# Patient Record
Sex: MALE | Race: WHITE | Employment: OTHER | URBAN - METROPOLITAN AREA
[De-identification: names, ages, dates, MRNs, and addresses within clinical notes are randomized per-mention and may not be internally consistent; named-entity substitution may affect disease eponyms.]

---

## 2017-01-10 ENCOUNTER — ALLIED HEALTH/NURSE VISIT (OUTPATIENT)
Dept: NURSING | Facility: CLINIC | Age: 82
End: 2017-01-10
Payer: COMMERCIAL

## 2017-01-10 DIAGNOSIS — E53.8 VITAMIN B12 DEFICIENCY (NON ANEMIC): Primary | ICD-10-CM

## 2017-01-10 PROCEDURE — 99207 ZZC NO CHARGE NURSE ONLY: CPT

## 2017-01-10 PROCEDURE — 96372 THER/PROPH/DIAG INJ SC/IM: CPT

## 2017-01-10 NOTE — PROGRESS NOTES
The following medication was given:     MEDICATION: Vitamin B12  1000mcg  ROUTE: IM  SITE: Deltoid - Right  DOSE: 1mL  LOT #: [id number]  :  [employer] [place]  EXPIRATION DATE:  5/1/2018  NDC#: 0738-4873-97

## 2017-02-07 ENCOUNTER — ALLIED HEALTH/NURSE VISIT (OUTPATIENT)
Dept: NURSING | Facility: CLINIC | Age: 82
End: 2017-02-07
Payer: COMMERCIAL

## 2017-02-07 DIAGNOSIS — E53.8 VITAMIN B12 DEFICIENCY (NON ANEMIC): Primary | ICD-10-CM

## 2017-02-07 PROCEDURE — 99207 ZZC NO CHARGE NURSE ONLY: CPT | Mod: 25

## 2017-02-27 ENCOUNTER — OFFICE VISIT (OUTPATIENT)
Dept: FAMILY MEDICINE | Facility: CLINIC | Age: 82
End: 2017-02-27
Payer: COMMERCIAL

## 2017-02-27 VITALS
BODY MASS INDEX: 25.31 KG/M2 | HEART RATE: 85 BPM | HEIGHT: 67 IN | SYSTOLIC BLOOD PRESSURE: 122 MMHG | TEMPERATURE: 98.5 F | DIASTOLIC BLOOD PRESSURE: 85 MMHG | WEIGHT: 161.3 LBS | OXYGEN SATURATION: 98 %

## 2017-02-27 DIAGNOSIS — M54.16 RIGHT LUMBAR RADICULOPATHY: Primary | ICD-10-CM

## 2017-02-27 DIAGNOSIS — K52.9 CHRONIC DIARRHEA: ICD-10-CM

## 2017-02-27 DIAGNOSIS — K50.819 CROHN'S DISEASE OF SMALL AND LARGE INTESTINES WITH COMPLICATION (H): ICD-10-CM

## 2017-02-27 PROCEDURE — 99213 OFFICE O/P EST LOW 20 MIN: CPT | Performed by: INTERNAL MEDICINE

## 2017-02-27 RX ORDER — MORPHINE 10 MG/ML
TINCTURE ORAL
Qty: 118 ML | Refills: 0 | Status: SHIPPED | OUTPATIENT
Start: 2017-02-27 | End: 2017-11-07

## 2017-02-27 RX ORDER — DIPHENOXYLATE HCL/ATROPINE 2.5-.025MG
1 TABLET ORAL 4 TIMES DAILY PRN
COMMUNITY
End: 2017-05-25

## 2017-02-27 RX ORDER — LOPERAMIDE HYDROCHLORIDE 2 MG/1
TABLET ORAL
Qty: 30 TABLET | Refills: 11 | Status: SHIPPED | OUTPATIENT
Start: 2017-02-27 | End: 2017-06-09

## 2017-02-27 NOTE — NURSING NOTE
"Chief Complaint   Patient presents with     Musculoskeletal Problem     Hip       Initial /85 (BP Location: Right arm, Patient Position: Chair, Cuff Size: Adult Regular)  Pulse 85  Temp 98.5  F (36.9  C) (Oral)  Ht 5' 7\" (1.702 m)  Wt 161 lb 4.8 oz (73.2 kg)  SpO2 98%  BMI 25.26 kg/m2 Estimated body mass index is 25.26 kg/(m^2) as calculated from the following:    Height as of this encounter: 5' 7\" (1.702 m).    Weight as of this encounter: 161 lb 4.8 oz (73.2 kg).  Medication Reconciliation: complete       AURELIA Rodriguez MA February 27, 2017 3:22 PM      "

## 2017-02-27 NOTE — MR AVS SNAPSHOT
After Visit Summary   2/27/2017    Dawson Jones    MRN: [id number]           Patient Information     Date Of Birth          5/5/1930        Visit Information        Provider Department      2/27/2017 3:00 PM Judson Mcadams MD [facility]        Today's Diagnoses     Right lumbar radiculopathy    -  1    Chronic diarrhea        Crohn's disease of small and large intestines with complication (H)           Follow-ups after your visit        Your next 10 appointments already scheduled     Mar 08, 2017  9:30 AM CST   Office Visit with Judson Mcadams MD   [facility] ([facility])    [address] [facility] [address] [phone]           Bring a current list of meds and any records pertaining to this visit.  For Physicals, please bring immunization records and any forms needing to be filled out.  Please arrive 10 minutes early to complete paperwork.            Mar 27, 2017 10:30 AM CDT   Office Visit with Judson Mcadams MD   [facility] ([facility])    [address] [facility] [id number] [phone]           Bring a current list of meds and any records pertaining to this visit.  For Physicals, please bring immunization records and any forms needing to be filled out.  Please arrive 10 minutes early to complete paperwork.            Apr 04, 2017  9:30 AM CDT   Nurse Only with CS NURSE   [facility] ([facility])    [address] [facility] [id number] [phone]              Who to contact     If you have questions or need follow up information about today's clinic visit or your schedule please contact [facility] directly at [phone].  Normal or non-critical lab and imaging results will be communicated to you by MyChart, letter or phone within 4 business days after the clinic has received the results. If you do not hear from us within 7 days, please contact the clinic through  ""[employer]hart or phone. If you have a critical or abnormal lab result, we will notify you by phone as soon as possible.  Submit refill requests through [employer] or call your pharmacy and they will forward the refill request to us. Please allow 3 business days for your refill to be completed.          Additional Information About Your Visit        "[employer] V&V Information     [employer] lets you send messages to your doctor, view your test results, renew your prescriptions, schedule appointments and more. To sign up, go to [email or web address] . Click on \"Log in\" on the left side of the screen, which will take you to the Welcome page. Then click on \"Sign up Now\" on the right side of the page.     You will be asked to enter the access code listed below, as well as some personal information. Please follow the directions to create your username and password.     Your access code is: [email or web address]  Expires: 2017 10:19 PM     Your access code will  in 90 days. If you need help or a new code, please call your [place] clinic or [phone].        Care EveryWhere ID     This is your Care EveryWhere ID. This could be used by other organizations to access your [place] medical records  [id number]        Your Vitals Were     Pulse Temperature Height Pulse Oximetry BMI (Body Mass Index)       85 98.5  F (36.9  C) (Oral) 5' 7\" (1.702 m) 98% 25.26 kg/m2        Blood Pressure from Last 3 Encounters:   17 122/85   09/15/16 94/70   08/10/16 98/60    Weight from Last 3 Encounters:   17 161 lb 4.8 oz (73.2 kg)   09/15/16 160 lb (72.6 kg)   08/10/16 161 lb 6.4 oz (73.2 kg)              We Performed the Following     MISC DURABLE MEDICAL EQUIP     Patient care order          Today's Medication Changes          These changes are accurate as of: 17 10:19 PM.  If you have any questions, ask your nurse or doctor.               Start taking these medicines.        Dose/Directions    loperamide 2 MG tablet   Commonly known " as:  IMODIUM A-D   Used for:  Chronic diarrhea   Started by:  Judson Mcadams MD        Take 2 tabs (4 mg) after first loose stool, and then take one tab (2 mg) after each diarrheal stool.  Max of 8 tabs (16 mg) per day.   Quantity:  30 tablet   Refills:  11            Where to get your medicines      Some of these will need a paper prescription and others can be bought over the counter.  Ask your nurse if you have questions.     Bring a paper prescription for each of these medications     loperamide 2 MG tablet    opium tincture 10 MG/ML (1%) liquid                Primary Care Provider Office Phone # Fax #    Judson Mcadams [phone]       [facility] [address]  [facility] [address]        Thank you!     Thank you for choosing [facility]  for your care. Our goal is always to provide you with excellent care. Hearing back from our patients is one way we can continue to improve our services. Please take a few minutes to complete the written survey that you may receive in the mail after your visit with us. Thank you!             Your Updated Medication List - Protect others around you: Learn how to safely use, store and throw away your medicines at www.disposemymeds.org.          This list is accurate as of: 2/27/17 10:19 PM.  Always use your most recent med list.                   Brand Name Dispense Instructions for use    cyanocobalamin 1000 MCG/ML injection    VITAMIN B12    1 mL    Inject 1 mL (1,000 mcg) into the muscle every 30 days       diphenoxylate-atropine 2.5-0.025 MG per tablet    LOMOTIL     Take 1 tablet by mouth 4 times daily as needed for diarrhea       ELIQUIS 5 MG tablet   Generic drug:  apixaban ANTICOAGULANT      TK 1 T PO BID       loperamide 2 MG tablet    IMODIUM A-D    30 tablet    Take 2 tabs (4 mg) after first loose stool, and then take one tab (2 mg) after each diarrheal stool.  Max of 8 tabs (16 mg) per day.       metoprolol 50 MG tablet     LOPRESSOR    180 tablet    Take 1 tablet (50 mg) by mouth 2 times daily       opium tincture 10 MG/ML (1%) liquid     118 mL    8 drops every 4 hours as needed for diarrhea

## 2017-02-27 NOTE — PROGRESS NOTES
SUBJECTIVE:                                                    Dawson Jones is a 86 year old male who presents to clinic today for the following health issues:      Musculoskeletal problem/pain      Duration: 1 year    Description  Location: Right Hip    Intensity:  3/10    Accompanying signs and symptoms: radiation of pain to Right Leg ; no new right leg weakness    History  Previous similar problem: YES- Cortizone Injection 08/2016; 7/26  Previous evaluation:  x-ray    Precipitating or alleviating factors:  Trauma or overuse: no   Aggravating factors include: standing and walking    Therapies tried and outcome: nothing           Problem list and histories reviewed & adjusted, as indicated.  Additional history: as documented    Patient Active Problem List   Diagnosis     Atrial fibrillation (H)     Crohn's disease of small and large intestines with complication (H)     Spinal stenosis of lumbosacral region     Vitamin B12 deficiency (non anemic)     Past Surgical History   Procedure Laterality Date     Appendectomy         Social History   Substance Use Topics     Smoking status: Former Smoker     Smokeless tobacco: Never Used      Comment: 40 years ago     Alcohol use 4.2 oz/week     7 Standard drinks or equivalent per week     Family History   Problem Relation Age of Onset     HEART DISEASE No family hx of          Current Outpatient Prescriptions   Medication Sig Dispense Refill     diphenoxylate-atropine (LOMOTIL) 2.5-0.025 MG per tablet Take 1 tablet by mouth 4 times daily as needed for diarrhea       loperamide (IMODIUM A-D) 2 MG tablet Take 2 tabs (4 mg) after first loose stool, and then take one tab (2 mg) after each diarrheal stool.  Max of 8 tabs (16 mg) per day. 30 tablet 11     opium tincture 10 MG/ML (1%) liquid 8 drops every 4 hours as needed for diarrhea 118 mL 0     metoprolol (LOPRESSOR) 50 MG tablet Take 1 tablet (50 mg) by mouth 2 times daily 180 tablet 3     cyanocobalamin (VITAMIN B12)  "1000 MCG/ML injection Inject 1 mL (1,000 mcg) into the muscle every 30 days 1 mL 11     ELIQUIS 5 MG tablet TK 1 T PO BID  3     [DISCONTINUED] opium tincture 10 MG/ML (1%) liquid 8 drops every 4 hours as needed for diarrhea 118 mL 0     Allergies   Allergen Reactions     No Known Allergies        ROS:  C: NEGATIVE for fever, chills, change in weight  R: NEGATIVE for significant cough or SOB  CV: NEGATIVE for chest pain, palpitations or peripheral edema    OBJECTIVE:                                                    /85 (BP Location: Right arm, Patient Position: Chair, Cuff Size: Adult Regular)  Pulse 85  Temp 98.5  F (36.9  C) (Oral)  Ht 5' 7\" (1.702 m)  Wt 161 lb 4.8 oz (73.2 kg)  SpO2 98%  BMI 25.26 kg/m2  Body mass index is 25.26 kg/(m^2).  GEN:  Well appearing man, comfortable   BACK:  No tenderness to palpation over the spinous processes of the thoracic and lumbar spine, deep tendon reflexes are 2+ at the bilateral patella and Achilles tendons, there is 5 out of 5 muscle strength in all lower extremity muscle groups, there is normal sensation to light touch in all lower extremity dermatomes, straight leg raise does not elicit radicular symptoms bilaterally, there was no tenderness to palpation over the paraspinous muscles of the lumbar spine. Gait is normal.     Diagnostic Test Results:  Results for orders placed or performed in visit on 06/16/16   Lipid Profile   Result Value Ref Range    Cholesterol 177 100 - 199 mg/dL    Triglycerides 75 0 - 149 mg/dL    HDL Cholesterol 62 >39 mg/dL    LDL Cholesterol Calculated 100 (H) 0 - 99 mg/dL    Narrative    [facility] Internal Medicine lab scan 6/16/16.   Hemoglobin A1c   Result Value Ref Range    Hemoglobin A1C 5.2 4.4 - 6.2 %    Narrative    [facility] Internal Medicine lab scan 6/16/16.   Lipid Profile   Result Value Ref Range    Cholesterol 177 100 - 199 mg/dL    Triglycerides 67 0 - 149 mg/dL    HDL Cholesterol 59 >39 mg/dL    LDL Cholesterol Calculated " 105 (H) 0 - 99 mg/dL    Narrative    [facility] Internal Medicine lab scan 6/16/16.   Hemoglobin A1c   Result Value Ref Range    Hemoglobin A1C 5.4 4.4 - 6.2 %    Narrative    [facility] Internal Medicine lab scan 6/16/16.        ASSESSMENT/PLAN:                                                            1. Right lumbar radiculopathy  Refer back to SI for trans foraminal steroid injection (he will need to hold NOAC prior to injection, discussed with wife); return her in 3-4 weeks to assess therapy; if that does not help enough then consider cymbalta or neurontin; he has already seen spine surgery and not considered surgical candidate   - Patient care order  - MISC DURABLE MEDICAL EQUIP    2. Chronic diarrhea    - loperamide (IMODIUM A-D) 2 MG tablet; Take 2 tabs (4 mg) after first loose stool, and then take one tab (2 mg) after each diarrheal stool.  Max of 8 tabs (16 mg) per day.  Dispense: 30 tablet; Refill: 11    3. Crohn's disease of small and large intestines with complication (H)    - opium tincture 10 MG/ML (1%) liquid; 8 drops every 4 hours as needed for diarrhea  Dispense: 118 mL; Refill: 0    FUTURE APPOINTMENTS:       - Follow-up visit in 3-4 weeks to assess symptoms after injection     Judson Mcadams MD  [facility]

## 2017-03-06 ENCOUNTER — TRANSFERRED RECORDS (OUTPATIENT)
Dept: HEALTH INFORMATION MANAGEMENT | Facility: CLINIC | Age: 82
End: 2017-03-06

## 2017-03-08 ENCOUNTER — OFFICE VISIT (OUTPATIENT)
Dept: NURSING | Facility: CLINIC | Age: 82
End: 2017-03-08
Payer: COMMERCIAL

## 2017-03-08 DIAGNOSIS — E53.8 VITAMIN B12 DEFICIENCY (NON ANEMIC): Primary | ICD-10-CM

## 2017-03-08 PROCEDURE — 96372 THER/PROPH/DIAG INJ SC/IM: CPT

## 2017-03-08 PROCEDURE — 99207 ZZC NO CHARGE NURSE ONLY: CPT | Mod: 25

## 2017-03-08 NOTE — MR AVS SNAPSHOT
After Visit Summary   3/8/2017    Dawson Jones    MRN: [id number]           Patient Information     Date Of Birth          5/5/1930        Visit Information        Provider Department      3/8/2017 9:30 AM CS NURSE [facility]        Today's Diagnoses     Vitamin B12 deficiency (non anemic)    -  1       Follow-ups after your visit        Your next 10 appointments already scheduled     Mar 27, 2017 10:30 AM CDT   Office Visit with Judson Mcadams MD   [facility] ([facility])    [address] [facility] [address] [phone]           Bring a current list of meds and any records pertaining to this visit.  For Physicals, please bring immunization records and any forms needing to be filled out.  Please arrive 10 minutes early to complete paperwork.            Apr 04, 2017  9:30 AM CDT   Nurse Only with CS NURSE   [facility] ([facility])    [address] [facility] [address] [phone]            May 02, 2017  9:30 AM CDT   Nurse Only with CS NURSE   [facility] ([facility])    [address] [facility] [address] [phone]              Who to contact     If you have questions or need follow up information about today's clinic visit or your schedule please contact [facility] directly at [phone].  Normal or non-critical lab and imaging results will be communicated to you by MyChart, letter or phone within 4 business days after the clinic has received the results. If you do not hear from us within 7 days, please contact the clinic through [employer] or phone. If you have a critical or abnormal lab result, we will notify you by phone as soon as possible.  Submit refill requests through [employer] or call your pharmacy and they will forward the refill request to us. Please allow 3 business days for your refill to be completed.          Additional Information About Your Visit        [employer] [facility]  "Information     [employer] lets you send messages to your doctor, view your test results, renew your prescriptions, schedule appointments and more. To sign up, go to [email or web address] . Click on \"Log in\" on the left side of the screen, which will take you to the Welcome page. Then click on \"Sign up Now\" on the right side of the page.     You will be asked to enter the access code listed below, as well as some personal information. Please follow the directions to create your username and password.     Your access code is: [email or web address]  Expires: 2017 10:19 PM     Your access code will  in 90 days. If you need help or a new code, please call your [place] clinic or [phone].        Care EveryWhere ID     This is your Care EveryWhere ID. This could be used by other organizations to access your [place] medical records  [id number]         Blood Pressure from Last 3 Encounters:   17 122/85   09/15/16 94/70   08/10/16 98/60    Weight from Last 3 Encounters:   17 161 lb 4.8 oz (73.2 kg)   09/15/16 160 lb (72.6 kg)   08/10/16 161 lb 6.4 oz (73.2 kg)              We Performed the Following     INJECTION INTRAMUSCULAR OR SUB-Q     VITAMIN B12 INJ /1000MCG        Primary Care Provider Office Phone # Fax #    Judson Mcadams [phone]       [facility] [address]  [facility] [address]        Thank you!     Thank you for choosing [facility]  for your care. Our goal is always to provide you with excellent care. Hearing back from our patients is one way we can continue to improve our services. Please take a few minutes to complete the written survey that you may receive in the mail after your visit with us. Thank you!             Your Updated Medication List - Protect others around you: Learn how to safely use, store and throw away your medicines at www.disposemymeds.org.          This list is accurate as of: 3/8/17 11:59 PM.  Always use your most recent med " list.                   Brand Name Dispense Instructions for use    cyanocobalamin 1000 MCG/ML injection    VITAMIN B12    1 mL    Inject 1 mL (1,000 mcg) into the muscle every 30 days       diphenoxylate-atropine 2.5-0.025 MG per tablet    LOMOTIL     Take 1 tablet by mouth 4 times daily as needed for diarrhea       ELIQUIS 5 MG tablet   Generic drug:  apixaban ANTICOAGULANT      TK 1 T PO BID       loperamide 2 MG tablet    IMODIUM A-D    30 tablet    Take 2 tabs (4 mg) after first loose stool, and then take one tab (2 mg) after each diarrheal stool.  Max of 8 tabs (16 mg) per day.       metoprolol 50 MG tablet    LOPRESSOR    180 tablet    Take 1 tablet (50 mg) by mouth 2 times daily       opium tincture 10 MG/ML (1%) liquid     118 mL    8 drops every 4 hours as needed for diarrhea

## 2017-03-27 ENCOUNTER — OFFICE VISIT (OUTPATIENT)
Dept: FAMILY MEDICINE | Facility: CLINIC | Age: 82
End: 2017-03-27
Payer: COMMERCIAL

## 2017-03-27 VITALS
DIASTOLIC BLOOD PRESSURE: 87 MMHG | TEMPERATURE: 97.9 F | HEART RATE: 74 BPM | BODY MASS INDEX: 25.11 KG/M2 | OXYGEN SATURATION: 98 % | SYSTOLIC BLOOD PRESSURE: 133 MMHG | WEIGHT: 160 LBS | HEIGHT: 67 IN

## 2017-03-27 DIAGNOSIS — R63.4 ABNORMAL LOSS OF WEIGHT: ICD-10-CM

## 2017-03-27 DIAGNOSIS — Z13.220 LIPID SCREENING: ICD-10-CM

## 2017-03-27 DIAGNOSIS — E53.8 VITAMIN B12 DEFICIENCY (NON ANEMIC): Primary | ICD-10-CM

## 2017-03-27 DIAGNOSIS — M48.07 SPINAL STENOSIS OF LUMBOSACRAL REGION: ICD-10-CM

## 2017-03-27 DIAGNOSIS — M70.61 TROCHANTERIC BURSITIS OF RIGHT HIP: ICD-10-CM

## 2017-03-27 LAB
ERYTHROCYTE [DISTWIDTH] IN BLOOD BY AUTOMATED COUNT: 12.7 % (ref 10–15)
HCT VFR BLD AUTO: 41.1 % (ref 40–53)
HGB BLD-MCNC: 13.7 G/DL (ref 13.3–17.7)
MCH RBC QN AUTO: 34.4 PG (ref 26.5–33)
MCHC RBC AUTO-ENTMCNC: 33.3 G/DL (ref 31.5–36.5)
MCV RBC AUTO: 103 FL (ref 78–100)
PLATELET # BLD AUTO: 143 10E9/L (ref 150–450)
RBC # BLD AUTO: 3.98 10E12/L (ref 4.4–5.9)
VIT B12 SERPL-MCNC: 551 PG/ML (ref 193–986)
WBC # BLD AUTO: 8 10E9/L (ref 4–11)

## 2017-03-27 PROCEDURE — 36415 COLL VENOUS BLD VENIPUNCTURE: CPT | Performed by: INTERNAL MEDICINE

## 2017-03-27 PROCEDURE — 80053 COMPREHEN METABOLIC PANEL: CPT | Performed by: INTERNAL MEDICINE

## 2017-03-27 PROCEDURE — 85027 COMPLETE CBC AUTOMATED: CPT | Performed by: INTERNAL MEDICINE

## 2017-03-27 PROCEDURE — 80061 LIPID PANEL: CPT | Performed by: INTERNAL MEDICINE

## 2017-03-27 PROCEDURE — 84443 ASSAY THYROID STIM HORMONE: CPT | Performed by: INTERNAL MEDICINE

## 2017-03-27 PROCEDURE — 99213 OFFICE O/P EST LOW 20 MIN: CPT | Mod: 25 | Performed by: INTERNAL MEDICINE

## 2017-03-27 PROCEDURE — 82607 VITAMIN B-12: CPT | Performed by: INTERNAL MEDICINE

## 2017-03-27 RX ORDER — DULOXETIN HYDROCHLORIDE 30 MG/1
CAPSULE, DELAYED RELEASE ORAL
Qty: 60 CAPSULE | Refills: 3 | Status: SHIPPED | OUTPATIENT
Start: 2017-03-27 | End: 2017-04-26

## 2017-03-27 NOTE — MR AVS SNAPSHOT
After Visit Summary   3/27/2017    Dawson Jones    MRN: [id number]           Patient Information     Date Of Birth          5/5/1930        Visit Information        Provider Department      3/27/2017 10:30 AM Judson Mcadams MD [facility]        Today's Diagnoses     Vitamin B12 deficiency (non anemic)    -  1    Trochanteric bursitis of right hip        Abnormal loss of weight        Spinal stenosis of lumbosacral region        Lipid screening           Follow-ups after your visit        Follow-up notes from your care team     Return in about 4 weeks (around 4/24/2017) for Routine Visit.      Your next 10 appointments already scheduled     Apr 04, 2017  9:30 AM CDT   Nurse Only with CS NURSE   [facility] ([facility])    [address]  [place] [address] [phone]            Apr 26, 2017 10:30 AM CDT   Office Visit with Judson Mcadams MD   [facility] ([facility]    [address] [facility] [address] [phone]           Bring a current list of meds and any records pertaining to this visit.  For Physicals, please bring immunization records and any forms needing to be filled out.  Please arrive 10 minutes early to complete paperwork.            May 02, 2017  9:30 AM CDT   Nurse Only with CS NURSE   [facility]    [address]  [facility] [address] [phone]              Who to contact     If you have questions or need follow up information about today's clinic visit or your schedule please contact [facility] directly at [phone].  Normal or non-critical lab and imaging results will be communicated to you by MyChart, letter or phone within 4 business days after the clinic has received the results. If you do not hear from us within 7 days, please contact the clinic through MyChart or phone. If you have a critical or abnormal lab result, we will notify you by  "phone as soon as possible.  Submit refill requests through [employer] or call your pharmacy and they will forward the refill request to us. Please allow 3 business days for your refill to be completed.          Additional Information About Your Visit        [employer] Information     [employer] lets you send messages to your doctor, view your test results, renew your prescriptions, schedule appointments and more. To sign up, go to [email or web address] . Click on \"Log in\" on the left side of the screen, which will take you to the Welcome page. Then click on \"Sign up Now\" on the right side of the page.     You will be asked to enter the access code listed below, as well as some personal information. Please follow the directions to create your username and password.     Your access code is: [email or web address]  Expires: 2017 11:19 PM     Your access code will  in 90 days. If you need help or a new code, please call your [place] clinic or [phone].        Care EveryWhere ID     This is your Care EveryWhere ID. This could be used by other organizations to access your [place] medical records  [id number]        Your Vitals Were     Pulse Temperature Height Pulse Oximetry BMI (Body Mass Index)       74 97.9  F (36.6  C) 5' 7\" (1.702 m) 98% 25.06 kg/m2        Blood Pressure from Last 3 Encounters:   17 133/87   17 122/85   09/15/16 94/70    Weight from Last 3 Encounters:   17 160 lb (72.6 kg)   17 161 lb 4.8 oz (73.2 kg)   09/15/16 160 lb (72.6 kg)              We Performed the Following     CBC with platelets     Comprehensive metabolic panel     Lipid Profile with reflex to direct LDL     TSH     Vitamin B12          Today's Medication Changes          These changes are accurate as of: 3/27/17 11:30 AM.  If you have any questions, ask your nurse or doctor.               Start taking these medicines.        Dose/Directions    DULoxetine 30 MG EC capsule   Commonly known as:  CYMBALTA   Used " for:  Spinal stenosis of lumbosacral region   Started by:  Judson Mcadams MD        One capsule once daily for one week, then increase to once capsule twice daily   Quantity:  60 capsule   Refills:  3            Where to get your medicines      These medications were sent to [id number] - [place], [place] - [address] AT [facility] [address], [facility] [address]    Hours:  24-hours Phone:  [phone]     DULoxetine 30 MG EC capsule                Primary Care Provider Office Phone # Fax #    Judson Mcadams [phone]       [facility] [address]  [facility] [address]        Thank you!     Thank you for choosing [facility]  for your care. Our goal is always to provide you with excellent care. Hearing back from our patients is one way we can continue to improve our services. Please take a few minutes to complete the written survey that you may receive in the mail after your visit with us. Thank you!             Your Updated Medication List - Protect others around you: Learn how to safely use, store and throw away your medicines at www.disposemymeds.org.          This list is accurate as of: 3/27/17 11:30 AM.  Always use your most recent med list.                   Brand Name Dispense Instructions for use    cyanocobalamin 1000 MCG/ML injection    VITAMIN B12    1 mL    Inject 1 mL (1,000 mcg) into the muscle every 30 days       diphenoxylate-atropine 2.5-0.025 MG per tablet    LOMOTIL     Take 1 tablet by mouth 4 times daily as needed for diarrhea       DULoxetine 30 MG EC capsule    CYMBALTA    60 capsule    One capsule once daily for one week, then increase to once capsule twice daily       ELIQUIS 5 MG tablet   Generic drug:  apixaban ANTICOAGULANT      TK 1 T PO BID       loperamide 2 MG tablet    IMODIUM A-D    30 tablet    Take 2 tabs (4 mg) after first loose stool, and then take one tab (2 mg) after each diarrheal  stool.  Max of 8 tabs (16 mg) per day.       metoprolol 50 MG tablet    LOPRESSOR    180 tablet    Take 1 tablet (50 mg) by mouth 2 times daily       opium tincture 10 MG/ML (1%) liquid     118 mL    8 drops every 4 hours as needed for diarrhea

## 2017-03-27 NOTE — LETTER
[facility]  [address] [facility] [facility]  [address]  [place]  [address]  Tel: [phone]    March 28, 2017    Dawson Jones  [address] [facility] [address]        Dear Beltran Harrellen,    The following letter pertains to your most recent diagnostic tests:    Good news! Your labs do not indicate any dangerous causes for weight loss.          Follow up:  I look forward to seeing you in 3-4 weeks to see how the duloxetine is helping with your pain.        Sincerely,    Judson Mcadams MD/Mary      Enclosure: Lab Results  Results for orders placed or performed in visit on 03/27/17   Comprehensive metabolic panel   Result Value Ref Range    Sodium 139 133 - 144 mmol/L    Potassium 4.7 3.4 - 5.3 mmol/L    Chloride 104 94 - 109 mmol/L    Carbon Dioxide 28 20 - 32 mmol/L    Anion Gap 7 3 - 14 mmol/L    Glucose 129 (H) 70 - 99 mg/dL    Urea Nitrogen 17 7 - 30 mg/dL    Creatinine 0.97 0.66 - 1.25 mg/dL    GFR Estimate 73 >60 mL/min/1.7m2    GFR Estimate If Black 89 >60 mL/min/1.7m2    Calcium 8.7 8.5 - 10.1 mg/dL    Bilirubin Total 1.5 (H) 0.2 - 1.3 mg/dL    Albumin 3.8 3.4 - 5.0 g/dL    Protein Total 6.7 (L) 6.8 - 8.8 g/dL    Alkaline Phosphatase 83 40 - 150 U/L    ALT 25 0 - 70 U/L    AST 23 0 - 45 U/L   TSH   Result Value Ref Range    TSH 1.06 0.40 - 4.00 mU/L   CBC with platelets   Result Value Ref Range    WBC 8.0 4.0 - 11.0 10e9/L    RBC Count 3.98 (L) 4.4 - 5.9 10e12/L    Hemoglobin 13.7 13.3 - 17.7 g/dL    Hematocrit 41.1 40.0 - 53.0 %     (H) 78 - 100 fl    MCH 34.4 (H) 26.5 - 33.0 pg    MCHC 33.3 31.5 - 36.5 g/dL    RDW 12.7 10.0 - 15.0 %    Platelet Count 143 (L) 150 - 450 10e9/L   Lipid Profile with reflex to direct LDL   Result Value Ref Range    Cholesterol 171 <200 mg/dL    Triglycerides 74 <150 mg/dL    HDL Cholesterol 72 >39 mg/dL    LDL Cholesterol Calculated 84 <100 mg/dL    Non HDL Cholesterol 99 <130 mg/dL   Vitamin B12   Result Value Ref Range    Vitamin B12 551 193 - 986  pg/mL

## 2017-03-27 NOTE — PROGRESS NOTES
SUBJECTIVE:                                                    Dawson Jones is a 86 year old male who presents to clinic today for the following health issues:      Chief Complaint   Patient presents with     Musculoskeletal Problem     Right hip pain for over a year     Pleasant 86-year-old man with memory problems who presents in follow-up after receiving an epidural steroid injection for chronic left lumbar radiculitis probably related to underlying spinal stenosis of the lumbar sacral area. He is not considered a surgical scan at it based on his advanced age and comorbid problems including atrial fibrillation for which she is anticoagulated for stroke prophylaxis. He is here with his wife who is involved with his care. His pain is not improved with physical therapy or maximum dose acetaminophen. He is not really a candidate for NSAID therapy due to his ongoing use of anticoagulants. Today, he states that most of his pain is localized to his right lateral hip. The pain seems to worsen when he lays on his right side or when he initiates walking. The pain overlying his right lateral hip is different than the pain that he has had for several years. The right lateral hip pain has been present for several weeks although the history is difficult due to his memory loss. He does not think that the lumbar epidural steroid injection helped very much at all. He continues to be inquiring about other strategies for managing his pain. His wife is concerned about a 6 pound weight loss over the last 3 months. The patient denies early satiety, abdominal pain, nausea, vomiting, change in stools. However, again, history due to the patient's memory loss.    Problem list and histories reviewed & adjusted, as indicated.  Additional history: as documented    Patient Active Problem List   Diagnosis     Atrial fibrillation (H)     Crohn's disease of small and large intestines with complication (H)     Spinal stenosis of lumbosacral  "region     Vitamin B12 deficiency (non anemic)     Past Surgical History:   Procedure Laterality Date     APPENDECTOMY         Social History   Substance Use Topics     Smoking status: Former Smoker     Smokeless tobacco: Never Used      Comment: 40 years ago     Alcohol use 4.2 oz/week     7 Standard drinks or equivalent per week     Family History   Problem Relation Age of Onset     HEART DISEASE No family hx of          Current Outpatient Prescriptions   Medication Sig Dispense Refill     DULoxetine (CYMBALTA) 30 MG EC capsule One capsule once daily for one week, then increase to once capsule twice daily 60 capsule 3     diphenoxylate-atropine (LOMOTIL) 2.5-0.025 MG per tablet Take 1 tablet by mouth 4 times daily as needed for diarrhea       loperamide (IMODIUM A-D) 2 MG tablet Take 2 tabs (4 mg) after first loose stool, and then take one tab (2 mg) after each diarrheal stool.  Max of 8 tabs (16 mg) per day. 30 tablet 11     opium tincture 10 MG/ML (1%) liquid 8 drops every 4 hours as needed for diarrhea 118 mL 0     metoprolol (LOPRESSOR) 50 MG tablet Take 1 tablet (50 mg) by mouth 2 times daily 180 tablet 3     cyanocobalamin (VITAMIN B12) 1000 MCG/ML injection Inject 1 mL (1,000 mcg) into the muscle every 30 days 1 mL 11     ELIQUIS 5 MG tablet TK 1 T PO BID  3     Allergies   Allergen Reactions     No Known Allergies        Reviewed and updated as needed this visit by clinical staff  Tobacco  Allergies  Meds  Soc Hx      Reviewed and updated as needed this visit by Provider         ROS:  Neck her review systems is unable to be obtained due to the patient's known memory loss    OBJECTIVE:                                                    /87  Pulse 74  Temp 97.9  F (36.6  C)  Ht 5' 7\" (1.702 m)  Wt 160 lb (72.6 kg)  SpO2 98%  BMI 25.06 kg/m2  Body mass index is 25.06 kg/(m^2).  Gen.: This is a well-appearing elderly man in no acute distress.  His weight on our scale is stable. He appears " well-nourished and is well-groomed.  BACK:  No tenderness to palpation over the spinous processes of the thoracic and lumbar spine, deep tendon reflexes are 2+ at the bilateral patella and Achilles tendons, there is 5 out of 5 muscle strength in all lower extremity muscle groups, there is normal sensation to light touch in all lower extremity dermatomes, straight leg raise does not elicit radicular symptoms bilaterally, there was no tenderness to palpation over the paraspinous muscles of the lumbar spine.  HIPS:  There is full pain free passive range of motion of the right hip joint, there is severe tenderness to palpation over the right greater trochanteric bursa.    Diagnostic Test Results:  Labs pending      ASSESSMENT/PLAN:                                                            1. Trochanteric bursitis of right hip    Options for treatment of right greater trochanteric bursitis were discussed with the patient and his wife. Conservative options would include ice, continued analgesics and rest. Another option would be a cortisone injection which might provide more rapid symptom relief. After discussion of risks and benefits of cortisone injection, the patient and his wife requested to proceed with a cortisone injection. The bursitis is probably related to gait disturbance from the chronic lumbar radiculopathy.      After discussion of risks and benefits, informed consent was obtained.  Area prepped and draped in sterile fashion.  Local anesthesia was obtained with 1% lidocaine.   1cc of K40 was injected into the bursal space.  The procedure was tolerate well and after care was discussed.       2. Vitamin B12 deficiency (non anemic)    - Vitamin B12    3. Abnormal loss of weight  Check labs for reversible causes of weight loss, sometimes weight loss is observed with memory problems, this was discussed with the patient and his wife  - Comprehensive metabolic panel  - TSH  - CBC with platelets    4. Spinal  stenosis of lumbosacral region  For chronic pain related to this problem, discussed risks and benefits of using Cymbalta, after discussion, the patient and his wife would like to proceed with a trial of Cymbalta as below with follow-up in 3-4 weeks to assess therapy  - DULoxetine (CYMBALTA) 30 MG EC capsule; One capsule once daily for one week, then increase to once capsule twice daily  Dispense: 60 capsule; Refill: 3    5. Lipid screening    - Lipid Profile with reflex to direct LDL    FUTURE APPOINTMENTS:       - Follow-up visit in 3-4 weeks, sooner if needed    Judson Mcadams MD  [facility]

## 2017-03-28 LAB
ALBUMIN SERPL-MCNC: 3.8 G/DL (ref 3.4–5)
ALP SERPL-CCNC: 83 U/L (ref 40–150)
ALT SERPL W P-5'-P-CCNC: 25 U/L (ref 0–70)
ANION GAP SERPL CALCULATED.3IONS-SCNC: 7 MMOL/L (ref 3–14)
AST SERPL W P-5'-P-CCNC: 23 U/L (ref 0–45)
BILIRUB SERPL-MCNC: 1.5 MG/DL (ref 0.2–1.3)
BUN SERPL-MCNC: 17 MG/DL (ref 7–30)
CALCIUM SERPL-MCNC: 8.7 MG/DL (ref 8.5–10.1)
CHLORIDE SERPL-SCNC: 104 MMOL/L (ref 94–109)
CHOLEST SERPL-MCNC: 171 MG/DL
CO2 SERPL-SCNC: 28 MMOL/L (ref 20–32)
CREAT SERPL-MCNC: 0.97 MG/DL (ref 0.66–1.25)
GFR SERPL CREATININE-BSD FRML MDRD: 73 ML/MIN/1.7M2
GLUCOSE SERPL-MCNC: 129 MG/DL (ref 70–99)
HDLC SERPL-MCNC: 72 MG/DL
LDLC SERPL CALC-MCNC: 84 MG/DL
NONHDLC SERPL-MCNC: 99 MG/DL
POTASSIUM SERPL-SCNC: 4.7 MMOL/L (ref 3.4–5.3)
PROT SERPL-MCNC: 6.7 G/DL (ref 6.8–8.8)
SODIUM SERPL-SCNC: 139 MMOL/L (ref 133–144)
TRIGL SERPL-MCNC: 74 MG/DL
TSH SERPL DL<=0.05 MIU/L-ACNC: 1.06 MU/L (ref 0.4–4)

## 2017-03-28 NOTE — PROGRESS NOTES
The following letter pertains to your most recent diagnostic tests:    Good news! Your labs do not indicate any dangerous causes for weight loss.          Follow up:  I look forward to seeing you in 3-4 weeks to see how the duloxetine is helping with your pain.        Sincerely,    Dr. Mcadams

## 2017-03-31 ENCOUNTER — TELEPHONE (OUTPATIENT)
Dept: NURSING | Facility: CLINIC | Age: 82
End: 2017-03-31

## 2017-03-31 ENCOUNTER — HOSPITAL ENCOUNTER (EMERGENCY)
Facility: CLINIC | Age: 82
Discharge: LEFT BEFORE DISCHARGE | End: 2017-03-31
Admitting: PHYSICIAN ASSISTANT
Payer: COMMERCIAL

## 2017-03-31 ENCOUNTER — TELEPHONE (OUTPATIENT)
Dept: FAMILY MEDICINE | Facility: CLINIC | Age: 82
End: 2017-03-31

## 2017-03-31 ENCOUNTER — HOSPITAL ENCOUNTER (EMERGENCY)
Facility: CLINIC | Age: 82
Discharge: HOME | End: 2017-03-31
Attending: EMERGENCY MEDICINE | Admitting: EMERGENCY MEDICINE
Payer: COMMERCIAL

## 2017-03-31 VITALS
DIASTOLIC BLOOD PRESSURE: 84 MMHG | BODY MASS INDEX: 24.25 KG/M2 | OXYGEN SATURATION: 97 % | WEIGHT: 160 LBS | TEMPERATURE: 96.6 F | HEIGHT: 68 IN | RESPIRATION RATE: 16 BRPM | SYSTOLIC BLOOD PRESSURE: 122 MMHG

## 2017-03-31 VITALS
TEMPERATURE: 98.2 F | HEIGHT: 69 IN | OXYGEN SATURATION: 98 % | HEART RATE: 102 BPM | WEIGHT: 160 LBS | RESPIRATION RATE: 16 BRPM | DIASTOLIC BLOOD PRESSURE: 85 MMHG | BODY MASS INDEX: 23.7 KG/M2 | SYSTOLIC BLOOD PRESSURE: 133 MMHG

## 2017-03-31 DIAGNOSIS — R33.9 URINARY RETENTION WITH INCOMPLETE BLADDER EMPTYING: ICD-10-CM

## 2017-03-31 LAB
ALBUMIN UR-MCNC: 10 MG/DL
APPEARANCE UR: CLEAR
BILIRUB UR QL STRIP: NEGATIVE
COLOR UR AUTO: YELLOW
GLUCOSE UR STRIP-MCNC: NEGATIVE MG/DL
HGB UR QL STRIP: NEGATIVE
KETONES UR STRIP-MCNC: 5 MG/DL
LEUKOCYTE ESTERASE UR QL STRIP: NEGATIVE
MUCOUS THREADS #/AREA URNS LPF: PRESENT /LPF
NITRATE UR QL: NEGATIVE
PH UR STRIP: 5.5 PH (ref 5–7)
RBC #/AREA URNS AUTO: 0 /HPF (ref 0–2)
SP GR UR STRIP: 1.02 (ref 1–1.03)
SPERM #/AREA URNS HPF: PRESENT /HPF
URN SPEC COLLECT METH UR: ABNORMAL
UROBILINOGEN UR STRIP-MCNC: NORMAL MG/DL (ref 0–2)
WBC #/AREA URNS AUTO: 1 /HPF (ref 0–2)

## 2017-03-31 PROCEDURE — 51702 INSERT TEMP BLADDER CATH: CPT

## 2017-03-31 PROCEDURE — 51798 US URINE CAPACITY MEASURE: CPT

## 2017-03-31 PROCEDURE — 99284 EMERGENCY DEPT VISIT MOD MDM: CPT | Mod: 25

## 2017-03-31 PROCEDURE — 40000268 ZZH STATISTIC NO CHARGES

## 2017-03-31 PROCEDURE — 81001 URINALYSIS AUTO W/SCOPE: CPT | Performed by: EMERGENCY MEDICINE

## 2017-03-31 ASSESSMENT — ENCOUNTER SYMPTOMS
DYSURIA: 0
DIFFICULTY URINATING: 1
FEVER: 0
ABDOMINAL PAIN: 0
CONSTIPATION: 0
BACK PAIN: 1
HEMATURIA: 0

## 2017-03-31 NOTE — DISCHARGE INSTRUCTIONS
Urinary Retention (Male)  Urinary retention is the medical term for difficulty or inability to pass urine, even though your bladder is full.  Causes  The most common cause of urinary retention in men is the bladder outlet being blocked. This can be due to an enlarged prostate gland or a bladder infection. Certain medicines can also cause this problem. This condition is more likely to occur as men get older.    This condition is treated by insertion of a catheter into the bladder to drain the urine. This provides immediate relief. The catheter may need to remain in place for a few days to prevent a recurrence. The catheter has a balloon on the tip which was inflated after insertion. This prevents the catheter from falling out.  Symptoms  Common symptoms of urinary retention include:    Pain (not experienced by everyone)    Frequent urination    Feeling that the bladder is still full after urinating    Incontinence (not being able to control the release of urine)    Swollen abdomen  Treatment  This condition is treated by inserting a tube (catheter) into the bladder to drain the urine. This provides immediate relief. The catheter may need to stay in place for a few days. The catheter has a balloon on the tip, which is inflated after insertion. This prevents the catheter from falling out.  Home care    If you were given antibiotics, take them until they are used up, or your healthcare provider tells you to stop. It is important to finish the antibiotics even though you feel better. This is to make sure your infection has cleared.    If a catheter was left in place, it is important to keep bacteria from getting into the collection bag. Do not disconnect the catheter from the collection bag.    Use a leg band to secure the drainage tube, so it does not pull on the catheter. Drain the collection bag when it becomes full using the drain spout at the bottom of the bag.    Do not pull on or try to remove your catheter.  This will injure your urethra. The catheter must be removed by a healthcare provider.  Follow-up care  Follow up with your healthcare provider, or as advised.  If a catheter was left in place, it can usually be removed within 3 to 7 days. Some conditions require the catheter to stay in longer. Your healthcare provider will tell you when to return to have the catheter removed.  When to seek medical advice  Call your healthcare provider right away if any of these occur:    Fever of 100.4 F (38 C) or higher, or as directed by your healthcare provider    Bladder or lower-abdominal pain or fullness    Abdominal swelling, nausea, vomiting, or back pain    Blood or urine leakage around the catheter    Bloody urine coming from the catheter (if a new symptom)    Weakness, dizziness, or fainting    Confusion or change in usual level of alertness    If a catheter was left in place, return if:    Catheter falls out    Catheter stops draining for 6 hours    7546-3458 [employer]. [address] [place] [address]. All rights reserved. This information is not intended as a substitute for professional medical care. Always follow your healthcare professional's instructions.          Mcdowell Catheter Care    A Mcdowell catheter is a rubber tube that is placed through the urethra (opening where urine comes out) and into the bladder. This helps drain urine from the bladder. There is a small balloon on the end of the tube that is inflated after insertion. This keeps the catheter from sliding out of the bladder.  A Mcdowell catheter is used to treat urinary retention (unable to pass urine). It is also used when there is incontinence (loss of bladder control).  Home care    Finish taking any prescribed antibiotic even if you are feeling better before then.    It is important to keep bacteria from getting into the collection bag. Do not disconnect the catheter from the collection bag.    Use a leg band to secure the drainage  tube, so it does not pull on the catheter. Drain the collection bag when it becomes full using the drain spout at the bottom of the bag.    Do not try to pull or remove your catheter. This will injure your urethra. It must be removed by a doctor or nurse.  Follow-up care  Follow up with your doctor as advised for repeat urine testing and catheter removal or replacement.  When to seek medical care  Get prompt medical attention if any of the following occur:    Fever of 100.4 F (38 C) or higher, or as directed by your health care provider    Bladder pain or fullness    Abdominal swelling, nausea or vomiting or back pain    Blood or urine leakage around the catheter    Bloody urine coming from the catheter (if a new symptom)    Catheter falls out    Catheter stops draining for 6 hours    Weakness, dizziness or fainting    3870-8104 [employer]. [address] [place] [address], [place] [address]. All rights reserved. This information is not intended as a substitute for professional medical care. Always follow your healthcare professional's instructions.

## 2017-03-31 NOTE — TELEPHONE ENCOUNTER
I called and spoke with Pauly (wife)  She states that Oscar was started on Duloxetine earlier this week.   Before starting medication, Oscar had a small issue with the urge to go to bathroom and would urinate a small amount  Now Pauly reports that since starting medication, Oscar has been having the urge to go to the bathroom every hour but is only urinating a very small volume.   There is slight pain when he is able to urinate.     Patient is not followed by Urology.     PCP: Would you recommend that patient go to Urgent Care today? Would you be able to see patient some time today?     Please advise.    Thank you!    Oliva Valentin RN

## 2017-03-31 NOTE — TELEPHONE ENCOUNTER
"Placed call.  Information given to spouse, Pauly,  from PCP.  States understood and agreed with advise.  Will stop Cymbalta and review at next OV.    Reports patient is \"very uncomfortable...running to the bathroom every 15 minutes.\"  Voiding small amts.    Denies burning or pain with urination.   Denies fever, vomiting, flank pain, hematuria.      Plan to go to [facility] now.      Ivette Mcduffie RN      "

## 2017-03-31 NOTE — ED PROVIDER NOTES
History     Chief Complaint:  Urinary retention    The history is provided by the patient and the spouse.      Dawson Jones is an 86 year old male with a history of spinal stenosis, crohn's disease and atrial fibrillation on eliquis who presents to the ED for evaluation urinary retention. The patient states that he hasn't been able to urinate significantly for the past 1.5 days. He suggests that he may have not actually emptied his bladder for a long time now. He feels like he has a full bladder, but denies any significant pain. He denies fevers, dysuria, hematuria or constipation. He denies any previous issues with his prostate. The patient was recently started on cymbalta for his chronic back pain secondary to spinal stenosis. He denies any recent diet changes.     Allergies:  No known drug allergies.     Medications:    Cymbalta  Lomotil  Imodium  Opium tincture  Lopressor  Vitamin B12  Eliquis     Past Medical History:    Atrial fibrillation  Cardiomyopathy  Crohn's disease  Hyperlipidemia  Spinal stenosis    Past Surgical History:    Appendectomy    Family History:    History reviewed. No pertinent family history.    Social History:  Marital Status:   Presents to the ED with his wife  Tobacco Use: Former smoker  Alcohol Use: Yes  PCP: Judson Mcadams     Review of Systems   Constitutional: Negative for fever.   Gastrointestinal: Negative for abdominal pain and constipation.   Genitourinary: Positive for difficulty urinating. Negative for dysuria and hematuria.   Musculoskeletal: Positive for back pain.   All other systems reviewed and are negative.      Physical Exam   First Vitals:  BP: 122/84 mmHg  Heart Rate: 95   Resp: 16  SpO2: 97% RA  Temp: 96.6  F (oral)       Physical Exam    Constitutional:  Patient is oriented to person, place, and time. They appear well-developed and well-nourished. No distress.  HENT:   Mouth/Throat:   Oropharynx is clear and moist.   Eyes:    Conjunctivae normal and  EOM are normal. Pupils are equal, round, and reactive to light.   Neck:    Normal range of motion.   Cardiovascular: Normal rate, regular rhythm and normal heart sounds.  Exam reveals no gallop and no friction rub.  No murmur heard.  Pulmonary/Chest:  Effort normal and breath sounds normal. Patient has no wheezes. Patient has no rales.   Abdominal:   Soft. Bowel sounds are normal. Patient exhibits no mass. There is no tenderness. There is no rebound and no guarding. No suprapubic tenderness.  Musculoskeletal:  Normal range of motion. Patient exhibits no edema.   Neurological:   Patient is alert and oriented to person, place, and time. Patient has normal strength. No cranial nerve deficit or sensory deficit. GCS 15  Skin:   Skin is warm and dry. No rash noted. No erythema.   Psychiatric:   Patient has a normal mood and affect. Patient's behavior is normal. Judgment and thought content normal.   :    Urinary catheter in, draining yellow, clear urine. No gross hematuria.       Emergency Department Course     Laboratory:  UA: Clear yellow urine, 5 ketones, 10 albumin, mucous present, sperm present, otherwise WNL    Emergency Department Course:  Nursing notes and vitals reviewed.  I performed an exam of the patient as documented above. GCS 15.    Urine sample was obtained and sent for laboratory analysis, findings above.    (1445) Bladder scan post-void residual showed 896 cc's. Mcdowell catheter placed. Approximately 950 cc's of urine output.    (1540) I updated the patient on the UA results.    Findings and plan explained to the patient. Patient discharged home with instructions regarding supportive care, medications, and reasons to return. The importance of close follow-up was reviewed.     I personally reviewed the laboratory results with the patient and answered all related questions prior to discharge.       Impression & Plan      Medical Decision Making:  Dawson Jones is an 86 year old male presenting with  urinary retention. A post-void residual was obtained. He did urinate a small amount, but still had greater than 900 cc's on bladder scan. A Mcdowell catheter was placed without any complications. The only new medication that he was started on was duloxetine and this was 3 days ago, so this may indeed by the culprit, he has no prior history of BPH, there was no resistance to indicate a significant prostatic obstruction on insertion of catheter. His PCP was consulted by himself today and advised him to stop it.  Urine shows no evidence of infection and at this time, he will keep that catheter in with the leg bag and follow-up with urology.       Diagnosis:    ICD-10-CM    1. Urinary retention with incomplete bladder emptying R33.9        Disposition:  Discharged to home    IJose Manuel, am serving as a scribe on 3/31/2017 at 2:17 PM to personally document services performed by Dr. Pee MD based on my observations and the provider's statements to me.     3/31/2017    EMERGENCY DEPARTMENT       Ivania Glasgow MD  03/31/17 6700

## 2017-03-31 NOTE — TELEPHONE ENCOUNTER
Patient wife is calling because she states patient is taking Doloxetine and is urinating every 45 minutes and is also having a hard time urinating. Wife states would like to discuss.  Please call

## 2017-03-31 NOTE — ED AVS SNAPSHOT
Emergency Department    [address] [place] [id number]    Phone:  [phone]    Fax:  [phone]                                       Dawson Jones   MRN: [id number]    Department:   Emergency Department   Date of Visit:  3/31/2017           After Visit Summary Signature Page     I have received my discharge instructions, and my questions have been answered. I have discussed any challenges I see with this plan with the nurse or doctor.    ..........................................................................................................................................  Patient/Patient Representative Signature      ..........................................................................................................................................  Patient Representative Print Name and Relationship to Patient    ..................................................               ................................................  Date                                            Time    ..........................................................................................................................................  Reviewed by Signature/Title    ...................................................              ..............................................  Date                                                            Time

## 2017-03-31 NOTE — ED AVS SNAPSHOT
Emergency Department    [address] [place] [address]    Phone:  [phone]    Fax:  [phone]                                       Dawson Jones   MRN: [id number]    Department:   Emergency Department   Date of Visit:  3/31/2017           Patient Information     Date Of Birth          5/5/1930        Your diagnoses for this visit were:     Urinary retention with incomplete bladder emptying        You were seen by Ivania Glasgow MD.      Follow-up Information     Follow up with Meliton Valverde MD In 3 days.    Specialty:  Urology    Contact information:    [facility]  [address] [place] [address] [phone]          Discharge Instructions         Urinary Retention (Male)  Urinary retention is the medical term for difficulty or inability to pass urine, even though your bladder is full.  Causes  The most common cause of urinary retention in men is the bladder outlet being blocked. This can be due to an enlarged prostate gland or a bladder infection. Certain medicines can also cause this problem. This condition is more likely to occur as men get older.    This condition is treated by insertion of a catheter into the bladder to drain the urine. This provides immediate relief. The catheter may need to remain in place for a few days to prevent a recurrence. The catheter has a balloon on the tip which was inflated after insertion. This prevents the catheter from falling out.  Symptoms  Common symptoms of urinary retention include:    Pain (not experienced by everyone)    Frequent urination    Feeling that the bladder is still full after urinating    Incontinence (not being able to control the release of urine)    Swollen abdomen  Treatment  This condition is treated by inserting a tube (catheter) into the bladder to drain the urine. This provides immediate relief. The catheter may need to stay in place for a few days. The catheter has a balloon on  the tip, which is inflated after insertion. This prevents the catheter from falling out.  Home care    If you were given antibiotics, take them until they are used up, or your healthcare provider tells you to stop. It is important to finish the antibiotics even though you feel better. This is to make sure your infection has cleared.    If a catheter was left in place, it is important to keep bacteria from getting into the collection bag. Do not disconnect the catheter from the collection bag.    Use a leg band to secure the drainage tube, so it does not pull on the catheter. Drain the collection bag when it becomes full using the drain spout at the bottom of the bag.    Do not pull on or try to remove your catheter. This will injure your urethra. The catheter must be removed by a healthcare provider.  Follow-up care  Follow up with your healthcare provider, or as advised.  If a catheter was left in place, it can usually be removed within 3 to 7 days. Some conditions require the catheter to stay in longer. Your healthcare provider will tell you when to return to have the catheter removed.  When to seek medical advice  Call your healthcare provider right away if any of these occur:    Fever of 100.4 F (38 C) or higher, or as directed by your healthcare provider    Bladder or lower-abdominal pain or fullness    Abdominal swelling, nausea, vomiting, or back pain    Blood or urine leakage around the catheter    Bloody urine coming from the catheter (if a new symptom)    Weakness, dizziness, or fainting    Confusion or change in usual level of alertness    If a catheter was left in place, return if:    Catheter falls out    Catheter stops draining for 6 hours    1548-0186 [employer]. [address] [place] [address], [place] [address]. All rights reserved. This information is not intended as a substitute for professional medical care. Always follow your healthcare professional's instructions.          Mcdowell Catheter  Care    A Mcdowell catheter is a rubber tube that is placed through the urethra (opening where urine comes out) and into the bladder. This helps drain urine from the bladder. There is a small balloon on the end of the tube that is inflated after insertion. This keeps the catheter from sliding out of the bladder.  A Mcdowell catheter is used to treat urinary retention (unable to pass urine). It is also used when there is incontinence (loss of bladder control).  Home care    Finish taking any prescribed antibiotic even if you are feeling better before then.    It is important to keep bacteria from getting into the collection bag. Do not disconnect the catheter from the collection bag.    Use a leg band to secure the drainage tube, so it does not pull on the catheter. Drain the collection bag when it becomes full using the drain spout at the bottom of the bag.    Do not try to pull or remove your catheter. This will injure your urethra. It must be removed by a doctor or nurse.  Follow-up care  Follow up with your doctor as advised for repeat urine testing and catheter removal or replacement.  When to seek medical care  Get prompt medical attention if any of the following occur:    Fever of 100.4 F (38 C) or higher, or as directed by your health care provider    Bladder pain or fullness    Abdominal swelling, nausea or vomiting or back pain    Blood or urine leakage around the catheter    Bloody urine coming from the catheter (if a new symptom)    Catheter falls out    Catheter stops draining for 6 hours    Weakness, dizziness or fainting    9952-0528 [employer]. [address] [place] [address]. All rights reserved. This information is not intended as a substitute for professional medical care. Always follow your healthcare professional's instructions.          Discharge References/Attachments     LEG BAG, DISCHARGE INSTRUCTIONS (ENGLISH)      Future Appointments        Provider Department Dept Phone  [place]    4/4/2017 9:30 AM [facility] Nurse [facility] [phone]     4/26/2017 10:30 AM Judson Mcadams MD [facility] [phone]     5/2/2017 9:30 AM [facility] Nurse [facility] [phone]       24 Hour Appointment Hotline       To make an appointment at any [facility], call [phone] ([phone]). If you don't have a family doctor or clinic, we will help you find one. [facility] are conveniently located to serve the needs of you and your family.             Review of your medicines      Our records show that you are taking the medicines listed below. If these are incorrect, please call your family doctor or clinic.        Dose / Directions Last dose taken    cyanocobalamin 1000 MCG/ML injection   Commonly known as:  VITAMIN B12   Dose:  1 mL   Quantity:  1 mL        Inject 1 mL (1,000 mcg) into the muscle every 30 days   Refills:  11        diphenoxylate-atropine 2.5-0.025 MG per tablet   Commonly known as:  LOMOTIL   Dose:  1 tablet        Take 1 tablet by mouth 4 times daily as needed for diarrhea   Refills:  0        DULoxetine 30 MG EC capsule   Commonly known as:  CYMBALTA   Quantity:  60 capsule        One capsule once daily for one week, then increase to once capsule twice daily   Refills:  3        ELIQUIS 5 MG tablet   Generic drug:  apixaban ANTICOAGULANT        TK 1 T PO BID   Refills:  3        loperamide 2 MG tablet   Commonly known as:  IMODIUM A-D   Quantity:  30 tablet        Take 2 tabs (4 mg) after first loose stool, and then take one tab (2 mg) after each diarrheal stool.  Max of 8 tabs (16 mg) per day.   Refills:  11        metoprolol 50 MG tablet   Commonly known as:  LOPRESSOR   Dose:  50 mg   Quantity:  180 tablet        Take 1 tablet (50 mg) by mouth 2 times daily   Refills:  3        opium tincture 10 MG/ML (1%) liquid   Quantity:  118 mL        8 drops every 4 hours as needed for diarrhea    Refills:  0                Procedures and tests performed during your visit     UA with Microscopic      Orders Needing Specimen Collection     None      Pending Results     No orders found from 3/29/2017 to 4/1/2017.            Pending Culture Results     No orders found from 3/29/2017 to 4/1/2017.             Test Results from your hospital stay     3/31/2017  2:58 PM - Interface, Flexilab Results      Component Results     Component Value Ref Range & Units Status    Color Urine Yellow  Final    Appearance Urine Clear  Final    Glucose Urine Negative NEG mg/dL Final    Bilirubin Urine Negative NEG Final    Ketones Urine 5 (A) NEG mg/dL Final    Specific Gravity Urine 1.016 1.003 - 1.035 Final    Blood Urine Negative NEG Final    pH Urine 5.5 5.0 - 7.0 pH Final    Protein Albumin Urine 10 (A) NEG mg/dL Final    Urobilinogen mg/dL Normal 0.0 - 2.0 mg/dL Final    Nitrite Urine Negative NEG Final    Leukocyte Esterase Urine Negative NEG Final    Source Midstream Urine  Final    WBC Urine 1 0 - 2 /HPF Final    RBC Urine 0 0 - 2 /HPF Final    Mucous Urine Present (A) NEG /LPF Final    sperm Present (A) NEG /HPF Final                Clinical Quality Measure: Blood Pressure Screening     Your blood pressure was checked while you were in the emergency department today. The last reading we obtained was  BP: 122/84 . Please read the guidelines below about what these numbers mean and what you should do about them.  If your systolic blood pressure (the top number) is less than 120 and your diastolic blood pressure (the bottom number) is less than 80, then your blood pressure is normal. There is nothing more that you need to do about it.  If your systolic blood pressure (the top number) is 120-139 or your diastolic blood pressure (the bottom number) is 80-89, your blood pressure may be higher than it should be. You should have your blood pressure rechecked within a year by a primary care provider.  If your systolic blood  "pressure (the top number) is 140 or greater or your diastolic blood pressure (the bottom number) is 90 or greater, you may have high blood pressure. High blood pressure is treatable, but if left untreated over time it can put you at risk for heart attack, stroke, or kidney failure. You should have your blood pressure rechecked by a primary care provider within the next 4 weeks.  If your provider in the emergency department today gave you specific instructions to follow-up with your doctor or provider even sooner than that, you should follow that instruction and not wait for up to 4 weeks for your follow-up visit.        Thank you for choosing [facility]       Thank you for choosing [facility] for your care. Our goal is always to provide you with excellent care. Hearing back from our patients is one way we can continue to improve our services. Please take a few minutes to complete the written survey that you may receive in the mail after you visit with us. Thank you!        [employer] Information     [employer] lets you send messages to your doctor, view your test results, renew your prescriptions, schedule appointments and more. To sign up, go to [email or web address] . Click on \"Log in\" on the left side of the screen, which will take you to the Welcome page. Then click on \"Sign up Now\" on the right side of the page.     You will be asked to enter the access code listed below, as well as some personal information. Please follow the directions to create your username and password.     Your access code is: [email or web address]  Expires: 2017 11:19 PM     Your access code will  in 90 days. If you need help or a new code, please call your [facility] clinic or [phone].        Care EveryWhere ID     This is your Care EveryWhere ID. This could be used by other organizations to access your [facility] medical records  [id number]        After Visit Summary       This is your record. Keep this with you and show to your community " pharmacist(s) and doctor(s) at your next visit.

## 2017-03-31 NOTE — TELEPHONE ENCOUNTER
I would advise stopping the cymbalta and reporting to urgent care over weekend if symptoms do not resolve to exclude UTI  ER for flank pain, nausea, vomiting or fevers  We can discuss alternatives to cymbalta when he returns

## 2017-03-31 NOTE — TELEPHONE ENCOUNTER
Call Type: Triage Call    Presenting Problem: Wife  laurence fowler carlton they just arrived  home from [place] ED where he had a king catheter inserted for urinary  retention and now has bright red blood coming from penis around  catheter.  Urine is collecting in bag and is only light pink in  colore; discharge instructions fro ED state to  seek medical  advise  for blood leaking around catheter; due to late hour of day, advised  to go back to ED.  Triage Note:  Guideline Title: Urinary Symptoms - Male ; Urinary Symptoms - Male  Recommended Disposition: See Provider within 2 Weeks  Original Inclination: Would have called ED  Override Disposition: See ED Immediately  Intended Action: Go to Hospital / ED  Physician Contacted: No  All other situations ?  YES  Blood in urine ? NO  Gradual onset of difficulty controlling bladder ? NO  Flank pain ? NO  New or worsening signs and symptoms that may indicate shock ? NO  Evaluated by provider AND symptoms worsening after following recommended treatment  plan for 72 hours ? NO  Increased frequency of urination AND increased thirst or appetite, unexplained  weight loss, or unexplained tiredness ? NO  Difficulty starting or stopping stream, slow stream, or dribbling at end of  urination ? NO  Sudden onset of pain in testicle(s), groin, or lower abdomen AND testicle(s)  swollen or tender to touch ? NO  Unbearable abdominal/pelvic pain ? NO  Current or recent urinary tract instrumentation AND urinary tract symptoms OR no  urine flow ? NO  Urinary tract symptoms AND fever 101.5 F (38.6 C) or higher or vomiting ? NO  Urinary tract symptoms AND any flank or low back, penis or scrotal pain ? NO  Evaluated by provider AND no improvement in symptoms after following treatment  plan for the time specified by provider ? NO  Frequent urination without other symptoms ? NO  Increased urinary volume without other symptoms ? NO  Has one or more urinary tract symptoms AND has not been previously  evaluated ? NO  Urine color change (other than straw colored) not related to consuming food or  food dyes AND not previously evaluated ? NO  Evaluated by provider and has question/concern about their condition, treatment  plan, treatment options, follow-up appointments, or other follow-up care. ? NO  Nighttime urination (3 or more times) continues after limiting fluids two to three  hours before bedtime. ? NO  No urination for 12 or more hours ? NO  Pain with or following ejaculation not previously evaluated ? NO  Pain in rectum with urination not previously evaluated ? NO  Any temperature elevation in an immunocompromised individual OR frail elderly with  signs of dehydration ? NO  Any other unexpected urinary symptoms following urinary tract or abdominal surgery  within timeframe specified by provider ? NO  Any new or unusual discharge from penis not previously evaluated. ? NO  Physician Instructions:  Care Advice: Do not change prescribed medications, dosing regimen, or other  treatments until consulting with your provider.  SYMPTOM / CONDITION MANAGEMENT  Empty your bladder completely every 2 to 3 hours when possible, and before  going to bed.

## 2017-04-04 ENCOUNTER — ALLIED HEALTH/NURSE VISIT (OUTPATIENT)
Dept: NURSING | Facility: CLINIC | Age: 82
End: 2017-04-04
Payer: COMMERCIAL

## 2017-04-04 DIAGNOSIS — E53.8 VITAMIN B12 DEFICIENCY (NON ANEMIC): Primary | ICD-10-CM

## 2017-04-04 PROCEDURE — 99207 ZZC NO CHARGE NURSE ONLY: CPT

## 2017-04-04 PROCEDURE — 96372 THER/PROPH/DIAG INJ SC/IM: CPT

## 2017-04-26 ENCOUNTER — OFFICE VISIT (OUTPATIENT)
Dept: FAMILY MEDICINE | Facility: CLINIC | Age: 82
End: 2017-04-26
Payer: COMMERCIAL

## 2017-04-26 VITALS
SYSTOLIC BLOOD PRESSURE: 110 MMHG | WEIGHT: 157 LBS | HEIGHT: 69 IN | DIASTOLIC BLOOD PRESSURE: 73 MMHG | TEMPERATURE: 97.7 F | HEART RATE: 76 BPM | BODY MASS INDEX: 23.25 KG/M2 | OXYGEN SATURATION: 99 %

## 2017-04-26 DIAGNOSIS — N40.1 BENIGN NON-NODULAR PROSTATIC HYPERPLASIA WITH LOWER URINARY TRACT SYMPTOMS: ICD-10-CM

## 2017-04-26 DIAGNOSIS — M48.07 SPINAL STENOSIS OF LUMBOSACRAL REGION: Primary | ICD-10-CM

## 2017-04-26 PROCEDURE — 99213 OFFICE O/P EST LOW 20 MIN: CPT | Performed by: INTERNAL MEDICINE

## 2017-04-26 RX ORDER — TAMSULOSIN HYDROCHLORIDE 0.4 MG/1
0.4 CAPSULE ORAL DAILY
Qty: 90 CAPSULE | Refills: 3 | COMMUNITY
Start: 2017-04-26 | End: 2018-10-31

## 2017-04-26 NOTE — PROGRESS NOTES
"  SUBJECTIVE:                                                    Dawson Jones is a 86 year old male who presents to clinic today for the following health issues:      ED/UC Followup:    Facility:   ER  Date of visit: 03/31/2017  Reason for visit: Urinary retention  Current Status: ***       {additional problems for provider to add:300575}    Problem list and histories reviewed & adjusted, as indicated.  Additional history: {NONE - AS DOCUMENTED:648172::\"as documented\"}    {HIST REVIEW/ LINKS 2:278697}    Reviewed and updated as needed this visit by clinical staff       Reviewed and updated as needed this visit by Provider         {PROVIDER CHARTING PREFERENCE:143726}    "

## 2017-04-26 NOTE — PROGRESS NOTES
"  SUBJECTIVE:                                                    Dawson Jones is a 86 year old male who presents to clinic today for the following health issues:  {Provider please address medication reconciliation discrepancies--rooming staff please delete if no med/rec issues}    Genitourinary - Male     Onset: 03/30/17    Description:   Dysuria (painful urination): { :835246}  Hematuria (blood in urine): { :494001}  Frequency: { :761378}  Are you urinating at night : { :426577}  Hesitancy (delay in urine): { :916076}  Retention (unable to empty): { :342677}  Decrease in urinary flow: { :918257}  Incontinence: { :200388}    Progression of Symptoms:  {.:107232}    Accompanying Signs & Symptoms:  Fever: { :896037}  Back/Flank pain: { :180120}  Urethral discharge: { :590644}  Testicle lumps/masses/pain: { :899187}  Nausea and/or vomiting: { :545209}  Abdominal pain: { :839971}   History:   History of frequent UTI's: { :121444}  History of kidney stones: { :326959}  History of hernias: { :605184}  Personal or Family history of Prostate problems: {.:318085::\"no\"}  Sexually active: { :526133}    Precipitating factors:   ***    Alleviating factors:  ***         Therapies Tried and outcome: {.:692636::\"***\"}; Outcome - ***      {additional problems for provider to add:153871}    Problem list and histories reviewed & adjusted, as indicated.  Additional history: {NONE - AS DOCUMENTED:635234::\"as documented\"}    {HIST REVIEW/ LINKS 2:814166}    Reviewed and updated as needed this visit by clinical staff       Reviewed and updated as needed this visit by Provider         {PROVIDER CHARTING PREFERENCE:481668}    "

## 2017-04-26 NOTE — PROGRESS NOTES
SUBJECTIVE:                                                    Dawson Jones is a 86 year old male who presents to clinic today for the following health issues:      Medication Followup of DULoxetine (CYMBALTA) 30 MG EC capsule    Taking Medication as prescribed: NO    Side Effects:  Yes, urinary retention shortly after starting the medication resolved with 2 weeks of Mcdowell catheter placement. Mcdowell catheter successfully removed at the urology clinic after starting tamsulosin. Currently patient complains of no urinary difficulties or side effects from tamsulosin specifically he denies new dizziness or lightheadedness.    Medication Helping Symptoms:  yes         Problem list and histories reviewed & adjusted, as indicated.  Additional history: as documented    Patient Active Problem List   Diagnosis     Atrial fibrillation (H)     Crohn's disease of small and large intestines with complication (H)     Spinal stenosis of lumbosacral region     Vitamin B12 deficiency (non anemic)     Past Surgical History:   Procedure Laterality Date     APPENDECTOMY         Social History   Substance Use Topics     Smoking status: Former Smoker     Smokeless tobacco: Never Used      Comment: 40 years ago     Alcohol use 4.2 oz/week     7 Standard drinks or equivalent per week     Family History   Problem Relation Age of Onset     HEART DISEASE No family hx of          Current Outpatient Prescriptions   Medication Sig Dispense Refill     tamsulosin (FLOMAX) 0.4 MG capsule Take 1 capsule (0.4 mg) by mouth daily 90 capsule 3     diphenoxylate-atropine (LOMOTIL) 2.5-0.025 MG per tablet Take 1 tablet by mouth 4 times daily as needed for diarrhea       loperamide (IMODIUM A-D) 2 MG tablet Take 2 tabs (4 mg) after first loose stool, and then take one tab (2 mg) after each diarrheal stool.  Max of 8 tabs (16 mg) per day. 30 tablet 11     opium tincture 10 MG/ML (1%) liquid 8 drops every 4 hours as needed for diarrhea 118 mL 0      "metoprolol (LOPRESSOR) 50 MG tablet Take 1 tablet (50 mg) by mouth 2 times daily 180 tablet 3     cyanocobalamin (VITAMIN B12) 1000 MCG/ML injection Inject 1 mL (1,000 mcg) into the muscle every 30 days 1 mL 11     ELIQUIS 5 MG tablet TK 1 T PO BID  3     Allergies   Allergen Reactions     No Known Allergies        Reviewed and updated as needed this visit by clinical staff  Tobacco  Allergies  Meds       Reviewed and updated as needed this visit by Provider         ROS:  Constitutional, HEENT, cardiovascular, pulmonary, gi and gu systems are negative, except as otherwise noted.    OBJECTIVE:                                                    /73 (BP Location: Left arm, Cuff Size: Adult Regular)  Pulse 76  Temp 97.7  F (36.5  C) (Oral)  Ht 5' 9\" (1.753 m)  Wt 157 lb (71.2 kg)  SpO2 99%  BMI 23.18 kg/m2  Body mass index is 23.18 kg/(m^2).  General: This is a comfortable appearing elderly man who is slightly hard of hearing    Diagnostic Test Results:  none      ASSESSMENT/PLAN:                                                              ICD-10-CM    1. Spinal stenosis of lumbosacral region M48.07    2. Benign non-nodular prostatic hyperplasia with lower urinary tract symptoms N40.1 tamsulosin (FLOMAX) 0.4 MG capsule     I spent about 15 minutes with this patient and his wife in face-to-face contact discussing further options for managing his chronic pain related to his advanced lumbar spinal stenosis. At this point, given the timing of his urinary retention following starting Cymbalta, I do not think he is a candidate for that medication. We spent a considerable amount of time discussing the possibility of using a walker to alleviate symptoms at their most troublesome time which he describes is when he has to stand for long periods of time without having anything to lean on. He remains reluctant to use a walker or cane. I did spend a considerable amount of time discussing the fact that these " interventions could help his symptoms significantly without the risks of side effects similar to what he had when he took Cymbalta. He will again consider this. Regarding medications, his wife relates that he is only taking 1000 mg of acetaminophen per day and he could increase that dose to 1000 mg up to 3 times a day as needed. He will try this. Finally, we discussed some of the potential side effects of tamsulosin such as dizziness or lightheadedness that could occur most likely when there is a change in position. His wife will be on alert for any such symptoms.      FUTURE APPOINTMENTS:       - Follow-up visit in follow up will be on an as-needed basis for no later than March 2018    Judson Mcadams MD  [facility]

## 2017-04-26 NOTE — MR AVS SNAPSHOT
"              After Visit Summary   4/26/2017    Dawson Jones    MRN: [id number]           Patient Information     Date Of Birth          5/5/1930        Visit Information        Provider Department      4/26/2017 10:30 AM Judson Mcadams MD [facility]        Today's Diagnoses     Spinal stenosis of lumbosacral region    -  1    Benign non-nodular prostatic hyperplasia with lower urinary tract symptoms           Follow-ups after your visit        Your next 10 appointments already scheduled     May 02, 2017  9:30 AM CDT   Nurse Only with CS NURSE   [facility] ([facility])    [address]  [place] [address] [phone]            May 30, 2017  9:30 AM CDT   Nurse Only with CS NURSE   [facility] ([facility])    [address]  [place] [address] [phone]              Who to contact     If you have questions or need follow up information about today's clinic visit or your schedule please contact [facility] directly at [phone].  Normal or non-critical lab and imaging results will be communicated to you by Food52hart, letter or phone within 4 business days after the clinic has received the results. If you do not hear from us within 7 days, please contact the clinic through [employer] or phone. If you have a critical or abnormal lab result, we will notify you by phone as soon as possible.  Submit refill requests through Boxbe or call your pharmacy and they will forward the refill request to us. Please allow 3 business days for your refill to be completed.          Additional Information About Your Visit        MyChart Information     Boxbe lets you send messages to your doctor, view your test results, renew your prescriptions, schedule appointments and more. To sign up, go to [email or web address] . Click on \"Log in\" on the left side of the screen, which will take you to the Welcome page. Then click on \"Sign up Now\" on the " "right side of the page.     You will be asked to enter the access code listed below, as well as some personal information. Please follow the directions to create your username and password.     Your access code is: [email or web address]  Expires: 2017 11:19 PM     Your access code will  in 90 days. If you need help or a new code, please call your [place] clinic or [phone].        Care EveryWhere ID     This is your Care EveryWhere ID. This could be used by other organizations to access your [place] medical records  [id number]        Your Vitals Were     Pulse Temperature Height Pulse Oximetry BMI (Body Mass Index)       76 97.7  F (36.5  C) (Oral) 5' 9\" (1.753 m) 99% 23.18 kg/m2        Blood Pressure from Last 3 Encounters:   17 110/73   17 133/85   17 122/84    Weight from Last 3 Encounters:   17 157 lb (71.2 kg)   17 160 lb (72.6 kg)   17 160 lb (72.6 kg)              Today, you had the following     No orders found for display         Today's Medication Changes          These changes are accurate as of: 17 11:13 AM.  If you have any questions, ask your nurse or doctor.               Stop taking these medicines if you haven't already. Please contact your care team if you have questions.     DULoxetine 30 MG EC capsule   Commonly known as:  CYMBALTA   Stopped by:  Judson Mcadams MD                    Primary Care Provider Office Phone # Fax #    Judson Mcadams [phone]       [facility] [address] [facility] [facility] [address]        Thank you!     Thank you for choosing [facility]  for your care. Our goal is always to provide you with excellent care. Hearing back from our patients is one way we can continue to improve our services. Please take a few minutes to complete the written survey that you may receive in the mail after your visit with us. Thank you!             Your Updated Medication List - Protect others around you: " Learn how to safely use, store and throw away your medicines at www.disposemymeds.org.          This list is accurate as of: 4/26/17 11:13 AM.  Always use your most recent med list.                   Brand Name Dispense Instructions for use    cyanocobalamin 1000 MCG/ML injection    VITAMIN B12    1 mL    Inject 1 mL (1,000 mcg) into the muscle every 30 days       diphenoxylate-atropine 2.5-0.025 MG per tablet    LOMOTIL     Take 1 tablet by mouth 4 times daily as needed for diarrhea       ELIQUIS 5 MG tablet   Generic drug:  apixaban ANTICOAGULANT      TK 1 T PO BID       FLOMAX 0.4 MG capsule   Generic drug:  tamsulosin     90 capsule    Take 1 capsule (0.4 mg) by mouth daily       loperamide 2 MG tablet    IMODIUM A-D    30 tablet    Take 2 tabs (4 mg) after first loose stool, and then take one tab (2 mg) after each diarrheal stool.  Max of 8 tabs (16 mg) per day.       metoprolol 50 MG tablet    LOPRESSOR    180 tablet    Take 1 tablet (50 mg) by mouth 2 times daily       opium tincture 10 mg/mL (1%) liquid     118 mL    8 drops every 4 hours as needed for diarrhea

## 2017-04-26 NOTE — NURSING NOTE
"Chief Complaint   Patient presents with     Recheck Medication     Cymbalta       Initial /73 (BP Location: Left arm, Cuff Size: Adult Regular)  Pulse 76  Temp 97.7  F (36.5  C) (Oral)  Ht 5' 9\" (1.753 m)  Wt 157 lb (71.2 kg)  SpO2 99%  BMI 23.18 kg/m2 Estimated body mass index is 23.18 kg/(m^2) as calculated from the following:    Height as of this encounter: 5' 9\" (1.753 m).    Weight as of this encounter: 157 lb (71.2 kg).  Medication Reconciliation: complete     FYI - patient requested a Health Care directive packet, which was provided to him and his spouse to fill out and bring back to have on file.     Ivania Sierra MA    "

## 2017-05-02 ENCOUNTER — ALLIED HEALTH/NURSE VISIT (OUTPATIENT)
Dept: NURSING | Facility: CLINIC | Age: 82
End: 2017-05-02
Payer: COMMERCIAL

## 2017-05-02 DIAGNOSIS — E53.8 VITAMIN B12 DEFICIENCY (NON ANEMIC): ICD-10-CM

## 2017-05-02 PROCEDURE — 99207 ZZC NO CHARGE NURSE ONLY: CPT

## 2017-05-02 PROCEDURE — 96372 THER/PROPH/DIAG INJ SC/IM: CPT

## 2017-05-25 DIAGNOSIS — K50.819 CROHN'S DISEASE OF SMALL AND LARGE INTESTINES WITH COMPLICATION (H): Primary | ICD-10-CM

## 2017-05-25 RX ORDER — DIPHENOXYLATE HCL/ATROPINE 2.5-.025MG
1 TABLET ORAL 4 TIMES DAILY PRN
Qty: 40 TABLET | Refills: 3 | Status: SHIPPED | OUTPATIENT
Start: 2017-05-25 | End: 2017-06-09

## 2017-05-25 NOTE — TELEPHONE ENCOUNTER
Reason for Call:  Medication or medication refill:    Do you use a [facility]?  Name of the pharmacy and phone number for the current request:  [facility] [id number] [place] [address] [address] AT [facility] [address]    Name of the medication requested: diphenoxylate-atropine (LOMOTIL) 2.5-0.025 MG per tablet    Other request: pt was told to get a OTC counter and he did purchase it.  He is unable to swallow because it dissolves is the mouth and there  Is nothing to swallow. He wants something to swallow. Could you   Please send the script to the pharmacy for him. His wife stopped in  At the clinic    Can we leave a detailed message on this number? YES    Phone number patient can be reached at: Home number on file [phone] (home)    Best Time: any    Call taken on 5/25/2017 at 12:49 PM by Shantal Lopez

## 2017-05-25 NOTE — TELEPHONE ENCOUNTER
diphenoxylate-atropine (LOMOTIL) 2.5-0.025 MG per tablet     --      Sig: Take 1 tablet by mouth 4 times daily as needed for diarrhea     Class: Historical       Pt states that he was told to get a OTC counter and he did purchase it.  He is unable to swallow because it dissolves is the mouth and there is nothing to swallow. He wants something to swallow.         Last Written Prescription Date: Unknown  Last Fill Quantity: Unknown,  # refills: Unknown   Last Office Visit with FMKELLEY, ANNE-MARIEP or [facility] prescribing provider: 04/26/2017                                         Next 5 appointments (look out 90 days)     Jun 06, 2017  9:30 AM CDT   Nurse Only with  NURSE   [facility]    [address] [address]   [phone]            Jul 06, 2017  9:30 AM CDT   Nurse Only with CS NURSE   [facility]    [address] [address]   [phone]

## 2017-06-06 ENCOUNTER — ALLIED HEALTH/NURSE VISIT (OUTPATIENT)
Dept: NURSING | Facility: CLINIC | Age: 82
End: 2017-06-06
Payer: COMMERCIAL

## 2017-06-06 ENCOUNTER — TELEPHONE (OUTPATIENT)
Dept: FAMILY MEDICINE | Facility: CLINIC | Age: 82
End: 2017-06-06

## 2017-06-06 DIAGNOSIS — E53.8 VITAMIN B12 DEFICIENCY (NON ANEMIC): Primary | ICD-10-CM

## 2017-06-06 PROCEDURE — 96372 THER/PROPH/DIAG INJ SC/IM: CPT

## 2017-06-06 PROCEDURE — 99207 ZZC NO CHARGE NURSE ONLY: CPT

## 2017-06-06 NOTE — TELEPHONE ENCOUNTER
Pt most recently seen 4/26/17 in clinic by PCP  Received verbal from pt to discuss with wife - does need C2C at future ov.  Pt did increase tylenol from 1000mg daily to t.i.d. And wife states does not help relieve pain at all.  Pt cannot take NSAIDs as is on Eliquis.  Has seen  Dr. Reynolds at [facility] for this in the past, and has had PT as well.    Wife wondering if tramadol would be option for pain or any other pain medication.    Please advise,  Estela Porter RN

## 2017-06-06 NOTE — NURSING NOTE
"Chief Complaint   Patient presents with     Imm/Inj       Initial There were no vitals taken for this visit. Estimated body mass index is 23.18 kg/(m^2) as calculated from the following:    Height as of 4/26/17: 5' 9\" (1.753 m).    Weight as of 4/26/17: 157 lb (71.2 kg).  Medication Reconciliation: unable or not appropriate to perform.    Prior to injection verified patient identity using patient's name and date of birth.    Per orders of Dr. Mcadams, injection of B12 given by Kisha House. Patient instructed to remain in clinic for 20 minutes afterwards, and to report any adverse reaction to me immediately.    Mary Lou House, TROY    "

## 2017-06-06 NOTE — TELEPHONE ENCOUNTER
Reason for Call:  Medication or medication refill:    Do you use a [facility]?  Name of the pharmacy and phone number for the current request:    [facility] [address] [facility] [address] AT [facility] [address]      Name of the medication requested: Tramadol    Other request: wife has been talking to friends about  pain in back.  She wants his to start taking Tramadol. That is what her friends are suggesting.  I did tell he may have to be seen to get it.    Can we leave a detailed message on this number? YES    Phone number patient can be reached at: Home number on file [phone] (home)    Best Time: any    Call taken on 6/6/2017 at 9:15 AM by Shantal Lopez

## 2017-06-06 NOTE — MR AVS SNAPSHOT
After Visit Summary   6/6/2017    Dawson Jones    MRN: [id number]           Patient Information     Date Of Birth          5/5/1930        Visit Information        Provider Department      6/6/2017 9:30 AM CS NURSE [facility]        Today's Diagnoses     Vitamin B12 deficiency (non anemic)    -  1       Follow-ups after your visit        Your next 10 appointments already scheduled     Jun 06, 2017  9:30 AM CDT   Nurse Only with CS NURSE   [facility]    [address] [place] [address]   [phone]            Jun 13, 2017 10:45 AM CDT   [facility] EP RETURN with Sanjuanita Arias MD   [facility] [place] ([facility])    [address]  [place] [address] [phone]            Jul 06, 2017  9:30 AM CDT   Nurse Only with CS NURSE   [facility] ([facility])    [address] [address] [phone]            Aug 01, 2017  9:30 AM CDT   Nurse Only with CS NURSE   [facility] ([facility]    [address] [place] [address] [phone]              Who to contact     If you have questions or need follow up information about today's clinic visit or your schedule please contact [facility] directly at [phone].  Normal or non-critical lab and imaging results will be communicated to you by MyChart, letter or phone within 4 business days after the clinic has received the results. If you do not hear from us within 7 days, please contact the clinic through MyChart or phone. If you have a critical or abnormal lab result, we will notify you by phone as soon as possible.  Submit refill requests through PushCoin or call your pharmacy and they will forward the refill request to us. Please allow 3 business days for your refill to be completed.          Additional Information About Your Visit        [facility]  "Information     20lines lets you send messages to your doctor, view your test results, renew your prescriptions, schedule appointments and more. To sign up, go to [email or web address] . Click on \"Log in\" on the left side of the screen, which will take you to the Welcome page. Then click on \"Sign up Now\" on the right side of the page.     You will be asked to enter the access code listed below, as well as some personal information. Please follow the directions to create your username and password.     Your access code is: [email or web address]  Expires: 2017  9:22 AM     Your access code will  in 90 days. If you need help or a new code, please call your [place] clinic or [phone].        Care EveryWhere ID     This is your Care EveryWhere ID. This could be used by other organizations to access your [place] medical records  [id number]         Blood Pressure from Last 3 Encounters:   17 110/73   17 133/85   17 122/84    Weight from Last 3 Encounters:   17 157 lb (71.2 kg)   17 160 lb (72.6 kg)   17 160 lb (72.6 kg)              We Performed the Following     ADMIN 1st VACCINE     B12 - 1000 MCG        Primary Care Provider Office Phone # Fax #    Judson Mcadams [phone]       [facility] [address]  [facility] [address]        Thank you!     Thank you for choosing [facility]  for your care. Our goal is always to provide you with excellent care. Hearing back from our patients is one way we can continue to improve our services. Please take a few minutes to complete the written survey that you may receive in the mail after your visit with us. Thank you!             Your Updated Medication List - Protect others around you: Learn how to safely use, store and throw away your medicines at www.disposemymeds.org.          This list is accurate as of: 17  9:22 AM.  Always use your most recent med list.                   Brand Name " Dispense Instructions for use    cyanocobalamin 1000 MCG/ML injection    VITAMIN B12    1 mL    Inject 1 mL (1,000 mcg) into the muscle every 30 days       diphenoxylate-atropine 2.5-0.025 MG per tablet    LOMOTIL    40 tablet    Take 1 tablet by mouth 4 times daily as needed for diarrhea       ELIQUIS 5 MG tablet   Generic drug:  apixaban ANTICOAGULANT      TK 1 T PO BID       FLOMAX 0.4 MG capsule   Generic drug:  tamsulosin     90 capsule    Take 1 capsule (0.4 mg) by mouth daily       loperamide 2 MG tablet    IMODIUM A-D    30 tablet    Take 2 tabs (4 mg) after first loose stool, and then take one tab (2 mg) after each diarrheal stool.  Max of 8 tabs (16 mg) per day.       metoprolol 50 MG tablet    LOPRESSOR    180 tablet    Take 1 tablet (50 mg) by mouth 2 times daily       opium tincture 10 MG/ML (1%) liquid     118 mL    8 drops every 4 hours as needed for diarrhea

## 2017-06-06 NOTE — TELEPHONE ENCOUNTER
Since tramadol is a synthetic opioid /narcotic, starting it would be very risky and it has many potential toxicities and side effects, if we are going to start that medication we may want to have a office visit to discuss the risks and benefits and brainstorm to make sure we have exhausted all alternative treatments.  He can be seen for an office visit in a hospital follow up slot or same day appointment slot

## 2017-06-06 NOTE — TELEPHONE ENCOUNTER
Discussed further with wife.  Scheduled appt for Same day slot this Friday with PCP  Estela Porter RN

## 2017-06-06 NOTE — TELEPHONE ENCOUNTER
"Routing to triage. Hx of Spinal Stenosis of lumbar region      4-26-17 office visit has wonderful Assessment /Plan notes that start as \" I spent about 15 minutes with this patient and his wife in face-to-face contact discussing further options for managing his chronic pain related to his advanced lumbar spinal stenosis. At this point, given the timing of ... \"  See OV for further notes  "

## 2017-06-06 NOTE — TELEPHONE ENCOUNTER
No C2C on file for wife.  Left message for pt to return call to triage nurse.  Per that last OV pt was to go from 1000mg Tylenol daily to three times daily, need to assess status.  Estela Porter RN

## 2017-06-09 ENCOUNTER — OFFICE VISIT (OUTPATIENT)
Dept: FAMILY MEDICINE | Facility: CLINIC | Age: 82
End: 2017-06-09
Payer: COMMERCIAL

## 2017-06-09 VITALS
WEIGHT: 152.9 LBS | TEMPERATURE: 98.1 F | BODY MASS INDEX: 22.64 KG/M2 | DIASTOLIC BLOOD PRESSURE: 77 MMHG | OXYGEN SATURATION: 99 % | HEIGHT: 69 IN | SYSTOLIC BLOOD PRESSURE: 121 MMHG | HEART RATE: 84 BPM

## 2017-06-09 DIAGNOSIS — E53.8 VITAMIN B12 DEFICIENCY (NON ANEMIC): ICD-10-CM

## 2017-06-09 DIAGNOSIS — M48.07 SPINAL STENOSIS OF LUMBOSACRAL REGION: Primary | ICD-10-CM

## 2017-06-09 DIAGNOSIS — I10 BENIGN ESSENTIAL HYPERTENSION: ICD-10-CM

## 2017-06-09 DIAGNOSIS — K50.819 CROHN'S DISEASE OF SMALL AND LARGE INTESTINES WITH COMPLICATION (H): ICD-10-CM

## 2017-06-09 DIAGNOSIS — I48.20 CHRONIC ATRIAL FIBRILLATION (H): ICD-10-CM

## 2017-06-09 PROCEDURE — 99213 OFFICE O/P EST LOW 20 MIN: CPT | Performed by: INTERNAL MEDICINE

## 2017-06-09 RX ORDER — APIXABAN 5 MG/1
5 TABLET, FILM COATED ORAL 2 TIMES DAILY
Qty: 180 TABLET | Refills: 3 | Status: SHIPPED | OUTPATIENT
Start: 2017-06-09 | End: 2018-06-19

## 2017-06-09 RX ORDER — CYANOCOBALAMIN 1000 UG/ML
1 INJECTION, SOLUTION INTRAMUSCULAR; SUBCUTANEOUS
Qty: 1 ML | Refills: 11 | Status: SHIPPED | OUTPATIENT
Start: 2017-06-09 | End: 2018-05-01

## 2017-06-09 RX ORDER — GABAPENTIN 100 MG/1
CAPSULE ORAL
Qty: 450 CAPSULE | Refills: 3 | Status: SHIPPED | OUTPATIENT
Start: 2017-06-09 | End: 2017-07-07

## 2017-06-09 RX ORDER — METOPROLOL TARTRATE 50 MG
50 TABLET ORAL 2 TIMES DAILY
Qty: 180 TABLET | Refills: 3 | Status: SHIPPED | OUTPATIENT
Start: 2017-06-09 | End: 2018-07-06

## 2017-06-09 RX ORDER — DIPHENOXYLATE HCL/ATROPINE 2.5-.025MG
1 TABLET ORAL 4 TIMES DAILY PRN
Qty: 40 TABLET | Refills: 3 | Status: SHIPPED | OUTPATIENT
Start: 2017-06-09 | End: 2018-01-30

## 2017-06-09 NOTE — MR AVS SNAPSHOT
After Visit Summary   6/9/2017    Dawson Jones    MRN: [id number]           Patient Information     Date Of Birth          5/5/1930        Visit Information        Provider Department      6/9/2017 3:30 PM Judson Mcadams MD [facility]        Today's Diagnoses     Chronic atrial fibrillation (H)    -  1    Crohn's disease of small and large intestines with complication (H)        Benign essential hypertension        Vitamin B12 deficiency (non anemic)        Spinal stenosis of lumbosacral region           Follow-ups after your visit        Follow-up notes from your care team     Return in about 4 weeks (around 7/7/2017) for Routine Visit.      Your next 10 appointments already scheduled     Jun 13, 2017 10:45 AM CDT   [facility] EP RETURN with Sanjuanita Arias MD   [facility] AT [place] ([facility])    [address] [place] [address] [address]  [facility] [address]   [phone]            Jul 06, 2017  9:30 AM CDT   Nurse Only with CS NURSE   [facility]    [address] [facility] [address] [phone]            Aug 01, 2017  9:30 AM CDT   Nurse Only with CS NURSE   [facility]    [address]  [place] [address] [phone]              Who to contact     If you have questions or need follow up information about today's clinic visit or your schedule please contact [facility] directly at [phone].  Normal or non-critical lab and imaging results will be communicated to you by MyChart, letter or phone within 4 business days after the clinic has received the results. If you do not hear from us within 7 days, please contact the clinic through [employer] or phone. If you have a critical or abnormal lab result, we will notify you by phone as soon as possible.  Submit refill requests through Roundscapes or call your pharmacy and they will forward the refill request  "to us. Please allow 3 business days for your refill to be completed.          Additional Information About Your Visit        BizzaboharBiggiFi Information     Touchmedia lets you send messages to your doctor, view your test results, renew your prescriptions, schedule appointments and more. To sign up, go to [email or web address] . Click on \"Log in\" on the left side of the screen, which will take you to the Welcome page. Then click on \"Sign up Now\" on the right side of the page.     You will be asked to enter the access code listed below, as well as some personal information. Please follow the directions to create your username and password.     Your access code is: [email or web address]  Expires: 2017  3:30 PM     Your access code will  in 90 days. If you need help or a new code, please call your [place] clinic or [phone].        Care EveryWhere ID     This is your Care EveryWhere ID. This could be used by other organizations to access your [place] medical records  [id number]        Your Vitals Were     Pulse Temperature Height Pulse Oximetry BMI (Body Mass Index)       84 98.1  F (36.7  C) (Oral) 5' 9\" (1.753 m) 99% 22.58 kg/m2        Blood Pressure from Last 3 Encounters:   17 121/77   17 110/73   17 133/85    Weight from Last 3 Encounters:   17 152 lb 14.4 oz (69.4 kg)   17 157 lb (71.2 kg)   17 160 lb (72.6 kg)              We Performed the Following     [employer] ACCESS CODE - Add PCP and Click on cosign on right          Today's Medication Changes          These changes are accurate as of: 17  4:05 PM.  If you have any questions, ask your nurse or doctor.               Start taking these medicines.        Dose/Directions    gabapentin 100 MG capsule   Commonly known as:  NEURONTIN   Used for:  Spinal stenosis of lumbosacral region   Started by:  Judson Mcadams MD        100mg in AM and 100mg at NOON and 200-300mg at Bedtime as needed for spinal stenosis-related leg pain "   Quantity:  450 capsule   Refills:  3         These medicines have changed or have updated prescriptions.        Dose/Directions    ELIQUIS 5 MG tablet   This may have changed:  See the new instructions.   Used for:  Chronic atrial fibrillation (H)   Generic drug:  apixaban ANTICOAGULANT   Changed by:  Judson Mcadams MD        Dose:  5 mg   Take 1 tablet (5 mg) by mouth 2 times daily   Quantity:  180 tablet   Refills:  3            Where to get your medicines      These medications were sent to [facility] [address] [place] [address] [address] AT [facility] [address]  [address], [facility] [address]    Hours:  24-hours Phone:  [phone]     cyanocobalamin 1000 MCG/ML injection    ELIQUIS 5 MG tablet    gabapentin 100 MG capsule    metoprolol 50 MG tablet         Some of these will need a paper prescription and others can be bought over the counter.  Ask your nurse if you have questions.     Bring a paper prescription for each of these medications     diphenoxylate-atropine 2.5-0.025 MG per tablet                Primary Care Provider Office Phone # Fax #    Judson Mcadams [phone]       [facility] [address] [facility] [address]        Thank you!     Thank you for choosing [facility]  for your care. Our goal is always to provide you with excellent care. Hearing back from our patients is one way we can continue to improve our services. Please take a few minutes to complete the written survey that you may receive in the mail after your visit with us. Thank you!             Your Updated Medication List - Protect others around you: Learn how to safely use, store and throw away your medicines at www.disposemymeds.org.          This list is accurate as of: 6/9/17  4:05 PM.  Always use your most recent med list.                   Brand Name Dispense Instructions for use    cyanocobalamin 1000 MCG/ML injection    VITAMIN B12    1 mL    Inject 1  mL (1,000 mcg) into the muscle every 30 days       diphenoxylate-atropine 2.5-0.025 MG per tablet    LOMOTIL    40 tablet    Take 1 tablet by mouth 4 times daily as needed for diarrhea       ELIQUIS 5 MG tablet   Generic drug:  apixaban ANTICOAGULANT     180 tablet    Take 1 tablet (5 mg) by mouth 2 times daily       FLOMAX 0.4 MG capsule   Generic drug:  tamsulosin     90 capsule    Take 1 capsule (0.4 mg) by mouth daily       gabapentin 100 MG capsule    NEURONTIN    450 capsule    100mg in AM and 100mg at NOON and 200-300mg at Bedtime as needed for spinal stenosis-related leg pain       metoprolol 50 MG tablet    LOPRESSOR    180 tablet    Take 1 tablet (50 mg) by mouth 2 times daily       opium tincture 10 MG/ML (1%) liquid     118 mL    8 drops every 4 hours as needed for diarrhea

## 2017-06-09 NOTE — NURSING NOTE
"Chief Complaint   Patient presents with     Recheck Medication       Initial /77 (BP Location: Right arm, Patient Position: Chair, Cuff Size: Adult Regular)  Pulse 84  Temp 98.1  F (36.7  C) (Oral)  Ht 5' 9\" (1.753 m)  Wt 152 lb 14.4 oz (69.4 kg)  SpO2 99%  BMI 22.58 kg/m2 Estimated body mass index is 22.58 kg/(m^2) as calculated from the following:    Height as of this encounter: 5' 9\" (1.753 m).    Weight as of this encounter: 152 lb 14.4 oz (69.4 kg).  Medication Reconciliation: complete   Sandy Partida MA  "

## 2017-06-09 NOTE — PROGRESS NOTES
SUBJECTIVE:                                                    Dawson Jones is a 87 year old male who presents to clinic today for the following health issues:      Discuss medication/Tramadol    This is a very pleasant 87-year-old man with a history of severe spinal stenosis, mild memory loss, B12 deficiency, chronic atrial fibrillation, Crohn's disease, hypertension. He has tried and failed several interventions for his spinal stenosis including lumbar epidural steroid injections, Tylenol, Cymbalta, physical therapy. He is reluctant to use a walker. He describes severe pain and shakiness of his legs associated with standing for more than a few minutes. The symptoms improve when he leans over. He has not had any new bowel or bladder symptoms. There has been no recent injury or trauma. He got together with some friends who suggested that he try tramadol for his symptoms. He is asking for a prescription for tramadol.    Problem list and histories reviewed & adjusted, as indicated.  Additional history: as documented    Patient Active Problem List   Diagnosis     Atrial fibrillation (H)     Crohn's disease of small and large intestines with complication (H)     Spinal stenosis of lumbosacral region     Vitamin B12 deficiency (non anemic)     Past Surgical History:   Procedure Laterality Date     APPENDECTOMY         Social History   Substance Use Topics     Smoking status: Former Smoker     Smokeless tobacco: Never Used      Comment: 40 years ago     Alcohol use 4.2 oz/week     7 Standard drinks or equivalent per week     Family History   Problem Relation Age of Onset     HEART DISEASE No family hx of          Current Outpatient Prescriptions   Medication Sig Dispense Refill     diphenoxylate-atropine (LOMOTIL) 2.5-0.025 MG per tablet Take 1 tablet by mouth 4 times daily as needed for diarrhea 40 tablet 3     ELIQUIS 5 MG tablet Take 1 tablet (5 mg) by mouth 2 times daily 180 tablet 3     cyanocobalamin (VITAMIN  "B12) 1000 MCG/ML injection Inject 1 mL (1,000 mcg) into the muscle every 30 days 1 mL 11     metoprolol (LOPRESSOR) 50 MG tablet Take 1 tablet (50 mg) by mouth 2 times daily 180 tablet 3     gabapentin (NEURONTIN) 100 MG capsule 100mg in AM and 100mg at NOON and 200-300mg at Bedtime as needed for spinal stenosis-related leg pain 450 capsule 3     tamsulosin (FLOMAX) 0.4 MG capsule Take 1 capsule (0.4 mg) by mouth daily 90 capsule 3     opium tincture 10 MG/ML (1%) liquid 8 drops every 4 hours as needed for diarrhea 118 mL 0     [DISCONTINUED] metoprolol (LOPRESSOR) 50 MG tablet Take 1 tablet (50 mg) by mouth 2 times daily 180 tablet 3     Allergies   Allergen Reactions     No Known Allergies        Reviewed and updated as needed this visit by clinical staff       Reviewed and updated as needed this visit by Provider           OBJECTIVE:                                                    /77 (BP Location: Right arm, Patient Position: Chair, Cuff Size: Adult Regular)  Pulse 84  Temp 98.1  F (36.7  C) (Oral)  Ht 5' 9\" (1.753 m)  Wt 152 lb 14.4 oz (69.4 kg)  SpO2 99%  BMI 22.58 kg/m2  Body mass index is 22.58 kg/(m^2).  Gen.: This is a frail, elderly man in no acute distress. He appears comfortable at rest. He walks a little bit stooped over. He is using a cane now.         ASSESSMENT/PLAN:                                                              ICD-10-CM    1. Spinal stenosis of lumbosacral region M48.07 gabapentin (NEURONTIN) 100 MG capsule   2. Chronic atrial fibrillation (H) I48.2 ELIQUIS 5 MG tablet   3. Crohn's disease of small and large intestines with complication (H) K50.819 diphenoxylate-atropine (LOMOTIL) 2.5-0.025 MG per tablet   4. Benign essential hypertension I10 metoprolol (LOPRESSOR) 50 MG tablet   5. Vitamin B12 deficiency (non anemic) E53.8 cyanocobalamin (VITAMIN B12) 1000 MCG/ML injection     I spent about 15 minutes with this patient and his wife in face-to-face contact. The " majority of time was spent counseling them on options for management of his spinal stenosis symptoms. We considered a surgery referral, but the patient is very reluctant to consider surgical options. Again, I encouraged him to try using a walker because I believe that would allow him to be more active and move about with more confidence. Again, he remains reluctant to do so. We discussed the risks of tramadol.  They were not aware that tramadol is a synthetic opioid and that it has the potential for addiction, overdose, tolerance forming, sedation etc. After this discussion, they do not wish to use tramadol. We discussed alternatives, he has never tried Neurontin. The side effects and risks of Neurontin were discussed with him and his wife in detail. We will start with low-dose and see him back in 3-4 weeks to titrate the dose to achieve symptom control.    FUTURE APPOINTMENTS:       - 3-4 weeks or sooner as needed    Judson Mcadams MD  [facility]

## 2017-06-13 ENCOUNTER — OFFICE VISIT (OUTPATIENT)
Dept: CARDIOLOGY | Facility: CLINIC | Age: 82
End: 2017-06-13
Payer: COMMERCIAL

## 2017-06-13 VITALS
HEART RATE: 76 BPM | SYSTOLIC BLOOD PRESSURE: 106 MMHG | WEIGHT: 154 LBS | BODY MASS INDEX: 23.34 KG/M2 | HEIGHT: 68 IN | DIASTOLIC BLOOD PRESSURE: 64 MMHG

## 2017-06-13 DIAGNOSIS — I48.20 CHRONIC ATRIAL FIBRILLATION (H): Primary | ICD-10-CM

## 2017-06-13 PROCEDURE — 99213 OFFICE O/P EST LOW 20 MIN: CPT | Mod: 25 | Performed by: INTERNAL MEDICINE

## 2017-06-13 PROCEDURE — 93000 ELECTROCARDIOGRAM COMPLETE: CPT | Performed by: INTERNAL MEDICINE

## 2017-06-13 NOTE — LETTER
6/13/2017    Judson Mcadams MD  [facility]  [address] [facility] [address]  [place] [address]    RE: Dawson MINOR Robert       Dear Colleague,    I had the pleasure of seeing Dawson Jones in the [facility].    It was my pleasure seeing Mr. Dawson Moseley, a delightful 87-year-old gentleman, in follow-up of permanent atrial fibrillation and history of mild non-ischemic cardiomyopathy.  I last saw him in 01/2015.  His wife requested this appointment as the patient had not seen Cardiology in some time.      Mr. Moseley looked well today.  He has issues with back pain and has seen Dr. Mcadams regularly for this.  He was recently started on gabapentin.  He has had no recent cardiac issues.  He remains unaware of the atrial fibrillation.  He is on Eliquis for anticoagulation without bleeding problems or other side-effects.  He has not had chest pain, syncope, near-syncope, orthopnea or PND.      PHYSICAL EXAMINATION:   VITAL SIGNS:  Blood pressure 106/64, pulse 76 and irregular, weight 69.9 kg, height 173 cm.   NECK:  Supple without bruits.   CHEST:  Mild kyphosis.   LUNGS:  Clear.   CARDIOVASCULAR:  Irregularly irregular rhythm.  No gallop, murmur or rub.   ABDOMEN:  Soft, nontender.   EXTREMITIES:  No edema.      DIAGNOSTIC STUDIES:    His most recent echocardiogram was from 01/2015 showing normal LV function with 1-2+ MR and 1+ AI.   His 12-lead ECG today showed atrial fibrillation with controlled ventricular rate.   I reviewed labs from 03/2017 and they were all excellent, including creatinine, potassium and fasting lipid panel.     Outpatient Encounter Prescriptions as of 6/13/2017   Medication Sig Dispense Refill     diphenoxylate-atropine (LOMOTIL) 2.5-0.025 MG per tablet Take 1 tablet by mouth 4 times daily as needed for diarrhea 40 tablet 3     ELIQUIS 5 MG tablet Take 1 tablet (5 mg) by mouth 2 times daily 180 tablet 3     cyanocobalamin (VITAMIN B12) 1000 MCG/ML injection  Inject 1 mL (1,000 mcg) into the muscle every 30 days 1 mL 11     metoprolol (LOPRESSOR) 50 MG tablet Take 1 tablet (50 mg) by mouth 2 times daily 180 tablet 3     [DISCONTINUED] gabapentin (NEURONTIN) 100 MG capsule 100mg in AM and 100mg at NOON and 200-300mg at Bedtime as needed for spinal stenosis-related leg pain 450 capsule 3     tamsulosin (FLOMAX) 0.4 MG capsule Take 1 capsule (0.4 mg) by mouth daily 90 capsule 3     opium tincture 10 MG/ML (1%) liquid 8 drops every 4 hours as needed for diarrhea (Patient taking differently: 4 drops every 4 hours as needed for diarrhea) 118 mL 0     No facility-administered encounter medications on file as of 6/13/2017.       IMPRESSION:   1.  Permanent atrial fibrillation.  The patient has been treated with rate control (metoprolol) and anticoagulation (apixaban).  He is doing well and remains unaware of the atrial fibrillation.  Rate control is very good.  No need for med changes.   2.  History of mild cardiomyopathy.  His EF was normal in 2015.  There is no reason to repeat an echocardiogram.      RECOMMENDATIONS:    I will be happy to see him in the future as needed.  He is very stable overall and is followed by Dr. Mcadams closely so I do not think he needs routine cardiology follow-up.     Again, thank you for allowing me to participate in the care of your patient.      Sincerely,    Sanjuanita Arias MD     [facility]

## 2017-06-13 NOTE — MR AVS SNAPSHOT
After Visit Summary   6/13/2017    Dawson Jones    MRN: [id number]           Patient Information     Date Of Birth          5/5/1930        Visit Information        Provider Department      6/13/2017 10:45 AM Sanjuanita Arias MD [facility]        Today's Diagnoses     Chronic atrial fibrillation (H)    -  1       Follow-ups after your visit        Your next 10 appointments already scheduled     Jul 06, 2017  9:30 AM CDT   Nurse Only with [facility] NURSE   [facility]    [address]  [facility] [address]   [phone]            Jul 07, 2017 11:00 AM CDT   Office Visit with Judson Mcadams MD   [facility]    [address] [facility] [address] [phone]           Bring a current list of meds and any records pertaining to this visit.  For Physicals, please bring immunization records and any forms needing to be filled out.  Please arrive 10 minutes early to complete paperwork.            Aug 01, 2017  9:30 AM CDT   Nurse Only with CS NURSE   [facility]    [address]  [facility] [address] [phone]              Who to contact     If you have questions or need follow up information about today's clinic visit or your schedule please contact [facility] directly at [phone].  Normal or non-critical lab and imaging results will be communicated to you by MyChart, letter or phone within 4 business days after the clinic has received the results. If you do not hear from us within 7 days, please contact the clinic through MyChart or phone. If you have a critical or abnormal lab result, we will notify you by phone as soon as possible.  Submit refill requests through [employer] or call your pharmacy and they will forward the refill request to us. Please allow 3 business days for your refill to  "be completed.          Additional Information About Your Visit        [facility] [employer] Information     [employer] lets you send messages to your doctor, view your test results, renew your prescriptions, schedule appointments and more. To sign up, go to [email or web address] . Click on \"Log in\" on the left side of the screen, which will take you to the Welcome page. Then click on \"Sign up Now\" on the right side of the page.     You will be asked to enter the access code listed below, as well as some personal information. Please follow the directions to create your username and password.     Your access code is: [email or web address]  Expires: 2017  3:30 PM     Your access code will  in 90 days. If you need help or a new code, please call your [facility] clinic or [phone].        Care EveryWhere ID     This is your Care EveryWhere ID. This could be used by other organizations to access your [facility] medical records  [id number]        Your Vitals Were     Pulse Height BMI (Body Mass Index)             76 1.727 m (5' 8\") 23.42 kg/m2          Blood Pressure from Last 3 Encounters:   17 106/64   17 121/77   17 110/73    Weight from Last 3 Encounters:   17 69.9 kg (154 lb)   17 69.4 kg (152 lb 14.4 oz)   17 71.2 kg (157 lb)              We Performed the Following     EKG 12-lead complete w/read - Clinics (performed today)        Primary Care Provider Office Phone # Fax #    Judson Mcadams [phone]       [facility] [address]  [facility] [address]        Thank you!     Thank you for choosing [facility]  for your care. Our goal is always to provide you with excellent care. Hearing back from our patients is one way we can continue to improve our services. Please take a few minutes to complete the written survey that you may receive in the mail after your visit with us. Thank you!             Your Updated Medication List " - Protect others around you: Learn how to safely use, store and throw away your medicines at www.disposemymeds.org.          This list is accurate as of: 6/13/17 11:28 AM.  Always use your most recent med list.                   Brand Name Dispense Instructions for use    cyanocobalamin 1000 MCG/ML injection    VITAMIN B12    1 mL    Inject 1 mL (1,000 mcg) into the muscle every 30 days       diphenoxylate-atropine 2.5-0.025 MG per tablet    LOMOTIL    40 tablet    Take 1 tablet by mouth 4 times daily as needed for diarrhea       ELIQUIS 5 MG tablet   Generic drug:  apixaban ANTICOAGULANT     180 tablet    Take 1 tablet (5 mg) by mouth 2 times daily       FLOMAX 0.4 MG capsule   Generic drug:  tamsulosin     90 capsule    Take 1 capsule (0.4 mg) by mouth daily       gabapentin 100 MG capsule    NEURONTIN    450 capsule    100mg in AM and 100mg at NOON and 200-300mg at Bedtime as needed for spinal stenosis-related leg pain       metoprolol 50 MG tablet    LOPRESSOR    180 tablet    Take 1 tablet (50 mg) by mouth 2 times daily       opium tincture 10 MG/ML (1%) liquid     118 mL    8 drops every 4 hours as needed for diarrhea

## 2017-06-13 NOTE — PROGRESS NOTES
HISTORY OF PRESENT ILLNESS:    It was my pleasure seeing Mr. Dawson Moseley, a delightful 87-year-old gentleman, in follow-up of permanent atrial fibrillation and history of mild non-ischemic cardiomyopathy.  I last saw him in 01/2015.  His wife requested this appointment as the patient had not seen Cardiology in some time.      Mr. Moesley looked well today.  He has issues with back pain and has seen Dr. Mcadams regularly for this.  He was recently started on gabapentin.  He has had no recent cardiac issues.  He remains unaware of the atrial fibrillation.  He is on Eliquis for anticoagulation without bleeding problems or other side-effects.  He has not had chest pain, syncope, near-syncope, orthopnea or PND.      PHYSICAL EXAMINATION:   VITAL SIGNS:  Blood pressure 106/64, pulse 76 and irregular, weight 69.9 kg, height 173 cm.   NECK:  Supple without bruits.   CHEST:  Mild kyphosis.   LUNGS:  Clear.   CARDIOVASCULAR:  Irregularly irregular rhythm.  No gallop, murmur or rub.   ABDOMEN:  Soft, nontender.   EXTREMITIES:  No edema.      DIAGNOSTIC STUDIES:    His most recent echocardiogram was from 01/2015 showing normal LV function with 1-2+ MR and 1+ AI.   His 12-lead ECG today showed atrial fibrillation with controlled ventricular rate.   I reviewed labs from 03/2017 and they were all excellent, including creatinine, potassium and fasting lipid panel.      IMPRESSION:   1.  Permanent atrial fibrillation.  The patient has been treated with rate control (metoprolol) and anticoagulation (apixaban).  He is doing well and remains unaware of the atrial fibrillation.  Rate control is very good.  No need for med changes.   2.  History of mild cardiomyopathy.  His EF was normal in 2015.  There is no reason to repeat an echocardiogram.      RECOMMENDATIONS:    I will be happy to see him in the future as needed.  He is very stable overall and is followed by Dr. Mcadams closely so I do not think he needs routine cardiology  follow-up.         TYSON KRUGER MD        cc:   [place] [address]         D: 2017 11:27   T: 2017 14:27   MT: TIFFANY      Name:     KING HILTON   MRN:      [id number]        Account:      [id number]   :      1930           Service Date: 2017      Document: [id number]

## 2017-06-13 NOTE — PROGRESS NOTES
HPI and Plan:   See dictation    Orders Placed This Encounter   Procedures     EKG 12-lead complete w/read - Clinics (performed today)       No orders of the defined types were placed in this encounter.      There are no discontinued medications.      Encounter Diagnosis   Name Primary?     Chronic atrial fibrillation (H) Yes       CURRENT MEDICATIONS:  Current Outpatient Prescriptions   Medication Sig Dispense Refill     diphenoxylate-atropine (LOMOTIL) 2.5-0.025 MG per tablet Take 1 tablet by mouth 4 times daily as needed for diarrhea 40 tablet 3     ELIQUIS 5 MG tablet Take 1 tablet (5 mg) by mouth 2 times daily 180 tablet 3     cyanocobalamin (VITAMIN B12) 1000 MCG/ML injection Inject 1 mL (1,000 mcg) into the muscle every 30 days 1 mL 11     metoprolol (LOPRESSOR) 50 MG tablet Take 1 tablet (50 mg) by mouth 2 times daily 180 tablet 3     gabapentin (NEURONTIN) 100 MG capsule 100mg in AM and 100mg at NOON and 200-300mg at Bedtime as needed for spinal stenosis-related leg pain 450 capsule 3     tamsulosin (FLOMAX) 0.4 MG capsule Take 1 capsule (0.4 mg) by mouth daily 90 capsule 3     opium tincture 10 MG/ML (1%) liquid 8 drops every 4 hours as needed for diarrhea 118 mL 0       ALLERGIES     Allergies   Allergen Reactions     No Known Allergies        PAST MEDICAL HISTORY:  Past Medical History:   Diagnosis Date     Atrial fibrillation (H)      Cardiomyopathy (H)      Crohn's disease of small and large intestines with complication (H)      Hyperlipidemia        PAST SURGICAL HISTORY:  Past Surgical History:   Procedure Laterality Date     APPENDECTOMY         FAMILY HISTORY:  Family History   Problem Relation Age of Onset     Family History Negative Mother      Cardiac Sudden Death Father      Family History Negative Brother      Other - See Comments Sister      colon issues     Family History Negative Son      Family History Negative Brother      Family History Negative Son      HEART DISEASE No family hx of         SOCIAL HISTORY:  Social History     Social History     Marital status:      Spouse name: N/A     Number of children: N/A     Years of education: N/A     Social History Main Topics     Smoking status: Former Smoker     Smokeless tobacco: Never Used      Comment: 40 years ago     Alcohol use 4.2 oz/week     7 Standard drinks or equivalent per week      Comment: 1 wine an evening     Drug use: No     Sexual activity: No     Other Topics Concern     None     Social History Narrative    Walks for exercise       Review of Systems:  Skin:  Negative       Eyes:  Positive for glasses    ENT:  Positive for hearing loss    Respiratory:  Negative       Cardiovascular:  Negative      Gastroenterology: Negative      Genitourinary:  Negative      Musculoskeletal:  Negative      Neurologic:  Negative      Psychiatric:  Negative      Heme/Lymph/Imm:  Negative      Endocrine:  Negative        [id number]        CC  Judson Mcadams MD  [facility]  [address] [address]

## 2017-07-06 ENCOUNTER — ALLIED HEALTH/NURSE VISIT (OUTPATIENT)
Dept: NURSING | Facility: CLINIC | Age: 82
End: 2017-07-06
Payer: COMMERCIAL

## 2017-07-06 DIAGNOSIS — E53.8 VITAMIN B12 DEFICIENCY (NON ANEMIC): Primary | ICD-10-CM

## 2017-07-06 PROCEDURE — 96372 THER/PROPH/DIAG INJ SC/IM: CPT

## 2017-07-07 ENCOUNTER — OFFICE VISIT (OUTPATIENT)
Dept: FAMILY MEDICINE | Facility: CLINIC | Age: 82
End: 2017-07-07
Payer: COMMERCIAL

## 2017-07-07 VITALS
WEIGHT: 158.4 LBS | TEMPERATURE: 98 F | HEIGHT: 68 IN | DIASTOLIC BLOOD PRESSURE: 86 MMHG | HEART RATE: 78 BPM | BODY MASS INDEX: 24.01 KG/M2 | SYSTOLIC BLOOD PRESSURE: 133 MMHG | OXYGEN SATURATION: 97 %

## 2017-07-07 DIAGNOSIS — I48.20 CHRONIC ATRIAL FIBRILLATION (H): ICD-10-CM

## 2017-07-07 DIAGNOSIS — M48.07 SPINAL STENOSIS OF LUMBOSACRAL REGION: ICD-10-CM

## 2017-07-07 DIAGNOSIS — K50.819 CROHN'S DISEASE OF SMALL AND LARGE INTESTINES WITH COMPLICATION (H): Primary | ICD-10-CM

## 2017-07-07 PROCEDURE — 99213 OFFICE O/P EST LOW 20 MIN: CPT | Performed by: INTERNAL MEDICINE

## 2017-07-07 RX ORDER — GABAPENTIN 300 MG/1
CAPSULE ORAL
Qty: 450 CAPSULE | Refills: 3 | Status: SHIPPED | OUTPATIENT
Start: 2017-07-07 | End: 2017-09-20

## 2017-07-07 NOTE — MR AVS SNAPSHOT
After Visit Summary   7/7/2017    Dawson Jones    MRN: [id number]           Patient Information     Date Of Birth          5/5/1930        Visit Information        Provider Department      7/7/2017 11:00 AM Judson Mcadams MD [facility]        Today's Diagnoses     Spinal stenosis of lumbosacral region           Follow-ups after your visit        Your next 10 appointments already scheduled     Aug 01, 2017  9:30 AM CDT   Nurse Only with CS NURSE   [facility]    [address] [place] [id number] [phone]            Sep 05, 2017  9:30 AM CDT   Nurse Only with CS NURSE   [facility] ([facility]    [address] [place] [address] [phone]              Who to contact     If you have questions or need follow up information about today's clinic visit or your schedule please contact [facility] directly at [phone].  Normal or non-critical lab and imaging results will be communicated to you by Oxtexhart, letter or phone within 4 business days after the clinic has received the results. If you do not hear from us within 7 days, please contact the clinic through Switchboard or phone. If you have a critical or abnormal lab result, we will notify you by phone as soon as possible.  Submit refill requests through Switchboard or call your pharmacy and they will forward the refill request to us. Please allow 3 business days for your refill to be completed.          Additional Information About Your Visit        Oxtexhart Information     Switchboard gives you secure access to your electronic health record. If you see a primary care provider, you can also send messages to your care team and make appointments. If you have questions, please call your primary care clinic.  If you do not have a primary care provider, please call [phone] and they will assist you.        Care EveryWhere ID     This is your Care EveryWhere ID.  "This could be used by other organizations to access your [place] medical records  [id number]        Your Vitals Were     Pulse Temperature Height Pulse Oximetry BMI (Body Mass Index)       78 98  F (36.7  C) (Oral) 5' 8\" (1.727 m) 97% 24.08 kg/m2        Blood Pressure from Last 3 Encounters:   07/07/17 133/86   06/13/17 106/64   06/09/17 121/77    Weight from Last 3 Encounters:   07/07/17 158 lb 6.4 oz (71.8 kg)   06/13/17 154 lb (69.9 kg)   06/09/17 152 lb 14.4 oz (69.4 kg)              Today, you had the following     No orders found for display         Today's Medication Changes          These changes are accurate as of: 7/7/17 11:18 AM.  If you have any questions, ask your nurse or doctor.               These medicines have changed or have updated prescriptions.        Dose/Directions    gabapentin 300 MG capsule   Commonly known as:  NEURONTIN   This may have changed:    - medication strength  - additional instructions   Used for:  Spinal stenosis of lumbosacral region   Changed by:  Judson Mcadams MD        300 mg in AM and 300 mg at NOON and 900 mg at Bedtime as needed for spinal stenosis-related leg pain   Quantity:  450 capsule   Refills:  3       opium tincture 10 MG/ML (1%) liquid   This may have changed:  additional instructions   Used for:  Crohn's disease of small and large intestines with complication (H)        8 drops every 4 hours as needed for diarrhea   Quantity:  118 mL   Refills:  0            Where to get your medicines      These medications were sent to [id number] - [facility] [address] AT [facility] [address], [facility] [address]    Hours:  24-hours Phone:  [phone]     gabapentin 300 MG capsule                Primary Care Provider Office Phone # Fax #    Judson Mcadams [phone]       [facility] [address] [place] [address]        Equal Access to Services     ABDULLAHI SANCHEZ AH: Hadii amada alvarado " dione Calero, warandda luqadaha, qaybta kaanila holman, ky marmolejocriss arabella. So [facility] [phone].    ATENCIÓN: Si ehrmesla noelle, tiene a ambrose disposición servicios gratuitos de asistencia lingüística. Silverio al [phone].    We comply with applicable federal civil rights laws and [place] laws. We do not discriminate on the basis of race, color, national origin, age, disability sex, sexual orientation or gender identity.            Thank you!     Thank you for choosing [facility]  for your care. Our goal is always to provide you with excellent care. Hearing back from our patients is one way we can continue to improve our services. Please take a few minutes to complete the written survey that you may receive in the mail after your visit with us. Thank you!             Your Updated Medication List - Protect others around you: Learn how to safely use, store and throw away your medicines at www.disposemymeds.org.          This list is accurate as of: 7/7/17 11:18 AM.  Always use your most recent med list.                   Brand Name Dispense Instructions for use Diagnosis    cyanocobalamin 1000 MCG/ML injection    VITAMIN B12    1 mL    Inject 1 mL (1,000 mcg) into the muscle every 30 days    Vitamin B12 deficiency (non anemic)       diphenoxylate-atropine 2.5-0.025 MG per tablet    LOMOTIL    40 tablet    Take 1 tablet by mouth 4 times daily as needed for diarrhea    Crohn's disease of small and large intestines with complication (H)       ELIQUIS 5 MG tablet   Generic drug:  apixaban ANTICOAGULANT     180 tablet    Take 1 tablet (5 mg) by mouth 2 times daily    Chronic atrial fibrillation (H)       FLOMAX 0.4 MG capsule   Generic drug:  tamsulosin     90 capsule    Take 1 capsule (0.4 mg) by mouth daily    Benign non-nodular prostatic hyperplasia with lower urinary tract symptoms       gabapentin 300 MG capsule    NEURONTIN    450 capsule    300 mg in AM and 300 mg at NOON  and 900 mg at Bedtime as needed for spinal stenosis-related leg pain    Spinal stenosis of lumbosacral region       metoprolol 50 MG tablet    LOPRESSOR    180 tablet    Take 1 tablet (50 mg) by mouth 2 times daily    Benign essential hypertension       opium tincture 10 MG/ML (1%) liquid     118 mL    8 drops every 4 hours as needed for diarrhea    Crohn's disease of small and large intestines with complication (H)

## 2017-07-07 NOTE — NURSING NOTE
"Chief Complaint   Patient presents with     Follow Up For     spinal stenosis       Initial /86 (BP Location: Right arm, Patient Position: Chair, Cuff Size: Adult Regular)  Pulse 78  Temp 98  F (36.7  C) (Oral)  Ht 5' 8\" (1.727 m)  Wt 158 lb 6.4 oz (71.8 kg)  SpO2 97%  BMI 24.08 kg/m2 Estimated body mass index is 24.08 kg/(m^2) as calculated from the following:    Height as of this encounter: 5' 8\" (1.727 m).    Weight as of this encounter: 158 lb 6.4 oz (71.8 kg).  Medication Reconciliation: complete   Sandy Partida MA  "

## 2017-07-07 NOTE — PROGRESS NOTES
SUBJECTIVE:                                                    Dawson Jones is a 87 year old male who presents to clinic today for the following health issues:      F/up spinal stenosis    Gabapentin has improved symptoms significantly  He is not having side effects such as lethargy, sedation or dizziness  He would like to increase the dose  He enjoyed a great 4th of July and his son's cabin    He has no new complaints     Problem list and histories reviewed & adjusted, as indicated.  Additional history: as documented    Patient Active Problem List   Diagnosis     Atrial fibrillation (H)     Crohn's disease of small and large intestines with complication (H)     Spinal stenosis of lumbosacral region     Vitamin B12 deficiency (non anemic)     Past Surgical History:   Procedure Laterality Date     APPENDECTOMY         Social History   Substance Use Topics     Smoking status: Former Smoker     Smokeless tobacco: Never Used      Comment: 40 years ago     Alcohol use 4.2 oz/week     7 Standard drinks or equivalent per week      Comment: 1 wine an evening     Family History   Problem Relation Age of Onset     Family History Negative Mother      Cardiac Sudden Death Father      Family History Negative Brother      Other - See Comments Sister      colon issues     Family History Negative Son      Family History Negative Brother      Family History Negative Son      HEART DISEASE No family hx of          Current Outpatient Prescriptions   Medication Sig Dispense Refill     gabapentin (NEURONTIN) 300 MG capsule 300 mg in AM and 300 mg at NOON and 900 mg at Bedtime as needed for spinal stenosis-related leg pain 450 capsule 3     diphenoxylate-atropine (LOMOTIL) 2.5-0.025 MG per tablet Take 1 tablet by mouth 4 times daily as needed for diarrhea 40 tablet 3     ELIQUIS 5 MG tablet Take 1 tablet (5 mg) by mouth 2 times daily 180 tablet 3     cyanocobalamin (VITAMIN B12) 1000 MCG/ML injection Inject 1 mL (1,000 mcg) into  "the muscle every 30 days 1 mL 11     metoprolol (LOPRESSOR) 50 MG tablet Take 1 tablet (50 mg) by mouth 2 times daily 180 tablet 3     tamsulosin (FLOMAX) 0.4 MG capsule Take 1 capsule (0.4 mg) by mouth daily 90 capsule 3     opium tincture 10 MG/ML (1%) liquid 8 drops every 4 hours as needed for diarrhea (Patient taking differently: 4 drops every 4 hours as needed for diarrhea) 118 mL 0     [DISCONTINUED] gabapentin (NEURONTIN) 100 MG capsule 100mg in AM and 100mg at NOON and 200-300mg at Bedtime as needed for spinal stenosis-related leg pain 450 capsule 3     Allergies   Allergen Reactions     No Known Allergies        Reviewed and updated as needed this visit by clinical staff       Reviewed and updated as needed this visit by Provider         ROS:  Crohn's symptoms are stable and he does not need refills on his lomotil or opium  He feels like the flomax is helping his bladder emptying, no dysuria or hematuria   No palpitations or chest pains    OBJECTIVE:     /86 (BP Location: Right arm, Patient Position: Chair, Cuff Size: Adult Regular)  Pulse 78  Temp 98  F (36.7  C) (Oral)  Ht 5' 8\" (1.727 m)  Wt 158 lb 6.4 oz (71.8 kg)  SpO2 97%  BMI 24.08 kg/m2  Body mass index is 24.08 kg/(m^2).  Well appearing comfortable appearing elderly man, now using a cane     Diagnostic Test Results:  none     ASSESSMENT/PLAN:             1. Spinal stenosis of lumbosacral region  Increase gabapentin dose as below  Can increase further if this dose does not adequately address pain and no side effects occur; wife will contact me by Sumavisoshart if that is the case   - gabapentin (NEURONTIN) 300 MG capsule; 300 mg in AM and 300 mg at NOON and 900 mg at Bedtime as needed for spinal stenosis-related leg pain  Dispense: 450 capsule; Refill: 3    2. Crohn's disease of small and large intestines with complication (H)  Stable, continue PRN opium and lomotil     3. Chronic atrial fibrillation (H)  Rate controlled, anticoagulated for " stroke prophylaxis       FUTURE APPOINTMENTS:       - as symptoms dictate     Judson Mcadams MD  [facility]

## 2017-08-01 ENCOUNTER — ALLIED HEALTH/NURSE VISIT (OUTPATIENT)
Dept: NURSING | Facility: CLINIC | Age: 82
End: 2017-08-01
Payer: COMMERCIAL

## 2017-08-01 DIAGNOSIS — E53.8 VITAMIN B12 DEFICIENCY (NON ANEMIC): Primary | ICD-10-CM

## 2017-08-01 PROCEDURE — 96372 THER/PROPH/DIAG INJ SC/IM: CPT

## 2017-09-05 ENCOUNTER — ALLIED HEALTH/NURSE VISIT (OUTPATIENT)
Dept: NURSING | Facility: CLINIC | Age: 82
End: 2017-09-05

## 2017-09-05 DIAGNOSIS — E53.8 VITAMIN B 12 DEFICIENCY: Primary | ICD-10-CM

## 2017-09-05 NOTE — MR AVS SNAPSHOT
After Visit Summary   9/5/2017    Dawson Jones    MRN: [id number]           Patient Information     Date Of Birth          5/5/1930        Visit Information        Provider Department      9/5/2017 9:30 AM CS NURSE [facility]        Today's Diagnoses     Vitamin B 12 deficiency    -  1       Follow-ups after your visit        Your next 10 appointments already scheduled     Oct 03, 2017  9:30 AM CDT   Nurse Only with CS NURSE   [facility]    [address]  [place] [address] [phone]            Nov 07, 2017  9:30 AM CST   Nurse Only with CS NURSE   [facility]    [address]  [place] [address] [phone]              Who to contact     If you have questions or need follow up information about today's clinic visit or your schedule please contact [facility] directly at [phone].  Normal or non-critical lab and imaging results will be communicated to you by 01Games Technologyhart, letter or phone within 4 business days after the clinic has received the results. If you do not hear from us within 7 days, please contact the clinic through Bestofmedia Groupt or phone. If you have a critical or abnormal lab result, we will notify you by phone as soon as possible.  Submit refill requests through LesConcierges or call your pharmacy and they will forward the refill request to us. Please allow 3 business days for your refill to be completed.          Additional Information About Your Visit        MyChart Information     LesConcierges gives you secure access to your electronic health record. If you see a primary care provider, you can also send messages to your care team and make appointments. If you have questions, please call your primary care clinic.  If you do not have a primary care provider, please call [phone] and they will assist you.        Care EveryWhere ID     This is your Care EveryWhere ID. This could be used by  other organizations to access your [place] medical records  [id number]         Blood Pressure from Last 3 Encounters:   07/07/17 133/86   06/13/17 106/64   06/09/17 121/77    Weight from Last 3 Encounters:   07/07/17 158 lb 6.4 oz (71.8 kg)   06/13/17 154 lb (69.9 kg)   06/09/17 152 lb 14.4 oz (69.4 kg)              Today, you had the following     No orders found for display         Today's Medication Changes          These changes are accurate as of: 9/5/17  9:40 AM.  If you have any questions, ask your nurse or doctor.               These medicines have changed or have updated prescriptions.        Dose/Directions    opium tincture 10 MG/ML (1%) liquid   This may have changed:  additional instructions   Used for:  Crohn's disease of small and large intestines with complication (H)        8 drops every 4 hours as needed for diarrhea   Quantity:  118 mL   Refills:  0                Primary Care Provider Office Phone # Fax #    Judson Mcadams [phone]       [facility] [address] [place] [address] [facility] [address]  [facility] [address]        Equal Access to Services     [facility] AH: Hadii aad ku hadasho Soshirlene, waaxda luqadaha, qaybta kaalmada ademichaelyada, ky cardona . So [facility] [phone].    ATENCIÓN: Si habla español, tiene a ambrose disposición servicios gratuitos de asistencia lingüística. Llame al [phone].    We comply with applicable federal civil rights laws and [place] laws. We do not discriminate on the basis of race, color, national origin, age, disability sex, sexual orientation or gender identity.            Thank you!     Thank you for choosing [facility]  for your care. Our goal is always to provide you with excellent care. Hearing back from our patients is one way we can continue to improve our services. Please take a few minutes to complete the written survey that you may receive in the mail after your visit with us. Thank you!              Your Updated Medication List - Protect others around you: Learn how to safely use, store and throw away your medicines at www.disposemymeds.org.          This list is accurate as of: 9/5/17  9:40 AM.  Always use your most recent med list.                   Brand Name Dispense Instructions for use Diagnosis    cyanocobalamin 1000 MCG/ML injection    VITAMIN B12    1 mL    Inject 1 mL (1,000 mcg) into the muscle every 30 days    Vitamin B12 deficiency (non anemic)       diphenoxylate-atropine 2.5-0.025 MG per tablet    LOMOTIL    40 tablet    Take 1 tablet by mouth 4 times daily as needed for diarrhea    Crohn's disease of small and large intestines with complication (H)       ELIQUIS 5 MG tablet   Generic drug:  apixaban ANTICOAGULANT     180 tablet    Take 1 tablet (5 mg) by mouth 2 times daily    Chronic atrial fibrillation (H)       FLOMAX 0.4 MG capsule   Generic drug:  tamsulosin     90 capsule    Take 1 capsule (0.4 mg) by mouth daily    Benign non-nodular prostatic hyperplasia with lower urinary tract symptoms       gabapentin 300 MG capsule    NEURONTIN    450 capsule    300 mg in AM and 300 mg at NOON and 900 mg at Bedtime as needed for spinal stenosis-related leg pain    Spinal stenosis of lumbosacral region       metoprolol 50 MG tablet    LOPRESSOR    180 tablet    Take 1 tablet (50 mg) by mouth 2 times daily    Benign essential hypertension       opium tincture 10 MG/ML (1%) liquid     118 mL    8 drops every 4 hours as needed for diarrhea    Crohn's disease of small and large intestines with complication (H)

## 2017-09-20 ENCOUNTER — TELEPHONE (OUTPATIENT)
Dept: FAMILY MEDICINE | Facility: CLINIC | Age: 82
End: 2017-09-20

## 2017-09-20 DIAGNOSIS — M48.07 SPINAL STENOSIS OF LUMBOSACRAL REGION: ICD-10-CM

## 2017-09-20 RX ORDER — GABAPENTIN 300 MG/1
CAPSULE ORAL
Qty: 720 CAPSULE | Refills: 3 | Status: SHIPPED | OUTPATIENT
Start: 2017-09-20 | End: 2018-01-04

## 2017-09-20 NOTE — TELEPHONE ENCOUNTER
Reason for Call:  Other prescription    Detailed comments: pts wife calling in stating that the rx for gabapentin (NEURONTIN) 300 MG capsule was recently increased by Dr. Mcadams (2 in the am, 2 in pm and 4 before bed). She states that they will need to have more pills because of the increase in dosing. Please call when refill is resent. Pt is out of medication     Phone Number Patient can be reached at: Home number on file [phone] (home)    Best Time: any    Can we leave a detailed message on this number? Not Applicable    Call taken on 9/20/2017 at 3:54 PM by Yana Espinoza

## 2017-09-20 NOTE — TELEPHONE ENCOUNTER
Dr. Mcadams,  Did you change dosing at last office visit? If so, Pharmacy needs an ok to fill early. They are not letting the patient fill until 9/27/2017    Please advise

## 2017-10-03 ENCOUNTER — ALLIED HEALTH/NURSE VISIT (OUTPATIENT)
Dept: NURSING | Facility: CLINIC | Age: 82
End: 2017-10-03
Payer: COMMERCIAL

## 2017-10-03 DIAGNOSIS — Z23 NEED FOR PROPHYLACTIC VACCINATION AND INOCULATION AGAINST INFLUENZA: Primary | ICD-10-CM

## 2017-10-03 PROCEDURE — 90662 IIV NO PRSV INCREASED AG IM: CPT

## 2017-10-03 PROCEDURE — G0008 ADMIN INFLUENZA VIRUS VAC: HCPCS

## 2017-10-03 PROCEDURE — 99207 ZZC NO CHARGE NURSE ONLY: CPT

## 2017-10-03 NOTE — MR AVS SNAPSHOT
After Visit Summary   10/3/2017    Dawson Jones    MRN: [id number]           Patient Information     Date Of Birth          5/5/1930        Visit Information        Provider Department      10/3/2017 9:30 AM CS NURSE [facility]        Today's Diagnoses     Need for prophylactic vaccination and inoculation against influenza    -  1       Follow-ups after your visit        Your next 10 appointments already scheduled     Nov 07, 2017  9:30 AM CST   Nurse Only with CS NURSE   [facility]    [address]  [place] [address] [phone]            Dec 05, 2017  9:30 AM CST   Nurse Only with CS NURSE   [facility]    [address]  [place] [address] [phone]              Who to contact     If you have questions or need follow up information about today's clinic visit or your schedule please contact [facility] directly at [phone].  Normal or non-critical lab and imaging results will be communicated to you by addwishhart, letter or phone within 4 business days after the clinic has received the results. If you do not hear from us within 7 days, please contact the clinic through TapClicks or phone. If you have a critical or abnormal lab result, we will notify you by phone as soon as possible.  Submit refill requests through TapClicks or call your pharmacy and they will forward the refill request to us. Please allow 3 business days for your refill to be completed.          Additional Information About Your Visit        addwishhart Information     TapClicks gives you secure access to your electronic health record. If you see a primary care provider, you can also send messages to your care team and make appointments. If you have questions, please call your primary care clinic.  If you do not have a primary care provider, please call [phone] and they will assist you.        Care EveryWhere ID     This is  your Care EveryWhere ID. This could be used by other organizations to access your [place] medical records  [id number]         Blood Pressure from Last 3 Encounters:   07/07/17 133/86   06/13/17 106/64   06/09/17 121/77    Weight from Last 3 Encounters:   07/07/17 158 lb 6.4 oz (71.8 kg)   06/13/17 154 lb (69.9 kg)   06/09/17 152 lb 14.4 oz (69.4 kg)              We Performed the Following     ADMIN INFLUENZA (For MEDICARE Patients ONLY) []     FLU VACCINE, INCREASED ANTIGEN, PRESV FREE, AGE 65+ [50341]          Today's Medication Changes          These changes are accurate as of: 10/3/17 11:14 AM.  If you have any questions, ask your nurse or doctor.               These medicines have changed or have updated prescriptions.        Dose/Directions    opium tincture 10 MG/ML (1%) liquid   This may have changed:  additional instructions   Used for:  Crohn's disease of small and large intestines with complication (H)        8 drops every 4 hours as needed for diarrhea   Quantity:  118 mL   Refills:  0                Primary Care Provider Office Phone # Fax #    Judson Mcadams [phone]       [facility] [address] [address]        Equal Access to Services     ABDULLAHI SANCHEZ AH: Hadii amada ku hadasho Soomaali, waaxda luqadaha, qaybta kaalmada adeegyada, ky cardona . So [facility] [phone].    ATENCIÓN: Si habla español, tiene a ambrose disposición servicios gratuitos de asistencia lingüística. LlUK [facility] [phone].    We comply with applicable federal civil rights laws and [place] laws. We do not discriminate on the basis of race, color, national origin, age, disability, sex, sexual orientation, or gender identity.            Thank you!     Thank you for choosing [facility]  for your care. Our goal is always to provide you with excellent care. Hearing back from our patients is one way we can continue to improve our services.  Please take a few minutes to complete the written survey that you may receive in the mail after your visit with us. Thank you!             Your Updated Medication List - Protect others around you: Learn how to safely use, store and throw away your medicines at www.disposemymeds.org.          This list is accurate as of: 10/3/17 11:14 AM.  Always use your most recent med list.                   Brand Name Dispense Instructions for use Diagnosis    cyanocobalamin 1000 MCG/ML injection    VITAMIN B12    1 mL    Inject 1 mL (1,000 mcg) into the muscle every 30 days    Vitamin B12 deficiency (non anemic)       diphenoxylate-atropine 2.5-0.025 MG per tablet    LOMOTIL    40 tablet    Take 1 tablet by mouth 4 times daily as needed for diarrhea    Crohn's disease of small and large intestines with complication (H)       ELIQUIS 5 MG tablet   Generic drug:  apixaban ANTICOAGULANT     180 tablet    Take 1 tablet (5 mg) by mouth 2 times daily    Chronic atrial fibrillation (H)       FLOMAX 0.4 MG capsule   Generic drug:  tamsulosin     90 capsule    Take 1 capsule (0.4 mg) by mouth daily    Benign non-nodular prostatic hyperplasia with lower urinary tract symptoms       gabapentin 300 MG capsule    NEURONTIN    720 capsule    2 in the am, 2 in afternoon and 4 before bed    Spinal stenosis of lumbosacral region       metoprolol 50 MG tablet    LOPRESSOR    180 tablet    Take 1 tablet (50 mg) by mouth 2 times daily    Benign essential hypertension       opium tincture 10 MG/ML (1%) liquid     118 mL    8 drops every 4 hours as needed for diarrhea    Crohn's disease of small and large intestines with complication (H)

## 2017-10-10 DIAGNOSIS — I10 BENIGN ESSENTIAL HYPERTENSION: ICD-10-CM

## 2017-10-11 RX ORDER — METOPROLOL TARTRATE 50 MG
TABLET ORAL
Qty: 180 TABLET | Refills: 0 | OUTPATIENT
Start: 2017-10-11

## 2017-10-11 NOTE — TELEPHONE ENCOUNTER
Metoprolol was last ordered on 6/9/2017, #180 with 3 refills.   Pharmacy advised.    Kate Garcia, KURT, RN, N  [facility]) [phone]

## 2017-10-11 NOTE — TELEPHONE ENCOUNTER
Rx sent on 6/9/17    Pending Prescriptions:                       Disp   Refills    metoprolol (LOPRESSOR) 50 MG tablet [Phar*180 ta*0            Sig: TAKE 1 TABLET BY MOUTH TWICE DAILY          Last Written Prescription Date: 6/9/17  Last Fill Quantity: 180, # refills: 3    Last Office Visit with [facility], P or [facility] prescribing provider:  6/9/17 Pema   Future Office Visit:    Next 5 appointments (look out 90 days)     Nov 07, 2017  9:30 AM CST   Nurse Only with  NURSE   [facility]    [address]  [place] [address]   [phone]            Dec 05, 2017  9:30 AM CST   Nurse Only with  NURSE   [facility]    [address]  [place] [address]   [phone]                    BP Readings from Last 3 Encounters:   07/07/17 133/86   06/13/17 106/64   06/09/17 121/77     RT Nando(R)

## 2017-11-07 ENCOUNTER — ALLIED HEALTH/NURSE VISIT (OUTPATIENT)
Dept: NURSING | Facility: CLINIC | Age: 82
End: 2017-11-07
Payer: COMMERCIAL

## 2017-11-07 DIAGNOSIS — E53.8 VITAMIN B12 DEFICIENCY (NON ANEMIC): Primary | ICD-10-CM

## 2017-11-07 DIAGNOSIS — K50.819 CROHN'S DISEASE OF SMALL AND LARGE INTESTINES WITH COMPLICATION (H): ICD-10-CM

## 2017-11-07 PROCEDURE — 96372 THER/PROPH/DIAG INJ SC/IM: CPT

## 2017-11-07 NOTE — TELEPHONE ENCOUNTER
opium tincture 10 MG/ML (1%) liquid        Last Written Prescription Date: 2/27/2017  Last Fill Quantity: 118mL,  # refills: 0   Last Office Visit with [facility], [facility] or [facility] prescribing provider: 7/7/2017                                         Next 5 appointments (look out 90 days)     Dec 05, 2017  9:30 AM CST   Nurse Only with  NURSE   [facility]    [address] [place] [address]   [phone]            Jan 02, 2018  9:30 AM CST   Nurse Only with CS NURSE   [facility]    [address] [place] [address]   [phone]

## 2017-11-08 RX ORDER — MORPHINE 10 MG/ML
TINCTURE ORAL
Qty: 118 ML | Refills: 0 | Status: SHIPPED | OUTPATIENT
Start: 2017-11-08 | End: 2018-03-30

## 2017-11-08 NOTE — TELEPHONE ENCOUNTER
Routing refill request to provider for review/approval because:  Drug not on the [facility] refill protocol   Christina BARRIOS RN

## 2017-11-20 ENCOUNTER — OFFICE VISIT (OUTPATIENT)
Dept: FAMILY MEDICINE | Facility: CLINIC | Age: 82
End: 2017-11-20
Payer: COMMERCIAL

## 2017-11-20 VITALS
BODY MASS INDEX: 25.73 KG/M2 | DIASTOLIC BLOOD PRESSURE: 86 MMHG | SYSTOLIC BLOOD PRESSURE: 134 MMHG | HEART RATE: 72 BPM | TEMPERATURE: 96 F | HEIGHT: 68 IN | OXYGEN SATURATION: 96 % | WEIGHT: 169.8 LBS

## 2017-11-20 DIAGNOSIS — E53.8 VITAMIN B12 DEFICIENCY (NON ANEMIC): ICD-10-CM

## 2017-11-20 DIAGNOSIS — K50.819 CROHN'S DISEASE OF SMALL AND LARGE INTESTINES WITH COMPLICATION (H): ICD-10-CM

## 2017-11-20 DIAGNOSIS — M48.062 SPINAL STENOSIS OF LUMBAR REGION WITH NEUROGENIC CLAUDICATION: Primary | ICD-10-CM

## 2017-11-20 DIAGNOSIS — I48.20 CHRONIC ATRIAL FIBRILLATION (H): ICD-10-CM

## 2017-11-20 PROCEDURE — 99214 OFFICE O/P EST MOD 30 MIN: CPT | Performed by: INTERNAL MEDICINE

## 2017-11-20 NOTE — PROGRESS NOTES
SUBJECTIVE:   Dawson Jones is a 87 year old male who presents to clinic today for the following health issues:    Musculoskeletal problem/pain      Duration: 1.5-2 weeks    Description  Location: bilateral Leg Pain    Intensity: Severe     Accompanying signs and symptoms: weakness of bilateral legs    History  Previous similar problem: no   Previous evaluation:  none    Precipitating or alleviating factors:  Trauma or overuse: no   Aggravating factors include: standing, walking and climbing stairs    Therapies tried and outcome: rest/inactivity        Long history of lower back pain radiating to legs associated with walking longer distances    MRI in 2/2016 showed finding consistent with lumbar spinal stenosis    He did get temporary relief from lumbar epidural steroid injection    His daughter inquired about implantable stimulators and TENs units and massage therapy    He cannot be certain if the gabapentin is helping     He requests a referral for another epidural steroid injection     He has not gotten a walker yet and declines a referral for one     Problem list and histories reviewed & adjusted, as indicated.  Additional history: as documented    Patient Active Problem List   Diagnosis     Atrial fibrillation (H)     Crohn's disease of small and large intestines with complication (H)     Spinal stenosis of lumbosacral region     Vitamin B12 deficiency (non anemic)     Past Surgical History:   Procedure Laterality Date     APPENDECTOMY         Social History   Substance Use Topics     Smoking status: Former Smoker     Smokeless tobacco: Never Used      Comment: 40 years ago     Alcohol use 4.2 oz/week     7 Standard drinks or equivalent per week      Comment: 1 wine an evening     Family History   Problem Relation Age of Onset     Family History Negative Mother      Cardiac Sudden Death Father      Family History Negative Brother      Other - See Comments Sister      colon issues     Family History  "Negative Son      Family History Negative Brother      Family History Negative Son      HEART DISEASE No family hx of          Current Outpatient Prescriptions   Medication Sig Dispense Refill     opium tincture 10 MG/ML (1%) liquid 4 drops every 4 hours as needed for diarrhea 118 mL 0     gabapentin (NEURONTIN) 300 MG capsule 2 in the am, 2 in afternoon and 4 before bed 720 capsule 3     diphenoxylate-atropine (LOMOTIL) 2.5-0.025 MG per tablet Take 1 tablet by mouth 4 times daily as needed for diarrhea 40 tablet 3     ELIQUIS 5 MG tablet Take 1 tablet (5 mg) by mouth 2 times daily 180 tablet 3     cyanocobalamin (VITAMIN B12) 1000 MCG/ML injection Inject 1 mL (1,000 mcg) into the muscle every 30 days 1 mL 11     metoprolol (LOPRESSOR) 50 MG tablet Take 1 tablet (50 mg) by mouth 2 times daily 180 tablet 3     tamsulosin (FLOMAX) 0.4 MG capsule Take 1 capsule (0.4 mg) by mouth daily 90 capsule 3     Allergies   Allergen Reactions     No Known Allergies          Reviewed and updated as needed this visit by clinical staff     Reviewed and updated as needed this visit by Provider         ROS:  Constitutional, HEENT, cardiovascular, pulmonary, gi and gu systems are negative, except as otherwise noted.      OBJECTIVE:   /86 (BP Location: Right arm, Cuff Size: Adult Regular)  Pulse 72  Temp 96  F (35.6  C) (Oral)  Ht 5' 8\" (1.727 m)  Wt 169 lb 12.8 oz (77 kg)  SpO2 96%  BMI 25.82 kg/m2  Body mass index is 25.82 kg/(m^2).  GENERAL: healthy, alert and no distress  MS: BACK:  No tenderness to palpation over the spinous processes of the thoracic and lumbar spine, deep tendon reflexes are 2+ at the bilateral patella and Achilles tendons, there is 5 out of 5 muscle strength in all lower extremity muscle groups, there is normal sensation to light touch in all lower extremity dermatomes, straight leg raise does not elicit radicular symptoms bilaterally, there was no tenderness to palpation over the paraspinous muscles " of the lumbar spine. Gait is normal.     Diagnostic Test Results:  MRI of lumbar spine from 2016    ASSESSMENT/PLAN:     1. Spinal stenosis of lumbar region with neurogenic claudication  We had a more than 27 minute conversation regarding treatment option for spinal stenosis including referral for surgical consult, epidural steroid injection, different drugs, TENS units and alternative therapies, I still think he should use a walker, but he declines; he would like to try another epidural steroid injection, and he understands it may only provide temporary relief and we reviewed the risks     2. Chronic atrial fibrillation (H)  On NOAC for prophylaxis; can hold for procedure if needed     3. Crohn's disease of small and large intestines with complication (H)  Stable         FUTURE APPOINTMENTS:       - Follow-up visit pending outcome of epidural steroid injection; could try up to 3 shots per year  by at least 14 days, discussed with patient, wife and daughter; if shots don't work then they would consider surgical consult     Judson Mcadams MD  [facility]

## 2017-11-20 NOTE — NURSING NOTE
"Chief Complaint   Patient presents with     Leg Pain     Left Leg pain gotten worse 1.5-2 weeks, bilateral leg pain started with L leg        Initial /86 (BP Location: Right arm, Cuff Size: Adult Regular)  Pulse 72  Temp 96  F (35.6  C) (Oral)  Ht 5' 8\" (1.727 m)  Wt 169 lb 12.8 oz (77 kg)  SpO2 96%  BMI 25.82 kg/m2 Estimated body mass index is 25.82 kg/(m^2) as calculated from the following:    Height as of this encounter: 5' 8\" (1.727 m).    Weight as of this encounter: 169 lb 12.8 oz (77 kg).  Medication Reconciliation: complete     ONIEL Murguia      "

## 2017-11-20 NOTE — MR AVS SNAPSHOT
After Visit Summary   11/20/2017    Dawson Jones    MRN: [id number]           Patient Information     Date Of Birth          5/5/1930        Visit Information        Provider Department      11/20/2017 12:30 PM Judson Mcadams MD [facility]        Today's Diagnoses     Spinal stenosis of lumbar region with neurogenic claudication    -  1    Chronic atrial fibrillation (H)        Crohn's disease of small and large intestines with complication (H)        Vitamin B12 deficiency (non anemic)           Follow-ups after your visit        Your next 10 appointments already scheduled     Dec 05, 2017  9:30 AM CST   Nurse Only with CS NURSE   [facility] ([facility]    [address]  [facility] [address] [phone]            Jan 02, 2018  9:30 AM CST   Nurse Only with CS NURSE   [facility] ([facility]    [address]  [place] [address] [phone]              Who to contact     If you have questions or need follow up information about today's clinic visit or your schedule please contact [facility] directly at [phone].  Normal or non-critical lab and imaging results will be communicated to you by CampEasyhart, letter or phone within 4 business days after the clinic has received the results. If you do not hear from us within 7 days, please contact the clinic through Freedu[employer] or phone. If you have a critical or abnormal lab result, we will notify you by phone as soon as possible.  Submit refill requests through [employer] or call your pharmacy and they will forward the refill request to us. Please allow 3 business days for your refill to be completed.          Additional Information About Your Visit        MyChart Information     [employer] gives you secure access to your electronic health record. If you see a primary care provider, you can also send messages to your care team and make appointments. If you have questions, please  "call your primary care clinic.  If you do not have a primary care provider, please call [phone] and they will assist you.        Care EveryWhere ID     This is your Care EveryWhere ID. This could be used by other organizations to access your [facility] medical records  [id number]        Your Vitals Were     Pulse Temperature Height Pulse Oximetry BMI (Body Mass Index)       72 96  F (35.6  C) (Oral) 5' 8\" (1.727 m) 96% 25.82 kg/m2        Blood Pressure from Last 3 Encounters:   11/20/17 134/86   07/07/17 133/86   06/13/17 106/64    Weight from Last 3 Encounters:   11/20/17 169 lb 12.8 oz (77 kg)   07/07/17 158 lb 6.4 oz (71.8 kg)   06/13/17 154 lb (69.9 kg)              We Performed the Following     Patient care order        Primary Care Provider Office Phone # Fax #    Judson BREN Mcadams [phone]       [facility] ([facility] [address] [facility] [address] [address]        Equal Access to Services     AKHIL SANCHEZ : Hadii aad ku hadasho Soshirlene, waaxda luqadaha, qaybta kaalmada manda, ky cardona . So [facility] [phone].    ATENCIÓN: Si habla español, tiene a ambrose disposición servicios gratuitos de asistencia lingüística. [facility] [phone].    We comply with applicable federal civil rights laws and [place] laws. We do not discriminate on the basis of race, color, national origin, age, disability, sex, sexual orientation, or gender identity.            Thank you!     Thank you for choosing [facility]  for your care. Our goal is always to provide you with excellent care. Hearing back from our patients is one way we can continue to improve our services. Please take a few minutes to complete the written survey that you may receive in the mail after your visit with us. Thank you!             Your Updated Medication List - Protect others around you: Learn how to safely use, store and throw away your medicines at www.disposemymeds.org.          This " list is accurate as of: 11/20/17  1:24 PM.  Always use your most recent med list.                   Brand Name Dispense Instructions for use Diagnosis    cyanocobalamin 1000 MCG/ML injection    VITAMIN B12    1 mL    Inject 1 mL (1,000 mcg) into the muscle every 30 days    Vitamin B12 deficiency (non anemic)       diphenoxylate-atropine 2.5-0.025 MG per tablet    LOMOTIL    40 tablet    Take 1 tablet by mouth 4 times daily as needed for diarrhea    Crohn's disease of small and large intestines with complication (H)       ELIQUIS 5 MG tablet   Generic drug:  apixaban ANTICOAGULANT     180 tablet    Take 1 tablet (5 mg) by mouth 2 times daily    Chronic atrial fibrillation (H)       FLOMAX 0.4 MG capsule   Generic drug:  tamsulosin     90 capsule    Take 1 capsule (0.4 mg) by mouth daily    Benign non-nodular prostatic hyperplasia with lower urinary tract symptoms       gabapentin 300 MG capsule    NEURONTIN    720 capsule    2 in the am, 2 in afternoon and 4 before bed    Spinal stenosis of lumbosacral region       metoprolol 50 MG tablet    LOPRESSOR    180 tablet    Take 1 tablet (50 mg) by mouth 2 times daily    Benign essential hypertension       opium tincture 10 MG/ML (1%) liquid     118 mL    4 drops every 4 hours as needed for diarrhea    Crohn's disease of small and large intestines with complication (H)

## 2017-11-28 ENCOUNTER — TELEPHONE (OUTPATIENT)
Dept: FAMILY MEDICINE | Facility: CLINIC | Age: 82
End: 2017-11-28

## 2017-11-28 NOTE — TELEPHONE ENCOUNTER
Reason for Call:  Other call back    Detailed comments: Patient had injection yesterday at [facility] (she thinks  Cortisone)  Pain has changed locations and last MRI was in 2016, person who spoke  To them there said they should follow up with a new MRI and possibly anohter injection  To get closer to where the issue is.  Advised they seek out advice from Dr Mcadams    Phone Number Patient can be reached at: Home number on file [phone] (home)    Best Time: anytime    Can we leave a detailed message on this number? YES    Call taken on 11/28/2017 at 3:17 PM by Renee May

## 2017-11-28 NOTE — TELEPHONE ENCOUNTER
Advise wife to give shot a week or two to see if it addresses symptoms  If not we can consider additional injections or follow up MRI

## 2017-11-28 NOTE — TELEPHONE ENCOUNTER
TO PCP:  Please see below message.  Do you want to see patient first?  Please advise.  Thank you.  Brandy Robles RN

## 2017-12-05 ENCOUNTER — ALLIED HEALTH/NURSE VISIT (OUTPATIENT)
Dept: NURSING | Facility: CLINIC | Age: 82
End: 2017-12-05
Payer: COMMERCIAL

## 2017-12-05 DIAGNOSIS — D51.8 OTHER VITAMIN B12 DEFICIENCY ANEMIA: Primary | ICD-10-CM

## 2017-12-05 PROCEDURE — 96372 THER/PROPH/DIAG INJ SC/IM: CPT

## 2017-12-08 ENCOUNTER — OFFICE VISIT (OUTPATIENT)
Dept: FAMILY MEDICINE | Facility: CLINIC | Age: 82
End: 2017-12-08
Payer: COMMERCIAL

## 2017-12-08 ENCOUNTER — RADIANT APPOINTMENT (OUTPATIENT)
Dept: GENERAL RADIOLOGY | Facility: CLINIC | Age: 82
End: 2017-12-08
Attending: INTERNAL MEDICINE
Payer: COMMERCIAL

## 2017-12-08 VITALS
WEIGHT: 165.8 LBS | OXYGEN SATURATION: 95 % | HEART RATE: 75 BPM | SYSTOLIC BLOOD PRESSURE: 94 MMHG | DIASTOLIC BLOOD PRESSURE: 64 MMHG | BODY MASS INDEX: 25.13 KG/M2 | TEMPERATURE: 97 F | HEIGHT: 68 IN

## 2017-12-08 DIAGNOSIS — M25.562 ACUTE PAIN OF LEFT KNEE: Primary | ICD-10-CM

## 2017-12-08 DIAGNOSIS — M25.562 ACUTE PAIN OF LEFT KNEE: ICD-10-CM

## 2017-12-08 DIAGNOSIS — M48.07 SPINAL STENOSIS OF LUMBOSACRAL REGION: ICD-10-CM

## 2017-12-08 PROCEDURE — 96372 THER/PROPH/DIAG INJ SC/IM: CPT | Performed by: INTERNAL MEDICINE

## 2017-12-08 PROCEDURE — 73560 X-RAY EXAM OF KNEE 1 OR 2: CPT | Mod: LT

## 2017-12-08 PROCEDURE — 99214 OFFICE O/P EST MOD 30 MIN: CPT | Mod: 25 | Performed by: INTERNAL MEDICINE

## 2017-12-08 NOTE — LETTER
[facility]  [address] [facility]  [address]  [place]  [address]  Tel: [phone]    December 11, 2017    Dawson Jones  [address] [facility] [address]        Dear Mr. Jones,    The following letter pertains to your most recent diagnostic tests:    Your knee x-ray does not show any dangerous findings.  There is only mild arthritis noted in the knee, but sometimes mild arthritic changes can cause severe pain.  Hopefully, the injection will help your symptoms.  Contact me if your back symptoms recur to arrange another epidural steroid injection.    Sincerely,    Judson Mcadams MD/GURINDER

## 2017-12-08 NOTE — MR AVS SNAPSHOT
After Visit Summary   12/8/2017    Dawson Jones    MRN: [id number]           Patient Information     Date Of Birth          5/5/1930        Visit Information        Provider Department      12/8/2017 4:30 PM Judson Mcadams MD [facility]        Today's Diagnoses     Acute pain of left knee    -  1    Spinal stenosis of lumbosacral region           Follow-ups after your visit        Your next 10 appointments already scheduled     Jan 02, 2018  9:30 AM CST   Nurse Only with CS NURSE   [facility]    [address]  [place] [address] [phone]            Feb 06, 2018  9:30 AM CST   Nurse Only with CS NURSE   [facility]    [address]  [place] [address] [phone]              Who to contact     If you have questions or need follow up information about today's clinic visit or your schedule please contact [facility] directly at [phone].  Normal or non-critical lab and imaging results will be communicated to you by Staccato Communicationshart, letter or phone within 4 business days after the clinic has received the results. If you do not hear from us within 7 days, please contact the clinic through Doctor kinetict or phone. If you have a critical or abnormal lab result, we will notify you by phone as soon as possible.  Submit refill requests through [employer] or call your pharmacy and they will forward the refill request to us. Please allow 3 business days for your refill to be completed.          Additional Information About Your Visit        Staccato Communicationshart Information     [employer] gives you secure access to your electronic health record. If you see a primary care provider, you can also send messages to your care team and make appointments. If you have questions, please call your primary care clinic.  If you do not have a primary care provider, please call [phone] and they will assist you.        Care EveryWhere ID   "   This is your Care EveryWhere ID. This could be used by other organizations to access your [facility] medical records  [id number]        Your Vitals Were     Pulse Temperature Height Pulse Oximetry BMI (Body Mass Index)       75 97  F (36.1  C) (Oral) 5' 8\" (1.727 m) 95% 25.21 kg/m2        Blood Pressure from Last 3 Encounters:   12/08/17 94/64   11/20/17 134/86   07/07/17 133/86    Weight from Last 3 Encounters:   12/08/17 165 lb 12.8 oz (75.2 kg)   11/20/17 169 lb 12.8 oz (77 kg)   07/07/17 158 lb 6.4 oz (71.8 kg)               Primary Care Provider Office Phone # Fax #    Judson Mcadams [phone]       [facility] [address] [facility] [address]  [facility] [address]        Equal Access to Services     ABDULLAHI SANCHEZ AH: Hadii aad ku hadasho Soomaali, waaxda luqadaha, qaybta kaalmada adeegyada, waxay sofiin haymarcelino cardona . So [facility] [phone].    ATENCIÓN: Si justino drake, tiene a ambrose disposición servicios gratuitos de asistencia lingüística. Llame al [phone].    We comply with applicable federal civil rights laws and [place] laws. We do not discriminate on the basis of race, color, national origin, age, disability, sex, sexual orientation, or gender identity.            Thank you!     Thank you for choosing [facility]  for your care. Our goal is always to provide you with excellent care. Hearing back from our patients is one way we can continue to improve our services. Please take a few minutes to complete the written survey that you may receive in the mail after your visit with us. Thank you!             Your Updated Medication List - Protect others around you: Learn how to safely use, store and throw away your medicines at www.disposemymeds.org.          This list is accurate as of: 12/8/17  5:50 PM.  Always use your most recent med list.                   Brand Name Dispense Instructions for use Diagnosis    cyanocobalamin 1000 MCG/ML injection    VITAMIN " B12    1 mL    Inject 1 mL (1,000 mcg) into the muscle every 30 days    Vitamin B12 deficiency (non anemic)       diphenoxylate-atropine 2.5-0.025 MG per tablet    LOMOTIL    40 tablet    Take 1 tablet by mouth 4 times daily as needed for diarrhea    Crohn's disease of small and large intestines with complication (H)       ELIQUIS 5 MG tablet   Generic drug:  apixaban ANTICOAGULANT     180 tablet    Take 1 tablet (5 mg) by mouth 2 times daily    Chronic atrial fibrillation (H)       FLOMAX 0.4 MG capsule   Generic drug:  tamsulosin     90 capsule    Take 1 capsule (0.4 mg) by mouth daily    Benign non-nodular prostatic hyperplasia with lower urinary tract symptoms       gabapentin 300 MG capsule    NEURONTIN    720 capsule    2 in the am, 2 in afternoon and 4 before bed    Spinal stenosis of lumbosacral region       metoprolol 50 MG tablet    LOPRESSOR    180 tablet    Take 1 tablet (50 mg) by mouth 2 times daily    Benign essential hypertension       opium tincture 10 MG/ML (1%) liquid     118 mL    4 drops every 4 hours as needed for diarrhea    Crohn's disease of small and large intestines with complication (H)

## 2017-12-08 NOTE — PROGRESS NOTES
SUBJECTIVE:   Dawson Jones is a 87 year old male who presents to clinic today for the following health issues:    F/up Lumbar spinal stenosis      Pleasant 87-year-old man with chronic atrial fibrillation, spinal stenosis, Crohn's disease, B12 deficiency.  He is here with his wife and daughter.  He was seen several weeks ago with complaints of severe back pain radiating down the back of his bilateral legs.  He was referred for a lumbar epidural steroid injection with the working diagnosis that he was having symptoms from his known diagnosis of spinal stenosis from an MRI a little over one year ago.  He received a epidural steroid injection and it improved his back and leg pain significantly.  However, several days later he developed severe pain in his left knee.  The pain seems to worsen when he bears weight on his left knee.  It was a little confusing for the family as to whether the pain was coming from his knee or coming from his lower back.  However the patient clearly localizes the pain to his left knee joint.  There is no injury or trauma to the left knee.          Problem list and histories reviewed & adjusted, as indicated.  Additional history: as documented    Patient Active Problem List   Diagnosis     Atrial fibrillation (H)     Crohn's disease of small and large intestines with complication (H)     Spinal stenosis of lumbosacral region     Vitamin B12 deficiency (non anemic)     Past Surgical History:   Procedure Laterality Date     APPENDECTOMY         Social History   Substance Use Topics     Smoking status: Former Smoker     Smokeless tobacco: Never Used      Comment: 40 years ago     Alcohol use 4.2 oz/week     7 Standard drinks or equivalent per week      Comment: 1 wine an evening     Family History   Problem Relation Age of Onset     Family History Negative Mother      Cardiac Sudden Death Father      Family History Negative Brother      Other - See Comments Sister      colon issues      "Family History Negative Son      Family History Negative Brother      Family History Negative Son      HEART DISEASE No family hx of          Current Outpatient Prescriptions   Medication Sig Dispense Refill     opium tincture 10 MG/ML (1%) liquid 4 drops every 4 hours as needed for diarrhea 118 mL 0     gabapentin (NEURONTIN) 300 MG capsule 2 in the am, 2 in afternoon and 4 before bed 720 capsule 3     diphenoxylate-atropine (LOMOTIL) 2.5-0.025 MG per tablet Take 1 tablet by mouth 4 times daily as needed for diarrhea 40 tablet 3     ELIQUIS 5 MG tablet Take 1 tablet (5 mg) by mouth 2 times daily 180 tablet 3     cyanocobalamin (VITAMIN B12) 1000 MCG/ML injection Inject 1 mL (1,000 mcg) into the muscle every 30 days 1 mL 11     metoprolol (LOPRESSOR) 50 MG tablet Take 1 tablet (50 mg) by mouth 2 times daily 180 tablet 3     tamsulosin (FLOMAX) 0.4 MG capsule Take 1 capsule (0.4 mg) by mouth daily 90 capsule 3     Allergies   Allergen Reactions     No Known Allergies          Reviewed and updated as needed this visit by clinical staff       Reviewed and updated as needed this visit by Provider         ROS:  No chest pains, fevers, shortness of breath, rash    OBJECTIVE:     BP 94/64  Pulse 75  Temp 97  F (36.1  C) (Oral)  Ht 5' 8\" (1.727 m)  Wt 165 lb 12.8 oz (75.2 kg)  SpO2 95%  BMI 25.21 kg/m2  Body mass index is 25.21 kg/(m^2).  General: This is a well-appearing elderly man in no acute distress.  He appears quite comfortable.  He grimaces in pain when he stands up and bears weight on his left leg.  Back: There is no exacerbation of pain or radicular symptoms with straight leg raises bilaterally, there is normal symmetric strength and sensation in lower extremity muscle groups and dermatomes, he has unchanged diminished bilateral deep tendon reflexes in the patella and Achilles tendons.  Knee: There is a small left knee effusion, there is a small left knee Reyna cyst, the patient has severe joint line " tenderness over the left knee joint, the left knee joint has full range of motion, ligament and meniscus testing is intact.      Knee plain film is pending    ASSESSMENT/PLAN:     1. Acute pain of left knee  I explained to his family and the patient that the left leg pain is not related to his spinal stenosis it is from a new problem which is probable osteoarthritis of the left knee joint.  We are checking a chest x-ray to confirm.  We discussed treatment options including conservative measures such as icing and bracing and resting over the weekend until the x-ray results are available.  However, the patient and his family requested a cortisone injection in the left knee joint today.  Following the cortisone injection, the patient described resolution of his left knee symptoms.  His gait improved significantly.  He is able to bear weight without pain.  - XR Knee Standing Left 2 Views; Future    Left knee joint cortisone injection  After discussion of risks and benefits, informed consent was obtained.  Area prepped and draped in sterile fashion.  Local anesthesia was obtained with 1% lidocaine.   The suprapatellar pouch was entered using a lateral approach.  1cc of K40 was injected into the joint space.  The procedure was tolerated well and after care was discussed.        2. Spinal stenosis of lumbosacral region  After further discussion, it seems that the initial epidural steroid injection actually addressed symptoms related to his spinal stenosis.  After discussion with the family and the patient, they did not wish to pursue another MRI to further evaluate.  They will monitor his back symptoms and contact me for a referral for an additional epidural steroid injection at [facility] if his symptoms recur.  He understands that he is eligible for up to 3 injections per year.      Follow-up as symptoms dictate    Judson Mcadams MD  [facility]

## 2017-12-09 NOTE — PROGRESS NOTES
The following letter pertains to your most recent diagnostic tests:    Your knee x-ray does not show any dangerous findings.  There is only mild arthritis noted in the knee, but sometimes mild arthritic changes can cause severe pain.  Hopefully, the injection will help your symptoms.  Contact me if your back symptoms recur to arrange another epidural steroid injection.      Sincerely,    Dr. Mcadams

## 2017-12-26 ENCOUNTER — OFFICE VISIT (OUTPATIENT)
Dept: URGENT CARE | Facility: URGENT CARE | Age: 82
End: 2017-12-26
Payer: COMMERCIAL

## 2017-12-26 ENCOUNTER — TELEPHONE (OUTPATIENT)
Dept: FAMILY MEDICINE | Facility: CLINIC | Age: 82
End: 2017-12-26

## 2017-12-26 VITALS
SYSTOLIC BLOOD PRESSURE: 133 MMHG | WEIGHT: 170.1 LBS | BODY MASS INDEX: 26.7 KG/M2 | HEIGHT: 67 IN | HEART RATE: 66 BPM | DIASTOLIC BLOOD PRESSURE: 76 MMHG | OXYGEN SATURATION: 97 %

## 2017-12-26 DIAGNOSIS — R60.0 LOWER EXTREMITY EDEMA: Primary | ICD-10-CM

## 2017-12-26 PROCEDURE — 99213 OFFICE O/P EST LOW 20 MIN: CPT | Performed by: STUDENT IN AN ORGANIZED HEALTH CARE EDUCATION/TRAINING PROGRAM

## 2017-12-26 NOTE — NURSING NOTE
"Chief Complaint   Patient presents with     Musculoskeletal Problem      Today - Left ankle - Swollen - red- pain varies when walking - Denies fall        Initial /76 (BP Location: Left arm, Patient Position: Chair, Cuff Size: Adult Regular)  Pulse 66  Ht 5' 7\" (1.702 m)  Wt 170 lb 1.6 oz (77.2 kg)  SpO2 97%  BMI 26.64 kg/m2 Estimated body mass index is 26.64 kg/(m^2) as calculated from the following:    Height as of this encounter: 5' 7\" (1.702 m).    Weight as of this encounter: 170 lb 1.6 oz (77.2 kg).  Medication Reconciliation: complete     Angie Bazan MA     "

## 2017-12-26 NOTE — TELEPHONE ENCOUNTER
Reason for call:  Patient reporting a symptom    Symptom or request: Swollen Left ankle and foot ( no pain, no redness)     Duration (how long have symptoms been present): 1 day     Have you been treated for this before? No    Additional comments: Pt did receive a Cortisone shot in Left knee about 2 weeks ago.     Phone Number patient can be reached at:  Home number on file [phone] (home)    Best Time:  anytime    Can we leave a detailed message on this number:  YES    Call taken on 12/26/2017 at 3:32 PM by Maria Del Carmen Oleary

## 2017-12-26 NOTE — MR AVS SNAPSHOT
After Visit Summary   12/26/2017    Dawson Jones    MRN: [id number]           Patient Information     Date Of Birth          5/5/1930        Visit Information        Provider Department      12/26/2017 5:15 PM Jasvir Cordova PA-C [facility]        Today's Diagnoses     Lower extremity edema    -  1       Follow-ups after your visit        Your next 10 appointments already scheduled     Jan 02, 2018  9:30 AM CST   Nurse Only with CS NURSE   [facility]    [address] [id number] [phone]            Jan 02, 2018  2:20 PM CST   New Visit with YAMILKA Harrison CNP   [facility] ([facility])    [address] [address] [phone]            Jan 04, 2018  3:30 PM CST   Office Visit with Judson Mcadams MD   [facility]    [address] [facility] [address] [phone]           Bring a current list of meds and any records pertaining to this visit. For Physicals, please bring immunization records and any forms needing to be filled out. Please arrive 10 minutes early to complete paperwork.            Feb 06, 2018  9:30 AM CST   Nurse Only with CS NURSE   [facility]    [address] [address] [phone]              Who to contact     If you have questions or need follow up information about today's clinic visit or your schedule please contact [facility] directly at [phone].  Normal or non-critical lab and imaging results will be communicated to you by MyChart, letter or phone within 4 business days after the clinic has received the results. If you do not hear from us within 7 days, please contact the clinic through MyChart or phone. If you have a critical or abnormal lab result, we will notify you by  "phone as soon as possible.  Submit refill requests through [employer] or call your pharmacy and they will forward the refill request to us. Please allow 3 business days for your refill to be completed.          Additional Information About Your Visit        [employer] Information     [employer] gives you secure access to your electronic health record. If you see a primary care provider, you can also send messages to your care team and make appointments. If you have questions, please call your primary care clinic.  If you do not have a primary care provider, please call [phone] and they will assist you.        Care EveryWhere ID     This is your Care EveryWhere ID. This could be used by other organizations to access your [place] medical records  [id number]        Your Vitals Were     Pulse Height Pulse Oximetry BMI (Body Mass Index)          66 5' 7\" (1.702 m) 97% 26.64 kg/m2         Blood Pressure from Last 3 Encounters:   12/26/17 133/76   12/08/17 94/64   11/20/17 134/86    Weight from Last 3 Encounters:   12/26/17 170 lb 1.6 oz (77.2 kg)   12/08/17 165 lb 12.8 oz (75.2 kg)   11/20/17 169 lb 12.8 oz (77 kg)              Today, you had the following     No orders found for display       Primary Care Provider Office Phone # Fax #    Judson Mcadams [phone]       [facility] [address] [facility] [address]  [place] [address]        Equal Access to Services     ABDULLAHI SANCHEZ : Hadii aad ku hadasho Soomaali, waaxda luqadaha, qaybta kaalmada adeegyada, ky cardona . So [facility] [phone].    ATENCIÓN: Si habla español, tiene a ambrose disposición servicios gratuitos de asistencia lingüística. Llame al [phone].    We comply with applicable federal civil rights laws and [place] laws. We do not discriminate on the basis of race, color, national origin, age, disability, sex, sexual orientation, or gender identity.            Thank you!     Thank you for choosing [facility]" [facility]  for your care. Our goal is always to provide you with excellent care. Hearing back from our patients is one way we can continue to improve our services. Please take a few minutes to complete the written survey that you may receive in the mail after your visit with us. Thank you!             Your Updated Medication List - Protect others around you: Learn how to safely use, store and throw away your medicines at www.disposemymeds.org.          This list is accurate as of: 12/26/17  6:19 PM.  Always use your most recent med list.                   Brand Name Dispense Instructions for use Diagnosis    cyanocobalamin 1000 MCG/ML injection    VITAMIN B12    1 mL    Inject 1 mL (1,000 mcg) into the muscle every 30 days    Vitamin B12 deficiency (non anemic)       diphenoxylate-atropine 2.5-0.025 MG per tablet    LOMOTIL    40 tablet    Take 1 tablet by mouth 4 times daily as needed for diarrhea    Crohn's disease of small and large intestines with complication (H)       ELIQUIS 5 MG tablet   Generic drug:  apixaban ANTICOAGULANT     180 tablet    Take 1 tablet (5 mg) by mouth 2 times daily    Chronic atrial fibrillation (H)       FLOMAX 0.4 MG capsule   Generic drug:  tamsulosin     90 capsule    Take 1 capsule (0.4 mg) by mouth daily    Benign non-nodular prostatic hyperplasia with lower urinary tract symptoms       gabapentin 300 MG capsule    NEURONTIN    720 capsule    2 in the am, 2 in afternoon and 4 before bed    Spinal stenosis of lumbosacral region       metoprolol 50 MG tablet    LOPRESSOR    180 tablet    Take 1 tablet (50 mg) by mouth 2 times daily    Benign essential hypertension       opium tincture 10 MG/ML (1%) liquid     118 mL    4 drops every 4 hours as needed for diarrhea    Crohn's disease of small and large intestines with complication (H)

## 2017-12-26 NOTE — TELEPHONE ENCOUNTER
Spoke with Pauly:   Pt woke up today - sudden swelling in left foot and ankle  No soreness, numbness, or tingling   Pitting edema - has not tried to elevate his leg  No hx of blood clots but takes Eliquis for a-fib   No pain, no redness, no discoloration   Had cortisone 12/8/17 on left knee   Right foot is normal - swelling is unilateral     Advised OV today - wife states they will bring pt to [facility] jignesh BARRIOS RN

## 2017-12-26 NOTE — PROGRESS NOTES
"SUBJECTIVE:  Dawson Jones is a pleasant 86 y/o male presenting to the clinic for evaluation on L lower extremity edema. Symptoms began this morning and denies ever having similar symptoms previously. The patient notes his L leg, predominately the ankle, has had swelling which extends to the mid shin. There is no pain associated with this, no redness, no discharge. Denies trama. No loss of sensation.     The patient has not tried anything for treatment at this time.     He does note a history of a-fib and is currently on Eliquis. No history of CHF.     Denies any significant changes in diet.    No CP, SOB, Orthopnea.    Past Medical History:   Diagnosis Date     Atrial fibrillation (H)      Cardiomyopathy (H)      Crohn's disease of small and large intestines with complication (H)      Hyperlipidemia      Past Surgical History:   Procedure Laterality Date     APPENDECTOMY       Social History     Social History     Marital status:      Spouse name: N/A     Number of children: N/A     Years of education: N/A     Occupational History     Not on file.     Social History Main Topics     Smoking status: Former Smoker     Smokeless tobacco: Never Used      Comment: 40 years ago     Alcohol use 4.2 oz/week     7 Standard drinks or equivalent per week      Comment: 1 wine an evening     Drug use: No     Sexual activity: No     Other Topics Concern     Not on file     Social History Narrative    Walks for exercise     Family History   Problem Relation Age of Onset     Family History Negative Mother      Cardiac Sudden Death Father      Family History Negative Brother      Other - See Comments Sister      colon issues     Family History Negative Son      Family History Negative Brother      Family History Negative Son      HEART DISEASE No family hx of          OBJECTIVE:  /76 (BP Location: Left arm, Patient Position: Chair, Cuff Size: Adult Regular)  Pulse 66  Ht 5' 7\" (1.702 m)  Wt 170 lb 1.6 oz (77.2 kg) "  SpO2 97%  BMI 26.64 kg/m2  Physical Exam   Constitutional: He is oriented to person, place, and time and well-developed, well-nourished, and in no distress. No distress.   HENT:   Head: Normocephalic and atraumatic.   Eyes: Conjunctivae are normal. Pupils are equal, round, and reactive to light.   Neck: Normal range of motion.   Cardiovascular: Normal rate.  An irregular rhythm present.   A-Fib   Pulmonary/Chest: Effort normal and breath sounds normal. No respiratory distress. He has no wheezes. He has no rales. He exhibits tenderness.   Musculoskeletal: Normal range of motion.        Right ankle: He exhibits normal range of motion, no swelling and no laceration.        Feet:    Neurological: He is alert and oriented to person, place, and time.   Skin: He is not diaphoretic.         ASSESSMENT/PLAN:  Dawson was seen today for musculoskeletal problem.    Diagnoses and all orders for this visit:    Lower extremity edema      Pleasant 88 y/o male with a presentation consistent with pitting lower extremity edema. During a review of chart, the patient does had a history of A-Fib and cardiomyopathy. On exam, the patient is not in heart failure. Lungs were CTAB, no subjective SOB or orthopnea. The patient currently is on Eliquis, therefore DVT is unlikely. Symptoms are most consistent with increased sodium intake and retention complicated by varicosity of the lower extremity. The family and the patient declines a US and XRay today. I do feel this was appropriate to decline. The patient is going to start using compression stockings, knee high, which they will  from the pharmacy. Declined the need for a prescription. Limit sodium and elevate legs as much as possible. He is going to follow-up with his PCP on Friday. If symptoms are not improving, likely workup for CHF. Educated on red-flags that should prompt a return to the UC and ER. Notes understanding and agrees. Denies any concerns.    Jasvir Cordova PA-C

## 2017-12-28 DIAGNOSIS — M48.07 SPINAL STENOSIS OF LUMBOSACRAL REGION: Primary | ICD-10-CM

## 2017-12-29 ENCOUNTER — HOSPITAL ENCOUNTER (OUTPATIENT)
Dept: MRI IMAGING | Facility: CLINIC | Age: 82
Discharge: HOME | End: 2017-12-29
Attending: NURSE PRACTITIONER | Admitting: NURSE PRACTITIONER
Payer: COMMERCIAL

## 2017-12-29 DIAGNOSIS — M48.07 SPINAL STENOSIS OF LUMBOSACRAL REGION: ICD-10-CM

## 2017-12-29 PROCEDURE — 72148 MRI LUMBAR SPINE W/O DYE: CPT

## 2018-01-02 ENCOUNTER — ALLIED HEALTH/NURSE VISIT (OUTPATIENT)
Dept: NURSING | Facility: CLINIC | Age: 83
End: 2018-01-02
Payer: COMMERCIAL

## 2018-01-02 ENCOUNTER — OFFICE VISIT (OUTPATIENT)
Dept: NEUROSURGERY | Facility: CLINIC | Age: 83
End: 2018-01-02
Attending: NURSE PRACTITIONER
Payer: COMMERCIAL

## 2018-01-02 VITALS
SYSTOLIC BLOOD PRESSURE: 125 MMHG | OXYGEN SATURATION: 99 % | HEIGHT: 67 IN | DIASTOLIC BLOOD PRESSURE: 89 MMHG | WEIGHT: 162.6 LBS | HEART RATE: 81 BPM | BODY MASS INDEX: 25.52 KG/M2

## 2018-01-02 DIAGNOSIS — M48.061 SPINAL STENOSIS, LUMBAR REGION, WITHOUT NEUROGENIC CLAUDICATION: Primary | ICD-10-CM

## 2018-01-02 DIAGNOSIS — E53.8 VITAMIN B12 DEFICIENCY (NON ANEMIC): Primary | ICD-10-CM

## 2018-01-02 PROCEDURE — 99204 OFFICE O/P NEW MOD 45 MIN: CPT | Performed by: NURSE PRACTITIONER

## 2018-01-02 PROCEDURE — 99207 ZZC NO CHARGE NURSE ONLY: CPT

## 2018-01-02 PROCEDURE — 96372 THER/PROPH/DIAG INJ SC/IM: CPT

## 2018-01-02 PROCEDURE — G0463 HOSPITAL OUTPT CLINIC VISIT: HCPCS | Performed by: NURSE PRACTITIONER

## 2018-01-02 ASSESSMENT — PAIN SCALES - GENERAL: PAINLEVEL: SEVERE PAIN (6)

## 2018-01-02 NOTE — MR AVS SNAPSHOT
After Visit Summary   1/2/2018    Dawson Jones    MRN: [id number]           Patient Information     Date Of Birth          5/5/1930        Visit Information        Provider Department      1/2/2018 2:20 PM Angie Mishra APRN CNP [facility]        Today's Diagnoses     Spinal stenosis, lumbar region, without neurogenic claudication    -  1      Care Instructions    1. No surgery indicated at this time. Recommend conservative care and medication treatment with low dose opioids if needed.  Due to pts age pt would be a good candidate for pain medication management.      2.  Please schedule your physical therapy.            Follow-ups after your visit        Additional Services     [facility] PT, HAND, AND CHIROPRACTIC REFERRAL       **This order will print in the [facility] Scheduling Office**    Physical Therapy, Hand Therapy and Chiropractic Care are available through:    *[facility]  *[facility]  *[facility]    Call one number to schedule at any of the above locations: [phone].    Your provider has referred you to: Physical Therapy at [facility] or [facility]    Indication/Reason for Referral: low back and leg pain   Onset of Illness: chronic  Therapy Orders: Evaluate and Treat  Special Programs: None  Special Request: None    [facility] [place]      Additional Comments for the Therapist or Chiropractor:         Please be aware that coverage of these services is subject to the terms and limitations of your health insurance plan.  Call member services at your health plan with any benefit or coverage questions.      Please bring the following to your appointment:    *Your personal calendar for scheduling future appointments  *Comfortable clothing                  Your next 10 appointments already scheduled     Jan 04, 2018  3:30 PM CST   [facility] with [facility]   [facility] [address] [address] [phone]           Bring a current list of meds and any records pertaining to this visit. For Physicals, please bring immunization records and any forms needing to be filled out. Please arrive 10 minutes early to complete paperwork.            Feb 06, 2018  9:30 AM CST   Nurse Only with CS NURSE   [facility] ([facility]    [address]  [place] [address] [phone]              Who to contact     If you have questions or need follow up information about today's clinic visit or your schedule please contact [facility] directly at [phone].  Normal or non-critical lab and imaging results will be communicated to you by [employer], letter or phone within 4 business days after the clinic has received the results. If you do not hear from us within 7 days, please contact the clinic through [employer] or phone. If you have a critical or abnormal lab result, we will notify you by phone as soon as possible.  Submit refill requests through [employer] or call your pharmacy and they will forward the refill request to us. Please allow 3 business days for your refill to be completed.          Additional Information About Your Visit        [employer] Identity Information     [employer] gives you secure access to your electronic health record. If you see a primary care provider, you can also send messages to your care team and make appointments. If you have questions, please call your primary care clinic.  If you do not have a primary care provider, please call [phone] and they will assist you.        Care EveryWhere ID     This is your Care EveryWhere ID. This could be used by other organizations to access your [place] medical records  [id number]        Your Vitals Were     Pulse Height Pulse Oximetry BMI (Body Mass Index)          81 5' 7\" (1.702 m) 99% 25.47 kg/m2         Blood Pressure from Last 3 Encounters:   01/02/18 125/89   12/26/17 " 133/76   12/08/17 94/64    Weight from Last 3 Encounters:   01/02/18 162 lb 9.6 oz (73.8 kg)   12/26/17 170 lb 1.6 oz (77.2 kg)   12/08/17 165 lb 12.8 oz (75.2 kg)              We Performed the Following     [facility] PT, HAND, AND CHIROPRACTIC REFERRAL        Primary Care Provider Office Phone # Fax #    Judson BREN Mcadams [phone]       [facility] [address] [facility] [address]  [facility] [address]        Equal Access to Services     [facility]: Hadii aad ku hadasho Soomaali, waaxda luqadaha, qaybta kaalmada adeegyada, ky padilla haymarcelino cardona . So [facility] [phone].    ATENCIÓN: Si habla español, tiene a ambrose disposición servicios gratuitos de asistencia lingüística. [facility] [phone].    We comply with applicable federal civil rights laws and [place] laws. We do not discriminate on the basis of race, color, national origin, age, disability, sex, sexual orientation, or gender identity.            Thank you!     Thank you for choosing [facility]  for your care. Our goal is always to provide you with excellent care. Hearing back from our patients is one way we can continue to improve our services. Please take a few minutes to complete the written survey that you may receive in the mail after your visit with us. Thank you!             Your Updated Medication List - Protect others around you: Learn how to safely use, store and throw away your medicines at www.disposemymeds.org.          This list is accurate as of: 1/2/18  2:34 PM.  Always use your most recent med list.                   Brand Name Dispense Instructions for use Diagnosis    cyanocobalamin 1000 MCG/ML injection    VITAMIN B12    1 mL    Inject 1 mL (1,000 mcg) into the muscle every 30 days    Vitamin B12 deficiency (non anemic)       diphenoxylate-atropine 2.5-0.025 MG per tablet    LOMOTIL    40 tablet    Take 1 tablet by mouth 4 times daily as needed for diarrhea    Crohn's disease of  small and large intestines with complication (H)       ELIQUIS 5 MG tablet   Generic drug:  apixaban ANTICOAGULANT     180 tablet    Take 1 tablet (5 mg) by mouth 2 times daily    Chronic atrial fibrillation (H)       FLOMAX 0.4 MG capsule   Generic drug:  tamsulosin     90 capsule    Take 1 capsule (0.4 mg) by mouth daily    Benign non-nodular prostatic hyperplasia with lower urinary tract symptoms       gabapentin 300 MG capsule    NEURONTIN    720 capsule    2 in the am, 2 in afternoon and 4 before bed    Spinal stenosis of lumbosacral region       metoprolol 50 MG tablet    LOPRESSOR    180 tablet    Take 1 tablet (50 mg) by mouth 2 times daily    Benign essential hypertension       opium tincture 10 MG/ML (1%) liquid     118 mL    4 drops every 4 hours as needed for diarrhea    Crohn's disease of small and large intestines with complication (H)

## 2018-01-02 NOTE — PROGRESS NOTES
Dr. Abimael Portillo  [facility]  Neurosurgery Clinic Visit        CC: low back and leg pain     Primary care Provider: Judson Mcadams      Reason For Visit:   I was asked by Dr. Mcadams to consult on the patient for lumbar radicular pain.      HPI: Dawson Jones is a 87 year old male with lumbar radicular pain.  He notes that he has had this for over a year. He notes low back and bilateral leg pain. He had a left knee injection on 12-8-2017. He states that he has not had leg pain since the left knee injection.  He states that he did have an episode of left foot swelling.  He states that he went to urgent care and they could not figure out why. He was asked to try compression stockings and this has helped. He is to return to his PCP for the swelling.  He feels that his worse pain is the right leg. He also gets back pain.  He notes pain to his left thigh to the knee. He states that the low back pain is more consistent.  He states that standing and walking make it worse and sitting makes it better. He feels he can walk about 1 block before he gets pain. He has not had PT for his back pain. He had an injection to his lumbar spine in November that did not help.     Pain at its worst 10  Pain right now:  6    Past Medical History:   Diagnosis Date     Atrial fibrillation (H)      Cardiomyopathy (H)      Crohn's disease of small and large intestines with complication (H)      Hyperlipidemia        Past Medical History reviewed with patient during visit.    Past Surgical History:   Procedure Laterality Date     APPENDECTOMY       Past Surgical History reviewed with patient during visit.    Current Outpatient Prescriptions   Medication     opium tincture 10 MG/ML (1%) liquid     gabapentin (NEURONTIN) 300 MG capsule     diphenoxylate-atropine (LOMOTIL) 2.5-0.025 MG per tablet     ELIQUIS 5 MG tablet     cyanocobalamin (VITAMIN B12) 1000 MCG/ML injection     metoprolol (LOPRESSOR) 50 MG tablet      "tamsulosin (FLOMAX) 0.4 MG capsule     No current facility-administered medications for this visit.        Allergies   Allergen Reactions     No Known Allergies        Social History     Social History     Marital status:      Spouse name: N/A     Number of children: N/A     Years of education: N/A     Social History Main Topics     Smoking status: Former Smoker     Smokeless tobacco: Never Used      Comment: 40 years ago     Alcohol use 4.2 oz/week     7 Standard drinks or equivalent per week      Comment: 1 wine an evening     Drug use: No     Sexual activity: No     Other Topics Concern     None     Social History Narrative    Walks for exercise       Family History   Problem Relation Age of Onset     Family History Negative Mother      Cardiac Sudden Death Father      Family History Negative Brother      Other - See Comments Sister      colon issues     Family History Negative Son      Family History Negative Brother      Family History Negative Son      HEART DISEASE No family hx of          Review Of Systems  Skin: negative  Eyes: negative  Ears/Nose/Throat: negative  Respiratory: No shortness of breath, dyspnea on exertion, cough, or hemoptysis  Cardiovascular: Afib  Gastrointestinal: negative  Genitourinary: negative  Musculoskeletal: back pain  Neurologic: bilateral leg pain   Psychiatric: negative  Hematologic/Lymphatic/Immunologic: eliquis  Endocrine: negative     ROS: 10 point ROS neg other than the symptoms noted above in the HPI.    Vital Signs: /89 (BP Location: Right arm, Patient Position: Sitting, Cuff Size: Adult Regular)  Pulse 81  Ht 5' 7\" (1.702 m)  Wt 162 lb 9.6 oz (73.8 kg)  SpO2 99%  BMI 25.47 kg/m2    Examination:  Constitutional:  Alert, well nourished, NAD.  Memory: recent and remote memory intact  HEENT: Normocephalic, atraumatic.   Pulm:  Without shortness of breath   CV:  No pitting edema of BLE.    Neurological:  Awake  Alert  Oriented x 3  Speech clear  Cranial " nerves II - XII intact  PERRL  EOMI  Face symmetric  Tongue midline  Motor exam   Shoulder Abduction:  Right:  5/5   Left:  5/5  Biceps:                      Right:  5/5   Left:  5/5  Triceps:                     Right:  5/5   Left:  5/5  Wrist Extensors:       Right:  5/5   Left:  5/5  Wrist Flexors:           Right:  5/5   Left:  5/5  Intrinsics:                   Right:  5/5   Left:  5/5   Hip Flexor:                Right: 5/5  Left:  5/5  Hip Adductor:             Right:  5/5  Left:  5/5  Hip Abductor:             Right:  5/5  Left:  5/5  Gastroc Soleus:        Right:  5/5  Left:  5/5  Tib/Ant:                      Right:  5/5  Left:  5/5  EHL:                          Right:  5/5  Left:  5/5   Sensation normal to bilateral upper and lower extremities  Muscle tone to bilateral upper and lower extremities normal  Gait: Able to stand from a seated position. Antalgic hunched over gait.      Lumbar examination reveals no tenderness of the spine or paraspinous muscles.  Hip height is symmetrical. Negative SI joint, sciatic notch or greater trochanteric tenderness to palpation bilaterally.  Straight leg raise is negative bilaterally.      Imaging:     Lumbar MRI    1. Multiple level DDD  2.  Left L3-4 lateral recess stenosis  3.  Reduction of left L4-5 extruded disc    Assessment/Plan:   Dawson Jones is a 87 year old male with lumbar radicular pain.  He notes that he has had this for over a year. He notes low back and bilateral leg pain. He had a left knee injection on 12-8-2017. He states that he has not had leg pain since the left knee injection.  He states that he did have an episode of left foot swelling.  He states that he went to urgent care and they could not figure out why. He was asked to try compression stockings and this has helped. He is to return to his PCP for the swelling.  He feels that his worse pain is the right leg. He also gets back pain.  He notes pain to his left thigh to the knee. He states  that the low back pain is more consistent.  He states that standing and walking make it worse and sitting makes it better. He feels he can walk about 1 block before he gets pain. He has not had PT for his back pain. He had an injection to his lumbar spine in November that did not help. His previous lumbar MRI was reviewed in detail.  He had a left L4-5 disc extrusion and progressive DDD.  The pt and his wife agree that due to his age and ability to function we would not recommend surgery. They explained that they did not come wanting surgery but just some pain relief. They are very frustrated that he is not able to be as active due to the pain.  However, he is still mobile and would not want surgery.  At this time we discussed low dose pain medication management if his PCP would be willing to do so.  They stated that this is what they were hoping for.  It was explained that if the PCP is not comfortable with this they may be referred to the pain clinic. But given the pts age and pain, low dose pain medication management would not be unreasonable.      Patient Instructions   1. No surgery indicated at this time. Recommend conservative care and medication treatment with low dose opioids if needed.  Due to pts age pt would be a good candidate for pain medication management.      2.  Please schedule your physical therapy.                 Angie Mishra [facility]  Spine and Brain Clinic  [address] [address]    Tel [phone]  Pager [phone]

## 2018-01-02 NOTE — MR AVS SNAPSHOT
After Visit Summary   1/2/2018    Dawson Jones    MRN: [id number]           Patient Information     Date Of Birth          5/5/1930        Visit Information        Provider Department      1/2/2018 9:30 AM CS NURSE [facility]        Today's Diagnoses     Vitamin B12 deficiency (non anemic)    -  1       Follow-ups after your visit        Your next 10 appointments already scheduled     Jan 02, 2018  9:30 AM CST   Nurse Only with CS NURSE   [facility] ([facility])    [address] [facility] [address] [phone]            Jan 02, 2018  2:20 PM CST   New Visit with YAMILKA Harrison CNP   [facility] ([facility] [place])    [address] [address] [phone]            Jan 04, 2018  3:30 PM CST   Office Visit with Judson Mcadams MD   [facility] ([facility])    [address] [facility] [address] [phone]           Bring a current list of meds and any records pertaining to this visit. For Physicals, please bring immunization records and any forms needing to be filled out. Please arrive 10 minutes early to complete paperwork.            Feb 06, 2018  9:30 AM CST   Nurse Only with CS NURSE   [facility] ([facility])    [address] [facility] [address] [phone]              Who to contact     If you have questions or need follow up information about today's clinic visit or your schedule please contact [facility] directly at [phone].  Normal or non-critical lab and imaging results will be communicated to you by MyChart, letter or phone within 4 business days after the clinic has received the results. If you do not hear from us within 7 days, please contact the clinic through MyChart or phone. If you have a critical or abnormal lab result, we will notify you by phone as soon as possible.  Submit  refill requests through [employer] or call your pharmacy and they will forward the refill request to us. Please allow 3 business days for your refill to be completed.          Additional Information About Your Visit        [employer] Information     [employer] gives you secure access to your electronic health record. If you see a primary care provider, you can also send messages to your care team and make appointments. If you have questions, please call your primary care clinic.  If you do not have a primary care provider, please call [phone] and they will assist you.        Care EveryWhere ID     This is your Care EveryWhere ID. This could be used by other organizations to access your [place] medical records  [id number]         Blood Pressure from Last 3 Encounters:   12/26/17 133/76   12/08/17 94/64   11/20/17 134/86    Weight from Last 3 Encounters:   12/26/17 170 lb 1.6 oz (77.2 kg)   12/08/17 165 lb 12.8 oz (75.2 kg)   11/20/17 169 lb 12.8 oz (77 kg)              We Performed the Following     B12 - 1000 MCG     INJECTION INTRAMUSCULAR OR SUB-Q        Primary Care Provider Office Phone # Fax #    Judson Mcadams [phone]       [facility] [address] [facility] [address]  [facility] [address]        Equal Access to Services     ABDULLAHI SANCHEZ : Hadii aad ku hadasho Soomaali, waaxda luqadaha, qaybta kaalmada adeegyada, waxay idiin hayaan guevara cardona . So [facility] [phone].    ATENCIÓN: Si habla español, tiene a ambrose disposición servicios gratuitos de asistencia lingüística. Llame al [phone].    We comply with applicable federal civil rights laws and [place] laws. We do not discriminate on the basis of race, color, national origin, age, disability, sex, sexual orientation, or gender identity.            Thank you!     Thank you for choosing [facility]  for your care. Our goal is always to provide you with excellent care. Hearing back from our patients is one way we can  continue to improve our services. Please take a few minutes to complete the written survey that you may receive in the mail after your visit with us. Thank you!             Your Updated Medication List - Protect others around you: Learn how to safely use, store and throw away your medicines at www.disposemymeds.org.          This list is accurate as of: 1/2/18  9:26 AM.  Always use your most recent med list.                   Brand Name Dispense Instructions for use Diagnosis    cyanocobalamin 1000 MCG/ML injection    VITAMIN B12    1 mL    Inject 1 mL (1,000 mcg) into the muscle every 30 days    Vitamin B12 deficiency (non anemic)       diphenoxylate-atropine 2.5-0.025 MG per tablet    LOMOTIL    40 tablet    Take 1 tablet by mouth 4 times daily as needed for diarrhea    Crohn's disease of small and large intestines with complication (H)       ELIQUIS 5 MG tablet   Generic drug:  apixaban ANTICOAGULANT     180 tablet    Take 1 tablet (5 mg) by mouth 2 times daily    Chronic atrial fibrillation (H)       FLOMAX 0.4 MG capsule   Generic drug:  tamsulosin     90 capsule    Take 1 capsule (0.4 mg) by mouth daily    Benign non-nodular prostatic hyperplasia with lower urinary tract symptoms       gabapentin 300 MG capsule    NEURONTIN    720 capsule    2 in the am, 2 in afternoon and 4 before bed    Spinal stenosis of lumbosacral region       metoprolol 50 MG tablet    LOPRESSOR    180 tablet    Take 1 tablet (50 mg) by mouth 2 times daily    Benign essential hypertension       opium tincture 10 MG/ML (1%) liquid     118 mL    4 drops every 4 hours as needed for diarrhea    Crohn's disease of small and large intestines with complication (H)

## 2018-01-02 NOTE — PATIENT INSTRUCTIONS
1. No surgery indicated at this time. Recommend conservative care and medication treatment with low dose opioids if needed.  Due to pts age pt would be a good candidate for pain medication management.      2.  Please schedule your physical therapy.

## 2018-01-02 NOTE — LETTER
1/2/2018         RE: Dawson Jones  [address]  [facility] [address]        Dear Colleague,    Thank you for referring your patient, Dawson Jones, to the [facility]ERY [facility]. Please see a copy of my visit note below.    Dr. Abimael Portillo  [facility]  Neurosurgery Clinic Visit        CC: low back and leg pain     Primary care Provider: Judson Mcadams      Reason For Visit:   I was asked by Dr. Mcadams to consult on the patient for lumbar radicular pain.      HPI: Dawson Jones is a 87 year old male with lumbar radicular pain.  He notes that he has had this for over a year. He notes low back and bilateral leg pain. He had a left knee injection on 12-8-2017. He states that he has not had leg pain since the left knee injection.  He states that he did have an episode of left foot swelling.  He states that he went to urgent care and they could not figure out why. He was asked to try compression stockings and this has helped. He is to return to his PCP for the swelling.  He feels that his worse pain is the right leg. He also gets back pain.  He notes pain to his left thigh to the knee. He states that the low back pain is more consistent.  He states that standing and walking make it worse and sitting makes it better. He feels he can walk about 1 block before he gets pain. He has not had PT for his back pain. He had an injection to his lumbar spine in November that did not help.     Pain at its worst 10  Pain right now:  6    Past Medical History:   Diagnosis Date     Atrial fibrillation (H)      Cardiomyopathy (H)      Crohn's disease of small and large intestines with complication (H)      Hyperlipidemia        Past Medical History reviewed with patient during visit.    Past Surgical History:   Procedure Laterality Date     APPENDECTOMY       Past Surgical History reviewed with patient during visit.    Current Outpatient Prescriptions   Medication      "opium tincture 10 MG/ML (1%) liquid     gabapentin (NEURONTIN) 300 MG capsule     diphenoxylate-atropine (LOMOTIL) 2.5-0.025 MG per tablet     ELIQUIS 5 MG tablet     cyanocobalamin (VITAMIN B12) 1000 MCG/ML injection     metoprolol (LOPRESSOR) 50 MG tablet     tamsulosin (FLOMAX) 0.4 MG capsule     No current facility-administered medications for this visit.        Allergies   Allergen Reactions     No Known Allergies        Social History     Social History     Marital status:      Spouse name: N/A     Number of children: N/A     Years of education: N/A     Social History Main Topics     Smoking status: Former Smoker     Smokeless tobacco: Never Used      Comment: 40 years ago     Alcohol use 4.2 oz/week     7 Standard drinks or equivalent per week      Comment: 1 wine an evening     Drug use: No     Sexual activity: No     Other Topics Concern     None     Social History Narrative    Walks for exercise       Family History   Problem Relation Age of Onset     Family History Negative Mother      Cardiac Sudden Death Father      Family History Negative Brother      Other - See Comments Sister      colon issues     Family History Negative Son      Family History Negative Brother      Family History Negative Son      HEART DISEASE No family hx of          Review Of Systems  Skin: negative  Eyes: negative  Ears/Nose/Throat: negative  Respiratory: No shortness of breath, dyspnea on exertion, cough, or hemoptysis  Cardiovascular: Afib  Gastrointestinal: negative  Genitourinary: negative  Musculoskeletal: back pain  Neurologic: bilateral leg pain   Psychiatric: negative  Hematologic/Lymphatic/Immunologic: eliquis  Endocrine: negative     ROS: 10 point ROS neg other than the symptoms noted above in the HPI.    Vital Signs: /89 (BP Location: Right arm, Patient Position: Sitting, Cuff Size: Adult Regular)  Pulse 81  Ht 5' 7\" (1.702 m)  Wt 162 lb 9.6 oz (73.8 kg)  SpO2 99%  BMI 25.47 " kg/m2    Examination:  Constitutional:  Alert, well nourished, NAD.  Memory: recent and remote memory intact  HEENT: Normocephalic, atraumatic.   Pulm:  Without shortness of breath   CV:  No pitting edema of BLE.    Neurological:  Awake  Alert  Oriented x 3  Speech clear  Cranial nerves II - XII intact  PERRL  EOMI  Face symmetric  Tongue midline  Motor exam   Shoulder Abduction:  Right:  5/5   Left:  5/5  Biceps:                      Right:  5/5   Left:  5/5  Triceps:                     Right:  5/5   Left:  5/5  Wrist Extensors:       Right:  5/5   Left:  5/5  Wrist Flexors:           Right:  5/5   Left:  5/5  Intrinsics:                   Right:  5/5   Left:  5/5   Hip Flexor:                Right: 5/5  Left:  5/5  Hip Adductor:             Right:  5/5  Left:  5/5  Hip Abductor:             Right:  5/5  Left:  5/5  Gastroc Soleus:        Right:  5/5  Left:  5/5  Tib/Ant:                      Right:  5/5  Left:  5/5  EHL:                          Right:  5/5  Left:  5/5   Sensation normal to bilateral upper and lower extremities  Muscle tone to bilateral upper and lower extremities normal  Gait: Able to stand from a seated position. Antalgic hunched over gait.      Lumbar examination reveals no tenderness of the spine or paraspinous muscles.  Hip height is symmetrical. Negative SI joint, sciatic notch or greater trochanteric tenderness to palpation bilaterally.  Straight leg raise is negative bilaterally.      Imaging:     Lumbar MRI    1. Multiple level DDD  2.  Left L3-4 lateral recess stenosis  3.  Reduction of left L4-5 extruded disc    Assessment/Plan:   Dawson Jones is a 87 year old male with lumbar radicular pain.  He notes that he has had this for over a year. He notes low back and bilateral leg pain. He had a left knee injection on 12-8-2017. He states that he has not had leg pain since the left knee injection.  He states that he did have an episode of left foot swelling.  He states that he went to  urgent care and they could not figure out why. He was asked to try compression stockings and this has helped. He is to return to his PCP for the swelling.  He feels that his worse pain is the right leg. He also gets back pain.  He notes pain to his left thigh to the knee. He states that the low back pain is more consistent.  He states that standing and walking make it worse and sitting makes it better. He feels he can walk about 1 block before he gets pain. He has not had PT for his back pain. He had an injection to his lumbar spine in November that did not help. His previous lumbar MRI was reviewed in detail.  He had a left L4-5 disc extrusion and progressive DDD.  The pt and his wife agree that due to his age and ability to function we would not recommend surgery. They explained that they did not come wanting surgery but just some pain relief. They are very frustrated that he is not able to be as active due to the pain.  However, he is still mobile and would not want surgery.  At this time we discussed low dose pain medication management if his PCP would be willing to do so.  They stated that this is what they were hoping for.  It was explained that if the PCP is not comfortable with this they may be referred to the pain clinic. But given the pts age and pain, low dose pain medication management would not be unreasonable.      Patient Instructions   1. No surgery indicated at this time. Recommend conservative care and medication treatment with low dose opioids if needed.  Due to pts age pt would be a good candidate for pain medication management.      2.  Please schedule your physical therapy.                 Angie Mishra CNP  [facility]  [address] [address]    Tel [phone]  Pager [phone]      Again, thank you for allowing me to participate in the care of your patient.        Sincerely,        YAMILKA Harrison CNP

## 2018-01-02 NOTE — NURSING NOTE
"Dawson Jones is a 87 year old male who presents for:  Chief Complaint   Patient presents with     Neurologic Problem     Low back pain radiates down the right leg mostly, did have pain in his left knee did get a shot and         Initial Vitals:  /89 (BP Location: Right arm, Patient Position: Sitting, Cuff Size: Adult Regular)  Pulse 81  Ht 5' 7\" (1.702 m)  Wt 162 lb 9.6 oz (73.8 kg)  SpO2 99%  BMI 25.47 kg/m2 Estimated body mass index is 25.47 kg/(m^2) as calculated from the following:    Height as of this encounter: 5' 7\" (1.702 m).    Weight as of this encounter: 162 lb 9.6 oz (73.8 kg).. Body surface area is 1.87 meters squared. BP completed using cuff size: regular  Severe Pain (6)    Do you feel safe in your environment?  Yes  Do you need any refills today? No    Nursing Comments: Low back pain radiates down the right leg mostly, did have pain in his left knee did get a shot and did get relief from it.        5 min. nursing intake time  Leeanna Monson MA       Discharge plan: 1. No surgery indicated at this time. Recommend conservative care and medication treatment with low dose opioids if needed.  Due to pts age pt would be a good candidate for pain medication management.      2.  Please schedule your physical therapy.    2 min. nursing discharge time  Leeanna Monson MA        "

## 2018-01-04 ENCOUNTER — OFFICE VISIT (OUTPATIENT)
Dept: FAMILY MEDICINE | Facility: CLINIC | Age: 83
End: 2018-01-04
Payer: COMMERCIAL

## 2018-01-04 VITALS
SYSTOLIC BLOOD PRESSURE: 97 MMHG | HEIGHT: 67 IN | BODY MASS INDEX: 25.27 KG/M2 | HEART RATE: 96 BPM | DIASTOLIC BLOOD PRESSURE: 60 MMHG | TEMPERATURE: 97.5 F | OXYGEN SATURATION: 97 % | WEIGHT: 161 LBS

## 2018-01-04 DIAGNOSIS — M48.07 SPINAL STENOSIS OF LUMBOSACRAL REGION: Primary | ICD-10-CM

## 2018-01-04 DIAGNOSIS — I87.2 VENOUS (PERIPHERAL) INSUFFICIENCY: ICD-10-CM

## 2018-01-04 DIAGNOSIS — G89.29 OTHER CHRONIC PAIN: ICD-10-CM

## 2018-01-04 PROCEDURE — 99214 OFFICE O/P EST MOD 30 MIN: CPT | Performed by: INTERNAL MEDICINE

## 2018-01-04 RX ORDER — TRAMADOL HYDROCHLORIDE 50 MG/1
50 TABLET ORAL 2 TIMES DAILY PRN
Qty: 60 TABLET | Refills: 0 | Status: SHIPPED | OUTPATIENT
Start: 2018-01-04 | End: 2018-02-04

## 2018-01-04 NOTE — PROGRESS NOTES
SUBJECTIVE:   Dawson Jones is a 87 year old male who presents to clinic today for the following health issues:      ED/UC Followup:    Facility:  [facility]  Date of visit: 12/26/2017  Reason for visit: Lower extremity edema   Current Status: Improvement, but still has some discomfort and slight swelling.         Pleasant 87-year-old man with Crohn's disease, chronic diarrhea, B12 deficiency, atrial fibrillation, chronic pain from lumbar spinal stenosis.  He presents in follow-up after being seen in urgent care for unilateral left lower extremity edema.  His edema was thought to be related to venous insufficiency.  His edema seemed to improve with leg elevation, compression hosiery.  He did see neurosurgery.  The neurosurgery provider suggested that gabapentin might be contributing to swelling symptoms in the left leg.  The patient admits that gabapentin is not helping his lower back pain and left leg symptoms.  As such, he stopped taking gabapentin.  He was not offered any surgical interventions for his spinal stenosis.  It was recommended that he try physical therapy.  It was also recommended that he try low dose opioid therapy for pain management.  At this point, the patient and his wife are exacerbated with pain every day that limits his ability to move around.  They have a good understanding of the risks of opioid therapy and the potential toxicities, but they are willing to take those risks given the amount of pain that he struggles with every day.  A requested a prescription for a low-dose opioid to help manage his pain.  They have never tried opioids in the past.    Problem list and histories reviewed & adjusted, as indicated.  Additional history: as documented    Patient Active Problem List   Diagnosis     Atrial fibrillation (H)     Crohn's disease of small and large intestines with complication (H)     Spinal stenosis of lumbosacral region     Vitamin B12 deficiency (non anemic)     Chronic pain     " Past Surgical History:   Procedure Laterality Date     APPENDECTOMY         Social History   Substance Use Topics     Smoking status: Former Smoker     Smokeless tobacco: Never Used      Comment: 40 years ago     Alcohol use 4.2 oz/week     7 Standard drinks or equivalent per week      Comment: 1 wine an evening     Family History   Problem Relation Age of Onset     Family History Negative Mother      Cardiac Sudden Death Father      Family History Negative Brother      Other - See Comments Sister      colon issues     Family History Negative Son      Family History Negative Brother      Family History Negative Son      HEART DISEASE No family hx of          Current Outpatient Prescriptions   Medication Sig Dispense Refill     traMADol (ULTRAM) 50 MG tablet Take 1 tablet (50 mg) by mouth 2 times daily as needed for pain maximum 2 tablet(s) per day 60 tablet 0     opium tincture 10 MG/ML (1%) liquid 4 drops every 4 hours as needed for diarrhea 118 mL 0     diphenoxylate-atropine (LOMOTIL) 2.5-0.025 MG per tablet Take 1 tablet by mouth 4 times daily as needed for diarrhea 40 tablet 3     ELIQUIS 5 MG tablet Take 1 tablet (5 mg) by mouth 2 times daily 180 tablet 3     cyanocobalamin (VITAMIN B12) 1000 MCG/ML injection Inject 1 mL (1,000 mcg) into the muscle every 30 days 1 mL 11     metoprolol (LOPRESSOR) 50 MG tablet Take 1 tablet (50 mg) by mouth 2 times daily 180 tablet 3     tamsulosin (FLOMAX) 0.4 MG capsule Take 1 capsule (0.4 mg) by mouth daily 90 capsule 3     No Known Allergies      Reviewed and updated as needed this visit by clinical staff       Reviewed and updated as needed this visit by Provider         ROS:  Constitutional, HEENT, cardiovascular, pulmonary, gi and gu systems are negative, except as otherwise noted.      OBJECTIVE:   BP 97/60 (BP Location: Left arm, Cuff Size: Adult Regular)  Pulse 96  Temp 97.5  F (36.4  C) (Tympanic)  Ht 5' 7\" (1.702 m)  Wt 161 lb (73 kg)  SpO2 97%  BMI 25.22 " kg/m2  Body mass index is 25.22 kg/(m^2).  General: This is a mildly uncomfortable appearing elderly man.  Extremity: There is trace edema in the left lower extremity with chronic venous stasis skin changes in the left lower extremity and multiple varicose veins seen in both lower extremities.    Diagnostic Test Results:  none     ASSESSMENT/PLAN:     The primary encounter diagnosis was Spinal stenosis of lumbosacral region. Diagnoses of Other chronic pain and Venous (peripheral) insufficiency were also pertinent to this visit.    His left leg swelling is from venous insufficiency.  We discussed the pathophysiology of venous insufficiency.  We discussed diet sodium restriction, leg elevation and compression hosiery to manage symptoms.  I agree, if the gabapentin is not helping, that medication should be stopped.  Gabapentin can sometimes contribute to lower extremity edema.  Relating to his chronic pain, we decided to try physical therapy.  If physical therapy worsen symptoms, and stop physical therapy.  With respect to low-dose opioid therapy, we had a lengthy discussion about the risks and benefits.  We had a lengthy discussion about the toxicities and dangers of opioid therapy.  I believe the patient and his wife have a good understanding of the risk of chronic opioid therapy including the risk of tolerance and dependence.  I think he is a good candidate for chronic low dose opioid therapy.  We decided to try tramadol.  Side effects and risks were discussed in great detail.  He did sign a controlled substance agreement.  I discussed with his wife the protocol for obtaining refills.  He will return in 3 months when the weather is better to assess therapy and follow-up on symptoms.  Sooner as symptoms dictate.  Total time was greater than 35 minutes the majority of which was spent counseling and coordinating care.        Judson Mcadams MD  [facility]

## 2018-01-04 NOTE — MR AVS SNAPSHOT
After Visit Summary   1/4/2018    Dawson Jones    MRN: [id number]           Patient Information     Date Of Birth          5/5/1930        Visit Information        Provider Department      1/4/2018 3:30 PM Judson Mcadams MD [facility]        Today's Diagnoses     Spinal stenosis of lumbosacral region    -  1    Other chronic pain        Venous (peripheral) insufficiency           Follow-ups after your visit        Your next 10 appointments already scheduled     Jan 09, 2018 12:10 PM CST   (Arrive by 11:55 AM)   [facility] with Jony Ellis PT   [facility] ([facility]  )    [address]  [place] [address]   [phone]            Feb 06, 2018  9:30 AM CST   Nurse Only with CS NURSE   [facility] ([facility]    [address]  [facility] [address]   [phone]            Mar 06, 2018  9:30 AM CST   Nurse Only with CS NURSE   [facility] ([facility])    [address] [address]   [phone]            Apr 02, 2018 10:00 AM CDT   Office Visit with Judson Mcadams MD   [facility] ([facility])    [address] [address] [phone]           Bring a current list of meds and any records pertaining to this visit. For Physicals, please bring immunization records and any forms needing to be filled out. Please arrive 10 minutes early to complete paperwork.              Who to contact     If you have questions or need follow up information about today's clinic visit or your schedule please contact [facility] directly at [phone].  Normal or non-critical lab and imaging results will be communicated to you by MyChart, letter or phone within 4 business days after the clinic has received the results. If you do not hear from us within 7 days, please contact the clinic through MyChart or phone. If you have a  "critical or abnormal lab result, we will notify you by phone as soon as possible.  Submit refill requests through [facility] or call your pharmacy and they will forward the refill request to us. Please allow 3 business days for your refill to be completed.          Additional Information About Your Visit        [facility] Information     [facility] gives you secure access to your electronic health record. If you see a primary care provider, you can also send messages to your care team and make appointments. If you have questions, please call your primary care clinic.  If you do not have a primary care provider, please call [phone] and they will assist you.        Care EveryWhere ID     This is your Care EveryWhere ID. This could be used by other organizations to access your [facility] medical records  [id number]        Your Vitals Were     Pulse Temperature Height Pulse Oximetry BMI (Body Mass Index)       96 97.5  F (36.4  C) (Tympanic) 5' 7\" (1.702 m) 97% 25.22 kg/m2        Blood Pressure from Last 3 Encounters:   01/04/18 97/60   01/02/18 125/89   12/26/17 133/76    Weight from Last 3 Encounters:   01/04/18 161 lb (73 kg)   01/02/18 162 lb 9.6 oz (73.8 kg)   12/26/17 170 lb 1.6 oz (77.2 kg)              Today, you had the following     No orders found for display         Today's Medication Changes          These changes are accurate as of: 1/4/18  4:48 PM.  If you have any questions, ask your nurse or doctor.               Start taking these medicines.        Dose/Directions    traMADol 50 MG tablet   Commonly known as:  ULTRAM   Used for:  Spinal stenosis of lumbosacral region   Started by:  Judson Mcadams MD        Dose:  50 mg   Take 1 tablet (50 mg) by mouth 2 times daily as needed for pain maximum 2 tablet(s) per day   Quantity:  60 tablet   Refills:  0         Stop taking these medicines if you haven't already. Please contact your care team if you have questions.     gabapentin 300 MG capsule   Commonly known " as:  NEURONTIN   Stopped by:  Judson Mcadams MD                Where to get your medicines      Some of these will need a paper prescription and others can be bought over the counter.  Ask your nurse if you have questions.     Bring a paper prescription for each of these medications     traMADol 50 MG tablet                Primary Care Provider Office Phone # Fax #    Judson Mcadams [phone]       [facility] [address]  [facility] [address]        Equal Access to Services     [facility] : Hadii aad ku hadasho Soomaali, waaxda luqadaha, qaybta kaalmada adeegyada, waxay idiin hayaan adeeg kharash la'marcelino . [facility] [phone].    ATENCIÓN: Si habla español, tiene a ambrose disposición servicios gratuitos de asistencia lingüística. [facility] [phone].    We comply with applicable federal civil rights laws and [place] laws. We do not discriminate on the basis of race, color, national origin, age, disability, sex, sexual orientation, or gender identity.            Thank you!     Thank you for choosing [facility]  for your care. Our goal is always to provide you with excellent care. Hearing back from our patients is one way we can continue to improve our services. Please take a few minutes to complete the written survey that you may receive in the mail after your visit with us. Thank you!             Your Updated Medication List - Protect others around you: Learn how to safely use, store and throw away your medicines at www.disposemymeds.org.          This list is accurate as of: 1/4/18  4:48 PM.  Always use your most recent med list.                   Brand Name Dispense Instructions for use Diagnosis    cyanocobalamin 1000 MCG/ML injection    VITAMIN B12    1 mL    Inject 1 mL (1,000 mcg) into the muscle every 30 days    Vitamin B12 deficiency (non anemic)       diphenoxylate-atropine 2.5-0.025 MG per tablet    LOMOTIL    40 tablet    Take 1 tablet by mouth 4  times daily as needed for diarrhea    Crohn's disease of small and large intestines with complication (H)       ELIQUIS 5 MG tablet   Generic drug:  apixaban ANTICOAGULANT     180 tablet    Take 1 tablet (5 mg) by mouth 2 times daily    Chronic atrial fibrillation (H)       FLOMAX 0.4 MG capsule   Generic drug:  tamsulosin     90 capsule    Take 1 capsule (0.4 mg) by mouth daily    Benign non-nodular prostatic hyperplasia with lower urinary tract symptoms       metoprolol 50 MG tablet    LOPRESSOR    180 tablet    Take 1 tablet (50 mg) by mouth 2 times daily    Benign essential hypertension       opium tincture 10 MG/ML (1%) liquid     118 mL    4 drops every 4 hours as needed for diarrhea    Crohn's disease of small and large intestines with complication (H)       traMADol 50 MG tablet    ULTRAM    60 tablet    Take 1 tablet (50 mg) by mouth 2 times daily as needed for pain maximum 2 tablet(s) per day    Spinal stenosis of lumbosacral region

## 2018-01-04 NOTE — LETTER
[facility]    01/04/18    Patient: Dawson Jnoes  YOB: 1930  Medical Record Number: [id number]                                                                  Controlled Substance Agreement  I understand that my care provider has prescribed controlled substances (narcotics, tranquilizers, and/or stimulants) to help manage my condition(s).  I am taking this medicine to help me function or work.  I know that this is strong medicine.  It could have serious side effects and even cause a dependency on the drug.  If I stop these medicines suddenly, I could have severe withdrawal symptoms.    The risks, benefits, and side effects of these medication(s) were explained to me.  I agree that:  1. I will take part in other treatments as advised by my provider.  This may be psychiatry or counseling, physical therapy, behavioral therapy, group treatment, or a referral to a pain clinic.  I will reduce or stop my medicine when my provider tells me to do so.   2. I will take my medicines as prescribed.  I will not change the dose or schedule unless my provider tells me to.  There will be no refills if I  run out early.   I may be contacted at any time without warning and asked to complete a drug test or pill count.   3. I will keep all my appointments at the clinic.  If I miss appointments or fail to follow instructions, my provider may stop my medicine.  4. I will not ask other providers to prescribe controlled substances. And I will not accept controlled substances from other people. If I need another prescribed controlled substance for a new reason, I will notify my provider within one business day.  5. If I enroll in the [place] Medical Marijuana program, I will tell my provider.  I will also sign an agreement to share my medical records with my provider.  6. I will use one pharmacy to fill all of my controlled substance prescriptions.  If my prescription is mailed to my pharmacy, it may take 5 to  7 days for my medicine to be ready.  7. I understand that my provider, clinic care team, and pharmacy can track controlled substance prescriptions from other providers through a central database (prescription monitoring program).  8. I will bring in my list of medications (or my medicine bottles) each time I come to the clinic.  REV- 04/2016                                                                                                                                            Page 1 of 2      [facility]    01/04/18    Patient: Dawson Jones  YOB: 1930  Medical Record Number: [id number]    9. Refills of controlled substances will be made only during office hours.  It is up to me to make sure that I do not run out of my medicines on weekends or holidays.    10. I am responsible for my prescriptions.  If the medicine is lost or stolen, it will not be replaced.   I also agree not to share these medicines with anyone.  11. I agree to not use ANY illegal or recreational drugs.  This includes marijuana, cocaine, bath salts or other drugs.  I agree not to use alcohol unless my provider says I may.  I agree to give urine samples whenever asked.  If I fail to give a urine sample, the provider may stop my medicine.     12. I will tell my nurse or provider right away if I become pregnant or have a new medical problem treated outside of [facility].  13. I understand that this medicine can affect my thinking and judgment.  It may be unsafe for me to drive, use machinery and do dangerous tasks.  I will not do any of these things until I know how the medicine affects me.  If my dose changes, I will wait to see how it affects me.  I will contact my provider if I have concerns about medicine side effects.  I understand that if I do not follow any of the conditions above, my prescriptions or treatment may be stopped.    I agree that my provider, clinic care team, and pharmacy may work with any city,  state or federal law enforcement agency that investigates the misuse, sale, or other diversion of my controlled medicine. I will allow my provider to discuss my care with or share a copy of this agreement with any other treating provider, pharmacy or emergency room where I receive care.  I agree to give up (waive) any right of privacy or confidentiality with respect to these authorizations.   I have read this agreement and have asked questions about anything I did not understand.   ___________________________________    ___________________________  Patient Signature                                                           Date and Time  ___________________________________     ____________________________  Witness                                                                            Date and Time  ___________________________________  Judson Mcadams MD  REV-  04/2016                                                                                                                                                                 Page 2 of 2

## 2018-01-04 NOTE — NURSING NOTE
"Chief Complaint   Patient presents with     FU After ER Visit       Initial BP 97/60 (BP Location: Left arm, Cuff Size: Adult Regular)  Pulse 96  Temp 97.5  F (36.4  C) (Tympanic)  Ht 5' 7\" (1.702 m)  Wt 161 lb (73 kg)  SpO2 97%  BMI 25.22 kg/m2 Estimated body mass index is 25.22 kg/(m^2) as calculated from the following:    Height as of this encounter: 5' 7\" (1.702 m).    Weight as of this encounter: 161 lb (73 kg).  Medication Reconciliation: complete   Ivania Sierra MA      "

## 2018-01-17 ENCOUNTER — TELEPHONE (OUTPATIENT)
Dept: FAMILY MEDICINE | Facility: CLINIC | Age: 83
End: 2018-01-17

## 2018-01-17 NOTE — TELEPHONE ENCOUNTER
Reason for Call:  Form, our goal is to have forms completed with 72 hours, however, some forms may require a visit or additional information.    Type of letter, form or note:  DMV     Who is the form from?: Patient/Wife    Where did the form come from: Patient or family brought in       What clinic location was the form placed at?: [facility]    Where the form was placed: the In box between the [facility] Stations    What number is listed as a contact on the form?: [phone]       Additional comments: Please Mail in once completed      Call taken on 1/17/2018 at 9:05 AM by John Callahan

## 2018-01-17 NOTE — TELEPHONE ENCOUNTER
Form completed with MD's title and clinic address    Form placed in mail to patient.     Patient notified via Voicemail

## 2018-01-29 ENCOUNTER — THERAPY VISIT (OUTPATIENT)
Dept: PHYSICAL THERAPY | Facility: CLINIC | Age: 83
End: 2018-01-29
Payer: COMMERCIAL

## 2018-01-29 DIAGNOSIS — G89.29 CHRONIC LEFT-SIDED LOW BACK PAIN WITH LEFT-SIDED SCIATICA: Primary | ICD-10-CM

## 2018-01-29 DIAGNOSIS — M54.42 CHRONIC LEFT-SIDED LOW BACK PAIN WITH LEFT-SIDED SCIATICA: Primary | ICD-10-CM

## 2018-01-29 PROCEDURE — 97110 THERAPEUTIC EXERCISES: CPT | Mod: GP | Performed by: PHYSICAL THERAPIST

## 2018-01-29 PROCEDURE — G8979 MOBILITY GOAL STATUS: HCPCS | Mod: GP | Performed by: PHYSICAL THERAPIST

## 2018-01-29 PROCEDURE — G8978 MOBILITY CURRENT STATUS: HCPCS | Mod: GP | Performed by: PHYSICAL THERAPIST

## 2018-01-29 PROCEDURE — 97161 PT EVAL LOW COMPLEX 20 MIN: CPT | Mod: GP | Performed by: PHYSICAL THERAPIST

## 2018-01-29 NOTE — PROGRESS NOTES
[facility] Initial Evaluation    Subjective:  Patient is a 87 year old male presenting with rehab back hpi.   Dawson Jones is a 87 year old male with a lumbar condition.  Condition occurred with:  Insidious onset.  Condition occurred: for unknown reasons.  This is a chronic condition  Patient notes R LBP with pain that wraps around the lateral hip to anterior hip/thigh/groin area (L2-3 distribution).  He's noted these symptoms since about 1/1/16.  He recently had MRI, epidurals and subsequent referral to PT, on 1/2/18..        Pain is described as aching and is intermittent and reported as 7/10 and 1/10.  Associated with: limping, sometimes weakness in legs with prolonged standing. Pain is worse in the A.M..  Symptoms are exacerbated by lifting and standing Relieved by: sitting or laying down.  Since onset symptoms are unchanged.  Special tests:  MRI (degenerative/stenotic changes, as well as disc pathology).  Previous treatment: epidural injections.  There was no improvement following previous treatment.  General health as reported by patient is fair.  Past medical history: atrial fibrillation.  Medical allergies: no.  Other surgeries include:  No.  Current medications:  Cardiac medication (metroprolol, eliquis).  Current occupation is retired.        Barriers include:  None as reported by the patient.    Red flags:  None as reported by the patient.                        Objective:  LUMBAR:    Posture: fair, kyphotic  Posture Correction: no change  Relevant Lateral Shift: mild structural scoliosis, no relevant lateral shift    Neurological:    Motor Deficit:  Myotomes L R   L1-2 (hip flexion) 5 5   L3 (knee extension) 5 5   L4 (ankle DF) 4 4   L5 (g. toe ext) 5 5   S1 (ankle PF or knee flex) 5 5     Sensory Deficit, Reflexes: intact light touch screen B LE dermatomes    Dural Signs:   L R   Slump Negative negative   SLR negative negative       AROM: (Major, Moderate, Minimal or Nil  loss)  Movement Loss Michael Mod Min Nil Pain   Flexion   X  No pain   Extension  X   No pain   Side Gliding L    X No pain   Side Gliding R    X No pain     Repeated movement testing:   (During: produces, abolishes, increases, decreases, no effect, centralizing, peripheralizing; After: better, worse, no better, no worse, no effect, centralized, peripheralized)    Pre-test Symptoms Lying: none    Symptoms During Symptoms After ROM increased ROM decreased No Effect   CAMERON        Rep CAMERON No effect No effect   X   EIL        Rep EIL        Static Tests: prone on elbows x 2 minutes no effect during/after  Other Tests: ambulation x 2 minutes, no effect.  Flexion in sitting no effect.     Provisional Classification: mechanically inconclusive, suspect other, spinal stenosis  Principle of Management: will initiate flexion in lying and flexion in sitting 5 x 10 seconds either one every 2-3 hours throughout day.  Will progress to hip/trunk strengthening as well .    System    Physical Exam    General     ROS    Assessment/Plan:    Patient is a 87 year old male with lumbar complaints.    Patient has the following significant findings with corresponding treatment plan.                Diagnosis 1:  LBP with R sided radiculopathy (likely stenotic in nature and likely in L2-3 distribution)    Pain -  hot/cold therapy, manual therapy and directional preference exercise  Decreased ROM/flexibility - manual therapy and therapeutic exercise  Decreased strength - therapeutic exercise and therapeutic activities  Decreased proprioception - neuro re-education and therapeutic activities  Impaired gait - gait training  Decreased function - therapeutic activities  Impaired posture - neuro re-education    Therapy Evaluation Codes:   1) History comprised of:   Personal factors that impact the plan of care:      Time since onset of symptoms.    Comorbidity factors that impact the plan of care are:      None.     Medications impacting care:  None.  2) Examination of Body Systems comprised of:   Body structures and functions that impact the plan of care:      Lumbar spine.   Activity limitations that impact the plan of care are:      Lifting, Standing and Walking.  3) Clinical presentation characteristics are:   Stable/Uncomplicated.  4) Decision-Making    Low complexity using standardized patient assessment instrument and/or measureable assessment of functional outcome.  Cumulative Therapy Evaluation is: Low complexity.    Previous and current functional limitations:  (See Goal Flow Sheet for this information)    Short term and Long term goals: (See Goal Flow Sheet for this information)     Communication ability:  Patient appears to be able to clearly communicate and understand verbal and written communication and follow directions correctly.  Treatment Explanation - The following has been discussed with the patient:   RX ordered/plan of care  Anticipated outcomes  Possible risks and side effects  This patient would benefit from PT intervention to resume normal activities.   Rehab potential is good.    Frequency:  1 X week, once daily  Duration:  for 6 weeks  Discharge Plan:  Achieve all LTG.  Independent in home treatment program.  Reach maximal therapeutic benefit.    Please refer to the daily flowsheet for treatment today, total treatment time and time spent performing 1:1 timed codes.

## 2018-01-30 DIAGNOSIS — K50.819 CROHN'S DISEASE OF SMALL AND LARGE INTESTINES WITH COMPLICATION (H): ICD-10-CM

## 2018-01-30 NOTE — TELEPHONE ENCOUNTER
diphenoxylate-atropine (LOMOTIL) 2.5-0.025 MG per tablet      Last Written Prescription Date:  6/09/17  Last Fill Quantity: 40 tablet,   # refills: 3  Last Office Visit: 1/04/18 Pema  Future Office visit:    Next 5 appointments (look out 90 days)     Feb 06, 2018  9:30 AM CST   [place] with [place]   [facility]    [address]  [place] [facility] [address]   [phone]            Mar 06, 2018  9:30 AM CST   Nurse Only with CS NURSE   [facility]    [address]  [place] [facility] [address]   [phone]            Apr 02, 2018 10:00 AM CDT   Office Visit with Judson Mcadams MD   [facility]    [address] [facility] [address]   [phone]                   Routing refill request to provider for review/approval because:  Drug not on the G, P or [facility] refill protocol or controlled substance      Requested Prescriptions   Pending Prescriptions Disp Refills     diphenoxylate-atropine (LOMOTIL) 2.5-0.025 MG per tablet [Pharmacy Med Name: DIPHENOXYLATE/ATROPINE TABLETS] 40 tablet 0     Sig: TAKE 1 TABLET BY MOUTH FOUR TIMES DAILY AS NEEDED FOR DIARRHEA    There is no refill protocol information for this order

## 2018-01-31 RX ORDER — DIPHENOXYLATE HCL/ATROPINE 2.5-.025MG
TABLET ORAL
Qty: 40 TABLET | Refills: 0 | Status: SHIPPED | OUTPATIENT
Start: 2018-01-31 | End: 2018-03-04

## 2018-02-04 DIAGNOSIS — M48.07 SPINAL STENOSIS OF LUMBOSACRAL REGION: ICD-10-CM

## 2018-02-04 DIAGNOSIS — G89.4 CHRONIC PAIN SYNDROME: ICD-10-CM

## 2018-02-05 NOTE — TELEPHONE ENCOUNTER
traMADol (ULTRAM) 50 MG tablet 60 tablet 0 1/4/2018           Last Written Prescription Date:  01/14/2018  Last Fill Quantity: 60,   # refills: 0  Last Office Visit: 01/04/2018  Future Office visit:04/02/2018    Next 5 appointments (look out 90 days)     Feb 06, 2018  9:30 AM CST   Nurse Only with CS NURSE   [facility]    [address]  [facility] [address]   [phone]            Mar 06, 2018  9:30 AM CST   Nurse Only with CS NURSE   [facility]    [address] [address]   [phone]            Apr 02, 2018 10:00 AM CDT   Office Visit with Judson Mcadams MD   [facility]    [address] [facility] [address]   [phone]                 Patient is followed by Judson Mcadams MD for ongoing prescription of pain medication.  All refills should only be approved by this provider, or covering partner.    Medication(s): Tramadol 50mg po bid prn # 60 per month.   Maximum quantity per month: 60  Clinic visit frequency required: Q 3 months     Controlled substance agreement:  Encounter-Level CSA - 01/04/2018:          Controlled Substance Agreement - Scan on 1/24/2018 12:08 PM : CONTROLLED SUBSTANCE AGREEMENT (below)              Pain Clinic evaluation in the past: No    DIRE Total Score(s):  No flowsheet data found.    Last [facility] website verification:  done on 2/7/2018

## 2018-02-06 ENCOUNTER — ALLIED HEALTH/NURSE VISIT (OUTPATIENT)
Dept: NURSING | Facility: CLINIC | Age: 83
End: 2018-02-06
Payer: COMMERCIAL

## 2018-02-06 DIAGNOSIS — E53.8 VITAMIN B12 DEFICIENCY (NON ANEMIC): Primary | ICD-10-CM

## 2018-02-06 PROCEDURE — 99207 ZZC NO CHARGE NURSE ONLY: CPT

## 2018-02-06 PROCEDURE — 96372 THER/PROPH/DIAG INJ SC/IM: CPT

## 2018-02-07 ENCOUNTER — THERAPY VISIT (OUTPATIENT)
Dept: PHYSICAL THERAPY | Facility: CLINIC | Age: 83
End: 2018-02-07
Payer: COMMERCIAL

## 2018-02-07 DIAGNOSIS — G89.29 CHRONIC LEFT-SIDED LOW BACK PAIN WITH LEFT-SIDED SCIATICA: ICD-10-CM

## 2018-02-07 DIAGNOSIS — M54.42 CHRONIC LEFT-SIDED LOW BACK PAIN WITH LEFT-SIDED SCIATICA: ICD-10-CM

## 2018-02-07 PROBLEM — G89.4 CHRONIC PAIN SYNDROME: Status: ACTIVE | Noted: 2018-01-04

## 2018-02-07 PROCEDURE — 97110 THERAPEUTIC EXERCISES: CPT | Mod: GP | Performed by: PHYSICAL THERAPIST

## 2018-02-07 PROCEDURE — 97140 MANUAL THERAPY 1/> REGIONS: CPT | Mod: GP | Performed by: PHYSICAL THERAPIST

## 2018-02-07 RX ORDER — TRAMADOL HYDROCHLORIDE 50 MG/1
TABLET ORAL
Qty: 60 TABLET | Refills: 0 | Status: SHIPPED | OUTPATIENT
Start: 2018-02-07 | End: 2018-03-04

## 2018-02-07 NOTE — TELEPHONE ENCOUNTER
Patient stopped into clinic to check on script.    Please send in asap!    Please call at:  [phone]  OK to leave VM

## 2018-02-12 ENCOUNTER — THERAPY VISIT (OUTPATIENT)
Dept: PHYSICAL THERAPY | Facility: CLINIC | Age: 83
End: 2018-02-12
Payer: COMMERCIAL

## 2018-02-12 DIAGNOSIS — G89.29 CHRONIC LEFT-SIDED LOW BACK PAIN WITH LEFT-SIDED SCIATICA: ICD-10-CM

## 2018-02-12 DIAGNOSIS — M54.42 CHRONIC LEFT-SIDED LOW BACK PAIN WITH LEFT-SIDED SCIATICA: ICD-10-CM

## 2018-02-12 PROCEDURE — 97140 MANUAL THERAPY 1/> REGIONS: CPT | Mod: GP | Performed by: PHYSICAL THERAPIST

## 2018-02-12 PROCEDURE — 97110 THERAPEUTIC EXERCISES: CPT | Mod: GP | Performed by: PHYSICAL THERAPIST

## 2018-02-26 ENCOUNTER — THERAPY VISIT (OUTPATIENT)
Dept: PHYSICAL THERAPY | Facility: CLINIC | Age: 83
End: 2018-02-26
Payer: COMMERCIAL

## 2018-02-26 DIAGNOSIS — M54.42 CHRONIC LEFT-SIDED LOW BACK PAIN WITH LEFT-SIDED SCIATICA: ICD-10-CM

## 2018-02-26 DIAGNOSIS — G89.29 CHRONIC LEFT-SIDED LOW BACK PAIN WITH LEFT-SIDED SCIATICA: ICD-10-CM

## 2018-02-26 PROCEDURE — 97110 THERAPEUTIC EXERCISES: CPT | Mod: GP | Performed by: PHYSICAL THERAPIST

## 2018-02-26 PROCEDURE — 97140 MANUAL THERAPY 1/> REGIONS: CPT | Mod: GP | Performed by: PHYSICAL THERAPIST

## 2018-02-26 PROCEDURE — G8979 MOBILITY GOAL STATUS: HCPCS | Mod: GP | Performed by: PHYSICAL THERAPIST

## 2018-02-26 PROCEDURE — G8978 MOBILITY CURRENT STATUS: HCPCS | Mod: GP | Performed by: PHYSICAL THERAPIST

## 2018-02-26 NOTE — PROGRESS NOTES
Subjective:  HPI                    Objective:  System    Physical Exam    General     ROS    Assessment/Plan:    PROGRESS  REPORT    Progress reporting period is from 1/29/18 to 2/26/18 (4 visits).       SUBJECTIVE  Subjective changes noted by patient:  Patient notes that he continues to have R LBP with pain to the R lateral hip and anterior groin/thigh.  He feels most pain in the morning after sleeping 8-9 hours.  He felt exercises made him more painful if he did them right away in the morning (double knee to chest and flexion in sitting specifically).  He stopped doing them in the morning and is using heat sitting in the chair and that is better.  He still generally has minimal to no pain with sitting and it mostly comes on with walking.  He and his wife do question if some of it could be coming from his R hip.  Did screen this and there is some positive testing there.  X-ray from 2015 with mild degeneration noted per report in epic.  They will discuss if hip should be investigated further with their referring provider.  MRI for LB does make sense as source though as L2-3 pattern is where symptoms persist.  Injection there without much benefit though, they question if a hip injetion may be of help.  Did discuss possibility, but defered to them having the conversation with his referring provider.    Current Pain level: 1/10 (currently, but symptoms vary worst in morning/standing 7/10).     Initial Pain level: 7/10.   Changes in function:  Yes (See Goal flowsheet attached for changes in current functional level)  Adverse reaction to treatment or activity: None    OBJECTIVE  Changes noted in objective findings:  Yes,   Objective:     Lumbar AROM: flexion to distal 1/3 of tibia, extension mod decrease, B SG min decrease.  No pain during AROM screen.      Lumbar flexion in sitting without pain, double knee to chest without pain.     Otherwise working on light core strength exercises of partial sit up, bridges,  abdominal post pelvic tilt with light marching, and SLR hip abd and clamsheels for R hip.  Some relief noted with long axis distraction of the R LE.      Hip PROM: no pain end range flexion, IR/ER (IR is lacking at about 5-10 degrees).  RUDI positive for anterior hip pain.  FADIR trace positive for anterior hip pain.      ASSESSMENT/PLAN  Updated problem list and treatment plan: Diagnosis 1:  R LBP with L2-3 radicular symptoms (likely stenotic in nature)    Pain -  hot/cold therapy, manual therapy and directional preference exercise  Decreased ROM/flexibility - manual therapy and therapeutic exercise  Decreased strength - therapeutic exercise and therapeutic activities  Decreased proprioception - neuro re-education and therapeutic activities  Impaired gait - gait training  Decreased function - therapeutic activities  Impaired posture - neuro re-education  STG/LTGs have been met or progress has been made towards goals:  Yes (See Goal flow sheet completed today.)  Assessment of Progress: The patient's condition has potential to improve.  Self Management Plans:  Patient has been instructed in a home treatment program.  I have re-evaluated this patient and find that the nature, scope, duration and intensity of the therapy is appropriate for the medical condition of the patient.  Dawson continues to require the following intervention to meet STG and LTG's:  PT    Recommendations:  This patient would benefit from continued therapy.     Frequency:  1 X week, once daily  Duration:  for 2 weeks    Patient would like to check back with referring provider to see discuss if hip could be a contributor and what options for treatment there could be considered.  Plan to continue 2 more visits of PT currently scheduled as well.         Please refer to the daily flowsheet for treatment today, total treatment time and time spent performing 1:1 timed codes.

## 2018-03-02 ENCOUNTER — TELEPHONE (OUTPATIENT)
Dept: NEUROSURGERY | Facility: CLINIC | Age: 83
End: 2018-03-02

## 2018-03-02 NOTE — TELEPHONE ENCOUNTER
Message left for Dawson to discuss reason for upcoming return visit on 3/13/18.  I explained that if his therapist's concern is for further evaluation of the hip perhaps this can be addressed by PCP or an orthopedic specialist.  Will await a return call to discuss.   Return message received from Pauly Atkins.  They verbalized understanding and agreement and will reach out to PCP for direction.

## 2018-03-04 DIAGNOSIS — K50.819 CROHN'S DISEASE OF SMALL AND LARGE INTESTINES WITH COMPLICATION (H): ICD-10-CM

## 2018-03-04 DIAGNOSIS — M48.07 SPINAL STENOSIS OF LUMBOSACRAL REGION: ICD-10-CM

## 2018-03-05 ENCOUNTER — TELEPHONE (OUTPATIENT)
Dept: NEUROSURGERY | Facility: CLINIC | Age: 83
End: 2018-03-05

## 2018-03-05 ENCOUNTER — THERAPY VISIT (OUTPATIENT)
Dept: PHYSICAL THERAPY | Facility: CLINIC | Age: 83
End: 2018-03-05
Payer: COMMERCIAL

## 2018-03-05 ENCOUNTER — ALLIED HEALTH/NURSE VISIT (OUTPATIENT)
Dept: NURSING | Facility: CLINIC | Age: 83
End: 2018-03-05
Payer: COMMERCIAL

## 2018-03-05 DIAGNOSIS — E53.8 VITAMIN B 12 DEFICIENCY: Primary | ICD-10-CM

## 2018-03-05 DIAGNOSIS — M54.42 CHRONIC LEFT-SIDED LOW BACK PAIN WITH LEFT-SIDED SCIATICA: ICD-10-CM

## 2018-03-05 DIAGNOSIS — G89.29 CHRONIC LEFT-SIDED LOW BACK PAIN WITH LEFT-SIDED SCIATICA: ICD-10-CM

## 2018-03-05 PROCEDURE — 97140 MANUAL THERAPY 1/> REGIONS: CPT | Mod: GP | Performed by: PHYSICAL THERAPIST

## 2018-03-05 PROCEDURE — G8980 MOBILITY D/C STATUS: HCPCS | Mod: GP | Performed by: PHYSICAL THERAPIST

## 2018-03-05 PROCEDURE — 97110 THERAPEUTIC EXERCISES: CPT | Mod: GP | Performed by: PHYSICAL THERAPIST

## 2018-03-05 PROCEDURE — G8979 MOBILITY GOAL STATUS: HCPCS | Mod: GP | Performed by: PHYSICAL THERAPIST

## 2018-03-05 NOTE — TELEPHONE ENCOUNTER
Message left for Dawson to reschedule him for follow up evaluation with Angie Mishra NP.  Message received from Dawson's Physical Therapist to Angie with further clarification regarding recommendations to return to our clinic for further exam and discussion.  I instructed Dawson to call us again to be rescheduled.

## 2018-03-05 NOTE — TELEPHONE ENCOUNTER
Patient has been rescheduled for follow up visit with Angie Mishra NP on 3/20/18.  Spouse is inquiring if our office would be willing/able to order imaging of the hip as well.  Will route to care team to discuss.     Reply received from Dawson's neurosurgical care team, NP.  We will defer orders for imaging of the hip/pelvis to PCP or Orthopedic specialist.  Family verbalized understanding.

## 2018-03-05 NOTE — PROGRESS NOTES
Subjective:  HPI                    Objective:  System      Physical Exam      General       ROS      Assessment/Plan:    PROGRESS  REPORT    Progress reporting period is from 1/29/18 to 3/5/18 (5 visits).       SUBJECTIVE  Subjective changes noted by patient:  Patient notes that he continues to have R LBP with pain to the R lateral hip and anterior groin/thigh.  He feels most pain in the morning after sleeping 8-9 hours.  He felt exercises  (double knee to chest and flexion in sitting specifically) are better if he waits until he gets moving, after 10am, to do them.  He still generally has minimal to no pain with sitting and it mostly comes on with walking.  He and his wife do question if some of it could be coming from his R hip.  They called his referring provider and were recommended to follow up with his primary physician for considerations for further consultation on R hip.    Current Pain level: 1/10 (currently, but symptoms vary worst in morning/standing 7/10).     Initial Pain level: 7/10.   Changes in function:  Yes (See Goal flowsheet attached for changes in current functional level)  Adverse reaction to treatment or activity: None    OBJECTIVE  Changes noted in objective findings:  Yes,   Objective:     Lumbar AROM: flexion to distal 1/3 of tibia, extension mod decrease, B SG min decrease.  No pain during AROM screen.      Lumbar flexion in sitting without pain, double knee to chest without pain.     Otherwise working on light core strength exercises of partial sit up, bridges, abdominal post pelvic tilt with light marching, and SLR hip abd and clamsheels for R hip.  Some relief noted with long axis distraction of the R LE.      Hip PROM: no pain end range flexion, IR/ER (IR is lacking at about 5-10 degrees).  RUDI positive for anterior hip pain.  FADIR trace positive for anterior hip pain.      ASSESSMENT/PLAN  Updated problem list and treatment plan: Diagnosis 1:  R LBP with L2-3 radicular symptoms  (likely stenotic in nature)    Pain -  hot/cold therapy, manual therapy and directional preference exercise  Decreased ROM/flexibility - manual therapy and therapeutic exercise  Decreased strength - therapeutic exercise and therapeutic activities  Decreased proprioception - neuro re-education and therapeutic activities  Impaired gait - gait training  Decreased function - therapeutic activities  Impaired posture - neuro re-education  STG/LTGs have been met or progress has been made towards goals:  Yes (See Goal flow sheet completed today.)  Assessment of Progress: The patient's condition has potential to improve.  Self Management Plans:  Patient has been instructed in a home treatment program.  I have re-evaluated this patient and find that the nature, scope, duration and intensity of the therapy is appropriate for the medical condition of the patient.  Dawson continues to require the following intervention to meet STG and LTG's:  HEP/visit with primary MD    Recommendations:  Will continue his HEP and trial self management regarding low back program and will return to his primary MD for considerations for need for hip x-ray and if subsequent injection is a consideration or not.         Please refer to the daily flowsheet for treatment today, total treatment time and time spent performing 1:1 timed codes.

## 2018-03-06 NOTE — TELEPHONE ENCOUNTER
Requested Prescriptions   Pending Prescriptions Disp Refills     traMADol (ULTRAM) 50 MG tablet [Pharmacy Med Name: TRAMADOL 50MG TABLETS] 60 tablet 0     Sig: TAKE 1 TABLET BY MOUTH TWICE DAILY AS NEEDED FOR PAIN. MAX OF 2 TABLETS PER DAY    There is no refill protocol information for this order        diphenoxylate-atropine (LOMOTIL) 2.5-0.025 MG per tablet [Pharmacy Med Name: DIPHENOXYLATE/ATROPINE TABLETS] 40 tablet 0     Sig: TAKE 1 TABLET BY MOUTH FOUR TIMES DAILY AS NEEDED FOR DIARRHEA    There is no refill protocol information for this order        traMADol (ULTRAM) 50 MG tablet      Last Written Prescription Date:  2/07/18  Last Fill Quantity: 60 tablet,   # refills: 0  Last Office Visit: 1/04/18 (Pema)  Future Office visit:    Next 5 appointments (look out 90 days)     Mar 20, 2018 12:00 PM CDT   Return Visit with YAMILKA Harrison CNP   [facility] ([facility])    [address] [address]   [phone]            Mar 30, 2018  9:45 AM CDT   Nurse Only with CS NURSE   [facility]    [address] [facility] [address]   [phone]            Mar 30, 2018 10:00 AM CDT   Office Visit with Judson Mcadams MD   [facility]    [address] [facility] [address]   [phone]            May 01, 2018  9:30 AM CDT   Nurse Only with CS NURSE   [facility]    [address] [facility] [address]   [phone]                   Routing refill request to provider for review/approval because:  Drug not on the [facility], [facility] or [facility] refill protocol or controlled substance      diphenoxylate-atropine (LOMOTIL) 2.5-0.025 MG per tablet      Last Written Prescription Date:  1/31/18  Last Fill Quantity: 40 tablet,   # refills: 0  Last Office Visit: 1/04/18 (Pema)  Future Office visit:    Next 5 appointments (look out 90  days)     Mar 20, 2018 12:00 PM CDT   Return Visit with YAMILKA Harrison CNP   [facility] ([facility])    [address] [place] [address]   [phone]            Mar 30, 2018  9:45 AM CDT   Nurse Only with CS NURSE   [facility]    [address]  [facility] [address]   [phone]            Mar 30, 2018 10:00 AM CDT   Office Visit with Judson Mcadams MD   [facility]    [address] [facility] [address]   [phone]            May 01, 2018  9:30 AM CDT   Nurse Only with CS NURSE   [facility] ([facility])    [address]  [place] [address]   [phone]                   Routing refill request to provider for review/approval because:  Drug not on the [facility], [facility] or [facility] refill protocol or controlled substance

## 2018-03-07 RX ORDER — DIPHENOXYLATE HCL/ATROPINE 2.5-.025MG
TABLET ORAL
Qty: 40 TABLET | Refills: 0 | Status: SHIPPED | OUTPATIENT
Start: 2018-03-07 | End: 2018-03-30

## 2018-03-07 RX ORDER — TRAMADOL HYDROCHLORIDE 50 MG/1
TABLET ORAL
Qty: 60 TABLET | Refills: 0 | Status: SHIPPED | OUTPATIENT
Start: 2018-03-07 | End: 2018-03-30

## 2018-03-08 ENCOUNTER — TRANSFERRED RECORDS (OUTPATIENT)
Dept: HEALTH INFORMATION MANAGEMENT | Facility: CLINIC | Age: 83
End: 2018-03-08

## 2018-03-30 ENCOUNTER — OFFICE VISIT (OUTPATIENT)
Dept: FAMILY MEDICINE | Facility: CLINIC | Age: 83
End: 2018-03-30
Payer: COMMERCIAL

## 2018-03-30 VITALS
TEMPERATURE: 97.4 F | HEART RATE: 76 BPM | SYSTOLIC BLOOD PRESSURE: 145 MMHG | WEIGHT: 157 LBS | HEIGHT: 67 IN | OXYGEN SATURATION: 99 % | BODY MASS INDEX: 24.64 KG/M2 | DIASTOLIC BLOOD PRESSURE: 93 MMHG

## 2018-03-30 DIAGNOSIS — K50.819 CROHN'S DISEASE OF SMALL AND LARGE INTESTINES WITH COMPLICATION (H): ICD-10-CM

## 2018-03-30 DIAGNOSIS — E53.8 VITAMIN B12 DEFICIENCY (NON ANEMIC): ICD-10-CM

## 2018-03-30 DIAGNOSIS — M48.00 SPINAL STENOSIS, UNSPECIFIED SPINAL REGION: Primary | ICD-10-CM

## 2018-03-30 PROCEDURE — 99214 OFFICE O/P EST MOD 30 MIN: CPT | Mod: 25 | Performed by: INTERNAL MEDICINE

## 2018-03-30 PROCEDURE — 96372 THER/PROPH/DIAG INJ SC/IM: CPT | Performed by: INTERNAL MEDICINE

## 2018-03-30 RX ORDER — DIPHENOXYLATE HCL/ATROPINE 2.5-.025MG
1 TABLET ORAL DAILY
Qty: 90 TABLET | Refills: 3 | Status: ON HOLD | OUTPATIENT
Start: 2018-03-30 | End: 2018-10-19

## 2018-03-30 RX ORDER — MORPHINE 10 MG/ML
TINCTURE ORAL
Qty: 118 ML | Refills: 0 | Status: SHIPPED | OUTPATIENT
Start: 2018-03-30 | End: 2018-08-06

## 2018-03-30 RX ORDER — CYANOCOBALAMIN 1000 UG/ML
1 INJECTION, SOLUTION INTRAMUSCULAR; SUBCUTANEOUS
Qty: 1 ML | Refills: 11 | OUTPATIENT
Start: 2018-03-30 | End: 2018-10-15

## 2018-03-30 RX ORDER — PREGABALIN 50 MG/1
CAPSULE ORAL
Qty: 90 CAPSULE | Refills: 2 | Status: SHIPPED | OUTPATIENT
Start: 2018-03-30 | End: 2018-07-13

## 2018-03-30 NOTE — NURSING NOTE
"Chief Complaint   Patient presents with     Follow Up For     Chronic pain     Recheck Medication     Tramadol       Initial BP (!) 145/93 (BP Location: Right arm, Cuff Size: Adult Regular)  Pulse 76  Temp 97.4  F (36.3  C) (Oral)  Ht 5' 7\" (1.702 m)  Wt 157 lb (71.2 kg)  SpO2 99%  BMI 24.59 kg/m2 Estimated body mass index is 24.59 kg/(m^2) as calculated from the following:    Height as of this encounter: 5' 7\" (1.702 m).    Weight as of this encounter: 157 lb (71.2 kg).  Medication Reconciliation: complete  "

## 2018-03-30 NOTE — MR AVS SNAPSHOT
After Visit Summary   3/30/2018    Dawson Jones    MRN: [id number]           Patient Information     Date Of Birth          5/5/1930        Visit Information        Provider Department      3/30/2018 10:00 AM Judson Mcadams MD [facility]        Today's Diagnoses     Spinal stenosis, unspecified spinal region    -  1    Crohn's disease of small and large intestines with complication (H)        Vitamin B12 deficiency (non anemic)           Follow-ups after your visit        Your next 10 appointments already scheduled     Jun 05, 2018  9:45 AM CDT   Nurse Only with CS NURSE   [facility] ([facility])    [address] [facility] [address]   [phone]            Jul 13, 2018 10:00 AM CDT   Office Visit with Judson Mcadams MD   [facility] ([facility])    [address] [facility] [address] [phone]           Bring a current list of meds and any records pertaining to this visit. For Physicals, please bring immunization records and any forms needing to be filled out. Please arrive 10 minutes early to complete paperwork.              Who to contact     If you have questions or need follow up information about today's clinic visit or your schedule please contact [facility] directly at [phone].  Normal or non-critical lab and imaging results will be communicated to you by MyChart, letter or phone within 4 business days after the clinic has received the results. If you do not hear from us within 7 days, please contact the clinic through [place] [facility] or phone. If you have a critical or abnormal lab result, we will notify you by phone as soon as possible.  Submit refill requests through [employer] or call your pharmacy and they will forward the refill request to us. Please allow 3 business days for your refill to be completed.          Additional Information About Your Visit        MyChart Information     [employer] gives you  "secure access to your electronic health record. If you see a primary care provider, you can also send messages to your care team and make appointments. If you have questions, please call your primary care clinic.  If you do not have a primary care provider, please call [phone] and they will assist you.        Care EveryWhere ID     This is your Care EveryWhere ID. This could be used by other organizations to access your [facility] medical records  [id number]        Your Vitals Were     Pulse Temperature Height Pulse Oximetry BMI (Body Mass Index)       76 97.4  F (36.3  C) (Oral) 5' 7\" (1.702 m) 99% 24.59 kg/m2        Blood Pressure from Last 3 Encounters:   03/30/18 (!) 145/93   01/04/18 97/60   01/02/18 125/89    Weight from Last 3 Encounters:   03/30/18 157 lb (71.2 kg)   01/04/18 161 lb (73 kg)   01/02/18 162 lb 9.6 oz (73.8 kg)              We Performed the Following     B12 - 1000 MCG          Today's Medication Changes          These changes are accurate as of 3/30/18 10:25 AM.  If you have any questions, ask your nurse or doctor.               Start taking these medicines.        Dose/Directions    pregabalin 50 MG capsule   Commonly known as:  LYRICA   Used for:  Spinal stenosis, unspecified spinal region   Started by:  Judson Mcadams MD        One capsule once daily at bedtime, then after one week increase to one capsule twice daily, then after one week you may increase to one capsule three times daily   Quantity:  90 capsule   Refills:  2         These medicines have changed or have updated prescriptions.        Dose/Directions    * cyanocobalamin 1000 MCG/ML injection   Commonly known as:  VITAMIN B12   This may have changed:  Another medication with the same name was added. Make sure you understand how and when to take each.   Used for:  Vitamin B12 deficiency (non anemic)   Changed by:  Judson Mcadams MD        Dose:  1 mL   Inject 1 mL (1,000 mcg) into the muscle every 30 days   Quantity:  1 " mL   Refills:  11       * cyanocobalamin 1000 MCG/ML injection   Commonly known as:  VITAMIN B12   This may have changed:  You were already taking a medication with the same name, and this prescription was added. Make sure you understand how and when to take each.   Used for:  Vitamin B12 deficiency (non anemic)   Changed by:  Judson Mcadams MD        Dose:  1 mL   Inject 1 mL (1,000 mcg) into the muscle every 30 days   Quantity:  1 mL   Refills:  11       diphenoxylate-atropine 2.5-0.025 MG per tablet   Commonly known as:  LOMOTIL   This may have changed:  See the new instructions.   Used for:  Crohn's disease of small and large intestines with complication (H)   Changed by:  Judson Mcadams MD        Dose:  1 tablet   Take 1 tablet by mouth daily   Quantity:  90 tablet   Refills:  3       * Notice:  This list has 2 medication(s) that are the same as other medications prescribed for you. Read the directions carefully, and ask your doctor or other care provider to review them with you.      Stop taking these medicines if you haven't already. Please contact your care team if you have questions.     traMADol 50 MG tablet   Commonly known as:  ULTRAM   Stopped by:  Judson Mcadams MD                Where to get your medicines      Some of these will need a paper prescription and others can be bought over the counter.  Ask your nurse if you have questions.     Bring a paper prescription for each of these medications     diphenoxylate-atropine 2.5-0.025 MG per tablet    opium tincture 10 MG/ML (1%) liquid    pregabalin 50 MG capsule       You don't need a prescription for these medications     cyanocobalamin 1000 MCG/ML injection                Primary Care Provider Office Phone # Fax #    Judson Mcadams [phone]       [facility] [address] [facility] [address]  [place] [address]        Equal Access to Services     ABDULLAHI SANCHEZ AH: Buddy Calero, laci reed, breanna john  ky holmanmichael saldivaraan ah. So [facility] [phone].    ATENCIÓN: Si hermesla noelle, tiene a ambrose disposición servicios gratuitos de asistencia lingüística. Silverio al [phone].    We comply with applicable federal civil rights laws and [place] laws. We do not discriminate on the basis of race, color, national origin, age, disability, sex, sexual orientation, or gender identity.            Thank you!     Thank you for choosing [facility]  for your care. Our goal is always to provide you with excellent care. Hearing back from our patients is one way we can continue to improve our services. Please take a few minutes to complete the written survey that you may receive in the mail after your visit with us. Thank you!             Your Updated Medication List - Protect others around you: Learn how to safely use, store and throw away your medicines at www.disposemymeds.org.          This list is accurate as of 3/30/18 10:25 AM.  Always use your most recent med list.                   Brand Name Dispense Instructions for use Diagnosis    * cyanocobalamin 1000 MCG/ML injection    VITAMIN B12    1 mL    Inject 1 mL (1,000 mcg) into the muscle every 30 days    Vitamin B12 deficiency (non anemic)       * cyanocobalamin 1000 MCG/ML injection    VITAMIN B12    1 mL    Inject 1 mL (1,000 mcg) into the muscle every 30 days    Vitamin B12 deficiency (non anemic)       diphenoxylate-atropine 2.5-0.025 MG per tablet    LOMOTIL    90 tablet    Take 1 tablet by mouth daily    Crohn's disease of small and large intestines with complication (H)       ELIQUIS 5 MG tablet   Generic drug:  apixaban ANTICOAGULANT     180 tablet    Take 1 tablet (5 mg) by mouth 2 times daily    Chronic atrial fibrillation (H)       FLOMAX 0.4 MG capsule   Generic drug:  tamsulosin     90 capsule    Take 1 capsule (0.4 mg) by mouth daily    Benign non-nodular prostatic hyperplasia with lower urinary tract symptoms        metoprolol tartrate 50 MG tablet    LOPRESSOR    180 tablet    Take 1 tablet (50 mg) by mouth 2 times daily    Benign essential hypertension       opium tincture 10 MG/ML (1%) liquid     118 mL    4 drops every 4 hours as needed for diarrhea    Crohn's disease of small and large intestines with complication (H)       pregabalin 50 MG capsule    LYRICA    90 capsule    One capsule once daily at bedtime, then after one week increase to one capsule twice daily, then after one week you may increase to one capsule three times daily    Spinal stenosis, unspecified spinal region       * Notice:  This list has 2 medication(s) that are the same as other medications prescribed for you. Read the directions carefully, and ask your doctor or other care provider to review them with you.

## 2018-03-30 NOTE — PROGRESS NOTES
SUBJECTIVE:   Dawson Jones is a 87 year old male who presents to clinic today for the following health issues:      Medication Followup of Tramadol    Taking Medication as prescribed: yes    Side Effects:  None    Medication Helping Symptoms:  NO   87-year-old man with Crohn disease, B12 deficiency, chronic spinal stenosis, osteoarthritis, atrial fibrillation.  He is here with his wife.  He is here for follow-up on tramadol that was prescribed 3 months ago to help with chronic back pain that radiates to his bilateral legs.  The pain remains severe.  The pain remains limiting him in his ability to exercise [place] mall.  Unfortunately, the tramadol did not address his pain in any significant way.  He did not have any side effects from the tramadol.  His physical therapist suggested he see an orthopedic surgeon regarding his right hip based on her evaluation.  The patient saw Dr. Jacques House who noted osteoarthritis of the right hip joint and referred Mr. Moseley for a cortisone injection.  Fortunately, the cortisone injection has improved his pain.  However, he continues to have severe pain in his lower back that radiates to his anterior bilateral legs with extensive walking.  He does not have any new leg numbness or weakness.  His diarrhea remains well controlled when he takes Lomotil and occasional tincture of opium.  His wife is interested in learning more about any additional medication that can help his chronic pain.  Of note, he failed a trial of duloxetine due to urinary retention associated with that medication.  Also, he tried gabapentin in the past but found it to cause weakness in his bilateral legs and so he stopped it.        Problem list and histories reviewed & adjusted, as indicated.  Additional history: as documented    Patient Active Problem List   Diagnosis     Atrial fibrillation (H)     Crohn's disease of small and large intestines with complication (H)     Spinal stenosis of  lumbosacral region     Vitamin B12 deficiency (non anemic)     Chronic pain syndrome     Past Surgical History:   Procedure Laterality Date     APPENDECTOMY         Social History   Substance Use Topics     Smoking status: Former Smoker     Smokeless tobacco: Never Used      Comment: 40 years ago     Alcohol use 4.2 oz/week     7 Standard drinks or equivalent per week      Comment: 1 wine an evening     Family History   Problem Relation Age of Onset     Family History Negative Mother      Cardiac Sudden Death Father      Family History Negative Brother      Other - See Comments Sister      colon issues     Family History Negative Son      Family History Negative Brother      Family History Negative Son      HEART DISEASE No family hx of          Current Outpatient Prescriptions   Medication Sig Dispense Refill     cyanocobalamin (VITAMIN B12) 1000 MCG/ML injection Inject 1 mL (1,000 mcg) into the muscle every 30 days 1 mL 11     pregabalin (LYRICA) 50 MG capsule One capsule once daily at bedtime, then after one week increase to one capsule twice daily, then after one week you may increase to one capsule three times daily 90 capsule 2     diphenoxylate-atropine (LOMOTIL) 2.5-0.025 MG per tablet Take 1 tablet by mouth daily 90 tablet 3     opium tincture 10 MG/ML (1%) liquid 4 drops every 4 hours as needed for diarrhea 118 mL 0     ELIQUIS 5 MG tablet Take 1 tablet (5 mg) by mouth 2 times daily 180 tablet 3     cyanocobalamin (VITAMIN B12) 1000 MCG/ML injection Inject 1 mL (1,000 mcg) into the muscle every 30 days 1 mL 11     metoprolol (LOPRESSOR) 50 MG tablet Take 1 tablet (50 mg) by mouth 2 times daily 180 tablet 3     tamsulosin (FLOMAX) 0.4 MG capsule Take 1 capsule (0.4 mg) by mouth daily 90 capsule 3     [DISCONTINUED] opium tincture 10 MG/ML (1%) liquid 4 drops every 4 hours as needed for diarrhea 118 mL 0     No Known Allergies    Reviewed and updated as needed this visit by clinical staff       Reviewed and  "updated as needed this visit by Provider         ROS:      OBJECTIVE:     BP (!) 145/93 (BP Location: Right arm, Cuff Size: Adult Regular)  Pulse 76  Temp 97.4  F (36.3  C) (Oral)  Ht 5' 7\" (1.702 m)  Wt 157 lb (71.2 kg)  SpO2 99%  BMI 24.59 kg/m2  Body mass index is 24.59 kg/(m^2).  General: This is a comfortable appearing elderly man in no acute distress.  Mental status: Alert and oriented to person place and time, normal mood and affect, well-groomed.  Neurological: Symmetric strength, he uses a cane.        ASSESSMENT/PLAN:         ICD-10-CM    1. Spinal stenosis, unspecified spinal region M48.00 pregabalin (LYRICA) 50 MG capsule   2. Crohn's disease of small and large intestines with complication (H) K50.819 diphenoxylate-atropine (LOMOTIL) 2.5-0.025 MG per tablet     opium tincture 10 MG/ML (1%) liquid   3. Vitamin B12 deficiency (non anemic) E53.8 B12 - 1000 MCG     cyanocobalamin (VITAMIN B12) 1000 MCG/ML injection     I spent about 27 minutes with Mr. Moseley and his wife in face-to-face contact more than 50% was spent counseling.  We brainstormed methods for controlling chronic pain.  Since he had no significant clinical benefit from the tramadol, we decided to stop that medication because the risks of that medication seem to outweigh the benefit.  We reviewed using Tylenol to manage pain.  We also reviewed my suggestion for using a walker to help with extended walking to take pressure off of the nerve roots in the lumbar spine.  The patient remains reluctant to do so.  We discussed the use of Lyrica rather than gabapentin to see if this medicine could be tolerated better.  We discussed the potential risks and side effects of Lyrica in great detail.  The wife is interested in trying Lyrica to help control pain if the cost is not prohibitive.  With respect to controlling chronic diarrhea from Crohn's disease, will continue with this patient's current Lomotil and opium tincture.  We decided to schedule " follow-up appointment in about 3 months time to monitor therapy.  Return sooner if needed.        Judson Mcadams MD  [facility]

## 2018-05-01 ENCOUNTER — ALLIED HEALTH/NURSE VISIT (OUTPATIENT)
Dept: NURSING | Facility: CLINIC | Age: 83
End: 2018-05-01
Payer: COMMERCIAL

## 2018-05-01 DIAGNOSIS — E53.8 VITAMIN B12 DEFICIENCY (NON ANEMIC): Primary | ICD-10-CM

## 2018-05-01 PROCEDURE — 99207 ZZC NO CHARGE NURSE ONLY: CPT

## 2018-05-01 PROCEDURE — 96372 THER/PROPH/DIAG INJ SC/IM: CPT

## 2018-05-01 NOTE — NURSING NOTE
-The following medication was given:     MEDICATION: Vitamin B12  1000 mcg  ROUTE: IM  SITE: Deltoid - Left  DOSE: 1 mL  LOT #: [id number]  :  [email or web address]  EXPIRATION DATE:  10/2019  NDC#: 2712-4712-95

## 2018-05-01 NOTE — MR AVS SNAPSHOT
After Visit Summary   5/1/2018    Dawson Jones    MRN: [id number]           Patient Information     Date Of Birth          5/5/1930        Visit Information        Provider Department      5/1/2018 9:30 AM CS NURSE [facility]        Today's Diagnoses     Vitamin B12 deficiency (non anemic)    -  1       Follow-ups after your visit        Follow-up notes from your care team     Return in about 4 weeks (around 5/29/2018) for Nurse visit.      Your next 10 appointments already scheduled     Jun 05, 2018  9:45 AM CDT   Nurse Only with CS NURSE   [facility]    [address]  [facility] [address]   [phone]            Jul 03, 2018  9:30 AM CDT   Nurse Only with CS NURSE   [facility]    [address]  [facility] [address]   [phone]            Jul 13, 2018 10:00 AM CDT   Office Visit with Judson Mcadams MD   [facility]    [address] [facility] [address]   [phone]           Bring a current list of meds and any records pertaining to this visit. For Physicals, please bring immunization records and any forms needing to be filled out. Please arrive 10 minutes early to complete paperwork.              Who to contact     If you have questions or need follow up information about today's clinic visit or your schedule please contact [facility] directly at [phone].  Normal or non-critical lab and imaging results will be communicated to you by MyChart, letter or phone within 4 business days after the clinic has received the results. If you do not hear from us within 7 days, please contact the clinic through [employer] or phone. If you have a critical or abnormal lab result, we will notify you by phone as soon as possible.  Submit refill requests through [employer] or call your pharmacy and they will forward the refill request to us. Please allow 3 business  days for your refill to be completed.          Additional Information About Your Visit        MyChart Information     Dives[employer] gives you secure access to your electronic health record. If you see a primary care provider, you can also send messages to your care team and make appointments. If you have questions, please call your primary care clinic.  If you do not have a primary care provider, please call [phone] and they will assist you.        Care EveryWhere ID     This is your Care EveryWhere ID. This could be used by other organizations to access your [place] medical records  [id number]         Blood Pressure from Last 3 Encounters:   03/30/18 (!) 145/93   01/04/18 97/60   01/02/18 125/89    Weight from Last 3 Encounters:   03/30/18 157 lb (71.2 kg)   01/04/18 161 lb (73 kg)   01/02/18 162 lb 9.6 oz (73.8 kg)              We Performed the Following     B12 - 1000 MCG     INJECTION INTRAMUSCULAR OR SUB-Q        Primary Care Provider Office Phone # Fax #    Judson Mcadams [phone]       [facility] [address]  [place] [address]        Equal Access to Services     AKHIL SANCHEZ : Hadii aad ku hadasho Soomaali, waaxda luqadaha, qaybta kaalmada adeegyada, waxay valerie haysangeethan guevara cardona . So [facility] [phone].    ATENCIÓN: Si habla español, tiene a ambrose disposición servicios gratuitos de asistencia lingüística. Llame al [phone].    We comply with applicable federal civil rights laws and [place] laws. We do not discriminate on the basis of race, color, national origin, age, disability, sex, sexual orientation, or gender identity.            Thank you!     Thank you for choosing [facility]  for your care. Our goal is always to provide you with excellent care. Hearing back from our patients is one way we can continue to improve our services. Please take a few minutes to complete the written survey that you may receive in the mail after your  visit with us. Thank you!             Your Updated Medication List - Protect others around you: Learn how to safely use, store and throw away your medicines at www.disposemymeds.org.          This list is accurate as of 5/1/18  2:16 PM.  Always use your most recent med list.                   Brand Name Dispense Instructions for use Diagnosis    cyanocobalamin 1000 MCG/ML injection    VITAMIN B12    1 mL    Inject 1 mL (1,000 mcg) into the muscle every 30 days    Vitamin B12 deficiency (non anemic)       diphenoxylate-atropine 2.5-0.025 MG per tablet    LOMOTIL    90 tablet    Take 1 tablet by mouth daily    Crohn's disease of small and large intestines with complication (H)       ELIQUIS 5 MG tablet   Generic drug:  apixaban ANTICOAGULANT     180 tablet    Take 1 tablet (5 mg) by mouth 2 times daily    Chronic atrial fibrillation (H)       FLOMAX 0.4 MG capsule   Generic drug:  tamsulosin     90 capsule    Take 1 capsule (0.4 mg) by mouth daily    Benign non-nodular prostatic hyperplasia with lower urinary tract symptoms       metoprolol tartrate 50 MG tablet    LOPRESSOR    180 tablet    Take 1 tablet (50 mg) by mouth 2 times daily    Benign essential hypertension       opium tincture 10 MG/ML (1%) liquid     118 mL    4 drops every 4 hours as needed for diarrhea    Crohn's disease of small and large intestines with complication (H)       pregabalin 50 MG capsule    LYRICA    90 capsule    One capsule once daily at bedtime, then after one week increase to one capsule twice daily, then after one week you may increase to one capsule three times daily    Spinal stenosis, unspecified spinal region

## 2018-06-05 ENCOUNTER — ALLIED HEALTH/NURSE VISIT (OUTPATIENT)
Dept: NURSING | Facility: CLINIC | Age: 83
End: 2018-06-05
Payer: COMMERCIAL

## 2018-06-05 ENCOUNTER — TRANSFERRED RECORDS (OUTPATIENT)
Dept: HEALTH INFORMATION MANAGEMENT | Facility: CLINIC | Age: 83
End: 2018-06-05

## 2018-06-05 DIAGNOSIS — E53.8 VITAMIN B12 DEFICIENCY (NON ANEMIC): Primary | ICD-10-CM

## 2018-06-05 PROCEDURE — 96372 THER/PROPH/DIAG INJ SC/IM: CPT

## 2018-06-05 PROCEDURE — 99207 ZZC NO CHARGE NURSE ONLY: CPT

## 2018-06-19 DIAGNOSIS — I48.20 CHRONIC ATRIAL FIBRILLATION (H): ICD-10-CM

## 2018-06-19 NOTE — TELEPHONE ENCOUNTER
"Last Written Prescription Date:  6/09/17  Last Fill Quantity: 180 tablet,  # refills: 3   Last office visit: 3/30/2018 with prescribing provider:  Pema   Future Office Visit:   Next 5 appointments (look out 90 days)     Jul 09, 2018  9:45 AM CDT   Nurse Only with CS NURSE   [facility]    [address]  [place] [address]   [phone]            Jul 13, 2018 10:00 AM CDT   Office Visit with Judson Mcadams MD   [facility] ([facility])    [address] [facility] [address]   [phone]            Aug 06, 2018  9:30 AM CDT   Nurse Only with CS NURSE   [facility] ([facility])    [address]  [place] [address]   [phone]                 Requested Prescriptions   Pending Prescriptions Disp Refills     ELIQUIS 5 MG tablet [Pharmacy Med Name: ELIQUIS 5MG TABLETS] 180 tablet 0     Sig: TAKE 1 TABLET(5 MG) BY MOUTH TWICE DAILY    Platelet Inhibitors Failed    6/19/2018 12:54 PM       Failed - Normal ALT on file in past 12 months    Recent Labs   Lab Test  03/27/17   1136   ALT  25            Failed - Normal HGB on file in past 12 months    Recent Labs   Lab Test  03/27/17   1136   HGB  13.7              Failed - Normal AST on file in past 12 months    Recent Labs   Lab Test  03/27/17   1136   AST  23            Failed - Normal Platelets on file in past 12 months    Recent Labs   Lab Test  03/27/17   1136   PLT  143*              Failed - Normal serum creatinine on file in past 12 months    Recent Labs   Lab Test  03/27/17   1136   CR  0.97            Passed - Recent (12 mo) or future (30 days) visit within the authorizing provider's specialty    Patient had office visit in the last 12 months or has a visit in the next 30 days with authorizing provider or within the authorizing provider's specialty.  See \"Patient Info\" tab in inbasket, or \"Choose Columns\" in Meds & Orders section of the refill encounter.           " Passed - Patient is age 18 or older

## 2018-06-20 RX ORDER — APIXABAN 5 MG/1
TABLET, FILM COATED ORAL
Qty: 180 TABLET | Refills: 0 | Status: ON HOLD | OUTPATIENT
Start: 2018-06-20 | End: 2018-10-02

## 2018-06-20 NOTE — TELEPHONE ENCOUNTER
Routing refill request to provider for review/approval because:  Labs not current  Pop up notification with pended med, missing creatinine for dose checking     Christina BARRIOS RN

## 2018-06-25 DIAGNOSIS — M48.062 SPINAL STENOSIS, LUMBAR REGION, WITH NEUROGENIC CLAUDICATION: Primary | ICD-10-CM

## 2018-06-26 NOTE — TELEPHONE ENCOUNTER
Requested Prescriptions   Pending Prescriptions Disp Refills     traMADol (ULTRAM) 50 MG tablet [Pharmacy Med Name: TRAMADOL 50MG TABLETS] 60 tablet 0     Sig: TAKE 1 TABLET BY MOUTH TWICE DAILY AS NEEDED FOR PAIN. MAXIMUM OF 2 TABLETS PER DAY.    There is no refill protocol information for this order        traMADol (ULTRAM) 50 MG tablet       Last Written Prescription Date:  3/07/18  Last Fill Quantity: 60 tablet,   # refills: 0  Last Office Visit: 3/30/18 (Pema)  Future Office visit:    Next 5 appointments (look out 90 days)     Jul 09, 2018  9:45 AM CDT   Nurse Only with CS NURSE   [facility]    [address] [facility] [address]   [phone]            Jul 13, 2018 10:00 AM CDT   Office Visit with Judson Mcadams MD   [facility]    [address] [facility] [address]   [phone]            Aug 06, 2018  9:30 AM CDT   Nurse Only with CS NURSE   [facility] ([facility])    [address] [facility] [address]   [phone]                 *Discontinued 3/30/18    Routing refill request to provider for review/approval because:  Drug not on the [facility], [facility] or [facility] refill protocol or controlled substance  Drug not active on patient's medication list

## 2018-06-27 RX ORDER — TRAMADOL HYDROCHLORIDE 50 MG/1
TABLET ORAL
Qty: 60 TABLET | Refills: 0 | Status: SHIPPED | OUTPATIENT
Start: 2018-06-27 | End: 2018-07-13

## 2018-06-27 NOTE — TELEPHONE ENCOUNTER
Tramadol was d/c 3/30/18 as no clinical improvement with this medication for patient.   Requesting rx for 30 days tramadol.     Pt has appt July 13th with PCP  Called pt/wife, lyrica was not helping at all so they stopped after 1 month.  Pain had slightly improved, but is worse again.  They will see Spine doc on 7/10/18.  Pt did try a few tramadol he had left over, and that did see to take edge off pain.    Currently taking Tylenol 500mg. 2 tabs bid.  Advised could do 3 times daily, but pt feeling like tylenol does not help much.    Plan made with pt wife if PCP approves tramadol:  Tylenol 1000mg bid, tramadol 1-2 times daily prn. 30 day supply, must keep follow up 7/13/18 to discuss further pain management.    Please advise if appropriate.  Estela Porter RN

## 2018-07-06 DIAGNOSIS — I10 BENIGN ESSENTIAL HYPERTENSION: ICD-10-CM

## 2018-07-06 NOTE — TELEPHONE ENCOUNTER
"Requested Prescriptions   Pending Prescriptions Disp Refills     metoprolol tartrate (LOPRESSOR) 50 MG tablet [Pharmacy Med Name: METOPROLOL TARTRATE 50MG TABLETS]  Last Written Prescription Date: 6/9/2017  Last Fill Quantity: 180 tablet,  # refills: 3   Last Office Visit: 3/30/2018   Future Office Visit:    Next 5 appointments (look out 90 days)     Jul 09, 2018  9:45 AM CDT   Nurse Only with CS NURSE   [facility]    [address]  [facility] [address]   [phone]            Jul 13, 2018 10:00 AM CDT   Office Visit with Judson Mcadams MD   [facility]    [address] [facility]  [place] [address]   [phone]            Aug 06, 2018  9:30 AM CDT   Nurse Only with CS NURSE   [facility]    [address]  [facility] [address]   [phone]                  180 tablet 0     Sig: TAKE 1 TABLET(50 MG) BY MOUTH TWICE DAILY    Beta-Blockers Protocol Failed    7/6/2018  7:58 AM       Failed - Blood pressure under 140/90 in past 12 months    BP Readings from Last 3 Encounters:   03/30/18 (!) 145/93   01/04/18 97/60   01/02/18 125/89                Passed - Patient is age 6 or older       Passed - Recent (12 mo) or future (30 days) visit within the authorizing provider's specialty    Patient had office visit in the last 12 months or has a visit in the next 30 days with authorizing provider or within the authorizing provider's specialty.  See \"Patient Info\" tab in inbasket, or \"Choose Columns\" in Meds & Orders section of the refill encounter.              "

## 2018-07-06 NOTE — TELEPHONE ENCOUNTER
"Requested Prescriptions   Pending Prescriptions Disp Refills     metoprolol tartrate (LOPRESSOR) 50 MG tablet [Pharmacy Med Name: METOPROLOL TARTRATE 50MG TABLETS] 180 tablet 0     Sig: TAKE 1 TABLET(50 MG) BY MOUTH TWICE DAILY    Beta-Blockers Protocol Failed    7/6/2018  7:58 AM       Failed - Blood pressure under 140/90 in past 12 months    BP Readings from Last 3 Encounters:   03/30/18 (!) 145/93   01/04/18 97/60   01/02/18 125/89                Passed - Patient is age 6 or older       Passed - Recent (12 mo) or future (30 days) visit within the authorizing provider's specialty    Patient had office visit in the last 12 months or has a visit in the next 30 days with authorizing provider or within the authorizing provider's specialty.  See \"Patient Info\" tab in inbasket, or \"Choose Columns\" in Meds & Orders section of the refill encounter.            Last Written Prescription Date:  06/09/17  Last Fill Quantity: 180,  # refills: 3   Last office visit: 3/30/2018 with prescribing provider:     Future Office Visit:   Next 5 appointments (look out 90 days)     Jul 09, 2018  9:45 AM CDT   Nurse Only with CS NURSE   [facility]    [address]  [place] [address]   [phone]            Jul 13, 2018 10:00 AM CDT   Office Visit with Judson Mcadams MD   [facility] ([facility])    [address] [place] [address]   [phone]            Aug 06, 2018  9:30 AM CDT   Nurse Only with CS NURSE   [facility] ([facility])    [address] [address]   [phone]                 Maria Del Carmen Hong MA    "

## 2018-07-07 RX ORDER — METOPROLOL TARTRATE 50 MG
TABLET ORAL
Qty: 180 TABLET | Refills: 3 | Status: SHIPPED | OUTPATIENT
Start: 2018-07-07 | End: 2018-10-31

## 2018-07-07 NOTE — TELEPHONE ENCOUNTER
Routing refill request to provider for review/approval because:  BP out of range.  Estela Porter RN

## 2018-07-09 ENCOUNTER — ALLIED HEALTH/NURSE VISIT (OUTPATIENT)
Dept: NURSING | Facility: CLINIC | Age: 83
End: 2018-07-09
Payer: COMMERCIAL

## 2018-07-09 DIAGNOSIS — E53.8 VITAMIN B12 DEFICIENCY (NON ANEMIC): Primary | ICD-10-CM

## 2018-07-09 PROCEDURE — 96372 THER/PROPH/DIAG INJ SC/IM: CPT

## 2018-07-09 PROCEDURE — 99207 ZZC NO CHARGE NURSE ONLY: CPT

## 2018-07-09 NOTE — PROGRESS NOTES
Due to injection administration, patient instructed to remain in clinic for 15 minutes  afterwards, and to report any adverse reaction to me immediately.  Maria Del Carmen Hong MA

## 2018-07-09 NOTE — MR AVS SNAPSHOT
After Visit Summary   7/9/2018    Dawson Jones    MRN: [id number]           Patient Information     Date Of Birth          5/5/1930        Visit Information        Provider Department      7/9/2018 9:45 AM CS NURSE [facility]        Today's Diagnoses     Vitamin B12 deficiency (non anemic)    -  1       Follow-ups after your visit        Your next 10 appointments already scheduled     Jul 09, 2018  9:45 AM CDT   Nurse Only with CS NURSE   [facility] ([facility]    [address] [place] [address]   [phone]            Jul 13, 2018 10:00 AM CDT   Office Visit with Judson Mcadams MD   [facility] ([facility])    [address] [place] [address] [phone]           Bring a current list of meds and any records pertaining to this visit. For Physicals, please bring immunization records and any forms needing to be filled out. Please arrive 10 minutes early to complete paperwork.            Aug 06, 2018  9:30 AM CDT   Nurse Only with CS NURSE   [facility] ([facility])    [address] [place] [address] [phone]            Sep 04, 2018  9:30 AM CDT   Nurse Only with CS NURSE   [facility] [place] ([facility])    [address]  [place] [address] [phone]              Who to contact     If you have questions or need follow up information about today's clinic visit or your schedule please contact [facility] directly at [phone].  Normal or non-critical lab and imaging results will be communicated to you by MyChart, letter or phone within 4 business days after the clinic has received the results. If you do not hear from us within 7 days, please contact the clinic through MyChart or phone. If you have a critical or abnormal lab result, we will notify you by phone as soon as possible.  Submit refill requests through [employer] or call your pharmacy and they  will forward the refill request to us. Please allow 3 business days for your refill to be completed.          Additional Information About Your Visit        Mingleboxhart Information     [employer] gives you secure access to your electronic health record. If you see a primary care provider, you can also send messages to your care team and make appointments. If you have questions, please call your primary care clinic.  If you do not have a primary care provider, please call [phone] and they will assist you.        Care EveryWhere ID     This is your Care EveryWhere ID. This could be used by other organizations to access your [place] medical records  [id number]         Blood Pressure from Last 3 Encounters:   03/30/18 (!) 145/93   01/04/18 97/60   01/02/18 125/89    Weight from Last 3 Encounters:   03/30/18 157 lb (71.2 kg)   01/04/18 161 lb (73 kg)   01/02/18 162 lb 9.6 oz (73.8 kg)              We Performed the Following     B12 - [address] [facility]        Primary Care Provider Office Phone # Fax #    Judson Mcadams [phone] [address]  [place] [address]        Equal Access to Services     [facility] Hospital[facility] : Hadii aad ku hadasho Somelanieali, waaxda luqadaha, qaybta kaalmada adeegyada, ky cardona . So [facility] [phone].    ATENCIÓN: Si habla español, tiene a ambrose disposición servicios gratuitos de asistencia lingüística. [facility] [phone].    We comply with applicable federal civil rights laws and [place] laws. We do not discriminate on the basis of race, color, national origin, age, disability, sex, sexual orientation, or gender identity.            Thank you!     Thank you for choosing [facility]  for your care. Our goal is always to provide you with excellent care. Hearing back from our patients is one way we can continue to improve our services. Please take a few minutes to complete the written survey that you may receive in the mail after your  visit with us. Thank you!             Your Updated Medication List - Protect others around you: Learn how to safely use, store and throw away your medicines at www.disposemymeds.org.          This list is accurate as of 7/9/18  9:41 AM.  Always use your most recent med list.                   Brand Name Dispense Instructions for use Diagnosis    cyanocobalamin 1000 MCG/ML injection    VITAMIN B12    1 mL    Inject 1 mL (1,000 mcg) into the muscle every 30 days    Vitamin B12 deficiency (non anemic)       diphenoxylate-atropine 2.5-0.025 MG per tablet    LOMOTIL    90 tablet    Take 1 tablet by mouth daily    Crohn's disease of small and large intestines with complication (H)       ELIQUIS 5 MG tablet   Generic drug:  apixaban ANTICOAGULANT     180 tablet    TAKE 1 TABLET(5 MG) BY MOUTH TWICE DAILY    Chronic atrial fibrillation (H)       FLOMAX 0.4 MG capsule   Generic drug:  tamsulosin     90 capsule    Take 1 capsule (0.4 mg) by mouth daily    Benign non-nodular prostatic hyperplasia with lower urinary tract symptoms       metoprolol tartrate 50 MG tablet    LOPRESSOR    180 tablet    TAKE 1 TABLET(50 MG) BY MOUTH TWICE DAILY    Benign essential hypertension       opium tincture 10 MG/ML (1%) liquid     118 mL    4 drops every 4 hours as needed for diarrhea    Crohn's disease of small and large intestines with complication (H)       pregabalin 50 MG capsule    LYRICA    90 capsule    One capsule once daily at bedtime, then after one week increase to one capsule twice daily, then after one week you may increase to one capsule three times daily    Spinal stenosis, unspecified spinal region       traMADol 50 MG tablet    ULTRAM    60 tablet    TAKE 1 TABLET BY MOUTH TWICE DAILY AS NEEDED FOR PAIN. MAXIMUM OF 2 TABLETS PER DAY.    Spinal stenosis, lumbar region, with neurogenic claudication

## 2018-07-13 ENCOUNTER — OFFICE VISIT (OUTPATIENT)
Dept: FAMILY MEDICINE | Facility: CLINIC | Age: 83
End: 2018-07-13
Payer: COMMERCIAL

## 2018-07-13 VITALS
OXYGEN SATURATION: 97 % | HEART RATE: 85 BPM | SYSTOLIC BLOOD PRESSURE: 117 MMHG | HEIGHT: 67 IN | DIASTOLIC BLOOD PRESSURE: 80 MMHG | WEIGHT: 154.1 LBS | BODY MASS INDEX: 24.19 KG/M2 | TEMPERATURE: 97.5 F

## 2018-07-13 DIAGNOSIS — I42.9 CARDIOMYOPATHY, UNSPECIFIED TYPE (H): Primary | ICD-10-CM

## 2018-07-13 DIAGNOSIS — I48.20 CHRONIC ATRIAL FIBRILLATION (H): ICD-10-CM

## 2018-07-13 DIAGNOSIS — M48.062 SPINAL STENOSIS, LUMBAR REGION, WITH NEUROGENIC CLAUDICATION: ICD-10-CM

## 2018-07-13 DIAGNOSIS — G89.4 CHRONIC PAIN SYNDROME: ICD-10-CM

## 2018-07-13 DIAGNOSIS — M79.651 PAIN OF RIGHT THIGH: ICD-10-CM

## 2018-07-13 DIAGNOSIS — I73.9 CLAUDICATION (H): ICD-10-CM

## 2018-07-13 DIAGNOSIS — M16.11 PRIMARY OSTEOARTHRITIS OF RIGHT HIP: ICD-10-CM

## 2018-07-13 DIAGNOSIS — M48.07 SPINAL STENOSIS OF LUMBOSACRAL REGION: ICD-10-CM

## 2018-07-13 DIAGNOSIS — L97.511 SKIN ULCER OF TOE OF RIGHT FOOT, LIMITED TO BREAKDOWN OF SKIN (H): ICD-10-CM

## 2018-07-13 PROCEDURE — 99214 OFFICE O/P EST MOD 30 MIN: CPT | Performed by: INTERNAL MEDICINE

## 2018-07-13 RX ORDER — TRAMADOL HYDROCHLORIDE 50 MG/1
50-100 TABLET ORAL EVERY 12 HOURS PRN
Qty: 120 TABLET | Refills: 0 | Status: SHIPPED | OUTPATIENT
Start: 2018-07-13 | End: 2018-08-19

## 2018-07-13 NOTE — MR AVS SNAPSHOT
After Visit Summary   7/13/2018    Dawson Jones    MRN: [id number]           Patient Information     Date Of Birth          5/5/1930        Visit Information        Provider Department      7/13/2018 10:00 AM Judson Mcadams MD [facility]        Today's Diagnoses     Cardiomyopathy, unspecified type (H)    -  1    Chronic atrial fibrillation (H)        Claudication (H)        Skin ulcer of toe of right foot, limited to breakdown of skin (H)        Pain of right thigh        Spinal stenosis of lumbosacral region        Primary osteoarthritis of right hip        Chronic pain syndrome        Spinal stenosis, lumbar region, with neurogenic claudication           Follow-ups after your visit        Additional Services     PODIATRY/FOOT & ANKLE SURGERY REFERRAL       Your provider has referred you to: [facility] [place]: [facility] [place] [phone]   [email or web address]    Please be aware that coverage of these services is subject to the terms and limitations of your health insurance plan.  Call member services at your health plan with any benefit or coverage questions.      Please bring the following to your appointment:  >>   Any x-rays, CTs or MRIs which have been performed.  Contact the facility where they were done to arrange for  prior to your scheduled appointment.    >>   List of current medications   >>   This referral request   >>   Any documents/labs given to you for this referral                  Follow-up notes from your care team     Return in about 3 months (around 10/13/2018) for Routine Visit.      Your next 10 appointments already scheduled     Aug 06, 2018  9:30 AM CDT   Nurse Only with [facility] NURSE   [facility]    [address] [id number] [phone]            Sep 04, 2018  9:30 AM CDT   Nurse Only with CS NURSE   [facility]    [address]  "[id number] [phone]              Future tests that were ordered for you today     Open Future Orders        Priority Expected Expires Ordered    US MIKEL Doppler with Exercise Bilateral Routine  7/13/2019 7/13/2018            Who to contact     If you have questions or need follow up information about today's clinic visit or your schedule please contact [facility] directly at [phone].  Normal or non-critical lab and imaging results will be communicated to you by [employer], letter or phone within 4 business days after the clinic has received the results. If you do not hear from us within 7 days, please contact the clinic through [employer] or phone. If you have a critical or abnormal lab result, we will notify you by phone as soon as possible.  Submit refill requests through [employer] or call your pharmacy and they will forward the refill request to us. Please allow 3 business days for your refill to be completed.          Additional Information About Your Visit        [facility] Information     [employer] gives you secure access to your electronic health record. If you see a primary care provider, you can also send messages to your care team and make appointments. If you have questions, please call your primary care clinic.  If you do not have a primary care provider, please call [phone] and they will assist you.        Care EveryWhere ID     This is your Care EveryWhere ID. This could be used by other organizations to access your [facility] medical records  [id number]        Your Vitals Were     Pulse Temperature Height Pulse Oximetry BMI (Body Mass Index)       85 97.5  F (36.4  C) (Oral) 5' 7\" (1.702 m) 97% 24.14 kg/m2        Blood Pressure from Last 3 Encounters:   07/13/18 117/80   03/30/18 (!) 145/93   01/04/18 97/60    Weight from Last 3 Encounters:   07/13/18 154 lb 1.6 oz (69.9 kg)   03/30/18 157 lb (71.2 kg)   01/04/18 161 lb (73 kg)              We Performed the Following     PODIATRY/FOOT & " ANKLE SURGERY REFERRAL          Today's Medication Changes          These changes are accurate as of 7/13/18 10:39 AM.  If you have any questions, ask your nurse or doctor.               These medicines have changed or have updated prescriptions.        Dose/Directions    traMADol 50 MG tablet   Commonly known as:  ULTRAM   This may have changed:  See the new instructions.   Used for:  Spinal stenosis, lumbar region, with neurogenic claudication   Changed by:  Judson Mcadams MD        Dose:   mg   Take 1-2 tablets ( mg) by mouth every 12 hours as needed for severe pain   Quantity:  120 tablet   Refills:  0            Where to get your medicines      Some of these will need a paper prescription and others can be bought over the counter.  Ask your nurse if you have questions.     Bring a paper prescription for each of these medications     traMADol 50 MG tablet               Information about OPIOIDS     PRESCRIPTION OPIOIDS: WHAT YOU NEED TO KNOW   We gave you an opioid (narcotic) pain medicine. It is important to manage your pain, but opioids are not always the best choice. You should first try all the other options your care team gave you. Take this medicine for as short a time (and as few doses) as possible.     These medicines have risks:    DO NOT drive when on new or higher doses of pain medicine. These medicines can affect your alertness and reaction times, and you could be arrested for driving under the influence (DUI). If you need to use opioids long-term, talk to your care team about driving.    DO NOT operate heave machinery    DO NOT do any other dangerous activities while taking these medicines.     DO NOT drink any alcohol while taking these medicines.      If the opioid prescribed includes acetaminophen, DO NOT take with any other medicines that contain acetaminophen. Read all labels carefully. Look for the word  acetaminophen  or  Tylenol.  Ask your pharmacist if you have questions or  are unsure.    You can get addicted to pain medicines, especially if you have a history of addiction (chemical, alcohol or substance dependence). Talk to your care team about ways to reduce this risk.    Store your pills in a secure place, locked if possible. We will not replace any lost or stolen medicine. If you don t finish your medicine, please throw away (dispose) as directed by your pharmacist. The [place] Pollution [employer] Agency has more information about safe disposal: https://www.pca.[facility].[place].us/living-green/managing-unwanted-medications.     All opioids tend to cause constipation. Drink plenty of water and eat foods that have a lot of fiber, such as fruits, vegetables, prune juice, apple juice and high-fiber cereal. Take a laxative (Miralax, milk of magnesia, Colace, Senna) if you don t move your bowels at least every other day.          Primary Care Provider Office Phone # Fax #    Judson Mcadams [phone] [address] [address]        Equal Access to Services     [facility]: Hadii aad ku hadasho Soomaali, waaxda luqadaha, qaybta kaalmada adeegyamakenzie, waxjose cardona . So [facility] [phone].    ATENCIÓN: Si habla español, tiene a ambrose disposición servicios gratuitos de asistencia lingüística. Llame al [phone].    We comply with applicable federal civil rights laws and [place] laws. We do not discriminate on the basis of race, color, national origin, age, disability, sex, sexual orientation, or gender identity.            Thank you!     Thank you for choosing [facility]  for your care. Our goal is always to provide you with excellent care. Hearing back from our patients is one way we can continue to improve our services. Please take a few minutes to complete the written survey that you may receive in the mail after your visit with us. Thank you!             Your Updated Medication List - Protect others around you:  Learn how to safely use, store and throw away your medicines at www.disposemymeds.org.          This list is accurate as of 7/13/18 10:39 AM.  Always use your most recent med list.                   Brand Name Dispense Instructions for use Diagnosis    cyanocobalamin 1000 MCG/ML injection    VITAMIN B12    1 mL    Inject 1 mL (1,000 mcg) into the muscle every 30 days    Vitamin B12 deficiency (non anemic)       diphenoxylate-atropine 2.5-0.025 MG per tablet    LOMOTIL    90 tablet    Take 1 tablet by mouth daily    Crohn's disease of small and large intestines with complication (H)       ELIQUIS 5 MG tablet   Generic drug:  apixaban ANTICOAGULANT     180 tablet    TAKE 1 TABLET(5 MG) BY MOUTH TWICE DAILY    Chronic atrial fibrillation (H)       FLOMAX 0.4 MG capsule   Generic drug:  tamsulosin     90 capsule    Take 1 capsule (0.4 mg) by mouth daily    Benign non-nodular prostatic hyperplasia with lower urinary tract symptoms       metoprolol tartrate 50 MG tablet    LOPRESSOR    180 tablet    TAKE 1 TABLET(50 MG) BY MOUTH TWICE DAILY    Benign essential hypertension       opium tincture 10 MG/ML (1%) liquid     118 mL    4 drops every 4 hours as needed for diarrhea    Crohn's disease of small and large intestines with complication (H)       traMADol 50 MG tablet    ULTRAM    120 tablet    Take 1-2 tablets ( mg) by mouth every 12 hours as needed for severe pain    Spinal stenosis, lumbar region, with neurogenic claudication

## 2018-07-13 NOTE — PROGRESS NOTES
SUBJECTIVE:   Dawson Jones is a 88 year old male who presents to clinic today for the following health issues:      Recheck    This is a really pleasant 88-year-old man who is accompanied by his wife.  He has been having severe pain in his proximal right leg and inguinal area for several months now.  The pain seems to be worse when he gets out of bed in the morning and seems to improve as he walks more although extended periods of walking seem to worsen the pain eventually.  Unfortunately, the pain has been refractory to many interventions.  He has tried Tylenol, gabapentin, Cymbalta, Lyrica and most recently tramadol to manage the pain none of which are providing him with adequate symptom relief.  He did see an orthopedic surgeon and a hip x-ray demonstrated advanced osteoarthritis changes in the right hip joint.  However, there was some concern that his pain might actually be related to lumbar spine disease and he was referred for an MRI of the lumbar spine of note, he has had 3 MRIs of his lumbar spine in the last 2 years.  He was then referred to a spine surgeon who was more suspicious that his pain was from his hip joint and his spine but referred him for a epidural steroid injection which he is yet to have.  Of note, in 2016, this patient was referred for several epidural steroid injections that did not seem to address his symptoms.  Meanwhile, his wife requests higher doses of tramadol to help manage his pain.  Currently, 50 mg twice daily is not adequate to keep him comfortable.  He is not having side effects from the tramadol.  Finally, his wife informs me of a sore in between the fourth and fifth digits of his right foot that has been present for several weeks.  She has been applying hydrocortisone cream and cotton swabs in between the toes without improvement in symptoms.  The patient describes mild to moderate pain associated with the sore between his toes.  The patient denies new symptoms of  orthopnea, PND, edema, dyspnea on exertion.  He has atrial fibrillation and is without palpitations and takes an oral anticoagulant for stroke prophylaxis.    Problem list and histories reviewed & adjusted, as indicated.  Additional history: as documented    Patient Active Problem List   Diagnosis     Atrial fibrillation (H)     Cardiomyopathy, unspecified type (H)     Crohn's disease of small and large intestines with complication (H)     Spinal stenosis of lumbosacral region     Vitamin B12 deficiency (non anemic)     Chronic pain syndrome     Past Surgical History:   Procedure Laterality Date     APPENDECTOMY         Social History   Substance Use Topics     Smoking status: Former Smoker     Smokeless tobacco: Never Used      Comment: 40 years ago     Alcohol use 4.2 oz/week     7 Standard drinks or equivalent per week      Comment: 1 wine an evening     Family History   Problem Relation Age of Onset     Family History Negative Mother      Cardiac Sudden Death Father      Family History Negative Brother      Other - See Comments Sister      colon issues     Family History Negative Son      Family History Negative Brother      Family History Negative Son      HEART DISEASE No family hx of          Current Outpatient Prescriptions   Medication Sig Dispense Refill     cyanocobalamin (VITAMIN B12) 1000 MCG/ML injection Inject 1 mL (1,000 mcg) into the muscle every 30 days 1 mL 11     diphenoxylate-atropine (LOMOTIL) 2.5-0.025 MG per tablet Take 1 tablet by mouth daily 90 tablet 3     ELIQUIS 5 MG tablet TAKE 1 TABLET(5 MG) BY MOUTH TWICE DAILY 180 tablet 0     metoprolol tartrate (LOPRESSOR) 50 MG tablet TAKE 1 TABLET(50 MG) BY MOUTH TWICE DAILY 180 tablet 3     opium tincture 10 MG/ML (1%) liquid 4 drops every 4 hours as needed for diarrhea 118 mL 0     tamsulosin (FLOMAX) 0.4 MG capsule Take 1 capsule (0.4 mg) by mouth daily 90 capsule 3     traMADol (ULTRAM) 50 MG tablet Take 1-2 tablets ( mg) by mouth every  "12 hours as needed for severe pain 120 tablet 0     No Known Allergies    Reviewed and updated as needed this visit by clinical staff       Reviewed and updated as needed this visit by Provider         ROS:  Constitutional, HEENT, cardiovascular, pulmonary, gi and gu systems are negative, except as otherwise noted.    OBJECTIVE:     /80 (BP Location: Right arm, Cuff Size: Adult Regular)  Pulse 85  Temp 97.5  F (36.4  C) (Oral)  Ht 5' 7\" (1.702 m)  Wt 154 lb 1.6 oz (69.9 kg)  SpO2 97%  BMI 24.14 kg/m2  Body mass index is 24.14 kg/(m^2).  General: This is a reasonably comfortable appearing elderly man in no acute distress who uses a cane.  Hip: There is reproducible pain with external rotation of the right hip joint.  Straight leg raise does not reduce radicular symptoms in the right leg.  There are 2+ bilateral dorsalis pedis pulses.  There seems to be symmetric strength in the lower extremities.  Skin: There is a shallow skin ulceration in the webspace of the fourth and fifth digits of the right foot.  There are no obvious changes consistent with tenia pedis.        ASSESSMENT/PLAN:         ICD-10-CM    1. Cardiomyopathy, unspecified type (H) I42.9    2. Chronic atrial fibrillation (H) I48.2    3. Claudication (H) I73.9 US MIKEL Doppler with Exercise Bilateral   4. Skin ulcer of toe of right foot, limited to breakdown of skin (H) L97.511 PODIATRY/FOOT & ANKLE SURGERY REFERRAL   5. Pain of right thigh M79.651    6. Spinal stenosis of lumbosacral region M48.07    7. Primary osteoarthritis of right hip M16.11    8. Chronic pain syndrome G89.4    9. Spinal stenosis, lumbar region, with neurogenic claudication M48.062 traMADol (ULTRAM) 50 MG tablet     Volume status appears appropriate, rate controlled, anticoagulated for stroke prophylaxis, exclude peripheral vascular disease as a cause of the patient's right leg pain although I am more suspicious for right hip osteoarthritis.  Unfortunately, I think that all " nonsurgical options have been exhausted in this patient and rather than subjecting him to escalating doses of opioid analgesics, I think that a hip replacement is the most prudent intervention to help him with his ongoing severe pain.  He will try the epidural steroid injection, but if he does not get significant relief, I think he should return to Dr. House to discuss a total hip arthroplasty.  With respect to the skin ulcer between his toes, he should see podiatry, until then, stop using hydrocortisone, use a triple antibiotic ointment over the ulcer.  Finally, we discussed the risks of increasing the dose of tramadol.  Patient and his wife would like to have the option of increasing the dose to at least temporarily address his severe limiting pain.  We decided to increase the tramadol from 50 mg twice daily to  mg up to twice daily as needed.      Follow-up: See podiatry as soon as convenient.  Return to either Dr. Reynolds if lumbar epidural injection helps with pain or Dr. House if there is no modulation of pain with the lumbar injection.  Schedule MIKEL when convenient.  Return to see me in no later than 3 months.      Total face to face contact time was greater than 27 minutes of which more than 50% of this time was spent counseling and coordinating care regarding the above topics.    Judson Mcadams MD  [facility]

## 2018-07-23 ENCOUNTER — OFFICE VISIT (OUTPATIENT)
Dept: PODIATRY | Facility: CLINIC | Age: 83
End: 2018-07-23
Payer: COMMERCIAL

## 2018-07-23 ENCOUNTER — RADIANT APPOINTMENT (OUTPATIENT)
Dept: GENERAL RADIOLOGY | Facility: CLINIC | Age: 83
End: 2018-07-23
Attending: PODIATRIST
Payer: COMMERCIAL

## 2018-07-23 VITALS
BODY MASS INDEX: 24.33 KG/M2 | DIASTOLIC BLOOD PRESSURE: 90 MMHG | WEIGHT: 155 LBS | HEIGHT: 67 IN | SYSTOLIC BLOOD PRESSURE: 140 MMHG

## 2018-07-23 DIAGNOSIS — L97.511 SKIN ULCER OF TOE OF RIGHT FOOT, LIMITED TO BREAKDOWN OF SKIN (H): ICD-10-CM

## 2018-07-23 DIAGNOSIS — L97.511 SKIN ULCER OF TOE OF RIGHT FOOT, LIMITED TO BREAKDOWN OF SKIN (H): Primary | ICD-10-CM

## 2018-07-23 PROCEDURE — 73660 X-RAY EXAM OF TOE(S): CPT | Mod: RT

## 2018-07-23 PROCEDURE — 99203 OFFICE O/P NEW LOW 30 MIN: CPT | Performed by: PODIATRIST

## 2018-07-23 NOTE — MR AVS SNAPSHOT
After Visit Summary   7/23/2018    Dawson Jones    MRN: [id number]           Patient Information     Date Of Birth          5/5/1930        Visit Information        Provider Department      7/23/2018 10:15 AM Hudson Conway DPM [facility]        Today's Diagnoses     Skin ulcer of toe of right foot, limited to breakdown of skin (H)    -  1      Care Instructions    Thank you for choosing [facility]!    DR. CONWAY'S CLINIC LOCATIONS     MONDAY - [place] WEDNESDAY - [place]   [address] [place] [address] [place] [address]   [phone] / [phone] / [phone]       THURSDAY - [place] SCHEDULE SURGERY: [phone]   [address] APPOINTMENTS: [phone]   [place] [address] BILLING QUESTIONS: [phone] / [phone]       Cleanse the area daily with a mild soap and blot dry.   Apply iodine and place cotton ball between the toes.     SIGNS OF INFECTION  expanding redness around the wound   yellow or greenish-colored pus or cloudy wound drainage   red streaking spreading from the wound   increased swelling, tenderness, or pain around the wound   fever  *If you notice any of these signs of infection, call us right away!        BODY MASS INDEX (BMI)  Many things can cause foot and ankle problems. Foot structure, activity level, foot mechanics and injuries are common causes of pain.  One very important issue that often goes unmentioned, is body weight.  Extra weight can cause increased stress on muscles, ligaments, bones and tendons.  Sometimes just a few extra pounds is all it takes to put one over her/his threshold. Without reducing that stress, it can be difficult to alleviate pain. Some people are uncomfortable addressing this issue, but we feel it is important for you to think about it. As Foot &  Ankle specialists, our job is addressing the lower  extremity problem and possible causes. Regarding extra body weight, we encourage patients to discuss diet and weight management plans with their primary care doctors. It is this team approach that gives you the best opportunity for pain relief and getting you back on your feet.        Wound Care:    1) cleanse area daily and blot dry  2) paint the area with betadine and allow to air-dry  3) place a cotton ball or other breathable, soft material between the toes  4) return to clinic in one month.   Thank you.          Follow-ups after your visit        Your next 10 appointments already scheduled     Jul 24, 2018  9:00 AM CDT   US MIKEL DOPPLER WITH EXERCISE BILATERAL with SHUS5   [facility] ([facility])    [address] [facility] [address] [phone]           Please bring a list of your medicines (including vitamins, minerals and over-the-counter drugs). Also, tell your doctor about any allergies you may have. Wear comfortable clothes and leave your valuables at home.  No caffeine or tobacco for 1 hour prior to exam.  Please call the Imaging Department at your exam site with any questions.            Aug 06, 2018  9:30 AM CDT   Nurse Only with [facility] NURSE   [facility]    [address] [facility] [address] [phone]            Sep 04, 2018  9:30 AM CDT   Nurse Only with CS NURSE   [facility]    [address] [facility] [address]   [phone]            Oct 15, 2018 10:00 AM CDT   Office Visit with Judson Mcadams MD   [facility]    [address] [facility] [address] [phone]           Bring a current list of meds and any records pertaining to this visit. For Physicals, please bring immunization records and any forms needing to be filled out. Please arrive 10 minutes early to complete paperwork.              Who to contact     If you have  "questions or need follow up information about today's clinic visit or your schedule please contact [facility] directly at [phone].  Normal or non-critical lab and imaging results will be communicated to you by MyChart, letter or phone within 4 business days after the clinic has received the results. If you do not hear from us within 7 days, please contact the clinic through MyChart or phone. If you have a critical or abnormal lab result, we will notify you by phone as soon as possible.  Submit refill requests through [facility] or call your pharmacy and they will forward the refill request to us. Please allow 3 business days for your refill to be completed.          Additional Information About Your Visit        [employer] Information     [facility] gives you secure access to your electronic health record. If you see a primary care provider, you can also send messages to your care team and make appointments. If you have questions, please call your primary care clinic.  If you do not have a primary care provider, please call [phone] and they will assist you.        Care EveryWhere ID     This is your Care EveryWhere ID. This could be used by other organizations to access your [facility] medical records  [id number]        Your Vitals Were     Height BMI (Body Mass Index)                5' 7\" (1.702 m) 24.28 kg/m2           Blood Pressure from Last 3 Encounters:   07/23/18 140/90   07/13/18 117/80   03/30/18 (!) 145/93    Weight from Last 3 Encounters:   07/23/18 155 lb (70.3 kg)   07/13/18 154 lb 1.6 oz (69.9 kg)   03/30/18 157 lb (71.2 kg)               Primary Care Provider Office Phone # Fax #    Judson Mcadams [phone] [address]  [facility] [address]        Equal Access to Services     ABDULLAHI SANCHEZ : Hadii amada jadeo Soshirlene, waaxda luqadaha, qaybta kaalmada adeegyada, ky bell. So [facility] [phone].    ATENCIÓN: " Si habla noelle, tiene a ambrose disposición servicios gratuitos de asistencia lingüística. Silverio alex [phone].    We comply with applicable federal civil rights laws and [place] laws. We do not discriminate on the basis of race, color, national origin, age, disability, sex, sexual orientation, or gender identity.            Thank you!     Thank you for choosing [facility]  for your care. Our goal is always to provide you with excellent care. Hearing back from our patients is one way we can continue to improve our services. Please take a few minutes to complete the written survey that you may receive in the mail after your visit with us. Thank you!             Your Updated Medication List - Protect others around you: Learn how to safely use, store and throw away your medicines at www.disposemymeds.org.          This list is accurate as of 7/23/18 10:39 AM.  Always use your most recent med list.                   Brand Name Dispense Instructions for use Diagnosis    cyanocobalamin 1000 MCG/ML injection    VITAMIN B12    1 mL    Inject 1 mL (1,000 mcg) into the muscle every 30 days    Vitamin B12 deficiency (non anemic)       diphenoxylate-atropine 2.5-0.025 MG per tablet    LOMOTIL    90 tablet    Take 1 tablet by mouth daily    Crohn's disease of small and large intestines with complication (H)       ELIQUIS 5 MG tablet   Generic drug:  apixaban ANTICOAGULANT     180 tablet    TAKE 1 TABLET(5 MG) BY MOUTH TWICE DAILY    Chronic atrial fibrillation (H)       FLOMAX 0.4 MG capsule   Generic drug:  tamsulosin     90 capsule    Take 1 capsule (0.4 mg) by mouth daily    Benign non-nodular prostatic hyperplasia with lower urinary tract symptoms       metoprolol tartrate 50 MG tablet    LOPRESSOR    180 tablet    TAKE 1 TABLET(50 MG) BY MOUTH TWICE DAILY    Benign essential hypertension       opium tincture 10 MG/ML (1%) liquid     118 mL    4 drops every 4 hours as needed for diarrhea    Crohn's  disease of small and large intestines with complication (H)       traMADol 50 MG tablet    ULTRAM    120 tablet    Take 1-2 tablets ( mg) by mouth every 12 hours as needed for severe pain    Spinal stenosis, lumbar region, with neurogenic claudication

## 2018-07-23 NOTE — PROGRESS NOTES
ASSESSMENT/PLAN:    Encounter Diagnosis   Name Primary?     Skin ulcer of toe of right foot, limited to breakdown of skin (H) Yes     This is consistent with a soft corn that ulcerated.  I explained how the skin can break down between toes, typically when the 5th toe and 4th toe are in close approximation and certain bones align. I reviewed the x-ray images with the patient and his wife.  The location of the ulcer is between the head of the 5th toe proximal phalanx and base of the 4th toe, proximal phalanx.     Instructions:      1) cleanse area daily and blot dry  2) paint the area with betadine and allow to air-dry  3) place a cotton ball or other breathable, soft material between the toes  4) return to clinic in one month.       Signs of infection reviewed and also included in the After Visit Summary.    Follow up in 1 month    Body mass index is 24.28 kg/(m^2).          Hudson Arenas DPM, FACFAS, MS    [facility]le [facility]      ____________________________________________________________________    HPI:       I was asked by Dr. Mcadams to evaluate this patient, in consultation, regarding a skin ulcer right foot.     Chief Complaint: sore between small toe, right foot  Onset of problem: 1 month  Pain/ discomfort is described as:  shooting  Ratin/10   Frequency:  intermittent    The pain is made worse with walking  Previous treatment: Neosporin  *  Patient Active Problem List   Diagnosis     Atrial fibrillation (H)     Cardiomyopathy, unspecified type (H)     Crohn's disease of small and large intestines with complication (H)     Spinal stenosis of lumbosacral region     Vitamin B12 deficiency (non anemic)     Chronic pain syndrome   *  *  Past Surgical History:   Procedure Laterality Date     APPENDECTOMY     *  *  Current Outpatient Prescriptions   Medication Sig Dispense Refill     cyanocobalamin (VITAMIN B12) 1000 MCG/ML injection Inject 1 mL (1,000 mcg) into the muscle  every 30 days 1 mL 11     diphenoxylate-atropine (LOMOTIL) 2.5-0.025 MG per tablet Take 1 tablet by mouth daily 90 tablet 3     ELIQUIS 5 MG tablet TAKE 1 TABLET(5 MG) BY MOUTH TWICE DAILY 180 tablet 0     metoprolol tartrate (LOPRESSOR) 50 MG tablet TAKE 1 TABLET(50 MG) BY MOUTH TWICE DAILY 180 tablet 3     opium tincture 10 MG/ML (1%) liquid 4 drops every 4 hours as needed for diarrhea 118 mL 0     tamsulosin (FLOMAX) 0.4 MG capsule Take 1 capsule (0.4 mg) by mouth daily 90 capsule 3     traMADol (ULTRAM) 50 MG tablet Take 1-2 tablets ( mg) by mouth every 12 hours as needed for severe pain 120 tablet 0       ROS:     A 10-point review of systems was performed and is positive for that noted above in the HPI and as seen below.  All other areas are negative.     Numbness in feet?  no   Calf pain with walking? no  Recent foot/ankle injury? no  Weight change over past 12 months? yes  Self perception as overweight? no  Recent flu-like symptoms? no  Joint pain other than feet ? Back pain    Social History: Employment:  no;  Exercise/Physical activity:  walking;  Tobacco use:  no  Social History     Social History     Marital status:      Spouse name: N/A     Number of children: N/A     Years of education: N/A     Occupational History     Not on file.     Social History Main Topics     Smoking status: Former Smoker     Smokeless tobacco: Never Used      Comment: 40 years ago     Alcohol use 4.2 oz/week     7 Standard drinks or equivalent per week      Comment: 1 wine an evening     Drug use: No     Sexual activity: No     Other Topics Concern     Not on file     Social History Narrative    Walks for exercise       Family history:  Family History   Problem Relation Age of Onset     Family History Negative Mother      Cardiac Sudden Death Father      Family History Negative Brother      Other - See Comments Sister      colon issues     Family History Negative Son      Family History Negative Brother      Family  "History Negative Son      HEART DISEASE No family hx of        Rheumatoid arthritis:  no  Foot Problems: no  Diabetes: no      EXAM:    Vitals: /90 (BP Location: Right arm, Patient Position: Chair, Cuff Size: Adult Regular)  Ht 5' 7\" (1.702 m)  Wt 155 lb (70.3 kg)  BMI 24.28 kg/m2  BMI: Body mass index is 24.28 kg/(m^2).  Height: 5' 7\"    Constitutional/ general:  Pt is in no apparent distress, appears well-nourished.  Cooperative with history and physical exam.     Vascular:  Pedal pulses are faintly palpable bilaterally for both the DP and PT arteries.  CFT < 3 sec.  No edema.  Pedal hair growth noted.     Neuro:  Alert and oriented x 3. Coordinated gait.  Light touch sensation is intact to the L4, L5, S1 distributions. No obvious deficits.  No evidence of neurological-based weakness, spasticity, or contracture in the lower extremities.     Derm: There is macerated skin in the right 4th web space and ulceration at the medial base of the 5th toe.  No erythema, drainage, malodor, edema.    Musculoskeletal:    Lower extremity muscle strength is normal.  Patient is ambulatory without an assistive device or brace .  No gross deformities.        Hudson Arenas DPM, YENNY, MS    [facility] & Ankle Surgery              "

## 2018-07-23 NOTE — LETTER
2018         RE: Dawson Jones  [address]  [facility] [address]        Dear Colleague,    Thank you for referring your patient, Dawson Jones, to the [facility]. Please see a copy of my visit note below.      ASSESSMENT/PLAN:    Encounter Diagnosis   Name Primary?     Skin ulcer of toe of right foot, limited to breakdown of skin (H) Yes     This is consistent with a soft corn that ulcerated.  I explained how the skin can break down between toes, typically when the 5th toe and 4th toe are in close approximation and certain bones align. I reviewed the x-ray images with the patient and his wife.  The location of the ulcer is between the head of the 5th toe proximal phalanx and base of the 4th toe, proximal phalanx.     Instructions:      1) cleanse area daily and blot dry  2) paint the area with betadine and allow to air-dry  3) place a cotton ball or other breathable, soft material between the toes  4) return to clinic in one month.       Signs of infection reviewed and also included in the After Visit Summary.    Follow up in 1 month    Body mass index is 24.28 kg/(m^2).          Hudson Arenas DPM, FACFAS, MS    [facility] Pod[facility]ry/Foot & Ankle Surgery      ____________________________________________________________________    HPI:       I was asked by Dr. Mcadams to evaluate this patient, in consultation, regarding a skin ulcer right foot.     Chief Complaint: sore between small toe, right foot  Onset of problem: 1 month  Pain/ discomfort is described as:  shooting  Ratin/10   Frequency:  intermittent    The pain is made worse with walking  Previous treatment: Neosporin  *  Patient Active Problem List   Diagnosis     Atrial fibrillation (H)     Cardiomyopathy, unspecified type (H)     Crohn's disease of small and large intestines with complication (H)     Spinal stenosis of lumbosacral region     Vitamin B12 deficiency (non anemic)     Chronic  pain syndrome   *  *  Past Surgical History:   Procedure Laterality Date     APPENDECTOMY     *  *  Current Outpatient Prescriptions   Medication Sig Dispense Refill     cyanocobalamin (VITAMIN B12) 1000 MCG/ML injection Inject 1 mL (1,000 mcg) into the muscle every 30 days 1 mL 11     diphenoxylate-atropine (LOMOTIL) 2.5-0.025 MG per tablet Take 1 tablet by mouth daily 90 tablet 3     ELIQUIS 5 MG tablet TAKE 1 TABLET(5 MG) BY MOUTH TWICE DAILY 180 tablet 0     metoprolol tartrate (LOPRESSOR) 50 MG tablet TAKE 1 TABLET(50 MG) BY MOUTH TWICE DAILY 180 tablet 3     opium tincture 10 MG/ML (1%) liquid 4 drops every 4 hours as needed for diarrhea 118 mL 0     tamsulosin (FLOMAX) 0.4 MG capsule Take 1 capsule (0.4 mg) by mouth daily 90 capsule 3     traMADol (ULTRAM) 50 MG tablet Take 1-2 tablets ( mg) by mouth every 12 hours as needed for severe pain 120 tablet 0       ROS:     A 10-point review of systems was performed and is positive for that noted above in the HPI and as seen below.  All other areas are negative.     Numbness in feet?  no   Calf pain with walking? no  Recent foot/ankle injury? no  Weight change over past 12 months? yes  Self perception as overweight? no  Recent flu-like symptoms? no  Joint pain other than feet ? Back pain    Social History: Employment:  no;  Exercise/Physical activity:  walking;  Tobacco use:  no  Social History     Social History     Marital status:      Spouse name: N/A     Number of children: N/A     Years of education: N/A     Occupational History     Not on file.     Social History Main Topics     Smoking status: Former Smoker     Smokeless tobacco: Never Used      Comment: 40 years ago     Alcohol use 4.2 oz/week     7 Standard drinks or equivalent per week      Comment: 1 wine an evening     Drug use: No     Sexual activity: No     Other Topics Concern     Not on file     Social History Narrative    Walks for exercise       Family history:  Family History  "  Problem Relation Age of Onset     Family History Negative Mother      Cardiac Sudden Death Father      Family History Negative Brother      Other - See Comments Sister      colon issues     Family History Negative Son      Family History Negative Brother      Family History Negative Son      HEART DISEASE No family hx of        Rheumatoid arthritis:  no  Foot Problems: no  Diabetes: no      EXAM:    Vitals: /90 (BP Location: Right arm, Patient Position: Chair, Cuff Size: Adult Regular)  Ht 5' 7\" (1.702 m)  Wt 155 lb (70.3 kg)  BMI 24.28 kg/m2  BMI: Body mass index is 24.28 kg/(m^2).  Height: 5' 7\"    Constitutional/ general:  Pt is in no apparent distress, appears well-nourished.  Cooperative with history and physical exam.     Vascular:  Pedal pulses are faintly palpable bilaterally for both the DP and PT arteries.  CFT < 3 sec.  No edema.  Pedal hair growth noted.     Neuro:  Alert and oriented x 3. Coordinated gait.  Light touch sensation is intact to the L4, L5, S1 distributions. No obvious deficits.  No evidence of neurological-based weakness, spasticity, or contracture in the lower extremities.     Derm: There is macerated skin in the right 4th web space and ulceration at the medial base of the 5th toe.  No erythema, drainage, malodor, edema.    Musculoskeletal:    Lower extremity muscle strength is normal.  Patient is ambulatory without an assistive device or brace .  No gross deformities.        Hudson Arenas DPM, FACFAS, MS    [facility] & Ankle Surgery                Again, thank you for allowing me to participate in the care of your patient.        Sincerely,        Hudson rAenas DPM    "

## 2018-07-23 NOTE — PATIENT INSTRUCTIONS
Thank you for choosing [facility]!    DR. CONWAY'S CLINIC LOCATIONS     MONDAY - [place] WEDNESDAY - [place]   [address] [place] [address] [address] [address]   [phone] / [phone] / [phone]       THURSDAY - [place] SCHEDULE SURGERY: [phone]   [address] APPOINTMENTS: [phone]   [place] [address] BILLING QUESTIONS: [phone] / [phone]       Cleanse the area daily with a mild soap and blot dry.   Apply iodine and place cotton ball between the toes.     SIGNS OF INFECTION  expanding redness around the wound   yellow or greenish-colored pus or cloudy wound drainage   red streaking spreading from the wound   increased swelling, tenderness, or pain around the wound   fever  *If you notice any of these signs of infection, call us right away!        BODY MASS INDEX (BMI)  Many things can cause foot and ankle problems. Foot structure, activity level, foot mechanics and injuries are common causes of pain.  One very important issue that often goes unmentioned, is body weight.  Extra weight can cause increased stress on muscles, ligaments, bones and tendons.  Sometimes just a few extra pounds is all it takes to put one over her/his threshold. Without reducing that stress, it can be difficult to alleviate pain. Some people are uncomfortable addressing this issue, but we feel it is important for you to think about it. As Foot &  Ankle specialists, our job is addressing the lower extremity problem and possible causes. Regarding extra body weight, we encourage patients to discuss diet and weight management plans with their primary care doctors. It is this team approach that gives you the best opportunity for pain relief and getting you back on your feet.        Wound Care:    1) cleanse area daily and blot dry  2) paint the area with betadine and allow to air-dry  3) place a cotton ball or  other breathable, soft material between the toes  4) return to clinic in one month.   Thank you.

## 2018-07-24 ENCOUNTER — HOSPITAL ENCOUNTER (OUTPATIENT)
Dept: ULTRASOUND IMAGING | Facility: CLINIC | Age: 83
Discharge: HOME | End: 2018-07-24
Attending: INTERNAL MEDICINE | Admitting: INTERNAL MEDICINE
Payer: COMMERCIAL

## 2018-07-24 DIAGNOSIS — I73.9 CLAUDICATION (H): ICD-10-CM

## 2018-07-24 PROCEDURE — 93922 UPR/L XTREMITY ART 2 LEVELS: CPT

## 2018-07-25 ENCOUNTER — TELEPHONE (OUTPATIENT)
Dept: FAMILY MEDICINE | Facility: CLINIC | Age: 83
End: 2018-07-25

## 2018-07-25 ENCOUNTER — TELEPHONE (OUTPATIENT)
Dept: OTHER | Facility: CLINIC | Age: 83
End: 2018-07-25

## 2018-07-25 DIAGNOSIS — I73.9 CLAUDICATION (H): Primary | ICD-10-CM

## 2018-07-25 NOTE — TELEPHONE ENCOUNTER
MIKEL cannot rule out PAD  However, results do not correlate that well with symptoms because arterial insufficiency looks worse on left and symptoms are worse on right  I think he should see vascular surgery to try to sort this out  Perhaps a CT angiogram would be most helpful?  Renal function is normal.  Incidentally, his wife tells me that he got some relief from a nerve block in the lumbar spine from Dr. Reynolds  Possibly a nerve root ablation may help  Overall, it is difficult to determine what is causing his severe pain  However, we should risk stratify for underlying PAD   His wife was told that the vascular surgery  will contact her to arrange a consult

## 2018-07-31 ENCOUNTER — OFFICE VISIT (OUTPATIENT)
Dept: OTHER | Facility: CLINIC | Age: 83
End: 2018-07-31
Attending: SURGERY
Payer: COMMERCIAL

## 2018-07-31 VITALS — SYSTOLIC BLOOD PRESSURE: 113 MMHG | DIASTOLIC BLOOD PRESSURE: 77 MMHG | HEART RATE: 82 BPM

## 2018-07-31 DIAGNOSIS — I73.9 PAD (PERIPHERAL ARTERY DISEASE) (H): Primary | ICD-10-CM

## 2018-07-31 DIAGNOSIS — I73.9 CLAUDICATION (H): ICD-10-CM

## 2018-07-31 DIAGNOSIS — M48.07 SPINAL STENOSIS OF LUMBOSACRAL REGION: ICD-10-CM

## 2018-07-31 PROCEDURE — G0463 HOSPITAL OUTPT CLINIC VISIT: HCPCS

## 2018-07-31 PROCEDURE — 99204 OFFICE O/P NEW MOD 45 MIN: CPT | Mod: ZP | Performed by: SURGERY

## 2018-07-31 NOTE — MR AVS SNAPSHOT
After Visit Summary   7/31/2018    Dawson Jones    MRN: [id number]           Patient Information     Date Of Birth          5/5/1930        Visit Information        Provider Department      7/31/2018 9:30 AM Heri Ramos MD [facility]      Today's Diagnoses     PAD (peripheral artery disease) (H)    -  1    Claudication (H)        Spinal stenosis of lumbosacral region           Follow-ups after your visit        Follow-up notes from your care team     Return if symptoms worsen or fail to improve.      Your next 10 appointments already scheduled     Aug 06, 2018  9:30 AM CDT   Nurse Only with CS NURSE   [facility]    [address] [facility] [address]   [phone]            Aug 20, 2018 10:15 AM CDT   Return Visit with Hudson Arenas DPM   [facility]    [address] [id number] [phone]            Sep 04, 2018  9:30 AM CDT   Nurse Only with CS NURSE   [facility]    [address]  [facility] [address]   [phone]            Oct 15, 2018 10:00 AM CDT   Office Visit with Judson Mcadams MD   [facility]    [address] [facility] [address]   [phone]           Bring a current list of meds and any records pertaining to this visit. For Physicals, please bring immunization records and any forms needing to be filled out. Please arrive 10 minutes early to complete paperwork.              Who to contact     If you have questions or need follow up information about today's clinic visit or your schedule please contact [facility] directly at [phone].  Normal or non-critical lab and imaging results will be communicated to you by MyChart, letter or phone within 4 business days after the  clinic has received the results. If you do not hear from us within 7 days, please contact the clinic through [employer] or phone. If you have a critical or abnormal lab result, we will notify you by phone as soon as possible.  Submit refill requests through [employer] or call your pharmacy and they will forward the refill request to us. Please allow 3 business days for your refill to be completed.          Additional Information About Your Visit        [employer] Information     [employer] gives you secure access to your electronic health record. If you see a primary care provider, you can also send messages to your care team and make appointments. If you have questions, please call your primary care clinic.  If you do not have a primary care provider, please call [phone] and they will assist you.        Care EveryWhere ID     This is your Care EveryWhere ID. This could be used by other organizations to access your [facility] medical records  [id number]        Your Vitals Were     Pulse                   82            Blood Pressure from Last 3 Encounters:   07/31/18 113/77   07/23/18 140/90   07/13/18 117/80    Weight from Last 3 Encounters:   07/23/18 155 lb (70.3 kg)   07/13/18 154 lb 1.6 oz (69.9 kg)   03/30/18 157 lb (71.2 kg)              Today, you had the following     No orders found for display       Primary Care Provider Office Phone # Fax #    Judson Mcadams [phone] [address]  [place] [address]        Equal Access to Services     ABDULLAHI SANCHEZ : Hadii aad ku hadasho Soomaali, waaxda luqadaha, qaybta kaalmada adeegyada, ky cardona . So [facility] [phone].    ATENCIÓN: Si habla español, tiene a ambrose disposición servicios gratuitos de asistencia lingüística. Llame al [phone].    We comply with applicable federal civil rights laws and [place] laws. We do not discriminate on the basis of race, color, national origin, age, disability, sex,  sexual orientation, or gender identity.            Thank you!     Thank you for choosing [facility]  for your care. Our goal is always to provide you with excellent care. Hearing back from our patients is one way we can continue to improve our services. Please take a few minutes to complete the written survey that you may receive in the mail after your visit with us. Thank you!             Your Updated Medication List - Protect others around you: Learn how to safely use, store and throw away your medicines at www.disposemymeds.org.          This list is accurate as of 7/31/18  9:57 AM.  Always use your most recent med list.                   Brand Name Dispense Instructions for use Diagnosis    cyanocobalamin 1000 MCG/ML injection    VITAMIN B12    1 mL    Inject 1 mL (1,000 mcg) into the muscle every 30 days    Vitamin B12 deficiency (non anemic)       diphenoxylate-atropine 2.5-0.025 MG per tablet    LOMOTIL    90 tablet    Take 1 tablet by mouth daily    Crohn's disease of small and large intestines with complication (H)       ELIQUIS 5 MG tablet   Generic drug:  apixaban ANTICOAGULANT     180 tablet    TAKE 1 TABLET(5 MG) BY MOUTH TWICE DAILY    Chronic atrial fibrillation (H)       FLOMAX 0.4 MG capsule   Generic drug:  tamsulosin     90 capsule    Take 1 capsule (0.4 mg) by mouth daily    Benign non-nodular prostatic hyperplasia with lower urinary tract symptoms       metoprolol tartrate 50 MG tablet    LOPRESSOR    180 tablet    TAKE 1 TABLET(50 MG) BY MOUTH TWICE DAILY    Benign essential hypertension       opium tincture 10 MG/ML (1%) liquid     118 mL    4 drops every 4 hours as needed for diarrhea    Crohn's disease of small and large intestines with complication (H)       traMADol 50 MG tablet    ULTRAM    120 tablet    Take 1-2 tablets ( mg) by mouth every 12 hours as needed for severe pain    Spinal stenosis, lumbar region, with neurogenic claudication

## 2018-07-31 NOTE — PROGRESS NOTES
VASCULAR SURGERY CLINIC CONSULTATION    VASCULAR SURGEON: Heri Ramos MD    LOCATION:  [facility]    Dawson Jones   Medical Record #:  [id number]  YOB: 1930  Age:  88 year old     Date of Service: 7/31/2018    PRIMARY CARE PROVIDER: Judson Mcadams      Reason for visit:  Right thigh pain.    IMPRESSION:  87 yo male with what sounds like neurogenic pain from spinal stenosis here for evaluation for PAD/claudication.    RECOMMENDATION: I discussed with Dawson and his wife that I do not think that his disease and pain in the right leg is related to peripheral arterial disease.  I think he does have a mild peripheral arterial disease in both lower extremities but his symptoms are not consistent claudication.  My concerns are that his pain originates from either his lumbar sacral region or even his right hip he needs evaluation with an orthopedist.  He seen a podiatrist for a small wound between the fourth and fifth toes on the right side that is from the toes rubbing together but he says this is healing very nicely.  His toe pressures suggest he has adequate healing in both lower extremities with a pressure greater than 90 bilaterally.  I will see him back as needed in the future if he develops any vascular issues.    HPI:  Dawson Jones is a 88 year old male who was seen today in consultation by Dr. Mcadams regarding concern of right thigh pain and potential claudication.  Patient describes the pain is significant especially when he tries to get out of bed in the morning shooting pain that starts what he thinks is in the low back and wraps around the right leg to the anterior thigh.  Says the pain is excruciating and is not correlating with any sort of walking distance and is only reproducible in these types of instances.  He has had several injections to help relieve the pain to in the back and several of the right hip with some success at each site for  pain relief.  The injection in the back appeared to have the longest duration of pain relief of several weeks before recurrence of the pain.  Patient does not have any calf cramping or pain on walking and otherwise denies classic claudication symptoms at today's meeting.  He also denies wrist pain at this time.    PHH:    Past Medical History:   Diagnosis Date     Atrial fibrillation (H)      Cardiomyopathy (H)      Crohn's disease of small and large intestines with complication (H)      Hyperlipidemia         Past Surgical History:   Procedure Laterality Date     APPENDECTOMY         ALLERGIES:  Review of patient's allergies indicates no known allergies.    MEDS:    Current Outpatient Prescriptions:      cyanocobalamin (VITAMIN B12) 1000 MCG/ML injection, Inject 1 mL (1,000 mcg) into the muscle every 30 days, Disp: 1 mL, Rfl: 11     diphenoxylate-atropine (LOMOTIL) 2.5-0.025 MG per tablet, Take 1 tablet by mouth daily, Disp: 90 tablet, Rfl: 3     ELIQUIS 5 MG tablet, TAKE 1 TABLET(5 MG) BY MOUTH TWICE DAILY, Disp: 180 tablet, Rfl: 0     metoprolol tartrate (LOPRESSOR) 50 MG tablet, TAKE 1 TABLET(50 MG) BY MOUTH TWICE DAILY, Disp: 180 tablet, Rfl: 3     opium tincture 10 MG/ML (1%) liquid, 4 drops every 4 hours as needed for diarrhea, Disp: 118 mL, Rfl: 0     tamsulosin (FLOMAX) 0.4 MG capsule, Take 1 capsule (0.4 mg) by mouth daily, Disp: 90 capsule, Rfl: 3     traMADol (ULTRAM) 50 MG tablet, Take 1-2 tablets ( mg) by mouth every 12 hours as needed for severe pain, Disp: 120 tablet, Rfl: 0    SOCIAL HABITS:    History   Smoking Status     Former Smoker   Smokeless Tobacco     Never Used     Comment: 40 years ago       Alcohol use Yes 4.2 oz/week 7 Standard drinks or equivalent per week   Comment: 1 wine an evening      History   Drug Use No       FAMILY HISTORY:    Family History   Problem Relation Age of Onset     Family History Negative Mother      Cardiac Sudden Death Father      Family History Negative  Brother      Other - See Comments Sister      colon issues     Family History Negative Son      Family History Negative Brother      Family History Negative Son      HEART DISEASE No family hx of        REVIEW OF SYSTEMS:    A 12 point ROS was reviewed and except for what is listed in the HPI above, all others are negative    PE:  /77 (BP Location: Left arm, Patient Position: Chair, Cuff Size: Adult Regular)  Pulse 82  Wt Readings from Last 1 Encounters:   07/23/18 155 lb (70.3 kg)     There is no height or weight on file to calculate BMI.    EXAM:  GENERAL: This is a well-developed 88 year old male who appears his stated age  EYES: Grossly normal.  MOUTH: Buccal mucosa normal   CARDIAC:  NS1 S2, No Murmur  CHEST/LUNG:  Clear lung fields bilaterally   GASTROINTESINAL (ABDOMEN): Soft, non-tender, B/S present, no pulsatile mass  MUSCULOSKELETAL: Grossly normal and both lower extremities are intact.  HEME/LYMPH: No lymphedema  NEUROLOGIC: Focally intact, Alert and oriented x 3.   PSYCH: appropriate affect  INTEGUMENT: Small wound between the 4th and 5th toe on the right from the toes rubbing together, this is healing.  No dependent rubor or rest pain.     Pulse Exam:     Radial: Left 2   Right  2    Femoral: Left 2   Right  2    Popliteal: Left 1   Right  1    DP: Left 1   Right  1     PT:   Left 0   Right  0          DIAGNOSTIC STUDIES:     Images:  Us Mikel Doppler No Exercise    Result Date: 7/24/2018  ULTRASOUND MIKEL DOPPLER NO EXERCISE, 1-2 LEVELS, BILATERAL July 24, 2018 at 0952 hours HISTORY: 88-year-old patient with claudication. COMPARISON: None. FINDINGS: The arteries are noncompressible, therefore unable to obtain resting MIKEL values. Distal posterior tibial and dorsalis pedis waveforms appear biphasic, though with somewhat irregular baseline. Distal posterior tibial and dorsalis pedis waveforms on the left are biphasic. First digital waveforms are monophasic with mild diminished amplitude on the right  and moderate diminished amplitude on the left.     IMPRESSION: 1. Unable to obtain resting MIKEL values given noncompressible arteries. 2. Suggestion of mild arterial insufficiency in the right lower extremity as demonstrated by distal and a digital waveforms. 3. Suggestion of mild to moderate arterial insufficiency in the left lower extremity as demonstrated by distal waveforms. FERNANDA ARRIAGA MD    Xr Toe Right G/e 2 Views    Result Date: 7/23/2018  XR TOE RIGHT G/E 2 VIEWS 7/23/2018 1:08 PM HISTORY: ulcer medial right 5th toe;  corn that ulcerated; Skin ulcer of toe of right foot, limited to breakdown of skin (H)     IMPRESSION: Negative. NIKITA BURGESS MD      I personally reviewed the images and my interpretation is toe pressures are normal, 98 on the right and 100 on the left.  He has biphasic waveforms at both ankles.    LABS:      Sodium   Date Value Ref Range Status   03/27/2017 139 133 - 144 mmol/L Final   07/29/2014 143 mmol/L Final   11/11/2010 141 133 - 144 mmol/L Final     Urea Nitrogen   Date Value Ref Range Status   03/27/2017 17 7 - 30 mg/dL Final   07/29/2014 14 mg/dL Final     Hemoglobin   Date Value Ref Range Status   03/27/2017 13.7 13.3 - 17.7 g/dL Final     Platelet Count   Date Value Ref Range Status   03/27/2017 143 (L) 150 - 450 10e9/L Final     INR   Date Value Ref Range Status   12/21/2010 0.98 0.86 - 1.14 Final   11/11/2010 2.60 (H) 0.86 - 1.14 Final     Heri Ramos MD  VASCULAR SURGERY

## 2018-07-31 NOTE — NURSING NOTE
"Dawson Jones is a 88 year old male who presents for:  Chief Complaint   Patient presents with     Consult     New consult for PAD, ref by Dr. Mcadams, records and MIKEL in Epic        Vitals:    Vitals:    07/31/18 0926 07/31/18 0927   BP: (!) 126/92 113/77   BP Location: Right arm Left arm   Patient Position: Chair Chair   Cuff Size: Adult Regular Adult Regular   Pulse: 79 82       BMI:  Estimated body mass index is 24.28 kg/(m^2) as calculated from the following:    Height as of 7/23/18: 5' 7\" (1.702 m).    Weight as of 7/23/18: 155 lb (70.3 kg).    Pain Score:  Data Unavailable        Raine Dale MA    "

## 2018-07-31 NOTE — LETTER
[facility] [address]  [place] [address]  Phone: [phone]  Fax: [phone]      2018    Re: Dawson Jones - 1930    [facility]     Dawson Jones  Medical Record #:  [id number]  YOB: 1930  Age:  88 year old      Date of Service: 2018     PRIMARY CARE PROVIDER: Judson Mcadams      Reason for visit:  Right thigh pain.     IMPRESSION:  87 yo male with what sounds like neurogenic pain from spinal stenosis here for evaluation for PAD/claudication.     RECOMMENDATION: I discussed with Dawson and his wife that I do not think that his disease and pain in the right leg is related to peripheral arterial disease.  I think he does have a mild peripheral arterial disease in both lower extremities but his symptoms are not consistent claudication.  My concerns are that his pain originates from either his lumbar sacral region or even his right hip he needs evaluation with an orthopedist.  He seen a podiatrist for a small wound between the fourth and fifth toes on the right side that is from the toes rubbing together but he says this is healing very nicely.  His toe pressures suggest he has adequate healing in both lower extremities with a pressure greater than 90 bilaterally.  I will see him back as needed in the future if he develops any vascular issues.     HPI:  Dawson Jones is a 88 year old male who was seen today in consultation by Dr. Mcadams regarding concern of right thigh pain and potential claudication.  Patient describes the pain is significant especially when he tries to get out of bed in the morning shooting pain that starts what he thinks is in the low back and wraps around the right leg to the anterior thigh.  Says the pain is excruciating and is not correlating with any sort of walking distance and is only reproducible in these types of instances.  He has had several injections  to help relieve the pain to in the back and several of the right hip with some success at each site for pain relief.  The injection in the back appeared to have the longest duration of pain relief of several weeks before recurrence of the pain.  Patient does not have any calf cramping or pain on walking and otherwise denies classic claudication symptoms at today's meeting.  He also denies wrist pain at this time.     ALLERGIES:  Review of patient's allergies indicates no known allergies.     MEDS:    Current Outpatient Prescriptions:      cyanocobalamin (VITAMIN B12) 1000 MCG/ML injection, Inject 1 mL (1,000 mcg) into the muscle every 30 days, Disp: 1 mL, Rfl: 11     diphenoxylate-atropine (LOMOTIL) 2.5-0.025 MG per tablet, Take 1 tablet by mouth daily, Disp: 90 tablet, Rfl: 3     ELIQUIS 5 MG tablet, TAKE 1 TABLET(5 MG) BY MOUTH TWICE DAILY, Disp: 180 tablet, Rfl: 0     metoprolol tartrate (LOPRESSOR) 50 MG tablet, TAKE 1 TABLET(50 MG) BY MOUTH TWICE DAILY, Disp: 180 tablet, Rfl: 3     opium tincture 10 MG/ML (1%) liquid, 4 drops every 4 hours as needed for diarrhea, Disp: 118 mL, Rfl: 0     tamsulosin (FLOMAX) 0.4 MG capsule, Take 1 capsule (0.4 mg) by mouth daily, Disp: 90 capsule, Rfl: 3     traMADol (ULTRAM) 50 MG tablet, Take 1-2 tablets ( mg) by mouth every 12 hours as needed for severe pain, Disp: 120 tablet, Rfl: 0   PE:  /77 (BP Location: Left arm, Patient Position: Chair, Cuff Size: Adult Regular)  Pulse 82  7/23/18  155 lb ( 70.3kg)       EXAM:  GENERAL: This is a well-developed 88 year old male who appears his stated age  EYES: Grossly normal.  MOUTH: Buccal mucosa normal   CARDIAC:  NS1 S2, No Murmur  CHEST/LUNG:  Clear lung fields bilaterally   GASTROINTESINAL (ABDOMEN): Soft, non-tender, B/S present, no pulsatile mass  MUSCULOSKELETAL: Grossly normal and both lower extremities are intact.  HEME/LYMPH: No lymphedema  NEUROLOGIC: Focally intact, Alert and oriented x 3.   PSYCH: appropriate  affect  INTEGUMENT: Small wound between the 4th and 5th toe on the right from the toes rubbing together, this is healing.  No dependent rubor or rest pain.      Pulse Exam:      Radial: Left 2                        Right  2     Femoral: Left 2                        Right  2     Popliteal: Left 1                        Right  1     DP:                Left 1                        Right  1      PT:                 Left 0                        Right  0              DIAGNOSTIC STUDIES:      Images:  Us Mikel Doppler No Exercise     Result Date: 7/24/2018  ULTRASOUND MIKEL DOPPLER NO EXERCISE, 1-2 LEVELS, BILATERAL July 24, 2018 at 0952 hours HISTORY: 88-year-old patient with claudication. COMPARISON: None. FINDINGS: The arteries are noncompressible, therefore unable to obtain resting MIKEL values. Distal posterior tibial and dorsalis pedis waveforms appear biphasic, though with somewhat irregular baseline. Distal posterior tibial and dorsalis pedis waveforms on the left are biphasic. First digital waveforms are monophasic with mild diminished amplitude on the right and moderate diminished amplitude on the left.      IMPRESSION: 1. Unable to obtain resting MIKEL values given noncompressible arteries. 2. Suggestion of mild arterial insufficiency in the right lower extremity as demonstrated by distal and a digital waveforms. 3. Suggestion of mild to moderate arterial insufficiency in the left lower extremity as demonstrated by distal waveforms. FERNANDA ARRIAGA MD     Xr Toe Right G/e 2 Views     Result Date: 7/23/2018  XR TOE RIGHT G/E 2 VIEWS 7/23/2018 1:08 PM HISTORY: ulcer medial right 5th toe;  corn that ulcerated; Skin ulcer of toe of right foot, limited to breakdown of skin (H)      IMPRESSION: Negative. NIKITA BURGESS MD      I personally reviewed the images and my interpretation is toe pressures are normal, 98 on the right and 100 on the left.  He has biphasic waveforms at both ankles.     LABS:            Sodium   Date  Value Ref Range Status   03/27/2017 139 133 - 144 mmol/L Final   07/29/2014 143 mmol/L Final   11/11/2010 141 133 - 144 mmol/L Final            Urea Nitrogen   Date Value Ref Range Status   03/27/2017 17 7 - 30 mg/dL Final   07/29/2014 14 mg/dL Final            Hemoglobin   Date Value Ref Range Status   03/27/2017 13.7 13.3 - 17.7 g/dL Final            Platelet Count   Date Value Ref Range Status   03/27/2017 143 (L) 150 - 450 10e9/L Final            INR   Date Value Ref Range Status   12/21/2010 0.98 0.86 - 1.14 Final   11/11/2010 2.60 (H) 0.86 - 1.14 Final      Heri Ramos MD  VASCULAR SURGERY

## 2018-08-06 ENCOUNTER — ALLIED HEALTH/NURSE VISIT (OUTPATIENT)
Dept: NURSING | Facility: CLINIC | Age: 83
End: 2018-08-06
Payer: COMMERCIAL

## 2018-08-06 DIAGNOSIS — K50.819 CROHN'S DISEASE OF SMALL AND LARGE INTESTINES WITH COMPLICATION (H): ICD-10-CM

## 2018-08-06 DIAGNOSIS — E53.8 VITAMIN B12 DEFICIENCY (NON ANEMIC): Primary | ICD-10-CM

## 2018-08-06 PROCEDURE — 96372 THER/PROPH/DIAG INJ SC/IM: CPT

## 2018-08-06 PROCEDURE — 99207 ZZC NO CHARGE NURSE ONLY: CPT

## 2018-08-06 RX ORDER — MORPHINE 10 MG/ML
TINCTURE ORAL
Qty: 118 ML | Refills: 0 | Status: ON HOLD | OUTPATIENT
Start: 2018-08-06 | End: 2018-10-19

## 2018-08-06 NOTE — TELEPHONE ENCOUNTER
Patient came in for a B12 and asked for this to be reprinted - he had an old rx that the pharmacy wouldn't take (dated November of 2017).     Pended for Dr. Mcadams.

## 2018-08-06 NOTE — MR AVS SNAPSHOT
After Visit Summary   8/6/2018    Dawson Jones    MRN: [id number]           Patient Information     Date Of Birth          5/5/1930        Visit Information        Provider Department      8/6/2018 9:30 AM CS NURSE [facility]        Today's Diagnoses     Vitamin B12 deficiency (non anemic)    -  1       Follow-ups after your visit        Your next 10 appointments already scheduled     Aug 20, 2018 10:15 AM CDT   [facility] Visit with Hudson Arenas DPM   [facility]    [address] [id number] [phone]            Sep 04, 2018  9:30 AM CDT   Nurse Only with CS NURSE   [facility]    [address] [facility] [id number] [phone]            Oct 15, 2018 10:00 AM CDT   Office Visit with Judson Mcadams MD   [facility]    [address] [facility] [id number] [phone]           Bring a current list of meds and any records pertaining to this visit. For Physicals, please bring immunization records and any forms needing to be filled out. Please arrive 10 minutes early to complete paperwork.              Who to contact     If you have questions or need follow up information about today's clinic visit or your schedule please contact [facility] directly at [phone].  Normal or non-critical lab and imaging results will be communicated to you by MyChart, letter or phone within 4 business days after the clinic has received the results. If you do not hear from us within 7 days, please contact the clinic through MyChart or phone. If you have a critical or abnormal lab result, we will notify you by phone as soon as possible.  Submit refill requests through [employer] or call your pharmacy and they will forward the refill request to us. Please allow 3 business days for your refill to be completed.           Additional Information About Your Visit        MyChart Information     [employer] gives you secure access to your electronic health record. If you see a primary care provider, you can also send messages to your care team and make appointments. If you have questions, please call your primary care clinic.  If you do not have a primary care provider, please call [phone] and they will assist you.        Care EveryWhere ID     This is your Care EveryWhere ID. This could be used by other organizations to access your [facility] medical records  [id number]         Blood Pressure from Last 3 Encounters:   07/31/18 113/77   07/23/18 140/90   07/13/18 117/80    Weight from Last 3 Encounters:   07/23/18 155 lb (70.3 kg)   07/13/18 154 lb 1.6 oz (69.9 kg)   03/30/18 157 lb (71.2 kg)              We Performed the Following     B12 - [facility]        Primary Care Provider Office Phone # Fax #    Judson BREN Mcadams [phone] [address] [facility] [address]  [facility] [address]        Equal Access to Services     [facility]: Hadii aad ku hadasho Soomaali, waaxda luqadaha, qaybta kaalmada ademichaelyamakenzie, ky cardona . So [facility] [phone].    ATENCIÓN: Si habla español, tiene a ambrose disposición servicios gratuitos de asistencia lingüística. [facility] [phone].    We comply with applicable federal civil rights laws and [place] laws. We do not discriminate on the basis of race, color, national origin, age, disability, sex, sexual orientation, or gender identity.            Thank you!     Thank you for choosing [facility]  for your care. Our goal is always to provide you with excellent care. Hearing back from our patients is one way we can continue to improve our services. Please take a few minutes to complete the written survey that you may receive in the mail after your visit with us. Thank you!             Your Updated Medication List - Protect others around you: Learn how to  safely use, store and throw away your medicines at www.disposemymeds.org.          This list is accurate as of 8/6/18  9:46 AM.  Always use your most recent med list.                   Brand Name Dispense Instructions for use Diagnosis    cyanocobalamin 1000 MCG/ML injection    VITAMIN B12    1 mL    Inject 1 mL (1,000 mcg) into the muscle every 30 days    Vitamin B12 deficiency (non anemic)       diphenoxylate-atropine 2.5-0.025 MG per tablet    LOMOTIL    90 tablet    Take 1 tablet by mouth daily    Crohn's disease of small and large intestines with complication (H)       ELIQUIS 5 MG tablet   Generic drug:  apixaban ANTICOAGULANT     180 tablet    TAKE 1 TABLET(5 MG) BY MOUTH TWICE DAILY    Chronic atrial fibrillation (H)       FLOMAX 0.4 MG capsule   Generic drug:  tamsulosin     90 capsule    Take 1 capsule (0.4 mg) by mouth daily    Benign non-nodular prostatic hyperplasia with lower urinary tract symptoms       metoprolol tartrate 50 MG tablet    LOPRESSOR    180 tablet    TAKE 1 TABLET(50 MG) BY MOUTH TWICE DAILY    Benign essential hypertension       opium tincture 10 MG/ML (1%) liquid     118 mL    4 drops every 4 hours as needed for diarrhea    Crohn's disease of small and large intestines with complication (H)       traMADol 50 MG tablet    ULTRAM    120 tablet    Take 1-2 tablets ( mg) by mouth every 12 hours as needed for severe pain    Spinal stenosis, lumbar region, with neurogenic claudication

## 2018-08-19 DIAGNOSIS — M48.062 SPINAL STENOSIS, LUMBAR REGION, WITH NEUROGENIC CLAUDICATION: ICD-10-CM

## 2018-08-19 DIAGNOSIS — G89.4 CHRONIC PAIN SYNDROME: ICD-10-CM

## 2018-08-20 ENCOUNTER — OFFICE VISIT (OUTPATIENT)
Dept: PODIATRY | Facility: CLINIC | Age: 83
End: 2018-08-20
Payer: COMMERCIAL

## 2018-08-20 ENCOUNTER — TRANSFERRED RECORDS (OUTPATIENT)
Dept: HEALTH INFORMATION MANAGEMENT | Facility: CLINIC | Age: 83
End: 2018-08-20

## 2018-08-20 VITALS
SYSTOLIC BLOOD PRESSURE: 122 MMHG | HEIGHT: 67 IN | WEIGHT: 151.7 LBS | BODY MASS INDEX: 23.81 KG/M2 | DIASTOLIC BLOOD PRESSURE: 68 MMHG

## 2018-08-20 DIAGNOSIS — I73.9 PAD (PERIPHERAL ARTERY DISEASE) (H): ICD-10-CM

## 2018-08-20 DIAGNOSIS — L97.511 SKIN ULCER OF TOE OF RIGHT FOOT, LIMITED TO BREAKDOWN OF SKIN (H): Primary | ICD-10-CM

## 2018-08-20 PROCEDURE — 99213 OFFICE O/P EST LOW 20 MIN: CPT | Performed by: PODIATRIST

## 2018-08-20 NOTE — TELEPHONE ENCOUNTER
Controlled Substance Refill Request for tramadol  Problem List Complete:  No     PROVIDER TO CONSIDER COMPLETION OF PROBLEM LIST AND OVERVIEW/CONTROLLED SUBSTANCE AGREEMENT    Last Written Prescription Date:  07/13/18  Last Fill Quantity: 120,   # refills: 0    [place] [facility] [place] [facility] [place]: 07/13/18    Future Office visit:   Next 5 appointments (look out 90 days)     Sep 04, 2018  9:30 AM CDT   Nurse Only with CS NURSE   [facility]    [address] [facility] [address]   [phone]            Oct 01, 2018 10:00 AM CDT   Return Visit with Hudson Arenas DPM   [facility]    [address] [id number] [phone]            Oct 15, 2018 10:00 AM CDT   Office Visit with Judson Mcadams MD   [facility]    [address] [facility] [address]   [phone]                  Controlled substance agreement on file: Yes:  Date 01/04/18.     Processing:  Fax Rx to [facility] pharmacy   checked in past 3 months?  No, route to CEFERINO Hong MA

## 2018-08-20 NOTE — MR AVS SNAPSHOT
After Visit Summary   8/20/2018    Dawson Jones    MRN: [id number]           Patient Information     Date Of Birth          5/5/1930        Visit Information        Provider Department      8/20/2018 10:15 AM Hudson Arenas DPM [facility]        Today's Diagnoses     Skin ulcer of toe of right foot, limited to breakdown of skin (H)    -  1    PAD (peripheral artery disease) (H)           Follow-ups after your visit        Your next 10 appointments already scheduled     Sep 04, 2018  9:30 AM CDT   Nurse Only with CS NURSE   [facility]    [address] [facility] [address]   [phone]            Oct 01, 2018 10:00 AM CDT   Return Visit with Hudson Arenas DPM   [facility]    [address] [id number] [phone]            Oct 15, 2018 10:00 AM CDT   Office Visit with Judson Mcadams MD   [facility]    [address] [facility] [address]   [phone]           Bring a current list of meds and any records pertaining to this visit. For Physicals, please bring immunization records and any forms needing to be filled out. Please arrive 10 minutes early to complete paperwork.              Who to contact     If you have questions or need follow up information about today's clinic visit or your schedule please contact [facility] directly at [phone].  Normal or non-critical lab and imaging results will be communicated to you by MyChart, letter or phone within 4 business days after the clinic has received the results. If you do not hear from us within 7 days, please contact the clinic through MyChart or phone. If you have a critical or abnormal lab result, we will notify you by phone as soon as possible.  Submit refill requests through [employer] or call your pharmacy and they  "will forward the refill request to us. Please allow 3 business days for your refill to be completed.          Additional Information About Your Visit        [employer] Information     [employer] gives you secure access to your electronic health record. If you see a primary care provider, you can also send messages to your care team and make appointments. If you have questions, please call your primary care clinic.  If you do not have a primary care provider, please call [phone] and they will assist you.        Care EveryWhere ID     This is your Care EveryWhere ID. This could be used by other organizations to access your [facility] medical records  [id number]        Your Vitals Were     Height BMI (Body Mass Index)                5' 7\" (1.702 m) 23.76 kg/m2           Blood Pressure from Last 3 Encounters:   08/20/18 122/68   07/31/18 113/77   07/23/18 140/90    Weight from Last 3 Encounters:   08/20/18 151 lb 11.2 oz (68.8 kg)   07/23/18 155 lb (70.3 kg)   07/13/18 154 lb 1.6 oz (69.9 kg)              Today, you had the following     No orders found for display       Primary Care Provider Office Phone # Fax #    Judson Mcadams [phone] [address] [facility] [address]  [facility] [address]        Equal Access to Services     : Hadii aad ku hadasho Soomaali, waaxda luqadaha, qaybta kaalmada adeegyada, waxay idiin hayaan guevara cardona . So [facility] [phone].    ATENCIÓN: Si habla español, tiene a ambrose disposición servicios gratuitos de asistencia lingüística. Llame al [phone].    We comply with applicable federal civil rights laws and [place] laws. We do not discriminate on the basis of race, color, national origin, age, disability, sex, sexual orientation, or gender identity.            Thank you!     Thank you for choosing [facility]  for your care. Our goal is always to provide you with excellent care. Hearing back from our patients is one way we can " continue to improve our services. Please take a few minutes to complete the written survey that you may receive in the mail after your visit with us. Thank you!             Your Updated Medication List - Protect others around you: Learn how to safely use, store and throw away your medicines at www.disposemymeds.org.          This list is accurate as of 8/20/18 11:00 AM.  Always use your most recent med list.                   Brand Name Dispense Instructions for use Diagnosis    cyanocobalamin 1000 MCG/ML injection    VITAMIN B12    1 mL    Inject 1 mL (1,000 mcg) into the muscle every 30 days    Vitamin B12 deficiency (non anemic)       diphenoxylate-atropine 2.5-0.025 MG per tablet    LOMOTIL    90 tablet    Take 1 tablet by mouth daily    Crohn's disease of small and large intestines with complication (H)       ELIQUIS 5 MG tablet   Generic drug:  apixaban ANTICOAGULANT     180 tablet    TAKE 1 TABLET(5 MG) BY MOUTH TWICE DAILY    Chronic atrial fibrillation (H)       FLOMAX 0.4 MG capsule   Generic drug:  tamsulosin     90 capsule    Take 1 capsule (0.4 mg) by mouth daily    Benign non-nodular prostatic hyperplasia with lower urinary tract symptoms       metoprolol tartrate 50 MG tablet    LOPRESSOR    180 tablet    TAKE 1 TABLET(50 MG) BY MOUTH TWICE DAILY    Benign essential hypertension       opium tincture 10 MG/ML (1%) liquid     118 mL    4 drops every 4 hours as needed for diarrhea    Crohn's disease of small and large intestines with complication (H)       traMADol 50 MG tablet    ULTRAM    120 tablet    Take 1-2 tablets ( mg) by mouth every 12 hours as needed for severe pain    Spinal stenosis, lumbar region, with neurogenic claudication

## 2018-08-20 NOTE — PROGRESS NOTES
Subjective:   Dawson Jones is a 88 year old male who presents today for an ulcerated corn, right 5th toe.  His wife is assisting with wound cares:  BID cleansing, betadine application and a toe spacer. She found a device that holds the toes apart and does not contact the ulcer. Although they are not sure if it is healing, he is having less pain.    He also was evaluated by Dr. Ramos, Vascular, and is though to have adequate perfusion for healing at the toe level. His pain is not thought to be related to mild PAD, but rather his back.        Patient Active Problem List    Diagnosis Date Noted     PAD (peripheral artery disease) (H) 07/31/2018     Priority: Medium     Chronic pain syndrome 01/04/2018     Priority: Medium     Patient is followed by Judson Mcadams MD for ongoing prescription of pain medication.  All refills should only be approved by this provider, or covering partner.    Medication(s): Tramadol  mg po bid prn # 120 per month.   Maximum quantity per month: 120  Clinic visit frequency required: Q 3 months     Controlled substance agreement:  Encounter-Level CSA - 01/04/2018:          Controlled Substance Agreement - Scan on 1/24/2018 12:08 PM : CONTROLLED SUBSTANCE AGREEMENT (below)              Pain Clinic evaluation in the past: No    DIRE Total Score(s):  No flowsheet data found.    Last [facility] website verification:  done on 2/7/2018       Crohn's disease of small and large intestines with complication (H) 09/15/2016     Priority: Medium     Spinal stenosis of lumbosacral region 09/15/2016     Priority: Medium     Vitamin B12 deficiency (non anemic) 09/15/2016     Priority: Medium     Atrial fibrillation (H) 01/05/2015     Priority: Medium     Cardiomyopathy, unspecified type (H)      Priority: Medium         Objective:    B/P: 122/68, T: Data Unavailable, P: Data Unavailable, R: Data Unavailable  Body mass index is 23.76 kg/(m^2).    Vascular:  Pedal pulses are faintly palpable bilaterally  for both the DP and PT arteries.  CFT < 3 sec.  No edema.  Pedal hair growth noted.      Neuro:  Alert and oriented x 3. Coordinated gait.  Light touch sensation is intact to the L4, L5, S1 distributions. No obvious deficits.  No evidence of neurological-based weakness, spasticity, or contracture in the lower extremities.      Derm: There is macerated skin in the right 4th web space and ulceration at the medial base of the 5th toe.  No erythema, drainage, malodor, edema.     Musculoskeletal:    Lower extremity muscle strength is normal.  Patient is ambulatory without an assistive device or brace .  No gross deformities.       XR TOE RIGHT G/E 2 VIEWS 7/23/2018 1:08 PM     HISTORY: ulcer medial right 5th toe;  corn that ulcerated; Skin ulcer  of toe of right foot, limited to breakdown of skin (H)     IMPRESSION: Negative.     NIKITA BURGESS MD      Assessment/Plan:     Encounter Diagnoses   Name Primary?     Skin ulcer of toe of right foot, limited to breakdown of skin (H) Yes     PAD (peripheral artery disease) (H)      No clinical signs of infection.  Continue daily cleansing  Hold betadine  A small amount of topical antibiotic can be applied  Continue toe separator  Follow up in 6-8 weeks  If the wound persists or if infection develops, amputation is an option.  We discussed this procedure.    Hudson Arenas DPM

## 2018-08-20 NOTE — LETTER
8/20/2018         RE: Dawson Jones  [address]  [facility][1] [address]        Dear Colleague,    Thank you for referring your patient, Dawson Jones, to the [facility]. Please see a copy of my visit note below.    Subjective:   Dawson Jones is a 88 year old male who presents today for an ulcerated corn, right 5th toe.  His wife is assisting with wound cares:  BID cleansing, betadine application and a toe spacer. She found a device that holds the toes apart and does not contact the ulcer. Although they are not sure if it is healing, he is having less pain.    He also was evaluated by Dr. Ramos, Vascular, and is though to have adequate perfusion for healing at the toe level. His pain is not thought to be related to mild PAD, but rather his back.        Patient Active Problem List    Diagnosis Date Noted     PAD (peripheral artery disease) (H) 07/31/2018     Priority: Medium     Chronic pain syndrome 01/04/2018     Priority: Medium     Patient is followed by Judson Mcadams MD for ongoing prescription of pain medication.  All refills should only be approved by this provider, or covering partner.    Medication(s): Tramadol  mg po bid prn # 120 per month.   Maximum quantity per month: 120  Clinic visit frequency required: Q 3 months     Controlled substance agreement:  Encounter-Level CSA - 01/04/2018:          Controlled Substance Agreement - Scan on 1/24/2018 12:08 PM : CONTROLLED SUBSTANCE AGREEMENT (below)              Pain Clinic evaluation in the past: No    DIRE Total Score(s):  No flowsheet data found.    Last [facility] website verification:  done on 2/7/2018       Crohn's disease of small and large intestines with complication (H) 09/15/2016     Priority: Medium     Spinal stenosis of lumbosacral region 09/15/2016     Priority: Medium     Vitamin B12 deficiency (non anemic) 09/15/2016     Priority: Medium     Atrial fibrillation (H) 01/05/2015     Priority: Medium      Cardiomyopathy, unspecified type (H)      Priority: Medium         Objective:    B/P: 122/68, T: Data Unavailable, P: Data Unavailable, R: Data Unavailable  Body mass index is 23.76 kg/(m^2).    Vascular:  Pedal pulses are faintly palpable bilaterally for both the DP and PT arteries.  CFT < 3 sec.  No edema.  Pedal hair growth noted.      Neuro:  Alert and oriented x 3. Coordinated gait.  Light touch sensation is intact to the L4, L5, S1 distributions. No obvious deficits.  No evidence of neurological-based weakness, spasticity, or contracture in the lower extremities.      Derm: There is macerated skin in the right 4th web space and ulceration at the medial base of the 5th toe.  No erythema, drainage, malodor, edema.     Musculoskeletal:    Lower extremity muscle strength is normal.  Patient is ambulatory without an assistive device or brace .  No gross deformities.       XR TOE RIGHT G/E 2 VIEWS 7/23/2018 1:08 PM     HISTORY: ulcer medial right 5th toe;  corn that ulcerated; Skin ulcer  of toe of right foot, limited to breakdown of skin (H)     IMPRESSION: Negative.     NIKITA BURGESS MD      Assessment/Plan:     Encounter Diagnoses   Name Primary?     Skin ulcer of toe of right foot, limited to breakdown of skin (H) Yes     PAD (peripheral artery disease) (H)      No clinical signs of infection.  Continue daily cleansing  Hold betadine  A small amount of topical antibiotic can be applied  Continue toe separator  Follow up in 6-8 weeks  If the wound persists or if infection develops, amputation is an option.  We discussed this procedure.    Hudson Arenas DPM            Again, thank you for allowing me to participate in the care of your patient.        Sincerely,        Hudson Arenas DPM

## 2018-08-21 NOTE — TELEPHONE ENCOUNTER
Controlled Substance Refill Request for Tramadol    Last refill: 7/13/18    Last clinic visit: 7/13/18    Clinic visit frequency required: Q 3 months  Next appt: 10/15/18    Controlled substance agreement on file: Yes:  Date 1/24/18.    Documentation in problem list reviewed:  Yes    Processing:  Fax Rx to [facility] pharmacy    [place] reviewed:  reviewed- no concerns  [place]:  [email or web address]       Checked by RN - No concerns- updated on Problem list documentation     Precious HENDERSON RN

## 2018-08-22 RX ORDER — TRAMADOL HYDROCHLORIDE 50 MG/1
TABLET ORAL
Qty: 120 TABLET | Refills: 0 | Status: ON HOLD | OUTPATIENT
Start: 2018-08-22 | End: 2018-10-01

## 2018-09-04 ENCOUNTER — ALLIED HEALTH/NURSE VISIT (OUTPATIENT)
Dept: NURSING | Facility: CLINIC | Age: 83
End: 2018-09-04
Payer: COMMERCIAL

## 2018-09-04 DIAGNOSIS — E53.8 VITAMIN B12 DEFICIENCY (NON ANEMIC): Primary | ICD-10-CM

## 2018-09-04 PROCEDURE — 99207 ZZC NO CHARGE NURSE ONLY: CPT

## 2018-09-04 PROCEDURE — 96372 THER/PROPH/DIAG INJ SC/IM: CPT

## 2018-09-04 NOTE — PROGRESS NOTES
Vit B12 inj given     >> MAR TEX Ruiz Sep 4, 2018  9:30 AM  The following medication was given:     MEDICATION: Vitamin B12  1000 mcg  ROUTE: IM  SITE: Deltoid - Right  DOSE: 1.0 ml  LOT #: [id number]  :  [employer]  EXPIRATION DATE:  03/30/2020  NDC#: 29686-825-13    Prior to injection verified patient identity using patient's name and date of birth.

## 2018-09-04 NOTE — MR AVS SNAPSHOT
After Visit Summary   9/4/2018    Dawson Jones    MRN: [id number]           Patient Information     Date Of Birth          5/5/1930        Visit Information        Provider Department      9/4/2018 9:30 AM CS NURSE [facility]        Today's Diagnoses     Vitamin B12 deficiency (non anemic)    -  1       Follow-ups after your visit        Your next 10 appointments already scheduled     Oct 01, 2018 10:00 AM CDT   Return Visit with Hudson Arenas DPM   [facility] ([facility])    [address] [id number] [phone]            Oct 02, 2018  9:30 AM CDT   Nurse Only with CS NURSE   [facility] ([facility])    [address]  [facility] [id number] [phone]            Oct 15, 2018 10:00 AM CDT   Office Visit with Judson Mcadams MD   [facility] ([facility])    [address]  [facility] [id number] [phone]           Bring a current list of meds and any records pertaining to this visit. For Physicals, please bring immunization records and any forms needing to be filled out. Please arrive 10 minutes early to complete paperwork.            Nov 06, 2018  9:30 AM CST   Nurse Only with CS NURSE   [facility] ([facility])    [address]  [facility] [address] [phone]              Who to contact     If you have questions or need follow up information about today's clinic visit or your schedule please contact [facility] directly at [phone].  Normal or non-critical lab and imaging results will be communicated to you by MyChart, letter or phone within 4 business days after the clinic has received the results. If you do not hear from us within 7 days, please contact the clinic through MyChart or phone. If you have a critical or abnormal lab result, we will notify you by phone as soon as possible.  Submit refill  requests through iversity or call your pharmacy and they will forward the refill request to us. Please allow 3 business days for your refill to be completed.          Additional Information About Your Visit        iBoxPayhart Information     iversity gives you secure access to your electronic health record. If you see a primary care provider, you can also send messages to your care team and make appointments. If you have questions, please call your primary care clinic.  If you do not have a primary care provider, please call [phone] and they will assist you.        Care EveryWhere ID     This is your Care EveryWhere ID. This could be used by other organizations to access your [place] medical records  [id number]         Blood Pressure from Last 3 Encounters:   08/20/18 122/68   07/31/18 113/77   07/23/18 140/90    Weight from Last 3 Encounters:   08/20/18 151 lb 11.2 oz (68.8 kg)   07/23/18 155 lb (70.3 kg)   07/13/18 154 lb 1.6 oz (69.9 kg)              We Performed the Following     INJECTION INTRAMUSCULAR OR SUB-Q     VITAMIN B12 INJ /1000MCG        Primary Care Provider Office Phone # Fax #    Judson Mcadams [phone] [address] [address]        Equal Access to Services     ABDULLAHI SANCHEZ : Hadii aad ku hadasho Soomaali, waaxda luqadaha, qaybta kaalmada adeegyada, waxay osfiin haysangeethan guevara cardona . So [facility] [phone].    ATENCIÓN: Si habla español, tiene a ambrose disposición servicios gratuitos de asistencia lingüística. Llame al [phone].    We comply with applicable federal civil rights laws and [place] laws. We do not discriminate on the basis of race, color, national origin, age, disability, sex, sexual orientation, or gender identity.            Thank you!     Thank you for choosing [facility]  for your care. Our goal is always to provide you with excellent care. Hearing back from our patients is one way we can continue to improve our  services. Please take a few minutes to complete the written survey that you may receive in the mail after your visit with us. Thank you!             Your Updated Medication List - Protect others around you: Learn how to safely use, store and throw away your medicines at www.disposemymeds.org.          This list is accurate as of 9/4/18  9:31 AM.  Always use your most recent med list.                   Brand Name Dispense Instructions for use Diagnosis    cyanocobalamin 1000 MCG/ML injection    VITAMIN B12    1 mL    Inject 1 mL (1,000 mcg) into the muscle every 30 days    Vitamin B12 deficiency (non anemic)       diphenoxylate-atropine 2.5-0.025 MG per tablet    LOMOTIL    90 tablet    Take 1 tablet by mouth daily    Crohn's disease of small and large intestines with complication (H)       ELIQUIS 5 MG tablet   Generic drug:  apixaban ANTICOAGULANT     180 tablet    TAKE 1 TABLET(5 MG) BY MOUTH TWICE DAILY    Chronic atrial fibrillation (H)       FLOMAX 0.4 MG capsule   Generic drug:  tamsulosin     90 capsule    Take 1 capsule (0.4 mg) by mouth daily    Benign non-nodular prostatic hyperplasia with lower urinary tract symptoms       metoprolol tartrate 50 MG tablet    LOPRESSOR    180 tablet    TAKE 1 TABLET(50 MG) BY MOUTH TWICE DAILY    Benign essential hypertension       opium tincture 10 MG/ML (1%) liquid     118 mL    4 drops every 4 hours as needed for diarrhea    Crohn's disease of small and large intestines with complication (H)       traMADol 50 MG tablet    ULTRAM    120 tablet    TAKE 1 TO 2 TABLETS BY MOUTH EVERY 12 HOURS AS NEEDED FOR SEVERE PAIN    Spinal stenosis, lumbar region, with neurogenic claudication

## 2018-09-24 ENCOUNTER — TELEPHONE (OUTPATIENT)
Dept: FAMILY MEDICINE | Facility: CLINIC | Age: 83
End: 2018-09-24

## 2018-09-24 ENCOUNTER — OFFICE VISIT (OUTPATIENT)
Dept: FAMILY MEDICINE | Facility: CLINIC | Age: 83
End: 2018-09-24
Payer: COMMERCIAL

## 2018-09-24 VITALS
BODY MASS INDEX: 24.17 KG/M2 | TEMPERATURE: 97 F | OXYGEN SATURATION: 99 % | WEIGHT: 154 LBS | HEART RATE: 75 BPM | HEIGHT: 67 IN | DIASTOLIC BLOOD PRESSURE: 85 MMHG | SYSTOLIC BLOOD PRESSURE: 127 MMHG

## 2018-09-24 DIAGNOSIS — I48.20 CHRONIC ATRIAL FIBRILLATION (H): ICD-10-CM

## 2018-09-24 DIAGNOSIS — Z23 NEED FOR PROPHYLACTIC VACCINATION AND INOCULATION AGAINST INFLUENZA: ICD-10-CM

## 2018-09-24 DIAGNOSIS — R73.01 IMPAIRED FASTING GLUCOSE: ICD-10-CM

## 2018-09-24 DIAGNOSIS — Z01.818 PREOP GENERAL PHYSICAL EXAM: Primary | ICD-10-CM

## 2018-09-24 DIAGNOSIS — M48.07 SPINAL STENOSIS OF LUMBOSACRAL REGION: ICD-10-CM

## 2018-09-24 LAB
ERYTHROCYTE [DISTWIDTH] IN BLOOD BY AUTOMATED COUNT: 12.1 % (ref 10–15)
HBA1C MFR BLD: 4.8 % (ref 0–5.6)
HCT VFR BLD AUTO: 40.6 % (ref 40–53)
HGB BLD-MCNC: 14.4 G/DL (ref 13.3–17.7)
MCH RBC QN AUTO: 37.1 PG (ref 26.5–33)
MCHC RBC AUTO-ENTMCNC: 35.5 G/DL (ref 31.5–36.5)
MCV RBC AUTO: 105 FL (ref 78–100)
MRSA DNA SPEC QL NAA+PROBE: NEGATIVE
PLATELET # BLD AUTO: 159 10E9/L (ref 150–450)
RBC # BLD AUTO: 3.88 10E12/L (ref 4.4–5.9)
SPECIMEN SOURCE: NORMAL
WBC # BLD AUTO: 8.1 10E9/L (ref 4–11)

## 2018-09-24 PROCEDURE — 87641 MR-STAPH DNA AMP PROBE: CPT | Performed by: INTERNAL MEDICINE

## 2018-09-24 PROCEDURE — 90662 IIV NO PRSV INCREASED AG IM: CPT | Performed by: INTERNAL MEDICINE

## 2018-09-24 PROCEDURE — 85027 COMPLETE CBC AUTOMATED: CPT | Performed by: INTERNAL MEDICINE

## 2018-09-24 PROCEDURE — 36415 COLL VENOUS BLD VENIPUNCTURE: CPT | Performed by: INTERNAL MEDICINE

## 2018-09-24 PROCEDURE — 83036 HEMOGLOBIN GLYCOSYLATED A1C: CPT | Performed by: INTERNAL MEDICINE

## 2018-09-24 PROCEDURE — 93000 ELECTROCARDIOGRAM COMPLETE: CPT | Performed by: INTERNAL MEDICINE

## 2018-09-24 PROCEDURE — 87640 STAPH A DNA AMP PROBE: CPT | Mod: 59 | Performed by: INTERNAL MEDICINE

## 2018-09-24 PROCEDURE — 80048 BASIC METABOLIC PNL TOTAL CA: CPT | Performed by: INTERNAL MEDICINE

## 2018-09-24 PROCEDURE — G0008 ADMIN INFLUENZA VIRUS VAC: HCPCS | Performed by: INTERNAL MEDICINE

## 2018-09-24 PROCEDURE — 99215 OFFICE O/P EST HI 40 MIN: CPT | Mod: 25 | Performed by: INTERNAL MEDICINE

## 2018-09-24 NOTE — PROGRESS NOTES
[facility]  [address] [place] [address]  [phone]  Dept: [phone]    PRE-OP EVALUATION:  Today's date: 2018    Dawson Jones (: 1930) presents for pre-operative evaluation assessment as requested by Bert Abbott MD and Mika Moore MD.  He requires evaluation and anesthesia risk assessment prior to undergoing surgery/procedure for treatment of Back Pain.    Proposed Surgery/ Procedure: DECOMPRESSION L2-3 WITH COFLEX DEVICE  (LAWRENCE FRAME, C-ARM, COFLEX DEVICE)  Date of Surgery/ Procedure: 10/1/2018  Time of Surgery/ Procedure: 7:30 AM  Hospital/Surgical Facility:  OR  Fax number for surgical facility: [phone]  Primary Physician: Judson Mcadams  Type of Anesthesia Anticipated: General    Patient has a Health Care Directive or Living Will:  YES on file 8/15/2017    1. NO - Do you have a history of heart attack, stroke, stent, bypass or surgery on an artery in the head, neck, heart or legs?  2. NO - Do you ever have any pain or discomfort in your chest?  3. NO - Do you have a history of  Heart Failure?  4. NO - Are you troubled by shortness of breath when: walking on the level, up a slight hill or at night?  5. NO - Do you currently have a cold, bronchitis or other respiratory infection?  6. NO - Do you have a cough, shortness of breath or wheezing?  7. NO - Do you sometimes get pains in the calves of your legs when you walk?  8. NO - Do you or anyone in your family have previous history of blood clots?  9. NO - Do you or does anyone in your family have a serious bleeding problem such as prolonged bleeding following surgeries or cuts?  10. NO - Have you ever had problems with anemia or been told to take iron pills?  11. NO - Have you had any abnormal blood loss such as black, tarry or bloody stools, or abnormal vaginal bleeding?  12. NO - Have you ever had a blood transfusion?  13. NO - Have you or any of your relatives ever  had problems with anesthesia?  14. NO - Do you have sleep apnea, excessive snoring or daytime drowsiness?  15. NO - Do you have any prosthetic heart valves?  16. NO - Do you have prosthetic joints?  17. NO - Is there any chance that you may be pregnant?      HPI:     HPI related to upcoming procedure: 3 year history of progressive now intolerable pain in right lower back radiating to right leg.  Pain has not been responsive to hip join injections, tramadol, physical therapy.  An epidural steroid injection helped pain temporarily.  Minimally invasive decompressive spine surgery is planned.  He can easily perform 4 METS of physical activity without chest pain or dyspnea.           MEDICAL HISTORY:     Patient Active Problem List    Diagnosis Date Noted     PAD (peripheral artery disease) (H) 07/31/2018     Priority: Medium     Chronic pain syndrome 01/04/2018     Priority: Medium     Patient is followed by Judson Mcadams MD for ongoing prescription of pain medication.  All refills should only be approved by this provider, or covering partner.    Medication(s): Tramadol  mg po bid prn # 120 per month.   Maximum quantity per month: 120  Clinic visit frequency required: Q 3 months     Controlled substance agreement:  Encounter-Level CSA - 01/04/2018:          Controlled Substance Agreement - Scan on 1/24/2018 12:08 PM : CONTROLLED SUBSTANCE AGREEMENT (below)              Pain Clinic evaluation in the past: No    DIRE Total Score(s):  No flowsheet data found.    Last [facility] website verification: No concerns 08/21/18 [facility]         Crohn's disease of small and large intestines with complication (H) 09/15/2016     Priority: Medium     Spinal stenosis of lumbosacral region 09/15/2016     Priority: Medium     Vitamin B12 deficiency (non anemic) 09/15/2016     Priority: Medium     Atrial fibrillation (H) 01/05/2015     Priority: Medium     Cardiomyopathy, unspecified type (H)      Priority: Medium      Past Medical History:  "  Diagnosis Date     Atrial fibrillation (H)      Cardiomyopathy (H)      Crohn's disease of small and large intestines with complication (H)      Hyperlipidemia      Past Surgical History:   Procedure Laterality Date     APPENDECTOMY       Current Outpatient Prescriptions   Medication Sig Dispense Refill     cyanocobalamin (VITAMIN B12) 1000 MCG/ML injection Inject 1 mL (1,000 mcg) into the muscle every 30 days 1 mL 11     diphenoxylate-atropine (LOMOTIL) 2.5-0.025 MG per tablet Take 1 tablet by mouth daily 90 tablet 3     ELIQUIS 5 MG tablet TAKE 1 TABLET(5 MG) BY MOUTH TWICE DAILY 180 tablet 0     metoprolol tartrate (LOPRESSOR) 50 MG tablet TAKE 1 TABLET(50 MG) BY MOUTH TWICE DAILY 180 tablet 3     opium tincture 10 MG/ML (1%) liquid 4 drops every 4 hours as needed for diarrhea 118 mL 0     tamsulosin (FLOMAX) 0.4 MG capsule Take 1 capsule (0.4 mg) by mouth daily 90 capsule 3     traMADol (ULTRAM) 50 MG tablet TAKE 1 TO 2 TABLETS BY MOUTH EVERY 12 HOURS AS NEEDED FOR SEVERE PAIN 120 tablet 0     OTC:  APAP    No Known Allergies   Latex Allergy: NO    Social History   Substance Use Topics     Smoking status: Former Smoker     Smokeless tobacco: Never Used      Comment: 40 years ago     Alcohol use 4.2 oz/week     7 Standard drinks or equivalent per week      Comment: 1 wine an evening     History   Drug Use No       REVIEW OF SYSTEMS:   Constitutional, neuro, ENT, endocrine, pulmonary, cardiac, gastrointestinal, genitourinary, musculoskeletal, integument and psychiatric systems are negative, except as otherwise noted.    EXAM:   /85 (BP Location: Right arm, Cuff Size: Adult Regular)  Pulse 75  Temp 97  F (36.1  C) (Tympanic)  Ht 5' 7\" (1.702 m)  Wt 154 lb (69.9 kg)  SpO2 99%  BMI 24.12 kg/m2    GENERAL APPEARANCE: healthy, alert and no distress     EYES: EOMI,  PERRL     HENT: ear canals and TM's normal and nose and mouth without ulcers or lesions     NECK: no adenopathy, no asymmetry, masses, or " scars and thyroid normal to palpation     RESP: lungs clear to auscultation - no rales, rhonchi or wheezes     CV: The heart has an irregularly irregular rhythm with a normal rate.      ABDOMEN:  soft, nontender, no HSM or masses and bowel sounds normal     MS: extremities normal- no gross deformities noted, no evidence of inflammation in joints, FROM in all extremities; small ulcer on medial surface of right 5th digit without any current evidence of infection      SKIN: no suspicious lesions or rashes.  Multiple SK's.        NEURO: Normal strength and tone, sensory exam grossly normal, mentation intact and speech normal     PSYCH: mentation appears normal. and affect normal/bright     LYMPHATICS: No cervical adenopathy    DIAGNOSTICS:   EKG: Atrial fibrillation rate 81 no ST changes     Recent Labs   Lab Test  03/27/17   1136 07/29/14 07/28/14 04/01/13  12/21/10   1242  11/11/10   0657   HGB  13.7   --    --    --    --    --    PLT  143*   --    --    --    --    --    INR   --    --    --    --   0.98  2.60*   NA  139  143   --    --    --   141   POTASSIUM  4.7  4.3   --    --    --   4.6   CR  0.97  0.92   --    --    --    --    A1C   --    --   5.2  5.4   --    --         IMPRESSION:   Reason for surgery/procedure: Spinal stenosis   Diagnosis/reason for consult: Preoperative evaluation     The proposed surgical procedure is considered INTERMEDIATE risk.    REVISED CARDIAC RISK INDEX  The patient has the following serious cardiovascular risks for perioperative complications such as (MI, PE, VFib and 3  AV Block):  No serious cardiac risks  INTERPRETATION: 1 risks: Class II (low risk - 0.9% complication rate)    The patient has the following additional risks for perioperative complications:  No identified additional risks      ICD-10-CM    1. Preop general physical exam Z01.818 BASIC METABOLIC PANEL     CBC with platelets     EKG 12-lead complete w/read - Clinics     Methicillin Resist/Sens S. aureus PCR   2.  Spinal stenosis of lumbosacral region M48.07    3. Chronic atrial fibrillation (H) I48.2    4. Need for prophylactic vaccination and inoculation against influenza Z23 FLU VACCINE, INCREASED ANTIGEN, PRESV FREE, AGE 65+ [83382]     ADMIN INFLUENZA  (For MEDICARE Patients ONLY) []   5. Impaired fasting glucose R73.01 Hemoglobin A1c       RECOMMENDATIONS:     --Consult hospital rounder / IM to assist post-op medical management    --Patient is to take all scheduled medications on the day of surgery EXCEPT for modifications listed below.    Anticoagulant or Antiplatelet Medication Use  Hold Eliquis 4 days prior to surgery.  Restart as soon as possible following surgery.          APPROVAL GIVEN to proceed with proposed procedure, without further diagnostic evaluation       Signed Electronically by: Judson Mcadams MD    Copy of this evaluation report is provided to requesting physician.    [place] Preop Guidelines    Revised Cardiac Risk Index

## 2018-09-24 NOTE — TELEPHONE ENCOUNTER
"Returned call to patient's spouse.     She states the \"adult kids\" are concerned and want to make sure it is safe for patient to undergo surgery and anesthesia.       Patient had Preop in clinic by PCP today, which included complete physical assessment and EKG.   Per preop notes:  No serious cardiac risks  INTERPRETATION: 1 risks: Class II (low risk - 0.9% complication rate)      APPROVAL GIVEN to proceed with proposed procedure, without further diagnostic evaluation    Pauly, patient's spouse stated understanding and will pass along the information to family members.     Ivette GONZALEZ RN,BSN        "

## 2018-09-24 NOTE — TELEPHONE ENCOUNTER
Reason for Call:  Other anesthesia question for surgery    Detailed comments: Pt's wife calling back, forgot to ask about the risk of anesthesia for 's upcoming surgery on 10/1.  Surgery should last about 2 hours & kids are concerned with father's age & anesthesia.  Wife is wondering if RN or provider could call back & confirm if ok to have anesthesia for surgery.    Phone Number Patient can be reached at: Home number on file [phone] (home)    Best Time: any    Can we leave a detailed message on this number? YES    Call taken on 9/24/2018 at 3:43 PM by Tatum Ventura

## 2018-09-24 NOTE — MR AVS SNAPSHOT
After Visit Summary   9/24/2018    Dawson Jones    MRN: [id number]           Patient Information     Date Of Birth          5/5/1930        Visit Information        Provider Department      9/24/2018 11:30 AM Judson Mcadams MD [facility]        Today's Diagnoses     Preop general physical exam    -  1    Spinal stenosis of lumbosacral region        Chronic atrial fibrillation (H)        Need for prophylactic vaccination and inoculation against influenza        Impaired fasting glucose          Care Instructions      Before Your Surgery      Call your surgeon if there is any change in your health. This includes signs of a cold or flu (such as a sore throat, runny nose, cough, rash or fever).    Do not smoke, drink alcohol or take over the counter medicine (unless your surgeon or primary care doctor tells you to) for the 24 hours before and after surgery.    If you take prescribed drugs: Follow your doctor s orders about which medicines to take and which to stop until after surgery.    Eating and drinking prior to surgery: follow the instructions from your surgeon    Take a shower or bath the night before surgery. Use the soap your surgeon gave you to gently clean your skin. If you do not have soap from your surgeon, use your regular soap. Do not shave or scrub the surgery site.  Wear clean pajamas and have clean sheets on your bed.           Follow-ups after your visit        Follow-up notes from your care team     Return in about 6 weeks (around 11/5/2018) for Pain Check.      Your next 10 appointments already scheduled     Sep 28, 2018  9:45 AM CDT   Nurse Only with CS NURSE   [facility]    [address]  [place] [address]   [phone]            Oct 01, 2018   Procedure with Bert Reynolds MD   [facility] (--)    [address]  [facility] [address]   [phone]            Oct 08, 2018 10:00 AM CDT    Return Visit with Hudson Arenas DPM   [facility] ([facility]    [address] [address] [facility] [id number] [phone]            Nov 05, 2018 11:30 AM CST   Office Visit with Judson Mcadams MD   [facility] ([facility])    [address]  [facility] [address] [phone]           Bring a current list of meds and any records pertaining to this visit. For Physicals, please bring immunization records and any forms needing to be filled out. Please arrive 10 minutes early to complete paperwork.            Nov 06, 2018  9:30 AM CST   Nurse Only with CS NURSE   [facility] ([facility])    [address]  [facility] [address] [phone]              Who to contact     If you have questions or need follow up information about today's clinic visit or your schedule please contact [facility] directly at [phone].  Normal or non-critical lab and imaging results will be communicated to you by Likeabilityhart, letter or phone within 4 business days after the clinic has received the results. If you do not hear from us within 7 days, please contact the clinic through Fire[employer] or phone. If you have a critical or abnormal lab result, we will notify you by phone as soon as possible.  Submit refill requests through [employer] or call your pharmacy and they will forward the refill request to us. Please allow 3 business days for your refill to be completed.          Additional Information About Your Visit        LikeabilityharCareem Information     [employer] gives you secure access to your electronic health record. If you see a primary care provider, you can also send messages to your care team and make appointments. If you have questions, please call your primary care clinic.  If you do not have a primary care provider, please call [phone] and they will assist you.        Care EveryWhere ID     This is your Care  "EveryWhere ID. This could be used by other organizations to access your [place] medical records  [id number]        Your Vitals Were     Pulse Temperature Height Pulse Oximetry BMI (Body Mass Index)       75 97  F (36.1  C) (Tympanic) 5' 7\" (1.702 m) 99% 24.12 kg/m2        Blood Pressure from Last 3 Encounters:   09/24/18 127/85   08/20/18 122/68   07/31/18 113/77    Weight from Last 3 Encounters:   09/24/18 154 lb (69.9 kg)   08/20/18 151 lb 11.2 oz (68.8 kg)   07/23/18 155 lb (70.3 kg)              We Performed the Following     ADMIN INFLUENZA  (For MEDICARE Patients ONLY) []     BASIC METABOLIC PANEL     CBC with platelets     EKG 12-lead complete w/read - Clinics     FLU VACCINE, INCREASED ANTIGEN, PRESV FREE, AGE 65+ [13117]     Hemoglobin A1c     Methicillin Resist/Sens S. aureus PCR        Primary Care Provider Office Phone # Fax #    Judson Mcadams [phone] [address]  [facility] [address]        Equal Access to Services     [facility]: Hadii aad ku hadasho Soomaali, waaxda luqadaha, qaybta kaalmada adeegyada, ky cardona . So [facility] [phone].    ATENCIÓN: Si habla español, tiene a ambrose disposición servicios gratuitos de asistencia lingüística. Llame al [phone].    We comply with applicable federal civil rights laws and [place] laws. We do not discriminate on the basis of race, color, national origin, age, disability, sex, sexual orientation, or gender identity.            Thank you!     Thank you for choosing [facility]  for your care. Our goal is always to provide you with excellent care. Hearing back from our patients is one way we can continue to improve our services. Please take a few minutes to complete the written survey that you may receive in the mail after your visit with us. Thank you!             Your Updated Medication List - Protect others around you: Learn how to safely use, store and throw away your " medicines at www.disposemymeds.org.          This list is accurate as of 9/24/18 12:27 PM.  Always use your most recent med list.                   Brand Name Dispense Instructions for use Diagnosis    cyanocobalamin 1000 MCG/ML injection    VITAMIN B12    1 mL    Inject 1 mL (1,000 mcg) into the muscle every 30 days    Vitamin B12 deficiency (non anemic)       diphenoxylate-atropine 2.5-0.025 MG per tablet    LOMOTIL    90 tablet    Take 1 tablet by mouth daily    Crohn's disease of small and large intestines with complication (H)       ELIQUIS 5 MG tablet   Generic drug:  apixaban ANTICOAGULANT     180 tablet    TAKE 1 TABLET(5 MG) BY MOUTH TWICE DAILY    Chronic atrial fibrillation (H)       FLOMAX 0.4 MG capsule   Generic drug:  tamsulosin     90 capsule    Take 1 capsule (0.4 mg) by mouth daily    Benign non-nodular prostatic hyperplasia with lower urinary tract symptoms       metoprolol tartrate 50 MG tablet    LOPRESSOR    180 tablet    TAKE 1 TABLET(50 MG) BY MOUTH TWICE DAILY    Benign essential hypertension       opium tincture 10 MG/ML (1%) liquid     118 mL    4 drops every 4 hours as needed for diarrhea    Crohn's disease of small and large intestines with complication (H)       traMADol 50 MG tablet    ULTRAM    120 tablet    TAKE 1 TO 2 TABLETS BY MOUTH EVERY 12 HOURS AS NEEDED FOR SEVERE PAIN    Spinal stenosis, lumbar region, with neurogenic claudication

## 2018-09-24 NOTE — PROGRESS NOTES

## 2018-09-25 LAB
ANION GAP SERPL CALCULATED.3IONS-SCNC: 7 MMOL/L (ref 3–14)
BUN SERPL-MCNC: 12 MG/DL (ref 7–30)
CALCIUM SERPL-MCNC: 8.9 MG/DL (ref 8.5–10.1)
CHLORIDE SERPL-SCNC: 104 MMOL/L (ref 94–109)
CO2 SERPL-SCNC: 26 MMOL/L (ref 20–32)
CREAT SERPL-MCNC: 0.94 MG/DL (ref 0.66–1.25)
GFR SERPL CREATININE-BSD FRML MDRD: 76 ML/MIN/1.7M2
GLUCOSE SERPL-MCNC: 110 MG/DL (ref 70–99)
POTASSIUM SERPL-SCNC: 4.6 MMOL/L (ref 3.4–5.3)
SODIUM SERPL-SCNC: 137 MMOL/L (ref 133–144)

## 2018-09-25 NOTE — PROGRESS NOTES
The following letter pertains to your most recent diagnostic tests:    Lab results and EKG look OK for surgery.        Sincerely,    Dr. Mcadams

## 2018-09-26 NOTE — H&P (VIEW-ONLY)
[facility]  [address] [place] [id number]  [phone]  Dept: [phone]    PRE-OP EVALUATION:  Today's date: 2018    Dawson Jones (: 1930) presents for pre-operative evaluation assessment as requested by Bert Abbott MD and Mika Moore MD.  He requires evaluation and anesthesia risk assessment prior to undergoing surgery/procedure for treatment of Back Pain.    Proposed Surgery/ Procedure: DECOMPRESSION L2-3 WITH COFLEX DEVICE  (LAWRENCE FRAME, C-ARM, COFLEX DEVICE)  Date of Surgery/ Procedure: 10/1/2018  Time of Surgery/ Procedure: 7:30 AM  Hospital/Surgical Facility:  OR  Fax number for surgical facility: [phone]  Primary Physician: Judson Mcadams  Type of Anesthesia Anticipated: General    Patient has a Health Care Directive or Living Will:  YES on file 8/15/2017    1. NO - Do you have a history of heart attack, stroke, stent, bypass or surgery on an artery in the head, neck, heart or legs?  2. NO - Do you ever have any pain or discomfort in your chest?  3. NO - Do you have a history of  Heart Failure?  4. NO - Are you troubled by shortness of breath when: walking on the level, up a slight hill or at night?  5. NO - Do you currently have a cold, bronchitis or other respiratory infection?  6. NO - Do you have a cough, shortness of breath or wheezing?  7. NO - Do you sometimes get pains in the calves of your legs when you walk?  8. NO - Do you or anyone in your family have previous history of blood clots?  9. NO - Do you or does anyone in your family have a serious bleeding problem such as prolonged bleeding following surgeries or cuts?  10. NO - Have you ever had problems with anemia or been told to take iron pills?  11. NO - Have you had any abnormal blood loss such as black, tarry or bloody stools, or abnormal vaginal bleeding?  12. NO - Have you ever had a blood transfusion?  13. NO - Have you or any of your relatives ever  had problems with anesthesia?  14. NO - Do you have sleep apnea, excessive snoring or daytime drowsiness?  15. NO - Do you have any prosthetic heart valves?  16. NO - Do you have prosthetic joints?  17. NO - Is there any chance that you may be pregnant?      HPI:     HPI related to upcoming procedure: 3 year history of progressive now intolerable pain in right lower back radiating to right leg.  Pain has not been responsive to hip join injections, tramadol, physical therapy.  An epidural steroid injection helped pain temporarily.  Minimally invasive decompressive spine surgery is planned.  He can easily perform 4 METS of physical activity without chest pain or dyspnea.           MEDICAL HISTORY:     Patient Active Problem List    Diagnosis Date Noted     PAD (peripheral artery disease) (H) 07/31/2018     Priority: Medium     Chronic pain syndrome 01/04/2018     Priority: Medium     Patient is followed by Judson Mcadams MD for ongoing prescription of pain medication.  All refills should only be approved by this provider, or covering partner.    Medication(s): Tramadol  mg po bid prn # 120 per month.   Maximum quantity per month: 120  Clinic visit frequency required: Q 3 months     Controlled substance agreement:  Encounter-Level CSA - 01/04/2018:          Controlled Substance Agreement - Scan on 1/24/2018 12:08 PM : CONTROLLED SUBSTANCE AGREEMENT (below)              Pain Clinic evaluation in the past: No    DIRE Total Score(s):  No flowsheet data found.    Last [facility] website verification: No concerns 08/21/18 [facility]         Crohn's disease of small and large intestines with complication (H) 09/15/2016     Priority: Medium     Spinal stenosis of lumbosacral region 09/15/2016     Priority: Medium     Vitamin B12 deficiency (non anemic) 09/15/2016     Priority: Medium     Atrial fibrillation (H) 01/05/2015     Priority: Medium     Cardiomyopathy, unspecified type (H)      Priority: Medium      Past Medical History:  "  Diagnosis Date     Atrial fibrillation (H)      Cardiomyopathy (H)      Crohn's disease of small and large intestines with complication (H)      Hyperlipidemia      Past Surgical History:   Procedure Laterality Date     APPENDECTOMY       Current Outpatient Prescriptions   Medication Sig Dispense Refill     cyanocobalamin (VITAMIN B12) 1000 MCG/ML injection Inject 1 mL (1,000 mcg) into the muscle every 30 days 1 mL 11     diphenoxylate-atropine (LOMOTIL) 2.5-0.025 MG per tablet Take 1 tablet by mouth daily 90 tablet 3     ELIQUIS 5 MG tablet TAKE 1 TABLET(5 MG) BY MOUTH TWICE DAILY 180 tablet 0     metoprolol tartrate (LOPRESSOR) 50 MG tablet TAKE 1 TABLET(50 MG) BY MOUTH TWICE DAILY 180 tablet 3     opium tincture 10 MG/ML (1%) liquid 4 drops every 4 hours as needed for diarrhea 118 mL 0     tamsulosin (FLOMAX) 0.4 MG capsule Take 1 capsule (0.4 mg) by mouth daily 90 capsule 3     traMADol (ULTRAM) 50 MG tablet TAKE 1 TO 2 TABLETS BY MOUTH EVERY 12 HOURS AS NEEDED FOR SEVERE PAIN 120 tablet 0     OTC:  APAP    No Known Allergies   Latex Allergy: NO    Social History   Substance Use Topics     Smoking status: Former Smoker     Smokeless tobacco: Never Used      Comment: 40 years ago     Alcohol use 4.2 oz/week     7 Standard drinks or equivalent per week      Comment: 1 wine an evening     History   Drug Use No       REVIEW OF SYSTEMS:   Constitutional, neuro, ENT, endocrine, pulmonary, cardiac, gastrointestinal, genitourinary, musculoskeletal, integument and psychiatric systems are negative, except as otherwise noted.    EXAM:   /85 (BP Location: Right arm, Cuff Size: Adult Regular)  Pulse 75  Temp 97  F (36.1  C) (Tympanic)  Ht 5' 7\" (1.702 m)  Wt 154 lb (69.9 kg)  SpO2 99%  BMI 24.12 kg/m2    GENERAL APPEARANCE: healthy, alert and no distress     EYES: EOMI,  PERRL     HENT: ear canals and TM's normal and nose and mouth without ulcers or lesions     NECK: no adenopathy, no asymmetry, masses, or " scars and thyroid normal to palpation     RESP: lungs clear to auscultation - no rales, rhonchi or wheezes     CV: The heart has an irregularly irregular rhythm with a normal rate.      ABDOMEN:  soft, nontender, no HSM or masses and bowel sounds normal     MS: extremities normal- no gross deformities noted, no evidence of inflammation in joints, FROM in all extremities; small ulcer on medial surface of right 5th digit without any current evidence of infection      SKIN: no suspicious lesions or rashes.  Multiple SK's.        NEURO: Normal strength and tone, sensory exam grossly normal, mentation intact and speech normal     PSYCH: mentation appears normal. and affect normal/bright     LYMPHATICS: No cervical adenopathy    DIAGNOSTICS:   EKG: Atrial fibrillation rate 81 no ST changes     Recent Labs   Lab Test  03/27/17   1136 07/29/14 07/28/14 04/01/13  12/21/10   1242  11/11/10   0657   HGB  13.7   --    --    --    --    --    PLT  143*   --    --    --    --    --    INR   --    --    --    --   0.98  2.60*   NA  139  143   --    --    --   141   POTASSIUM  4.7  4.3   --    --    --   4.6   CR  0.97  0.92   --    --    --    --    A1C   --    --   5.2  5.4   --    --         IMPRESSION:   Reason for surgery/procedure: Spinal stenosis   Diagnosis/reason for consult: Preoperative evaluation     The proposed surgical procedure is considered INTERMEDIATE risk.    REVISED CARDIAC RISK INDEX  The patient has the following serious cardiovascular risks for perioperative complications such as (MI, PE, VFib and 3  AV Block):  No serious cardiac risks  INTERPRETATION: 1 risks: Class II (low risk - 0.9% complication rate)    The patient has the following additional risks for perioperative complications:  No identified additional risks      ICD-10-CM    1. Preop general physical exam Z01.818 BASIC METABOLIC PANEL     CBC with platelets     EKG 12-lead complete w/read - Clinics     Methicillin Resist/Sens S. aureus PCR   2.  Spinal stenosis of lumbosacral region M48.07    3. Chronic atrial fibrillation (H) I48.2    4. Need for prophylactic vaccination and inoculation against influenza Z23 FLU VACCINE, INCREASED ANTIGEN, PRESV FREE, AGE 65+ [84699]     ADMIN INFLUENZA  (For MEDICARE Patients ONLY) []   5. Impaired fasting glucose R73.01 Hemoglobin A1c       RECOMMENDATIONS:     --Consult hospital rounder / IM to assist post-op medical management    --Patient is to take all scheduled medications on the day of surgery EXCEPT for modifications listed below.    Anticoagulant or Antiplatelet Medication Use  Hold Eliquis 4 days prior to surgery.  Restart as soon as possible following surgery.          APPROVAL GIVEN to proceed with proposed procedure, without further diagnostic evaluation       Signed Electronically by: Judson Mcadams MD    Copy of this evaluation report is provided to requesting physician.    [facility] Preop Guidelines    Revised Cardiac Risk Index

## 2018-10-01 ENCOUNTER — APPOINTMENT (OUTPATIENT)
Dept: GENERAL RADIOLOGY | Facility: CLINIC | Age: 83
End: 2018-10-01
Attending: ORTHOPAEDIC SURGERY
Payer: COMMERCIAL

## 2018-10-01 ENCOUNTER — ANESTHESIA (OUTPATIENT)
Dept: SURGERY | Facility: CLINIC | Age: 83
End: 2018-10-01
Payer: COMMERCIAL

## 2018-10-01 ENCOUNTER — ANESTHESIA EVENT (OUTPATIENT)
Dept: SURGERY | Facility: CLINIC | Age: 83
End: 2018-10-01
Payer: COMMERCIAL

## 2018-10-01 ENCOUNTER — HOSPITAL ENCOUNTER (OUTPATIENT)
Facility: CLINIC | Age: 83
Discharge: HOME | End: 2018-10-03
Attending: ORTHOPAEDIC SURGERY | Admitting: ORTHOPAEDIC SURGERY
Payer: COMMERCIAL

## 2018-10-01 ENCOUNTER — SURGERY (OUTPATIENT)
Age: 83
End: 2018-10-01

## 2018-10-01 DIAGNOSIS — I48.20 CHRONIC ATRIAL FIBRILLATION (H): ICD-10-CM

## 2018-10-01 DIAGNOSIS — Z98.890 S/P LAMINECTOMY: Primary | ICD-10-CM

## 2018-10-01 PROBLEM — M48.00 SPINAL STENOSIS: Status: ACTIVE | Noted: 2018-10-01

## 2018-10-01 PROCEDURE — 71000013 ZZH RECOVERY PHASE 1 LEVEL 1 EA ADDTL HR: Performed by: ORTHOPAEDIC SURGERY

## 2018-10-01 PROCEDURE — 37000009 ZZH ANESTHESIA TECHNICAL FEE, EACH ADDTL 15 MIN: Performed by: ORTHOPAEDIC SURGERY

## 2018-10-01 PROCEDURE — 25000128 H RX IP 250 OP 636: Performed by: ANESTHESIOLOGY

## 2018-10-01 PROCEDURE — 25000128 H RX IP 250 OP 636: Performed by: PHYSICIAN ASSISTANT

## 2018-10-01 PROCEDURE — C1713 ANCHOR/SCREW BN/BN,TIS/BN: HCPCS | Performed by: ORTHOPAEDIC SURGERY

## 2018-10-01 PROCEDURE — 36000063 ZZH SURGERY LEVEL 4 EA 15 ADDTL MIN: Performed by: ORTHOPAEDIC SURGERY

## 2018-10-01 PROCEDURE — 25000125 ZZHC RX 250: Performed by: ORTHOPAEDIC SURGERY

## 2018-10-01 PROCEDURE — 25000132 ZZH RX MED GY IP 250 OP 250 PS 637: Performed by: PHYSICIAN ASSISTANT

## 2018-10-01 PROCEDURE — 25000128 H RX IP 250 OP 636: Performed by: ORTHOPAEDIC SURGERY

## 2018-10-01 PROCEDURE — 27210794 ZZH OR GENERAL SUPPLY STERILE: Performed by: ORTHOPAEDIC SURGERY

## 2018-10-01 PROCEDURE — 71000012 ZZH RECOVERY PHASE 1 LEVEL 1 FIRST HR: Performed by: ORTHOPAEDIC SURGERY

## 2018-10-01 PROCEDURE — 25000125 ZZHC RX 250: Performed by: NURSE ANESTHETIST, CERTIFIED REGISTERED

## 2018-10-01 PROCEDURE — 40000170 ZZH STATISTIC PRE-PROCEDURE ASSESSMENT II: Performed by: ORTHOPAEDIC SURGERY

## 2018-10-01 PROCEDURE — 40000277 XR SURGERY CARM FLUORO LESS THAN 5 MIN W STILLS

## 2018-10-01 PROCEDURE — 25000566 ZZH SEVOFLURANE, EA 15 MIN: Performed by: ORTHOPAEDIC SURGERY

## 2018-10-01 PROCEDURE — 36000065 ZZH SURGERY LEVEL 4 W FLUORO 1ST 30 MIN: Performed by: ORTHOPAEDIC SURGERY

## 2018-10-01 PROCEDURE — 25000128 H RX IP 250 OP 636: Performed by: NURSE ANESTHETIST, CERTIFIED REGISTERED

## 2018-10-01 PROCEDURE — 37000008 ZZH ANESTHESIA TECHNICAL FEE, 1ST 30 MIN: Performed by: ORTHOPAEDIC SURGERY

## 2018-10-01 RX ORDER — ACETAMINOPHEN 325 MG/1
650 TABLET ORAL EVERY 6 HOURS PRN
Status: DISCONTINUED | OUTPATIENT
Start: 2018-10-01 | End: 2018-10-03 | Stop reason: HOSPADM

## 2018-10-01 RX ORDER — ONDANSETRON 2 MG/ML
4 INJECTION INTRAMUSCULAR; INTRAVENOUS EVERY 30 MIN PRN
Status: DISCONTINUED | OUTPATIENT
Start: 2018-10-01 | End: 2018-10-01 | Stop reason: HOSPADM

## 2018-10-01 RX ORDER — METOPROLOL TARTRATE 50 MG
50 TABLET ORAL 2 TIMES DAILY
Status: DISCONTINUED | OUTPATIENT
Start: 2018-10-01 | End: 2018-10-03 | Stop reason: HOSPADM

## 2018-10-01 RX ORDER — HYDROMORPHONE HYDROCHLORIDE 1 MG/ML
0.2 INJECTION, SOLUTION INTRAMUSCULAR; INTRAVENOUS; SUBCUTANEOUS
Status: DISCONTINUED | OUTPATIENT
Start: 2018-10-01 | End: 2018-10-03 | Stop reason: HOSPADM

## 2018-10-01 RX ORDER — SODIUM CHLORIDE, SODIUM LACTATE, POTASSIUM CHLORIDE, CALCIUM CHLORIDE 600; 310; 30; 20 MG/100ML; MG/100ML; MG/100ML; MG/100ML
INJECTION, SOLUTION INTRAVENOUS CONTINUOUS
Status: DISCONTINUED | OUTPATIENT
Start: 2018-10-01 | End: 2018-10-01 | Stop reason: HOSPADM

## 2018-10-01 RX ORDER — CEFAZOLIN SODIUM 1 G/3ML
1 INJECTION, POWDER, FOR SOLUTION INTRAMUSCULAR; INTRAVENOUS EVERY 8 HOURS
Status: DISCONTINUED | OUTPATIENT
Start: 2018-10-01 | End: 2018-10-02

## 2018-10-01 RX ORDER — TRAMADOL HYDROCHLORIDE 50 MG/1
50 TABLET ORAL EVERY 6 HOURS PRN
Status: DISCONTINUED | OUTPATIENT
Start: 2018-10-01 | End: 2018-10-03 | Stop reason: HOSPADM

## 2018-10-01 RX ORDER — DIPHENOXYLATE HCL/ATROPINE 2.5-.025MG
1 TABLET ORAL DAILY
Status: DISCONTINUED | OUTPATIENT
Start: 2018-10-02 | End: 2018-10-03 | Stop reason: HOSPADM

## 2018-10-01 RX ORDER — CEFAZOLIN SODIUM 1 G/3ML
1 INJECTION, POWDER, FOR SOLUTION INTRAMUSCULAR; INTRAVENOUS SEE ADMIN INSTRUCTIONS
Status: DISCONTINUED | OUTPATIENT
Start: 2018-10-01 | End: 2018-10-01 | Stop reason: HOSPADM

## 2018-10-01 RX ORDER — ONDANSETRON 4 MG/1
4 TABLET, ORALLY DISINTEGRATING ORAL EVERY 6 HOURS PRN
Status: DISCONTINUED | OUTPATIENT
Start: 2018-10-01 | End: 2018-10-03 | Stop reason: HOSPADM

## 2018-10-01 RX ORDER — TAMSULOSIN HYDROCHLORIDE 0.4 MG/1
0.4 CAPSULE ORAL EVERY EVENING
Status: DISCONTINUED | OUTPATIENT
Start: 2018-10-01 | End: 2018-10-03 | Stop reason: HOSPADM

## 2018-10-01 RX ORDER — FENTANYL CITRATE 50 UG/ML
25-50 INJECTION, SOLUTION INTRAMUSCULAR; INTRAVENOUS
Status: DISCONTINUED | OUTPATIENT
Start: 2018-10-01 | End: 2018-10-01 | Stop reason: HOSPADM

## 2018-10-01 RX ORDER — HYDROMORPHONE HYDROCHLORIDE 1 MG/ML
.3-.5 INJECTION, SOLUTION INTRAMUSCULAR; INTRAVENOUS; SUBCUTANEOUS EVERY 5 MIN PRN
Status: DISCONTINUED | OUTPATIENT
Start: 2018-10-01 | End: 2018-10-01 | Stop reason: HOSPADM

## 2018-10-01 RX ORDER — LIDOCAINE HYDROCHLORIDE 20 MG/ML
INJECTION, SOLUTION INFILTRATION; PERINEURAL PRN
Status: DISCONTINUED | OUTPATIENT
Start: 2018-10-01 | End: 2018-10-01

## 2018-10-01 RX ORDER — ONDANSETRON 2 MG/ML
4 INJECTION INTRAMUSCULAR; INTRAVENOUS EVERY 6 HOURS PRN
Status: DISCONTINUED | OUTPATIENT
Start: 2018-10-01 | End: 2018-10-03 | Stop reason: HOSPADM

## 2018-10-01 RX ORDER — ONDANSETRON 4 MG/1
4 TABLET, ORALLY DISINTEGRATING ORAL EVERY 30 MIN PRN
Status: DISCONTINUED | OUTPATIENT
Start: 2018-10-01 | End: 2018-10-01 | Stop reason: HOSPADM

## 2018-10-01 RX ORDER — ACETAMINOPHEN 500 MG
1000 TABLET ORAL ONCE
Status: DISCONTINUED | OUTPATIENT
Start: 2018-10-01 | End: 2018-10-01 | Stop reason: HOSPADM

## 2018-10-01 RX ORDER — GLYCOPYRROLATE 0.2 MG/ML
INJECTION, SOLUTION INTRAMUSCULAR; INTRAVENOUS PRN
Status: DISCONTINUED | OUTPATIENT
Start: 2018-10-01 | End: 2018-10-01

## 2018-10-01 RX ORDER — NEOSTIGMINE METHYLSULFATE 1 MG/ML
VIAL (ML) INJECTION PRN
Status: DISCONTINUED | OUTPATIENT
Start: 2018-10-01 | End: 2018-10-01

## 2018-10-01 RX ORDER — KETOROLAC TROMETHAMINE 15 MG/ML
15 INJECTION, SOLUTION INTRAMUSCULAR; INTRAVENOUS EVERY 6 HOURS PRN
Status: DISCONTINUED | OUTPATIENT
Start: 2018-10-01 | End: 2018-10-01

## 2018-10-01 RX ORDER — PROPOFOL 10 MG/ML
INJECTION, EMULSION INTRAVENOUS PRN
Status: DISCONTINUED | OUTPATIENT
Start: 2018-10-01 | End: 2018-10-01

## 2018-10-01 RX ORDER — SODIUM CHLORIDE 9 MG/ML
INJECTION, SOLUTION INTRAVENOUS CONTINUOUS
Status: DISCONTINUED | OUTPATIENT
Start: 2018-10-01 | End: 2018-10-03 | Stop reason: HOSPADM

## 2018-10-01 RX ORDER — ONDANSETRON 2 MG/ML
INJECTION INTRAMUSCULAR; INTRAVENOUS PRN
Status: DISCONTINUED | OUTPATIENT
Start: 2018-10-01 | End: 2018-10-01

## 2018-10-01 RX ORDER — NALOXONE HYDROCHLORIDE 0.4 MG/ML
.1-.4 INJECTION, SOLUTION INTRAMUSCULAR; INTRAVENOUS; SUBCUTANEOUS
Status: DISCONTINUED | OUTPATIENT
Start: 2018-10-01 | End: 2018-10-01

## 2018-10-01 RX ORDER — FENTANYL CITRATE 50 UG/ML
INJECTION, SOLUTION INTRAMUSCULAR; INTRAVENOUS PRN
Status: DISCONTINUED | OUTPATIENT
Start: 2018-10-01 | End: 2018-10-01

## 2018-10-01 RX ORDER — CEFAZOLIN SODIUM 2 G/100ML
2 INJECTION, SOLUTION INTRAVENOUS
Status: COMPLETED | OUTPATIENT
Start: 2018-10-01 | End: 2018-10-01

## 2018-10-01 RX ORDER — NALOXONE HYDROCHLORIDE 0.4 MG/ML
.1-.4 INJECTION, SOLUTION INTRAMUSCULAR; INTRAVENOUS; SUBCUTANEOUS
Status: DISCONTINUED | OUTPATIENT
Start: 2018-10-01 | End: 2018-10-03 | Stop reason: HOSPADM

## 2018-10-01 RX ORDER — OXYCODONE HYDROCHLORIDE 5 MG/1
5-10 TABLET ORAL
Status: DISCONTINUED | OUTPATIENT
Start: 2018-10-01 | End: 2018-10-03

## 2018-10-01 RX ORDER — EPHEDRINE SULFATE 50 MG/ML
INJECTION, SOLUTION INTRAMUSCULAR; INTRAVENOUS; SUBCUTANEOUS PRN
Status: DISCONTINUED | OUTPATIENT
Start: 2018-10-01 | End: 2018-10-01

## 2018-10-01 RX ORDER — LIDOCAINE 40 MG/G
CREAM TOPICAL
Status: DISCONTINUED | OUTPATIENT
Start: 2018-10-01 | End: 2018-10-03 | Stop reason: HOSPADM

## 2018-10-01 RX ADMIN — METOPROLOL TARTRATE 50 MG: 50 TABLET ORAL at 20:34

## 2018-10-01 RX ADMIN — PHENYLEPHRINE HYDROCHLORIDE 100 MCG: 10 INJECTION, SOLUTION INTRAMUSCULAR; INTRAVENOUS; SUBCUTANEOUS at 08:30

## 2018-10-01 RX ADMIN — PHENYLEPHRINE HYDROCHLORIDE 150 MCG: 10 INJECTION, SOLUTION INTRAMUSCULAR; INTRAVENOUS; SUBCUTANEOUS at 09:09

## 2018-10-01 RX ADMIN — SODIUM CHLORIDE: 9 INJECTION, SOLUTION INTRAVENOUS at 14:59

## 2018-10-01 RX ADMIN — GENTAMICIN SULFATE 1000 ML: 40 INJECTION, SOLUTION INTRAMUSCULAR; INTRAVENOUS at 08:20

## 2018-10-01 RX ADMIN — PHENYLEPHRINE HYDROCHLORIDE 150 MCG: 10 INJECTION, SOLUTION INTRAMUSCULAR; INTRAVENOUS; SUBCUTANEOUS at 08:43

## 2018-10-01 RX ADMIN — SODIUM CHLORIDE, POTASSIUM CHLORIDE, SODIUM LACTATE AND CALCIUM CHLORIDE: 600; 310; 30; 20 INJECTION, SOLUTION INTRAVENOUS at 08:45

## 2018-10-01 RX ADMIN — PHENYLEPHRINE HYDROCHLORIDE 150 MCG: 10 INJECTION, SOLUTION INTRAMUSCULAR; INTRAVENOUS; SUBCUTANEOUS at 10:06

## 2018-10-01 RX ADMIN — TAMSULOSIN HYDROCHLORIDE 0.4 MG: 0.4 CAPSULE ORAL at 19:20

## 2018-10-01 RX ADMIN — LIDOCAINE HYDROCHLORIDE 59 ML: 10 INJECTION, SOLUTION EPIDURAL; INFILTRATION; INTRACAUDAL; PERINEURAL at 10:06

## 2018-10-01 RX ADMIN — PHENYLEPHRINE HYDROCHLORIDE 150 MCG: 10 INJECTION, SOLUTION INTRAMUSCULAR; INTRAVENOUS; SUBCUTANEOUS at 09:24

## 2018-10-01 RX ADMIN — ROCURONIUM BROMIDE 10 MG: 10 INJECTION INTRAVENOUS at 07:52

## 2018-10-01 RX ADMIN — ROCURONIUM BROMIDE 10 MG: 10 INJECTION INTRAVENOUS at 08:23

## 2018-10-01 RX ADMIN — CEFAZOLIN SODIUM 2 G: 2 INJECTION, SOLUTION INTRAVENOUS at 07:53

## 2018-10-01 RX ADMIN — ROCURONIUM BROMIDE 40 MG: 10 INJECTION INTRAVENOUS at 07:27

## 2018-10-01 RX ADMIN — PHENYLEPHRINE HYDROCHLORIDE 100 MCG: 10 INJECTION, SOLUTION INTRAMUSCULAR; INTRAVENOUS; SUBCUTANEOUS at 07:54

## 2018-10-01 RX ADMIN — ROCURONIUM BROMIDE 10 MG: 10 INJECTION INTRAVENOUS at 09:30

## 2018-10-01 RX ADMIN — PHENYLEPHRINE HYDROCHLORIDE 100 MCG: 10 INJECTION, SOLUTION INTRAMUSCULAR; INTRAVENOUS; SUBCUTANEOUS at 07:36

## 2018-10-01 RX ADMIN — PHENYLEPHRINE HYDROCHLORIDE 0.25 MCG/KG/MIN: 10 INJECTION, SOLUTION INTRAMUSCULAR; INTRAVENOUS; SUBCUTANEOUS at 08:11

## 2018-10-01 RX ADMIN — PHENYLEPHRINE HYDROCHLORIDE 100 MCG: 10 INJECTION, SOLUTION INTRAMUSCULAR; INTRAVENOUS; SUBCUTANEOUS at 09:05

## 2018-10-01 RX ADMIN — PHENYLEPHRINE HYDROCHLORIDE 100 MCG: 10 INJECTION, SOLUTION INTRAMUSCULAR; INTRAVENOUS; SUBCUTANEOUS at 07:39

## 2018-10-01 RX ADMIN — ACETAMINOPHEN 1000 MG: 325 TABLET, FILM COATED ORAL at 07:00

## 2018-10-01 RX ADMIN — PHENYLEPHRINE HYDROCHLORIDE 150 MCG: 10 INJECTION, SOLUTION INTRAMUSCULAR; INTRAVENOUS; SUBCUTANEOUS at 08:36

## 2018-10-01 RX ADMIN — CEFAZOLIN SODIUM 1 G: 2 INJECTION, SOLUTION INTRAVENOUS at 10:10

## 2018-10-01 RX ADMIN — PROPOFOL 90 MG: 10 INJECTION, EMULSION INTRAVENOUS at 07:27

## 2018-10-01 RX ADMIN — THROMBIN, TOPICAL (BOVINE) 5000 UNITS: KIT at 08:20

## 2018-10-01 RX ADMIN — FENTANYL CITRATE 100 MCG: 50 INJECTION, SOLUTION INTRAMUSCULAR; INTRAVENOUS at 07:27

## 2018-10-01 RX ADMIN — PHENYLEPHRINE HYDROCHLORIDE 150 MCG: 10 INJECTION, SOLUTION INTRAMUSCULAR; INTRAVENOUS; SUBCUTANEOUS at 09:48

## 2018-10-01 RX ADMIN — PROPOFOL 40 MG: 10 INJECTION, EMULSION INTRAVENOUS at 07:32

## 2018-10-01 RX ADMIN — ONDANSETRON 4 MG: 2 INJECTION INTRAMUSCULAR; INTRAVENOUS at 10:03

## 2018-10-01 RX ADMIN — ACETAMINOPHEN 650 MG: 325 TABLET, FILM COATED ORAL at 20:34

## 2018-10-01 RX ADMIN — PHENYLEPHRINE HYDROCHLORIDE 100 MCG: 10 INJECTION, SOLUTION INTRAMUSCULAR; INTRAVENOUS; SUBCUTANEOUS at 09:39

## 2018-10-01 RX ADMIN — PHENYLEPHRINE HYDROCHLORIDE 100 MCG: 10 INJECTION, SOLUTION INTRAMUSCULAR; INTRAVENOUS; SUBCUTANEOUS at 08:03

## 2018-10-01 RX ADMIN — PHENYLEPHRINE HYDROCHLORIDE 100 MCG: 10 INJECTION, SOLUTION INTRAMUSCULAR; INTRAVENOUS; SUBCUTANEOUS at 07:43

## 2018-10-01 RX ADMIN — PHENYLEPHRINE HYDROCHLORIDE 100 MCG: 10 INJECTION, SOLUTION INTRAMUSCULAR; INTRAVENOUS; SUBCUTANEOUS at 07:49

## 2018-10-01 RX ADMIN — DEXMEDETOMIDINE HYDROCHLORIDE 0.4 MCG/KG/HR: 100 INJECTION, SOLUTION INTRAVENOUS at 07:46

## 2018-10-01 RX ADMIN — LIDOCAINE HYDROCHLORIDE 100 MG: 20 INJECTION, SOLUTION INFILTRATION; PERINEURAL at 07:27

## 2018-10-01 RX ADMIN — CEFAZOLIN SODIUM 1 G: 1 INJECTION, POWDER, FOR SOLUTION INTRAMUSCULAR; INTRAVENOUS at 17:18

## 2018-10-01 RX ADMIN — SODIUM CHLORIDE, POTASSIUM CHLORIDE, SODIUM LACTATE AND CALCIUM CHLORIDE: 600; 310; 30; 20 INJECTION, SOLUTION INTRAVENOUS at 12:07

## 2018-10-01 RX ADMIN — SODIUM CHLORIDE, POTASSIUM CHLORIDE, SODIUM LACTATE AND CALCIUM CHLORIDE: 600; 310; 30; 20 INJECTION, SOLUTION INTRAVENOUS at 06:28

## 2018-10-01 RX ADMIN — ACETAMINOPHEN 650 MG: 325 TABLET, FILM COATED ORAL at 14:17

## 2018-10-01 RX ADMIN — NEOSTIGMINE METHYLSULFATE 2 MG: 1 INJECTION, SOLUTION INTRAVENOUS at 10:17

## 2018-10-01 RX ADMIN — PHENYLEPHRINE HYDROCHLORIDE 100 MCG: 10 INJECTION, SOLUTION INTRAMUSCULAR; INTRAVENOUS; SUBCUTANEOUS at 08:32

## 2018-10-01 RX ADMIN — PHENYLEPHRINE HYDROCHLORIDE 100 MCG: 10 INJECTION, SOLUTION INTRAMUSCULAR; INTRAVENOUS; SUBCUTANEOUS at 08:08

## 2018-10-01 RX ADMIN — GLYCOPYRROLATE 0.4 MG: 0.2 INJECTION, SOLUTION INTRAMUSCULAR; INTRAVENOUS at 10:17

## 2018-10-01 RX ADMIN — PHENYLEPHRINE HYDROCHLORIDE 100 MCG: 10 INJECTION, SOLUTION INTRAMUSCULAR; INTRAVENOUS; SUBCUTANEOUS at 08:57

## 2018-10-01 RX ADMIN — PHENYLEPHRINE HYDROCHLORIDE 100 MCG: 10 INJECTION, SOLUTION INTRAMUSCULAR; INTRAVENOUS; SUBCUTANEOUS at 09:33

## 2018-10-01 RX ADMIN — Medication 5 MG: at 08:35

## 2018-10-01 RX ADMIN — PHENYLEPHRINE HYDROCHLORIDE 100 MCG: 10 INJECTION, SOLUTION INTRAMUSCULAR; INTRAVENOUS; SUBCUTANEOUS at 08:48

## 2018-10-01 RX ADMIN — PHENYLEPHRINE HYDROCHLORIDE 100 MCG: 10 INJECTION, SOLUTION INTRAMUSCULAR; INTRAVENOUS; SUBCUTANEOUS at 08:35

## 2018-10-01 ASSESSMENT — ENCOUNTER SYMPTOMS
DYSRHYTHMIAS: 1
SEIZURES: 0

## 2018-10-01 NOTE — PLAN OF CARE
Problem: Patient Care Overview  Goal: Plan of Care/Patient Progress Review  Outcome: No Change  Patient arrived to unit from PACU ~1255. A&Ox4, VS stable. CMS intact. Mcdowell and hemovac in place. Patient has declined pain medications at this time.

## 2018-10-01 NOTE — OP NOTE
Procedure Date: 10/01/2018      DATE OF PROCEDURE:  10/01/2018      PREOPERATIVE DIAGNOSIS:  Right L2-3 foraminal stenosis with right L2 nerve root impingement and right leg radiculopathy.      POSTOPERATIVE DIAGNOSIS:  Right L2-3 foraminal stenosis with right L2 nerve root impingement and right leg radiculopathy.      PROCEDURE:  Bilateral L2-3 decompression and insertion of a Coflex device.      SURGEON:  Bert Reynolds MD      ASSISTANT:  Mika Moore MD      ANESTHESIA:  General.      PQRI REQUIREMENTS:     1.  The patient was fitted with pneumatic compression boots prior to the surgery.  These were kept operational throughout the procedure.  They will be continued until he is ambulatory.  Chemical anticoagulants cannot be utilized because of the risk of epidural hematoma.   2.  The patient was given 2 grams of Ancef within 1 hour prior to starting the surgery.  He will be kept on antibiotics for 24 hours and then stopped.      OPERATIVE DESCRIPTION:  The patient was brought to the operating room.  A general anesthetic was administered by the anesthesiology staff.  A Mcdowell catheter was placed.  The patient was then positioned prone on the Edmonds frame, carefully padded and positioned, and his back was prepped and draped in routine sterile fashion.  Marking needles were placed at what was felt to be the L5-S1 and the L2-3 interspace.  Lateral x-ray was obtained.  This confirmed the needles were at those levels.  The most superior needle was at the spinous process of L3.  After the timeout, the planned skin incision was injected with 10 mL of 0.5% Marcaine with epinephrine.  A midline skin incision was made from the spinous process of L2 to the spinous process of L3.  A clamp was placed on the interspinous ligament between L2 and L3 and another lateral x-ray was obtained.  This again confirmed our level.  A bilateral exposure out to the facet joints at L2-3 was then performed.  The interspinous ligament between  L2 and L3 was removed.  A Holt retractor was placed.  Using a Midas bur the interspace between L2 and L3 was developed.  A laminotomy was performed of L3 along the leading edge.  The ligamentum flavum was removed between L2 and L3.  On the right side, which was the symptomatic side, more of a laminotomy was performed.  The L3 nerve root was completely decompressed.  Care was taken to preserve the spinous processes.  We removed enough of the spinous processes to allow for placement of the Coflex device.  The spinous processes of both L2 and L3 had to be thinned slightly with a bur and a rongeur.  Likewise, the shoulders of the lamina had to be burred slightly to allow for fit of the Coflex device.  When we were done with the decompression, we had good visualization of the dural sac.  The decompression was carried out to the edge of the medial wall of the pedicle of L3 and also on the right up to and along the medial edge of the facet joint.      At this point, we were able to size the device.  A 14 mm and a 16 mm appeared to give the best fit.  Fourteen millimeter gave a slight distraction, but the 16 seemed to open it up further and due to his scoliosis in the foraminal approach, we elected to use a 16.      A 16 mm Coflex device was chosen.  The wings of the device were spread and the device was impacted into the L2-L3 level.  We needed to trim a little bit more with the Midas along the shoulder.  A lateral lumbar spine x-ray was obtained with first the sizer in place and then with the device in place.  Showing that the device was seated as best as possible we crimped the wings to hold it in place.  I checked the stability with a Kocher clamp and it appeared very stable.  A little bone wax was used along the bleeding cancellous surfaces.      The wound was then irrigated with antibiotic solution.  Hemovac drain was placed in the wound.  The wound was then closed in a routine fashion with interrupted #1 Vicryl  suture for the deep fascia, 2-0 Vicryl for the subcutaneous and 3-0 Vicryl for the skin.  Dressings were applied, drapes removed.  He was transferred back to the hospital cart and taken to the recovery room in good condition.      ESTIMATED BLOOD LOSS:  20 mL.      COMPLICATIONS:  None.      DRAINS:  One Hemovac.         BERT DURAND MD             D: 10/01/2018   T: 10/01/2018   MT: CC      Name:     KING HILTON   MRN:      [id number]        Account:        [id number]   :      1930           Procedure Date: 10/01/2018      Document: [id number]       cc: Bert Mcadams MD

## 2018-10-01 NOTE — PROGRESS NOTES
Per Preop CEFERINO Biswas, two 500mg Tylenol given to patient at 0700. Ordered by provider Dr. Baptiste (Anesthesia) Med not scanned,  entered into MAR manually by writer.

## 2018-10-01 NOTE — ANESTHESIA POSTPROCEDURE EVALUATION
Patient: Dawson Jones    Procedure(s):  DECOMPRESSION L2-3 WITH COFLEX DEVICE   - Wound Class: I-Clean    Diagnosis:RIGHT L2-3 FORAMINAL STENOSIS WITH RIGHT L2 NERVE IMPINGEMENT, RIGHT LEG RADICULOPATHY  Diagnosis Additional Information: No value filed.    Anesthesia Type:  General, ETT    Note:  Anesthesia Post Evaluation    Patient location during evaluation: PACU  Patient participation: Able to fully participate in evaluation  Level of consciousness: awake  Pain management: adequate  Airway patency: patent  Cardiovascular status: acceptable  Respiratory status: acceptable  Hydration status: acceptable  PONV: none     Anesthetic complications: None          Last vitals:  Vitals:    10/01/18 1500 10/01/18 1548 10/01/18 1549   BP:  139/88 (!) 139/96   Resp: 16 16 16   Temp: 36.5  C (97.7  F)  36.5  C (97.7  F)   SpO2: 99% 99% 93%         Electronically Signed By: Hudson Gray MD  October 1, 2018  5:16 PM

## 2018-10-01 NOTE — BRIEF OP NOTE
[facility]  Spinal Surgery Brief Operative Note    Pre-operative diagnosis: RIGHT L2-3 FORAMINAL STENOSIS WITH RIGHT L2 NERVE IMPINGEMENT, RIGHT LEG RADICULOPATHY   Post-operative diagnosis: Same   Procedure: BILATERAL L2-3 DECOMPRESSION AND INSERTION OF COFLEX DEVICE   Surgeon: VESTA DURAND MD   Assistant(s): BARNEY MCLAUGHLIN MD   Anesthesia: General endotracheal anesthesia   Estimated blood loss: 20 ml   Total IV fluids: (See anesthesia record)   Blood transfusion: NONE   Drains: Hemovac   Specimens: NONE   Implants: AS ABOVE   Findings: AS ABOVE   Complications: NONE   Condition: STABLE   Comments: SEE DICTATED OPERATIVE REPORT FOR DETAILS

## 2018-10-01 NOTE — IP AVS SNAPSHOT
[address] [facility]    [facility] [address]    [place] [address]    Phone:  [phone]                                       After Visit Summary   10/1/2018    Dawson Jones    MRN: [id number]           After Visit Summary Signature Page     I have received my discharge instructions, and my questions have been answered. I have discussed any challenges I see with this plan with the nurse or doctor.    ..........................................................................................................................................  Patient/Patient Representative Signature      ..........................................................................................................................................  Patient Representative Print Name and Relationship to Patient    ..................................................               ................................................  Date                                   Time    ..........................................................................................................................................  Reviewed by Signature/Title    ...................................................              ..............................................  Date                                               Time          22EPIC Rev 08/18

## 2018-10-01 NOTE — IP AVS SNAPSHOT
MRN:[id number]                      After Visit Summary   10/1/2018    Dawson Jones    MRN: [id number]           Thank you!     Thank you for choosing [facility] for your care. Our goal is always to provide you with excellent care. Hearing back from our patients is one way we can continue to improve our services. Please take a few minutes to complete the written survey that you may receive in the mail after you visit with us. Thank you!        Patient Information     Date Of Birth          5/5/1930        Designated Caregiver       Most Recent Value    Caregiver    Will someone help with your care after discharge? yes    Name of designated caregiver Pauly Pleasanton     Phone number of caregiver [phone]    Caregiver address [place], [place]       About your hospital stay     You were admitted on:  October 1, 2018 You last received care in the:  [facility] [facility]    You were discharged on:  October 3, 2018       Who to Call     For medical emergencies, please call 911.  For non-urgent questions about your medical care, please call your primary care provider or clinic, [phone]  For questions related to your surgery, please call your surgery clinic        Attending Provider     Provider Bert Pittman MD Orthopedics       Primary Care Provider Office Phone # Fax #    Judson Mcadams [phone]      Follow-up Appointments     Follow-up and recommended labs and tests        Discharge patient to home later today if voiding independently and ambulating and pain controlled and taking p.o. adequately.  Call Dr. Reynolds to discuss this above criteria are met.  Give patient discharge instruction sheet and dressing supplies.  Rx for oxycodone            Follow-up and recommended labs and tests        An appointment for a voiding trial has been scheduled with Dr. Valverde for Wednesday October 10 at 10:00 am for a voiding trial    [place]  [facility]  [address], [address]  [place] [address]  [phone]                  Your next 10 appointments already scheduled     Oct 08, 2018 10:00 AM CDT   Return Visit with Hudson Arenas DPM   [facility]    [address] [id number] [phone]            Nov 05, 2018 11:30 AM CST   Office Visit with Judson Mcadams MD   [facility]    [address] [facility] [id number] [phone]           Bring a current list of meds and any records pertaining to this visit. For Physicals, please bring immunization records and any forms needing to be filled out. Please arrive 10 minutes early to complete paperwork.            Nov 06, 2018  9:30 AM CST   Nurse Only with CS NURSE   [facility])    [address] [facility] [id number] [phone]              Pending Results     No orders found from 9/29/2018 to 10/2/2018.            Statement of Approval     Ordered          10/03/18 1249  I have reviewed and agree with all the recommendations and orders detailed in this document.  EFFECTIVE NOW     Approved and electronically signed by:  Bert Reynolds MD             Admission Information     Date & Time Provider Department Dept. Phone    10/1/2018 Bert Reynolds MD [address] [facility] [phone]      Your Vitals Were     Blood Pressure Pulse Temperature Respirations Height Weight    109/72 (BP Location: Right arm) 97 98.4  F (36.9  C) (Oral) 16 1.702 m (5' 7\") 69.6 kg (153 lb 8 oz)    Pulse Oximetry BMI (Body Mass Index)                96% 24.04 kg/m2          MyChart Information     [employer] gives you secure access to your electronic health record. If you see a primary care provider, you can also send messages to your care team and make appointments. If you have questions, please call your primary care clinic.  If " you do not have a primary care provider, please call [phone] and they will assist you.        Care EveryWhere ID     This is your Care EveryWhere ID. This could be used by other organizations to access your [facility] medical records  [id number]        Equal Access to Services     BESSYAKHIL LAURA : Hadii amada alvarado yasmanymariah Anayaali, warandda luqadaha, qaybta kaalmada manda, waxjose valerie cyndycriss waterman naomyeva bell. So [facility] [phone].    ATENCIÓN: Si habla español, tiene a ambrose disposición servicios gratuitos de asistencia lingüística. Llame al [phone].    We comply with applicable federal civil rights laws and [place] laws. We do not discriminate on the basis of race, color, national origin, age, disability, sex, sexual orientation, or gender identity.               Review of your medicines      START taking        Dose / Directions    order for DME   Used for:  S/P laminectomy        Equipment being ordered: Walker Wheels () and Walker () Treatment Diagnosis: DIfficulty with gait   Quantity:  1 each   Refills:  0       oxyCODONE IR 5 MG tablet   Commonly known as:  ROXICODONE   Used for:  S/P laminectomy        Dose:  5-10 mg   Take 1-2 tablets (5-10 mg) by mouth every 3 hours as needed   Quantity:  20 tablet   Refills:  0         CONTINUE these medicines which may have CHANGED, or have new prescriptions. If we are uncertain of the size of tablets/capsules you have at home, strength may be listed as something that might have changed.        Dose / Directions    ELIQUIS 5 MG tablet   This may have changed:  See the new instructions.   Used for:  Chronic atrial fibrillation (H)   Generic drug:  apixaban ANTICOAGULANT        Dose:  5 mg   Take 1 tablet (5 mg) by mouth 2 times daily   Quantity:  30 tablet   Refills:  0       opium tincture 10 MG/ML (1%) liquid   This may have changed:    - how much to take  - how to take this  - when to take this  - reasons to take this  - additional instructions    Used for:  Crohn's disease of small and large intestines with complication (H)        4 drops every 4 hours as needed for diarrhea   Quantity:  118 mL   Refills:  0         CONTINUE these medicines which have NOT CHANGED        Dose / Directions    cyanocobalamin 1000 MCG/ML injection   Commonly known as:  VITAMIN B12   Used for:  Vitamin B12 deficiency (non anemic)        Dose:  1 mL   Inject 1 mL (1,000 mcg) into the muscle every 30 days   Quantity:  1 mL   Refills:  11       diphenoxylate-atropine 2.5-0.025 MG per tablet   Commonly known as:  LOMOTIL   Used for:  Crohn's disease of small and large intestines with complication (H)        Dose:  1 tablet   Take 1 tablet by mouth daily   Quantity:  90 tablet   Refills:  3       FLOMAX 0.4 MG capsule   Used for:  Benign non-nodular prostatic hyperplasia with lower urinary tract symptoms   Generic drug:  tamsulosin        Dose:  0.4 mg   Take 1 capsule (0.4 mg) by mouth daily   Quantity:  90 capsule   Refills:  3       metoprolol tartrate 50 MG tablet   Commonly known as:  LOPRESSOR   Used for:  Benign essential hypertension        TAKE 1 TABLET(50 MG) BY MOUTH TWICE DAILY   Quantity:  180 tablet   Refills:  3       TYLENOL 8 HOUR PO        Dose:  650-1300 mg   Take 650-1,300 mg by mouth 3 times daily as needed   Refills:  0            Where to get your medicines      These medications were sent to [facility] [place] - [address]  [address], [place] [address]     Phone:  [phone]     ELIQUIS 5 MG tablet         Some of these will need a paper prescription and others can be bought over the counter. Ask your nurse if you have questions.     Bring a paper prescription for each of these medications     order for DME    oxyCODONE IR 5 MG tablet                Protect others around you: Learn how to safely use, store and throw away your medicines at www.disposemymeds.org.        Information about OPIOIDS     PRESCRIPTION  OPIOIDS: WHAT YOU NEED TO KNOW   We gave you an opioid (narcotic) pain medicine. It is important to manage your pain, but opioids are not always the best choice. You should first try all the other options your care team gave you. Take this medicine for as short a time (and as few doses) as possible.    Some activities can increase your pain, such as bandage changes or therapy sessions. It may help to take your pain medicine 30 to 60 minutes before these activities. Reduce your stress by getting enough sleep, working on hobbies you enjoy and practicing relaxation or meditation. Talk to your care team about ways to manage your pain beyond prescription opioids.    These medicines have risks:    DO NOT drive when on new or higher doses of pain medicine. These medicines can affect your alertness and reaction times, and you could be arrested for driving under the influence (DUI). If you need to use opioids long-term, talk to your care team about driving.    DO NOT operate heavy machinery    DO NOT do any other dangerous activities while taking these medicines.    DO NOT drink any alcohol while taking these medicines.     If the opioid prescribed includes acetaminophen, DO NOT take with any other medicines that contain acetaminophen. Read all labels carefully. Look for the word  acetaminophen  or  Tylenol.  Ask your pharmacist if you have questions or are unsure.    You can get addicted to pain medicines, especially if you have a history of addiction (chemical, alcohol or substance dependence). Talk to your care team about ways to reduce this risk.    All opioids tend to cause constipation. Drink plenty of water and eat foods that have a lot of fiber, such as fruits, vegetables, prune juice, apple juice and high-fiber cereal. Take a laxative (Miralax, milk of magnesia, Colace, Senna) if you don t move your bowels at least every other day. Other side effects include upset stomach, sleepiness, dizziness, throwing up,  tolerance (needing more of the medicine to have the same effect), physical dependence and slowed breathing.    Store your pills in a secure place, locked if possible. We will not replace any lost or stolen medicine. If you don t finish your medicine, please throw away (dispose) as directed by your pharmacist. The [place] Pollution [employer] has more information about safe disposal: https://www.pca.[facility].[place].us/living-green/managing-unwanted-medications             Medication List: This is a list of all your medications and when to take them. Check marks below indicate your daily home schedule. Keep this list as a reference.      Medications           Morning Afternoon Evening Bedtime As Needed    cyanocobalamin 1000 MCG/ML injection   Commonly known as:  VITAMIN B12   Inject 1 mL (1,000 mcg) into the muscle every 30 days                                diphenoxylate-atropine 2.5-0.025 MG per tablet   Commonly known as:  LOMOTIL   Take 1 tablet by mouth daily   Last time this was given:  1 tablet on 10/2/2018  8:59 AM                                ELIQUIS 5 MG tablet   Take 1 tablet (5 mg) by mouth 2 times daily   Generic drug:  apixaban ANTICOAGULANT                                FLOMAX 0.4 MG capsule   Take 1 capsule (0.4 mg) by mouth daily   Last time this was given:  0.4 mg on 10/2/2018  8:02 PM   Generic drug:  tamsulosin                                metoprolol tartrate 50 MG tablet   Commonly known as:  LOPRESSOR   TAKE 1 TABLET(50 MG) BY MOUTH TWICE DAILY   Last time this was given:  50 mg on 10/3/2018  7:42 AM                                opium tincture 10 MG/ML (1%) liquid   4 drops every 4 hours as needed for diarrhea                                order for DME   Equipment being ordered: Walker Wheels () and Walker () Treatment Diagnosis: DIfficulty with gait                                oxyCODONE IR 5 MG tablet   Commonly known as:  ROXICODONE   Take 1-2 tablets (5-10 mg) by mouth  every 3 hours as needed   Last time this was given:  5 mg on 10/2/2018  8:59 AM                                TYLENOL 8 HOUR PO   Take 650-1,300 mg by mouth 3 times daily as needed

## 2018-10-01 NOTE — ANESTHESIA CARE TRANSFER NOTE
Patient: Dawson Jones    Procedure(s):  DECOMPRESSION L2-3 WITH COFLEX DEVICE   - Wound Class: I-Clean    Diagnosis: RIGHT L2-3 FORAMINAL STENOSIS WITH RIGHT L2 NERVE IMPINGEMENT, RIGHT LEG RADICULOPATHY  Diagnosis Additional Information: No value filed.    Anesthesia Type:   General, ETT     Note:  Airway :Face Mask  Patient transferred to:PACU  Comments: Transferred to PACU, spontaneous respirations, 10L oxygen via facemask.  All monitors and alarms on and functioning, VSS.  Patient awake, comfortable.  Report to PACU RN.Handoff Report: Identifed the Patient, Identified the Reponsible Provider, Reviewed the pertinent medical history, Discussed the surgical course, Reviewed Intra-OP anesthesia mangement and issues during anesthesia, Set expectations for post-procedure period and Allowed opportunity for questions and acknowledgement of understanding      Vitals: (Last set prior to Anesthesia Care Transfer)    CRNA VITALS  10/1/2018 0956 - 10/1/2018 1035      10/1/2018             Pulse: 126    SpO2: 98 %                Electronically Signed By: YAMILKA Kaiser CRNA  October 1, 2018  10:35 AM

## 2018-10-01 NOTE — PROGRESS NOTES
Admission medication history interview status for the 10/1/2018  admission is complete. See EPIC admission navigator for prior to admission medications     Medication history source reliability:Good    Medication history interview source(s):Patient    Medication history resources (including written lists, pill bottles, clinic record):None    Primary pharmacy[facility]  Additional medication history information not noted on PTA med list :None    Time spent in this activity: 45 minutes    Prior to Admission medications    Medication Sig Last Dose Taking? Auth Provider   Acetaminophen (TYLENOL 8 HOUR PO) Take 650-1,300 mg by mouth 3 times daily as needed  9/30/2018 at prn Yes Reported, Patient   cyanocobalamin (VITAMIN B12) 1000 MCG/ML injection Inject 1 mL (1,000 mcg) into the muscle every 30 days 9/4/2018 Yes Judson Mcadams MD   diphenoxylate-atropine (LOMOTIL) 2.5-0.025 MG per tablet Take 1 tablet by mouth daily 10/1/2018 at 0430 Yes Judson Mcadams MD   ELIQUIS 5 MG tablet TAKE 1 TABLET(5 MG) BY MOUTH TWICE DAILY 9/27/2018 Yes Judson Mcadams MD   metoprolol tartrate (LOPRESSOR) 50 MG tablet TAKE 1 TABLET(50 MG) BY MOUTH TWICE DAILY 10/1/2018 at 0430 Yes Judson Mcadams MD   opium tincture 10 MG/ML (1%) liquid 4 drops every 4 hours as needed for diarrhea  Patient taking differently: Take 0.2 mLs by mouth daily as needed (4 drops) 9/27/2018 at prn Yes Judson Mcadams MD   tamsulosin (FLOMAX) 0.4 MG capsule Take 1 capsule (0.4 mg) by mouth daily 9/30/2018 at pm Yes Judson Mcadams MD

## 2018-10-01 NOTE — ANESTHESIA PREPROCEDURE EVALUATION
"Procedure: Procedure(s):  DECOMPRESSION LUMBAR ONE LEVEL  Preop diagnosis: RIGHT L2-3 FORAMINAL STENOSIS WITH RIGHT L2 NERVE IMPINGEMENT, RIGHT LEG RADICULOPATHY  No Known Allergies  Patient Active Problem List   Diagnosis     Atrial fibrillation (H)     Cardiomyopathy, unspecified type (H)     Crohn's disease of small and large intestines with complication (H)     Spinal stenosis of lumbosacral region     Vitamin B12 deficiency (non anemic)     Chronic pain syndrome     PAD (peripheral artery disease) (H)     Past Medical History:   Diagnosis Date     Atrial fibrillation (H)      Cardiomyopathy (H)      Crohn's disease of small and large intestines with complication (H)      Hyperlipidemia      Past Surgical History:   Procedure Laterality Date     APPENDECTOMY         No current facility-administered medications on file prior to encounter.   Current Outpatient Prescriptions on File Prior to Encounter:  cyanocobalamin (VITAMIN B12) 1000 MCG/ML injection Inject 1 mL (1,000 mcg) into the muscle every 30 days   diphenoxylate-atropine (LOMOTIL) 2.5-0.025 MG per tablet Take 1 tablet by mouth daily   ELIQUIS 5 MG tablet TAKE 1 TABLET(5 MG) BY MOUTH TWICE DAILY   metoprolol tartrate (LOPRESSOR) 50 MG tablet TAKE 1 TABLET(50 MG) BY MOUTH TWICE DAILY   opium tincture 10 MG/ML (1%) liquid 4 drops every 4 hours as needed for diarrhea (Patient taking differently: Take 0.2 mLs by mouth daily (4 drops))   tamsulosin (FLOMAX) 0.4 MG capsule Take 1 capsule (0.4 mg) by mouth daily   traMADol (ULTRAM) 50 MG tablet TAKE 1 TO 2 TABLETS BY MOUTH EVERY 12 HOURS AS NEEDED FOR SEVERE PAIN     /90  Temp 36.1  C (97  F) (Temporal)  Resp 16  Ht 1.702 m (5' 7\")  Wt 69.6 kg (153 lb 8 oz)  SpO2 97%  BMI 24.04 kg/m2    Lab Results   Component Value Date    WBC 8.1 09/24/2018     Lab Results   Component Value Date    RBC 3.88 09/24/2018     Lab Results   Component Value Date    HGB 14.4 09/24/2018     Lab Results   Component Value " Date    HCT 40.6 09/24/2018     Lab Results   Component Value Date     09/24/2018     Lab Results   Component Value Date    MCH 37.1 09/24/2018     Lab Results   Component Value Date    MCHC 35.5 09/24/2018     Lab Results   Component Value Date    RDW 12.1 09/24/2018     Lab Results   Component Value Date     09/24/2018     Lab Results   Component Value Date    INR 0.98 12/21/2010       Last Basic Metabolic Panel:  Lab Results   Component Value Date     09/24/2018      Lab Results   Component Value Date    POTASSIUM 4.6 09/24/2018     Lab Results   Component Value Date    CHLORIDE 104 09/24/2018     Lab Results   Component Value Date    CHANG 8.9 09/24/2018     Lab Results   Component Value Date    CO2 26 09/24/2018     Lab Results   Component Value Date    BUN 12 09/24/2018     Lab Results   Component Value Date    CR 0.94 09/24/2018     Lab Results   Component Value Date     09/24/2018       Echo 1/2015  Interpretation Summary  There is no comparison study available.  Left ventricular systolic function is normal. The visual ejection fraction is   estimated at 55-60%. There is mild to moderate (1-2+) mitral regurgitation.   There is mild (1+) aortic regurgitation. No hemodynamically significant   valvular aortic stenosis. The left atrium is moderate to severely dilated.   The ascending aorta is Mildly dilated.  PatientHeight: 68 in  PatientWeight: 167 lbs  SystolicPressure: 100 mmHg  DiastolicPressure: 60 mmHg  HeartRate: 88 bpm  BSA 1.9 m^2        Left Ventricle  The left ventricle is normal in size.  There is normal left ventricular wall thickness.  Left ventricular systolic function is normal.  The visual ejection fraction is estimated at 55-60%.  No regional wall motion abnormalities noted.     Right Ventricle  The right ventricle is normal in structure, function and size.     Atria  The left atrium is moderate to severely dilated.  The right atrium is mildly dilated.  Intact atrial  septum.     Mitral Valve  The mitral valve leaflets appear thickened, but open well.  Mild mitral valve prolapse, posterior leaflet.  There is mild to moderate (1-2+) mitral regurgitation.     Tricuspid Valve  Right ventricular systolic pressure is normal.  The right ventricular systolic pressure is approximated at 23 mmHg plus the   right atrial pressure.  There is mild (1+) tricuspid regurgitation.     Aortic Valve  The aortic valve is trileaflet with aortic valve sclerosis.  There is mild (1+) aortic regurgitation.  No hemodynamically significant valvular aortic stenosis.     Pulmonic Valve  The pulmonic valve is not well seen, but is grossly normal.  There is trace pulmonic valvular regurgitation.     Vessels  The ascending aorta is Mildly dilated.  The IVC is normal in size and reactivity with respiration, suggesting normal   central venous pressure.     Pericardium  There is no pericardial effusion.     Rhythm  The rhythm was atrial fibrillation.    EKG - Afib, LAD, controlled ventricular rate    Anesthesia Evaluation     . Pt has had prior anesthetic.     No history of anesthetic complications          ROS/MED HX    ENT/Pulmonary:  - neg pulmonary ROS    (-) sleep apnea and recent URI   Neurologic:  - neg neurologic ROS    (-) seizures, CVA and migraines   Cardiovascular: Comment: cardiomyopathy    (+) Dyslipidemia, -Peripheral Vascular Disease---. Taking blood thinners : . . . :. dysrhythmias a-fib, .       METS/Exercise Tolerance:     Hematologic:  - neg hematologic  ROS       Musculoskeletal: Comment: Spinal stenosis  Right lower extremity radicular symptoms        GI/Hepatic:     (+) Inflammatory bowel disease (Crohn's disease),      (-) GERD   Renal/Genitourinary:  - ROS Renal section negative       Endo:      (-) Type II DM and thyroid disease   Psychiatric:  - neg psychiatric ROS       Infectious Disease:  - neg infectious disease ROS       Malignancy:      - no malignancy   Other: Comment: Controlled  substance agreement   (+) H/O Chronic Pain,                   Physical Exam  Normal systems: pulmonary and dental    Airway   Mallampati: II  TM distance: >3 FB  Neck ROM: full    Dental     Cardiovascular   Rhythm and rate: irregular and normal      Pulmonary    breath sounds clear to auscultation                    Anesthesia Plan      History & Physical Review  History and physical reviewed and following examination; no interval change.    ASA Status:  3 .    NPO Status:  > 8 hours    Plan for General and ETT   PONV prophylaxis:  Ondansetron (or other 5HT-3)  Additional equipment: Videolaryngoscope Acetaminophen 1000 mg PO   precedex infusion      Postoperative Care  Postoperative pain management:  IV analgesics.      Consents  Anesthetic plan, risks, benefits and alternatives discussed with:  Patient..                          .

## 2018-10-02 ENCOUNTER — APPOINTMENT (OUTPATIENT)
Dept: PHYSICAL THERAPY | Facility: CLINIC | Age: 83
End: 2018-10-02
Attending: ORTHOPAEDIC SURGERY
Payer: COMMERCIAL

## 2018-10-02 LAB — GLUCOSE BLDC GLUCOMTR-MCNC: 101 MG/DL (ref 70–99)

## 2018-10-02 PROCEDURE — 25000128 H RX IP 250 OP 636: Performed by: PHYSICIAN ASSISTANT

## 2018-10-02 PROCEDURE — 40000193 ZZH STATISTIC PT WARD VISIT: Performed by: PHYSICAL THERAPIST

## 2018-10-02 PROCEDURE — 25000132 ZZH RX MED GY IP 250 OP 250 PS 637: Performed by: ORTHOPAEDIC SURGERY

## 2018-10-02 PROCEDURE — G8980 MOBILITY D/C STATUS: HCPCS | Mod: CI | Performed by: PHYSICAL THERAPIST

## 2018-10-02 PROCEDURE — 82962 GLUCOSE BLOOD TEST: CPT

## 2018-10-02 PROCEDURE — G8979 MOBILITY GOAL STATUS: HCPCS | Mod: GP,CI | Performed by: PHYSICAL THERAPIST

## 2018-10-02 PROCEDURE — 25000132 ZZH RX MED GY IP 250 OP 250 PS 637: Performed by: PHYSICIAN ASSISTANT

## 2018-10-02 PROCEDURE — 97116 GAIT TRAINING THERAPY: CPT | Mod: GP | Performed by: PHYSICAL THERAPIST

## 2018-10-02 PROCEDURE — 97161 PT EVAL LOW COMPLEX 20 MIN: CPT | Mod: GP | Performed by: PHYSICAL THERAPIST

## 2018-10-02 PROCEDURE — G8978 MOBILITY CURRENT STATUS: HCPCS | Mod: GP,CI | Performed by: PHYSICAL THERAPIST

## 2018-10-02 PROCEDURE — 97112 NEUROMUSCULAR REEDUCATION: CPT | Mod: GP | Performed by: PHYSICAL THERAPIST

## 2018-10-02 RX ORDER — OXYCODONE HYDROCHLORIDE 5 MG/1
5-10 TABLET ORAL
Qty: 20 TABLET | Refills: 0 | Status: ON HOLD | OUTPATIENT
Start: 2018-10-02 | End: 2018-10-19

## 2018-10-02 RX ORDER — APIXABAN 5 MG/1
5 TABLET, FILM COATED ORAL 2 TIMES DAILY
Qty: 30 TABLET | Refills: 0 | Status: SHIPPED | OUTPATIENT
Start: 2018-10-02 | End: 2018-10-31

## 2018-10-02 RX ADMIN — OXYCODONE HYDROCHLORIDE 5 MG: 5 TABLET ORAL at 01:08

## 2018-10-02 RX ADMIN — SODIUM CHLORIDE: 9 INJECTION, SOLUTION INTRAVENOUS at 05:11

## 2018-10-02 RX ADMIN — OXYCODONE HYDROCHLORIDE 5 MG: 5 TABLET ORAL at 05:52

## 2018-10-02 RX ADMIN — TAMSULOSIN HYDROCHLORIDE 0.4 MG: 0.4 CAPSULE ORAL at 20:02

## 2018-10-02 RX ADMIN — ACETAMINOPHEN 650 MG: 325 TABLET, FILM COATED ORAL at 03:20

## 2018-10-02 RX ADMIN — OXYCODONE HYDROCHLORIDE 5 MG: 5 TABLET ORAL at 08:59

## 2018-10-02 RX ADMIN — CEFAZOLIN SODIUM 1 G: 1 INJECTION, POWDER, FOR SOLUTION INTRAMUSCULAR; INTRAVENOUS at 01:08

## 2018-10-02 RX ADMIN — METOPROLOL TARTRATE 50 MG: 50 TABLET ORAL at 08:59

## 2018-10-02 RX ADMIN — ACETAMINOPHEN 650 MG: 325 TABLET, FILM COATED ORAL at 15:59

## 2018-10-02 RX ADMIN — METOPROLOL TARTRATE 50 MG: 50 TABLET ORAL at 20:03

## 2018-10-02 RX ADMIN — DIPHENOXYLATE HYDROCHLORIDE AND ATROPINE SULFATE 1 TABLET: 2.5; .025 TABLET ORAL at 08:59

## 2018-10-02 NOTE — PROGRESS NOTES
10/02/18 1018   Quick Adds   Type of Visit Initial PT Evaluation   Living Environment   Lives With spouse   Living Arrangements house   Home Accessibility bed and bath on same level;stairs to enter home;stairs within home   Number of Stairs to Enter Home 2   Number of Stairs Within Home (stairs to basement to laundry)   Transportation Available car;family or friend will provide   Self-Care   Usual Activity Tolerance good   Current Activity Tolerance moderate   Regular Exercise no   Equipment Currently Used at Home cane, straight;shower chair   Activity/Exercise/Self-Care Comment independent using cane, spouse picks out clothes but pt can dress independently, showers independently using stool.   Functional Level Prior   Ambulation 1-->assistive equipment   Transferring 1-->assistive equipment   Toileting 0-->independent   Bathing 1-->assistive equipment   Dressing 2-->assistive person   Eating 0-->independent   Communication 0-->understands/communicates without difficulty   Swallowing 0-->swallows foods/liquids without difficulty   Cognition 0 - no cognition issues reported   Fall history within last six months no   General Information   Onset of Illness/Injury or Date of Surgery - Date 10/01/18   Referring Physician Dr. Reynolds   Patient/Family Goals Statement Pt plans to return home with spouse   Pertinent History of Current Problem (include personal factors and/or comorbidities that impact the POC) Pt is a 88 year old male POD#1 bilat L2-L3 decompression and insertion of Coflex device.   Precautions/Limitations fall precautions;spinal precautions   Weight-Bearing Status - LLE full weight-bearing   Weight-Bearing Status - RLE full weight-bearing   General Observations Pt already sitting up in chair   Cognitive Status Examination   Orientation orientation to person, place and time   Level of Consciousness alert   Follows Commands and Answers Questions 100% of the time   Personal Safety and Judgment intact   Memory  "intact   Pain Assessment   Patient Currently in Pain (no pain at rest, only with movement)   Posture    Posture Comments kyphosis, forward head and shoulders, flexed trunk   Range of Motion (ROM)   ROM Comment Extremities WFL, limited trunk ROM consistent with surgery. Limited cervical ROM due to posture   Strength   Strength Comments functional strength in extremities   Bed Mobility   Bed Mobility Comments Pt instructed in body mechanics and technique with spinal precautions with cues and SBA   Transfer Skills   Transfer Comments Pt needed cues and Radha for first time standing but then SBA   Gait   Gait Comments Pt ambulates with wh walker and SBA and cues for 30-40ft x 2   Balance   Balance Comments requires walker   Sensory Examination   Sensory Perception no deficits were identified   General Therapy Interventions   Planned Therapy Interventions bed mobility training;gait training;neuromuscular re-education;transfer training   Clinical Impression   Criteria for Skilled Therapeutic Intervention yes, treatment indicated   PT Diagnosis difficulty with gait   Influenced by the following impairments pain with mobility, spinal precautions, limited trunk mobility,    Functional limitations due to impairments need for walker instead of cane with gait, decreased endurance and decreased tolerance for activity   Clinical Presentation Stable/Uncomplicated   Clinical Presentation Rationale clinical judgement   Clinical Decision Making (Complexity) Low complexity   Therapy Frequency` other (see comments)  (one eval and treat)   Predicted Duration of Therapy Intervention (days/wks) 1   Anticipated Equipment Needs at Discharge front wheeled walker   Anticipated Discharge Disposition Home with Assist   Risk & Benefits of therapy have been explained Yes   Patient, Family & other staff in agreement with plan of care Yes   [facility] AM-PAC TM \"6 Clicks\"   2016, Trustees of [facility], under license to [employer].  " "All rights reserved.   6 Clicks Short Forms Basic Mobility Inpatient Short Form   [facility] AM-PAC  \"6 Clicks\" V.2 Basic Mobility Inpatient Short Form   1. Turning from your back to your side while in a flat bed without using bedrails? 4 - None   2. Moving from lying on your back to sitting on the side of a flat bed without using bedrails? 4 - None   3. Moving to and from a bed to a chair (including a wheelchair)? 3 - A Little   4. Standing up from a chair using your arms (e.g., wheelchair, or bedside chair)? 3 - A Little   5. To walk in hospital room? 3 - A Little   6. Climbing 3-5 steps with a railing? 3 - A Little   Basic Mobility Raw Score (Score out of 24.Lower scores equate to lower levels of function) 20   Total Evaluation Time   Total Evaluation Time (Minutes) 11     "

## 2018-10-02 NOTE — PLAN OF CARE
Problem: Patient Care Overview  Goal: Plan of Care/Patient Progress Review  Outcome: Improving  A&O X4. CMS intact ex 4/5 strength to bilat lower extremities. Bowel sounds hypoactive, -flatus, -BM this shift. VSS ex on 1L NC. Dressing CDI. Hemovac patent. Mcdowell removed at 0600-DTV. Not yet ambulated, dangled bedside. C/o back pain, decreased with decreased w/ scheduled tylenol and PRN oxyodone. Plan to work w/ therapies in AM.

## 2018-10-02 NOTE — PHARMACY
Medication coverage check for Eliquis. $74.78 copay monthly.  Izabel Gomez CphT  [facility] Discharge Pharmacy Liaison  Liaison Cell: [phone]

## 2018-10-02 NOTE — PLAN OF CARE
Problem: Patient Care Overview  Goal: Plan of Care/Patient Progress Review  Outcome: Improving  Patient alert x 4, occassional confusion this morning , but improving, given tylenol today for pain, gets confused with oxycodone. Patient up with 1 assist GB/walker. Patient unable to void , st cathed for 350cc, Dr Reynolds informed and patient will stay tonight. Plan discharge to home when voiding.

## 2018-10-02 NOTE — PROVIDER NOTIFICATION
Provider Dr. Reynolds paged regarding patient capno and frequent alarming. Patient VSS, resps 14 and o2 98 on 2LPM. Took verbal order to discontinue capno machine and placed patient on continuous pulse ox.

## 2018-10-02 NOTE — PROGRESS NOTES
[facility]    Spine Surgery  Daily Post-Op Note    Assessment & Plan   Procedure(s):  DECOMPRESSION LUMBAR ONE LEVEL   -1 Day Post-Op      ASSESSMENT:    stable on postop day #1 status post Bilateral L2-3 decompression and insertion of a Coflex device.     PLAN:  Ambulate this morning with physical therapy  DC Mcdowell catheter  DC Hemovac drain and change dressing  Do not start Eliquis until postoperative day #3  If he is voiding, ambulating adequately he could be discharged home later today possibly tomorrow morning.  I will wait to hear from nursing, he does with ambulation and voiding today.    Bert Reynolds MD      Interval History   Had some trouble sleeping overnight.  Had to DC capnography.  No nausea or vomiting.  Mcdowell catheter was removed early this morning.  He has not voided.  She's not having any leg pain.  Just has incisional pain when he moves.    Physical Exam   Temp: 98  F (36.7  C) Temp src: Oral BP: 130/75   Heart Rate: 95 Resp: 16 SpO2: 98 % O2 Device: Nasal cannula Oxygen Delivery: 1 LPM  Vitals:    10/01/18 0552   Weight: 69.6 kg (153 lb 8 oz)     Vital Signs with Ranges  Temp:  [96.6  F (35.9  C)-98.1  F (36.7  C)] 98  F (36.7  C)  Heart Rate:  [49-97] 95  Resp:  [8-17] 16  BP: ()/(63-97) 130/75  SpO2:  [93 %-100 %] 98 %  I/O last 3 completed shifts:  In: 2670 [P.O.:370; I.V.:2300]  Out: 1661 [Urine:1625; Drains:16; Blood:20]    Alert, Oriented  Abd: S,NT,ND  Wound / Dressing: Clean, dry, and intact  Sensory and motor are intact bilateral lower extremities  Hemovac 5 mL      Medications     sodium chloride 75 mL/hr at 10/02/18 0511        ceFAZolin  1 g Intravenous Q8H     diphenoxylate-atropine  1 tablet Oral Daily     metoprolol tartrate  50 mg Oral BID     sodium chloride (PF)  3 mL Intracatheter Q8H     tamsulosin  0.4 mg Oral QPM       Data   No lab results found in last 7 days.    Recent Results (from the past 24 hour(s))   XR Surgery DIONNA L/T 5 Min Fluoro w  Stills    Narrative    XR SURGERY DIONNA FLUORO LESS THAN 5 MIN W STILLS 10/1/2018 9:55 AM     COMPARISON: MRI dated 12/29/2017    HISTORY: L2-L3 decompression    NUMBER OF IMAGES ACQUIRED: 4    VIEWS: 2    FLUOROSCOPY TIME: .2      Impression    IMPRESSION: Intraoperative imaging during lumbar spinal surgery.    NESTOR ESTRADA MD

## 2018-10-02 NOTE — PLAN OF CARE
Problem: Patient Care Overview  Goal: Plan of Care/Patient Progress Review  PT: orders received. Chart reviewed. Evaluation  And treatment completed. Pt is a 88 year old male POD#1 L2-L3 decompression with insertion of Coflex device. Pt lives with spouse in a house with 2 steps to enter with a support pole and room for second person assist. Pt uses a cane at baseline and is independent with mobility for ADLs using a shower chair and assist of clothes set up by spouse. No falls.   Discharge Planner PT   Patient plan for discharge: home with spouse  Current status: Pt ambulates with a walker SBA for 30-40ft x 2. Pt ascends and descends stairs with rail and HHA. Pt was educated and demonstrated body mechanics and spinal precautions for bed mobility with SBA.  Barriers to return to prior living situation: steps to enter  Recommendations for discharge: Home with supervision from spouse using walker  Rationale for recommendations: Pt has functional mobility with a walker to manage at home with SBA. Pt will need to walk and stay active to increase endurance for activity and independence with mobility.       Entered by: Senia Mayen 10/02/2018 11:17 AM

## 2018-10-02 NOTE — PROGRESS NOTES
Took telephone order from Dr. Reynolds for Tramadol 50mg PO every 6 hours PRN. PT consult ordered for tomorrow for assistance in ambulation. Orders entered by writer.      Took verbal order via telephone from Dr. Reynolds for patient to take 1-3 tabs of tylenol every 6 hours PRN. Updated admin instructions on current MAR note as unable to place order for range dose.

## 2018-10-02 NOTE — PLAN OF CARE
Problem: Patient Care Overview  Goal: Plan of Care/Patient Progress Review  Outcome: Improving  A&O x4. CMS intact. Bowel sounds hypoactive, -flatus. VSS on RA. Dressing CDI. Hemovac patent. Dangled at bedside. Turn and reposition. Therapies to consult in AM. Tolerating clears.  C/o minimal back pain decreased with tylenol. Plan to work with therapy, discharge pending progress.

## 2018-10-03 VITALS
WEIGHT: 153.5 LBS | DIASTOLIC BLOOD PRESSURE: 72 MMHG | BODY MASS INDEX: 24.09 KG/M2 | RESPIRATION RATE: 16 BRPM | TEMPERATURE: 98.4 F | HEART RATE: 97 BPM | OXYGEN SATURATION: 96 % | HEIGHT: 67 IN | SYSTOLIC BLOOD PRESSURE: 109 MMHG

## 2018-10-03 PROCEDURE — 25000132 ZZH RX MED GY IP 250 OP 250 PS 637: Performed by: ORTHOPAEDIC SURGERY

## 2018-10-03 PROCEDURE — 25000132 ZZH RX MED GY IP 250 OP 250 PS 637: Performed by: PHYSICIAN ASSISTANT

## 2018-10-03 RX ADMIN — OXYCODONE HYDROCHLORIDE 2.5 MG: 5 TABLET ORAL at 13:29

## 2018-10-03 RX ADMIN — ACETAMINOPHEN 650 MG: 325 TABLET, FILM COATED ORAL at 08:05

## 2018-10-03 RX ADMIN — ACETAMINOPHEN 650 MG: 325 TABLET, FILM COATED ORAL at 00:07

## 2018-10-03 RX ADMIN — METOPROLOL TARTRATE 50 MG: 50 TABLET ORAL at 07:42

## 2018-10-03 ASSESSMENT — PAIN DESCRIPTION - DESCRIPTORS
DESCRIPTORS: SORE
DESCRIPTORS: SORE

## 2018-10-03 NOTE — DISCHARGE SUMMARY
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    Additional Procedures:    Hospital Course:  The patient underwent the above-noted procedure on the day of admission.  There were no complications.  Postoperatively he had good pain control.  His preoperative right leg pain was gone.  He usually had the pain when he was ambulating.  He's been ambulating in the halls without return of his leg pain.    On postoperative day #1 his Mcdowell catheter was removed.  He had trouble voiding.  He has had problems with urinary retention in the past and has been seen by Dr. Valverde.  He has had to go home with a catheter from surgery before.  He he was straight cathetered ×1 yesterday and then a Mcdowell was placed last night.  He has a Mcdowell catheter now.    His incision is clean and dry.  Pain is controlled with oxycodone or Tylenol.  He had some confusion with oxycodone so his dose was modified.  He'll go home with a prescription for 2.5-5 mg q.4 hours p.r.n.    He has an appointment to see me in 2-3 weeks.  He'll get a discharge instruction sheet and dressings    Pain Medication on Discharge: Oxycodone on discharge.     Antibiotics prescribed at discharge: None prescribed     Bert Reynolds    Discharge Disposition   Discharged to home   Condition at discharge: Stable    Primary Care Physician   Judson Mcadams    Consultations This Hospital Stay   PHYSICAL THERAPY ADULT IP CONSULT    Time Spent on this Encounter   I have spent less than 30 minutes on this  discharge.    Discharge Orders     Follow-up and recommended labs and tests    Discharge patient to home later today if voiding independently and ambulating and pain controlled and taking p.o. adequately.  Call Dr. Reynolds to discuss this above criteria are met.  Give patient discharge instruction sheet and dressing supplies.  Rx for oxycodone     Follow-up and recommended labs and tests    An appointment for a voiding trial has been scheduled with Dr. Valverde for Wednesday October 10 at 10:00 am for a voiding trial    [facility]  [address], [address]  [place] [address]  [phone]       Discharge Medications   Current Discharge Medication List      START taking these medications    Details   order for DME Equipment being ordered: Walker Wheels () and Walker ()  Treatment Diagnosis: DIfficulty with gait  Qty: 1 each, Refills: 0    Associated Diagnoses: S/P laminectomy      oxyCODONE IR (ROXICODONE) 5 MG tablet Take 1-2 tablets (5-10 mg) by mouth every 3 hours as needed  Qty: 20 tablet, Refills: 0    Associated Diagnoses: S/P laminectomy         CONTINUE these medications which have CHANGED    Details   ELIQUIS 5 MG tablet Take 1 tablet (5 mg) by mouth 2 times daily  Qty: 30 tablet, Refills: 0    Associated Diagnoses: Chronic atrial fibrillation (H)         CONTINUE these medications which have NOT CHANGED    Details   Acetaminophen (TYLENOL 8 HOUR PO) Take 650-1,300 mg by mouth 3 times daily as needed       cyanocobalamin (VITAMIN B12) 1000 MCG/ML injection Inject 1 mL (1,000 mcg) into the muscle every 30 days  Qty: 1 mL, Refills: 11    Associated Diagnoses: Vitamin B12 deficiency (non anemic)      diphenoxylate-atropine (LOMOTIL) 2.5-0.025 MG per tablet Take 1 tablet by mouth daily  Qty: 90 tablet, Refills: 3    Associated Diagnoses: Crohn's disease of small and large intestines with complication (H)      metoprolol tartrate (LOPRESSOR) 50 MG tablet TAKE 1 TABLET(50 MG) BY MOUTH TWICE  DAILY  Qty: 180 tablet, Refills: 3    Associated Diagnoses: Benign essential hypertension      opium tincture 10 MG/ML (1%) liquid 4 drops every 4 hours as needed for diarrhea  Qty: 118 mL, Refills: 0    Associated Diagnoses: Crohn's disease of small and large intestines with complication (H)      tamsulosin (FLOMAX) 0.4 MG capsule Take 1 capsule (0.4 mg) by mouth daily  Qty: 90 capsule, Refills: 3    Associated Diagnoses: Benign non-nodular prostatic hyperplasia with lower urinary tract symptoms           Allergies   No Known Allergies  Data

## 2018-10-03 NOTE — PLAN OF CARE
Problem: Patient Care Overview  Goal: Plan of Care/Patient Progress Review  Outcome: Improving  A&Ox4. [place]. VSS on RA. Up with A1 GB/W. Regular diet, poor appetite. IV SL. CMS intact except trace edema in BL ankles. Denies SOB, pain, nausea. Dressing CDI, noticed some blanchable redness around dressing, educated on shifting weight frequently and repositioning. Having difficulty urinating, PVR's and orders for straight cath or leaving a king in if unable to urinate by 1 am. Discharge pending pt ability to void adequately and independently.

## 2018-10-03 NOTE — PLAN OF CARE
Problem: Patient Care Overview  Goal: Plan of Care/Patient Progress Review  Outcome: Improving  A&O X4. CMS intact ex trace edema bilat lower extremeties. Bowel sounds active, +flatus, -BM. VSS. Dressing CDI. Mcdowell placed previous shift d/t retention. Regular diet. Up with Ax1, GB, W. C/o back pain, decreased with PRN tylenol. Discharge plan pending.

## 2018-10-03 NOTE — PROGRESS NOTES
The following appointment has been scheduled:    An appointment for a voiding trial has been scheduled with Dr. Valverde for Wednesday October 10 at 10:00 am    [facility]  [address], [address]  [place] [address] [phone]

## 2018-10-03 NOTE — PLAN OF CARE
Problem: Patient Care Overview  Goal: Plan of Care/Patient Progress Review  Outcome: Adequate for Discharge Date Met: 10/03/18  Alert and oriented X 4, up with A1 with GB/WW. Pain slightly improved with oral APAP; oxycodone 2.5mg given prior to discharge. Education provided regarding new narcotic, king catheter. Wife demonstrated understanding. Pt to f/u in clinic with Dr. Valverde for voiding trial. Dressing changed to lumbar incision, wife acknowledged understanding on procedure and when to change. Pt to f/u with Dr. Reynolds in 2 weeks. Writer reviewed all other discharge instructions with pt/wife; all questions answered. Script for oxycodone sent along. Pt transported off unit via w/c accompanied by CNA; wife transporting home.

## 2018-10-14 DIAGNOSIS — I48.20 CHRONIC ATRIAL FIBRILLATION (H): ICD-10-CM

## 2018-10-15 ENCOUNTER — OFFICE VISIT (OUTPATIENT)
Dept: FAMILY MEDICINE | Facility: CLINIC | Age: 83
End: 2018-10-15
Payer: COMMERCIAL

## 2018-10-15 ENCOUNTER — RADIANT APPOINTMENT (OUTPATIENT)
Dept: GENERAL RADIOLOGY | Facility: CLINIC | Age: 83
End: 2018-10-15
Attending: INTERNAL MEDICINE
Payer: COMMERCIAL

## 2018-10-15 VITALS
BODY MASS INDEX: 24.27 KG/M2 | HEIGHT: 67 IN | WEIGHT: 154.6 LBS | HEART RATE: 97 BPM | TEMPERATURE: 98.7 F | SYSTOLIC BLOOD PRESSURE: 78 MMHG | DIASTOLIC BLOOD PRESSURE: 58 MMHG | OXYGEN SATURATION: 98 %

## 2018-10-15 DIAGNOSIS — R05.9 COUGH: ICD-10-CM

## 2018-10-15 DIAGNOSIS — R82.90 NONSPECIFIC FINDING ON EXAMINATION OF URINE: ICD-10-CM

## 2018-10-15 DIAGNOSIS — E53.8 VITAMIN B12 DEFICIENCY (NON ANEMIC): ICD-10-CM

## 2018-10-15 DIAGNOSIS — R68.83 CHILLS (WITHOUT FEVER): ICD-10-CM

## 2018-10-15 DIAGNOSIS — R33.9 URINARY RETENTION: ICD-10-CM

## 2018-10-15 DIAGNOSIS — M48.07 SPINAL STENOSIS OF LUMBOSACRAL REGION: Primary | ICD-10-CM

## 2018-10-15 DIAGNOSIS — J98.11 ATELECTASIS: ICD-10-CM

## 2018-10-15 LAB
ALBUMIN UR-MCNC: >=300 MG/DL
ANION GAP SERPL CALCULATED.3IONS-SCNC: 12 MMOL/L (ref 3–14)
APPEARANCE UR: CLEAR
BACTERIA #/AREA URNS HPF: ABNORMAL /HPF
BILIRUB UR QL STRIP: ABNORMAL
BUN SERPL-MCNC: 16 MG/DL (ref 7–30)
CALCIUM SERPL-MCNC: 8.8 MG/DL (ref 8.5–10.1)
CHLORIDE SERPL-SCNC: 106 MMOL/L (ref 94–109)
CO2 SERPL-SCNC: 22 MMOL/L (ref 20–32)
COLOR UR AUTO: YELLOW
CREAT SERPL-MCNC: 1.09 MG/DL (ref 0.66–1.25)
ERYTHROCYTE [DISTWIDTH] IN BLOOD BY AUTOMATED COUNT: 12 % (ref 10–15)
GFR SERPL CREATININE-BSD FRML MDRD: 64 ML/MIN/1.7M2
GLUCOSE SERPL-MCNC: 134 MG/DL (ref 70–99)
GLUCOSE UR STRIP-MCNC: 100 MG/DL
HCT VFR BLD AUTO: 35.8 % (ref 40–53)
HGB BLD-MCNC: 11.6 G/DL (ref 13.3–17.7)
HGB UR QL STRIP: ABNORMAL
KETONES UR STRIP-MCNC: ABNORMAL MG/DL
LEUKOCYTE ESTERASE UR QL STRIP: ABNORMAL
MCH RBC QN AUTO: 34.2 PG (ref 26.5–33)
MCHC RBC AUTO-ENTMCNC: 32.4 G/DL (ref 31.5–36.5)
MCV RBC AUTO: 106 FL (ref 78–100)
NITRATE UR QL: POSITIVE
NON-SQ EPI CELLS #/AREA URNS LPF: ABNORMAL /LPF
PH UR STRIP: 5.5 PH (ref 5–7)
PLATELET # BLD AUTO: 338 10E9/L (ref 150–450)
POTASSIUM SERPL-SCNC: 3.4 MMOL/L (ref 3.4–5.3)
RBC # BLD AUTO: 3.39 10E12/L (ref 4.4–5.9)
RBC #/AREA URNS AUTO: ABNORMAL /HPF
SODIUM SERPL-SCNC: 140 MMOL/L (ref 133–144)
SOURCE: ABNORMAL
SP GR UR STRIP: >1.03 (ref 1–1.03)
UROBILINOGEN UR STRIP-ACNC: 1 EU/DL (ref 0.2–1)
WBC # BLD AUTO: 15.5 10E9/L (ref 4–11)
WBC #/AREA URNS AUTO: ABNORMAL /HPF

## 2018-10-15 PROCEDURE — 36415 COLL VENOUS BLD VENIPUNCTURE: CPT | Performed by: INTERNAL MEDICINE

## 2018-10-15 PROCEDURE — 87800 DETECT AGNT MULT DNA DIREC: CPT | Performed by: INTERNAL MEDICINE

## 2018-10-15 PROCEDURE — 99214 OFFICE O/P EST MOD 30 MIN: CPT | Performed by: INTERNAL MEDICINE

## 2018-10-15 PROCEDURE — 87088 URINE BACTERIA CULTURE: CPT | Performed by: INTERNAL MEDICINE

## 2018-10-15 PROCEDURE — 81001 URINALYSIS AUTO W/SCOPE: CPT | Performed by: INTERNAL MEDICINE

## 2018-10-15 PROCEDURE — 87040 BLOOD CULTURE FOR BACTERIA: CPT | Performed by: INTERNAL MEDICINE

## 2018-10-15 PROCEDURE — 71046 X-RAY EXAM CHEST 2 VIEWS: CPT

## 2018-10-15 PROCEDURE — 87086 URINE CULTURE/COLONY COUNT: CPT | Performed by: INTERNAL MEDICINE

## 2018-10-15 PROCEDURE — 87077 CULTURE AEROBIC IDENTIFY: CPT | Performed by: INTERNAL MEDICINE

## 2018-10-15 PROCEDURE — 80048 BASIC METABOLIC PNL TOTAL CA: CPT | Performed by: INTERNAL MEDICINE

## 2018-10-15 PROCEDURE — 85027 COMPLETE CBC AUTOMATED: CPT | Performed by: INTERNAL MEDICINE

## 2018-10-15 PROCEDURE — 87186 SC STD MICRODIL/AGAR DIL: CPT | Performed by: INTERNAL MEDICINE

## 2018-10-15 RX ORDER — CYANOCOBALAMIN 1000 UG/ML
1 INJECTION, SOLUTION INTRAMUSCULAR; SUBCUTANEOUS
Qty: 3 ML | Refills: 11 | Status: SHIPPED | OUTPATIENT
Start: 2018-10-15 | End: 2020-01-01

## 2018-10-15 ASSESSMENT — ANXIETY QUESTIONNAIRES
1. FEELING NERVOUS, ANXIOUS, OR ON EDGE: NOT AT ALL
5. BEING SO RESTLESS THAT IT IS HARD TO SIT STILL: NOT AT ALL
IF YOU CHECKED OFF ANY PROBLEMS ON THIS QUESTIONNAIRE, HOW DIFFICULT HAVE THESE PROBLEMS MADE IT FOR YOU TO DO YOUR WORK, TAKE CARE OF THINGS AT HOME, OR GET ALONG WITH OTHER PEOPLE: NOT DIFFICULT AT ALL
3. WORRYING TOO MUCH ABOUT DIFFERENT THINGS: NOT AT ALL
7. FEELING AFRAID AS IF SOMETHING AWFUL MIGHT HAPPEN: NOT AT ALL
6. BECOMING EASILY ANNOYED OR IRRITABLE: NOT AT ALL
2. NOT BEING ABLE TO STOP OR CONTROL WORRYING: NOT AT ALL
GAD7 TOTAL SCORE: 0

## 2018-10-15 ASSESSMENT — PATIENT HEALTH QUESTIONNAIRE - PHQ9: 5. POOR APPETITE OR OVEREATING: NOT AT ALL

## 2018-10-15 NOTE — TELEPHONE ENCOUNTER
"Eliquis 5 mg    Last Written Prescription Date:  10/02/18  Last Fill Quantity: 30 tablets,  # refills: 0   Last office visit: 10/15/2018 with prescribing provider:  Pema   Future Office Visit:   Next 5 appointments (look out 90 days)     Nov 06, 2018  9:30 AM CST   Nurse Only with CS NURSE   [facility]    [address]  [place] [id number] [phone]                 Requested Prescriptions   Pending Prescriptions Disp Refills     ELIQUIS 5 MG tablet [Pharmacy Med Name: ELIQUIS 5MG TABLETS] 180 tablet 0     Sig: TAKE 1 TABLET(5 MG) BY MOUTH TWICE DAILY    Platelet Inhibitors Failed    10/14/2018 11:30 AM       Failed - Normal ALT on file in past 12 months    Recent Labs   Lab Test  03/27/17   1136   ALT  25            Failed - Normal AST on file in past 12 months    Recent Labs   Lab Test  03/27/17   1136   AST  23            Passed - Normal HGB on file in past 12 months    Recent Labs   Lab Test  10/15/18   1058   HGB  11.6*              Passed - Normal Platelets on file in past 12 months    Recent Labs   Lab Test  10/15/18   1058   PLT  338              Passed - Recent (12 mo) or future (30 days) visit within the authorizing provider's specialty    Patient had office visit in the last 12 months or has a visit in the next 30 days with authorizing provider or within the authorizing provider's specialty.  See \"Patient Info\" tab in inbasket, or \"Choose Columns\" in Meds & Orders section of the refill encounter.           Passed - Patient is age 18 or older       Passed - Normal serum creatinine on file in past 12 months    Recent Labs   Lab Test  10/15/18   1058   CR  1.09               "

## 2018-10-15 NOTE — MR AVS SNAPSHOT
After Visit Summary   10/15/2018    Dawson Jones    MRN: [id number]           Patient Information     Date Of Birth          5/5/1930        Visit Information        Provider Department      10/15/2018 10:00 AM Judson Mcadams MD [facility]        Today's Diagnoses     Spinal stenosis of lumbosacral region    -  1    Chills (without fever)        Cough        Vitamin B12 deficiency (non anemic)        Atelectasis           Follow-ups after your visit        Follow-up notes from your care team     Return in about 3 months (around 1/15/2019) for Pain Check.      Your next 10 appointments already scheduled     Nov 06, 2018  9:30 AM CST   Nurse Only with CS NURSE   [facility] ([facility]    [address] [id number] [phone]            Jan 16, 2019 10:00 AM CST   Office Visit with Judson Mcadams MD   [facility] ([facility]    [address] [facility] [id number] [phone]           Bring a current list of meds and any records pertaining to this visit. For Physicals, please bring immunization records and any forms needing to be filled out. Please arrive 10 minutes early to complete paperwork.              Who to contact     If you have questions or need follow up information about today's clinic visit or your schedule please contact [facility] directly at [phone].  Normal or non-critical lab and imaging results will be communicated to you by MyChart, letter or phone within 4 business days after the clinic has received the results. If you do not hear from us within 7 days, please contact the clinic through MyChart or phone. If you have a critical or abnormal lab result, we will notify you by phone as soon as possible.  Submit refill requests through [employer] or call your pharmacy and they will forward the refill request to us. Please allow 3 business days for your refill to be completed.           "Additional Information About Your Visit        MyChart Information     [employer] gives you secure access to your electronic health record. If you see a primary care provider, you can also send messages to your care team and make appointments. If you have questions, please call your primary care clinic.  If you do not have a primary care provider, please call [phone] and they will assist you.        Care EveryWhere ID     This is your Care EveryWhere ID. This could be used by other organizations to access your [facility] medical records  [id number]        Your Vitals Were     Pulse Temperature Height Pulse Oximetry BMI (Body Mass Index)       97 98.7  F (37.1  C) (Oral) 5' 7\" (1.702 m) 98% 24.21 kg/m2        Blood Pressure from Last 3 Encounters:   10/15/18 (!) 78/58   10/03/18 109/72   09/24/18 127/85    Weight from Last 3 Encounters:   10/15/18 154 lb 9.6 oz (70.1 kg)   10/01/18 153 lb 8 oz (69.6 kg)   09/24/18 154 lb (69.9 kg)              We Performed the Following     *UA reflex to Microscopic and Culture ([facility] (except [place] and [place])     Basic metabolic panel     Blood culture     Blood culture     CBC with platelets          Today's Medication Changes          These changes are accurate as of 10/15/18 10:51 AM.  If you have any questions, ask your nurse or doctor.               Start taking these medicines.        Dose/Directions    order for DME   Used for:  Atelectasis   Started by:  Judson Mcadams MD        Equipment being ordered: incentive spirometer   Quantity:  1 Units   Refills:  0         These medicines have changed or have updated prescriptions.        Dose/Directions    opium tincture 10 MG/ML (1%) liquid   This may have changed:    - how much to take  - how to take this  - when to take this  - reasons to take this  - additional instructions   Used for:  Crohn's disease of small and large intestines with complication (H)        4 drops every 4 hours as needed for " diarrhea   Quantity:  118 mL   Refills:  0            Where to get your medicines      These medications were sent to [facility] - [place], [place] - [address] AT [facility] [address], [facility] [address]     Phone:  [phone]     cyanocobalamin 1000 MCG/ML injection         Some of these will need a paper prescription and others can be bought over the counter.  Ask your nurse if you have questions.     Bring a paper prescription for each of these medications     order for DME                Primary Care Provider Office Phone # Fax #    Judson Mcadams [phone] [address] [address]        Equal Access to Services     ABDULLAHI SANCHEZ : Hadii aad ku hadasho Soomaali, waaxda luqadaha, qaybta kaalmada adeegyada, waxay idiin haymarcelino bell. So [facility] [phone].    ATENCIÓN: Si habla español, tiene a ambrose disposición servicios gratuitos de asistencia lingüística. [facility] [phone].    We comply with applicable federal civil rights laws and [place] laws. We do not discriminate on the basis of race, color, national origin, age, disability, sex, sexual orientation, or gender identity.            Thank you!     Thank you for choosing [facility]  for your care. Our goal is always to provide you with excellent care. Hearing back from our patients is one way we can continue to improve our services. Please take a few minutes to complete the written survey that you may receive in the mail after your visit with us. Thank you!             Your Updated Medication List - Protect others around you: Learn how to safely use, store and throw away your medicines at www.disposemymeds.org.          This list is accurate as of 10/15/18 10:51 AM.  Always use your most recent med list.                   Brand Name Dispense Instructions for use Diagnosis    cyanocobalamin 1000 MCG/ML injection    VITAMIN B12    3 mL     Inject 1 mL (1,000 mcg) into the muscle every 30 days    Vitamin B12 deficiency (non anemic)       diphenoxylate-atropine 2.5-0.025 MG per tablet    LOMOTIL    90 tablet    Take 1 tablet by mouth daily    Crohn's disease of small and large intestines with complication (H)       ELIQUIS 5 MG tablet   Generic drug:  apixaban ANTICOAGULANT     30 tablet    Take 1 tablet (5 mg) by mouth 2 times daily    Chronic atrial fibrillation (H)       FLOMAX 0.4 MG capsule   Generic drug:  tamsulosin     90 capsule    Take 1 capsule (0.4 mg) by mouth daily    Benign non-nodular prostatic hyperplasia with lower urinary tract symptoms       metoprolol tartrate 50 MG tablet    LOPRESSOR    180 tablet    TAKE 1 TABLET(50 MG) BY MOUTH TWICE DAILY    Benign essential hypertension       opium tincture 10 MG/ML (1%) liquid     118 mL    4 drops every 4 hours as needed for diarrhea    Crohn's disease of small and large intestines with complication (H)       order for DME     1 each    Equipment being ordered: Walker Wheels () and Walker () Treatment Diagnosis: DIfficulty with gait    S/P laminectomy       order for DME     1 Units    Equipment being ordered: incentive spirometer    Atelectasis       oxyCODONE IR 5 MG tablet    ROXICODONE    20 tablet    Take 1-2 tablets (5-10 mg) by mouth every 3 hours as needed    S/P laminectomy       TYLENOL 8 HOUR PO      Take 650-1,300 mg by mouth 3 times daily as needed

## 2018-10-15 NOTE — NURSING NOTE
"Chief Complaint   Patient presents with     URI     BP (!) 78/58  Pulse 97  Temp 98.7  F (37.1  C) (Oral)  Ht 5' 7\" (1.702 m)  Wt 154 lb 9.6 oz (70.1 kg)  SpO2 98%  BMI 24.21 kg/m2 Estimated body mass index is 24.21 kg/(m^2) as calculated from the following:    Height as of this encounter: 5' 7\" (1.702 m).    Weight as of this encounter: 154 lb 9.6 oz (70.1 kg).  Medication Reconciliation: complete      Health Maintenance that is potentially due pending provider review:  PHQ9 and GAD7    Completing forms today.    ONIEL Ridley  "

## 2018-10-15 NOTE — PROGRESS NOTES
SUBJECTIVE:   Dawson Jones is a 88 year old male who presents to clinic today for the following health issues:      RESPIRATORY SYMPTOMS      Duration: sx started yesterday     Description  rhinorrhea, cough, wheezing, chills, fatigue/malaise and hoarse voice    Severity: moderate    Accompanying signs and symptoms: None    History (predisposing factors):  none    Precipitating or alleviating factors: None    Therapies tried and outcome:  none    SUBJECTIVE:                                                      Patient presents for Hospital Followup Visit:    Hospital:  [facility]      Date of Admission: 10/01/2018  Date of Discharge: 10/128358  Reason(s) for Admission: Back surgery             Problems taking medications regularly:  None       Medication changes since discharge: None       Problems adhering to non-medication therapy:  None    Summary of hospitalization:  [facility] discharge summary reviewed  Diagnostic Tests/Treatments reviewed.  Follow up needed: ortho spine as directed ; follow-up with urology as directed for catheter removal  Other Healthcare Providers Involved in Patient s Care:         None  Update since discharge: improved.     Pleasant 88-year-old man who is about 2 weeks status post decompression surgery for lumbar spinal stenosis.  Fortunately, surgery helped his leg pain a lot.  He continues to have lower back pain.  However, he is here today because his wife is worried about chills that occurred yesterday after he was sitting on the couch for several hours watching the [employer] game.  She did not measure a fever.  She did note symptoms of mild nonproductive cough and mild rhinorrhea associated with his chilling episode.  She wonders if he might have a urinary tract infection because he had a urinary catheter placed at the time of surgery there was unable to be removed.      Type of Medical Decision Making Face-to-Face Visit within 7 Days Face-to-Face Visit  within 14 days   Moderate Complexity 76222 [id number]   High Complexity 54938 [id number]         Problem list and histories reviewed & adjusted, as indicated.  Additional history: as documented    Patient Active Problem List   Diagnosis     Atrial fibrillation (H)     Cardiomyopathy, unspecified type (H)     Crohn's disease of small and large intestines with complication (H)     Spinal stenosis of lumbosacral region     Vitamin B12 deficiency (non anemic)     Chronic pain syndrome     PAD (peripheral artery disease) (H)     Spinal stenosis     Past Surgical History:   Procedure Laterality Date     APPENDECTOMY       DECOMPRESSION LUMBAR ONE LEVEL N/A 10/1/2018    Procedure: DECOMPRESSION LUMBAR ONE LEVEL;  DECOMPRESSION L2-3 WITH COFLEX DEVICE  ;  Surgeon: Bert Reynolds MD;  Location:  OR       Social History   Substance Use Topics     Smoking status: Former Smoker     Smokeless tobacco: Never Used      Comment: 40 years ago     Alcohol use 4.2 oz/week     7 Standard drinks or equivalent per week      Comment: 1 wine an evening     Family History   Problem Relation Age of Onset     Family History Negative Mother      Cardiac Sudden Death Father      Family History Negative Brother      Other - See Comments Sister      colon issues     Family History Negative Son      Family History Negative Brother      Family History Negative Son      HEART DISEASE No family hx of          Current Outpatient Prescriptions   Medication Sig Dispense Refill     Acetaminophen (TYLENOL 8 HOUR PO) Take 650-1,300 mg by mouth 3 times daily as needed        cyanocobalamin (VITAMIN B12) 1000 MCG/ML injection Inject 1 mL (1,000 mcg) into the muscle every 30 days 3 mL 11     diphenoxylate-atropine (LOMOTIL) 2.5-0.025 MG per tablet Take 1 tablet by mouth daily 90 tablet 3     ELIQUIS 5 MG tablet Take 1 tablet (5 mg) by mouth 2 times daily 30 tablet 0     metoprolol tartrate (LOPRESSOR) 50 MG tablet TAKE 1 TABLET(50 MG) BY MOUTH TWICE DAILY 180  "tablet 3     order for DME Equipment being ordered: incentive spirometer 1 Units 0     tamsulosin (FLOMAX) 0.4 MG capsule Take 1 capsule (0.4 mg) by mouth daily 90 capsule 3     opium tincture 10 MG/ML (1%) liquid 4 drops every 4 hours as needed for diarrhea (Patient taking differently: Take 0.2 mLs by mouth daily as needed (4 drops)) 118 mL 0     order for DME Equipment being ordered: Walker Wheels () and Walker ()  Treatment Diagnosis: DIfficulty with gait 1 each 0     oxyCODONE IR (ROXICODONE) 5 MG tablet Take 1-2 tablets (5-10 mg) by mouth every 3 hours as needed 20 tablet 0     No Known Allergies    Reviewed and updated as needed this visit by clinical staff       Reviewed and updated as needed this visit by Provider         ROS:  Constitutional, HEENT, cardiovascular, pulmonary, gi and gu systems are negative, except as otherwise noted.    OBJECTIVE:     BP (!) 78/58  Pulse 97  Temp 98.7  F (37.1  C) (Oral)  Ht 5' 7\" (1.702 m)  Wt 154 lb 9.6 oz (70.1 kg)  SpO2 98%  BMI 24.21 kg/m2  Body mass index is 24.21 kg/(m^2).  GENERAL: healthy, alert and no distress  NECK: no adenopathy, no asymmetry, masses, or scars and thyroid normal to palpation  RESP: lungs clear to auscultation - no rales, rhonchi or wheezes; he did have mild crackles in the right lung base that seem to clear with 2 coughs no associated dullness to percussion  CV: Heart with regular rate and rhythm.   ABDOMEN: soft, nontender, no hepatosplenomegaly, no masses and bowel sounds normal  MS: no gross musculoskeletal defects noted, no edema  SKIN: Well-healing midline lumbar spine scar without evidence of infection  NEURO: Normal strength and tone, mentation intact and speech normal  PSYCH: mentation appears normal, affect normal/bright    Chest x-ray, blood cultures, CBC, BMP, urinalysis pending    ASSESSMENT/PLAN:         ICD-10-CM    1. Spinal stenosis of lumbosacral region M48.07    2. Chills (without fever) R68.83 *UA reflex to " Microscopic and Culture ([place] and [facility] (except [place] and [place])     Blood culture     Blood culture     Basic metabolic panel     CBC with platelets   3. Cough R05 XR Chest 2 Views   4. Atelectasis J98.11 order for DME   5. Urinary retention R33.9    6. Vitamin B12 deficiency (non anemic) E53.8 cyanocobalamin (VITAMIN B12) 1000 MCG/ML injection     I suspect that his chills might be related to atelectasis in the right lung base.  He did not receive an incentive spirometer upon hospital discharge.  I did call the seventh floor of the hospital and asked if they could provide him with an incentive spirometer with education today.  Following collection of his laboratory samples today, he will go to the hospital to  the incentive spirometer.  I did order chest x-ray to exclude a pneumonia as well as labs as listed above.  Urinary tract infection is another consideration.  He will follow-up with urology as directed for catheter removal.  Finally, his wife asked if he could get his vitamin B12 shots administered by a nurse who lives across the street from them.  That would be fine.      Follow-up will depend on the results of his diagnostic test and symptoms.    Judson Mcadams MD  [facility]

## 2018-10-15 NOTE — LETTER
[facility]  [address] [facility]  [address]  [place]  [address]  Tel: [phone]    October 16, 2018    Dawson Jones  [address] [facility] [address]        Dear Mr. Jones,    The following letter pertains to your most recent diagnostic tests:    Your x-ray did not show an obvious pneumonia.      If you have any further questions or problems, please contact our office.      Sincerely,    Judson Mcadams MD/GURINDER

## 2018-10-16 ENCOUNTER — APPOINTMENT (OUTPATIENT)
Dept: GENERAL RADIOLOGY | Facility: CLINIC | Age: 83
DRG: 699 | End: 2018-10-16
Attending: EMERGENCY MEDICINE
Payer: COMMERCIAL

## 2018-10-16 ENCOUNTER — HOSPITAL ENCOUNTER (INPATIENT)
Facility: CLINIC | Age: 83
LOS: 3 days | Discharge: NURSING, REHAB OR LONG-TERM CARE | DRG: 699 | End: 2018-10-19
Attending: EMERGENCY MEDICINE | Admitting: HOSPITALIST
Payer: COMMERCIAL

## 2018-10-16 ENCOUNTER — APPOINTMENT (OUTPATIENT)
Dept: PHYSICAL THERAPY | Facility: CLINIC | Age: 83
DRG: 699 | End: 2018-10-16
Attending: PHYSICIAN ASSISTANT
Payer: COMMERCIAL

## 2018-10-16 DIAGNOSIS — Z16.12 ESBL (EXTENDED SPECTRUM BETA-LACTAMASE) PRODUCING BACTERIA INFECTION: ICD-10-CM

## 2018-10-16 DIAGNOSIS — K50.819 CROHN'S DISEASE OF SMALL AND LARGE INTESTINES WITH COMPLICATION (H): ICD-10-CM

## 2018-10-16 DIAGNOSIS — R78.81 BACTEREMIA: ICD-10-CM

## 2018-10-16 DIAGNOSIS — A49.9 ESBL (EXTENDED SPECTRUM BETA-LACTAMASE) PRODUCING BACTERIA INFECTION: ICD-10-CM

## 2018-10-16 LAB
ALBUMIN UR-MCNC: 100 MG/DL
ANION GAP SERPL CALCULATED.3IONS-SCNC: 9 MMOL/L (ref 3–14)
APPEARANCE UR: ABNORMAL
BASOPHILS # BLD AUTO: 0 10E9/L (ref 0–0.2)
BASOPHILS NFR BLD AUTO: 0.2 %
BILIRUB UR QL STRIP: NEGATIVE
BUN SERPL-MCNC: 21 MG/DL (ref 7–30)
CALCIUM SERPL-MCNC: 8.5 MG/DL (ref 8.5–10.1)
CHLORIDE SERPL-SCNC: 106 MMOL/L (ref 94–109)
CO2 SERPL-SCNC: 24 MMOL/L (ref 20–32)
COLOR UR AUTO: YELLOW
CREAT SERPL-MCNC: 1.02 MG/DL (ref 0.66–1.25)
DIFFERENTIAL METHOD BLD: ABNORMAL
EOSINOPHIL # BLD AUTO: 0 10E9/L (ref 0–0.7)
EOSINOPHIL NFR BLD AUTO: 0.1 %
ERYTHROCYTE [DISTWIDTH] IN BLOOD BY AUTOMATED COUNT: 12.4 % (ref 10–15)
GFR SERPL CREATININE-BSD FRML MDRD: 69 ML/MIN/1.7M2
GLUCOSE SERPL-MCNC: 115 MG/DL (ref 70–99)
GLUCOSE UR STRIP-MCNC: NEGATIVE MG/DL
HCT VFR BLD AUTO: 34.4 % (ref 40–53)
HGB BLD-MCNC: 11.6 G/DL (ref 13.3–17.7)
HGB UR QL STRIP: ABNORMAL
IMM GRANULOCYTES # BLD: 0 10E9/L (ref 0–0.4)
IMM GRANULOCYTES NFR BLD: 0.2 %
KETONES UR STRIP-MCNC: 5 MG/DL
LACTATE BLD-SCNC: 1.6 MMOL/L (ref 0.7–2)
LEUKOCYTE ESTERASE UR QL STRIP: ABNORMAL
LYMPHOCYTES # BLD AUTO: 1.1 10E9/L (ref 0.8–5.3)
LYMPHOCYTES NFR BLD AUTO: 11.1 %
MCH RBC QN AUTO: 34.1 PG (ref 26.5–33)
MCHC RBC AUTO-ENTMCNC: 33.7 G/DL (ref 31.5–36.5)
MCV RBC AUTO: 101 FL (ref 78–100)
MONOCYTES # BLD AUTO: 0.5 10E9/L (ref 0–1.3)
MONOCYTES NFR BLD AUTO: 5 %
MUCOUS THREADS #/AREA URNS LPF: PRESENT /LPF
NEUTROPHILS # BLD AUTO: 7.9 10E9/L (ref 1.6–8.3)
NEUTROPHILS NFR BLD AUTO: 83.4 %
NITRATE UR QL: POSITIVE
NRBC # BLD AUTO: 0 10*3/UL
NRBC BLD AUTO-RTO: 0 /100
PH UR STRIP: 6 PH (ref 5–7)
PLATELET # BLD AUTO: 247 10E9/L (ref 150–450)
POTASSIUM SERPL-SCNC: 3.2 MMOL/L (ref 3.4–5.3)
POTASSIUM SERPL-SCNC: 3.6 MMOL/L (ref 3.4–5.3)
RBC # BLD AUTO: 3.4 10E12/L (ref 4.4–5.9)
RBC #/AREA URNS AUTO: 13 /HPF (ref 0–2)
SODIUM SERPL-SCNC: 139 MMOL/L (ref 133–144)
SOURCE: ABNORMAL
SP GR UR STRIP: 1.02 (ref 1–1.03)
UROBILINOGEN UR STRIP-MCNC: 2 MG/DL (ref 0–2)
WBC # BLD AUTO: 9.5 10E9/L (ref 4–11)
WBC #/AREA URNS AUTO: >182 /HPF (ref 0–5)
WBC CLUMPS #/AREA URNS HPF: PRESENT /HPF

## 2018-10-16 PROCEDURE — 87040 BLOOD CULTURE FOR BACTERIA: CPT | Performed by: PHYSICIAN ASSISTANT

## 2018-10-16 PROCEDURE — 36415 COLL VENOUS BLD VENIPUNCTURE: CPT | Performed by: PHYSICIAN ASSISTANT

## 2018-10-16 PROCEDURE — 12000000 ZZH R&B MED SURG/OB

## 2018-10-16 PROCEDURE — 25000128 H RX IP 250 OP 636: Performed by: EMERGENCY MEDICINE

## 2018-10-16 PROCEDURE — 81001 URINALYSIS AUTO W/SCOPE: CPT | Performed by: PHYSICIAN ASSISTANT

## 2018-10-16 PROCEDURE — 87088 URINE BACTERIA CULTURE: CPT | Performed by: HOSPITALIST

## 2018-10-16 PROCEDURE — 96361 HYDRATE IV INFUSION ADD-ON: CPT

## 2018-10-16 PROCEDURE — 71046 X-RAY EXAM CHEST 2 VIEWS: CPT

## 2018-10-16 PROCEDURE — 99285 EMERGENCY DEPT VISIT HI MDM: CPT | Mod: 25

## 2018-10-16 PROCEDURE — 85025 COMPLETE CBC W/AUTO DIFF WBC: CPT | Performed by: EMERGENCY MEDICINE

## 2018-10-16 PROCEDURE — 25000128 H RX IP 250 OP 636: Performed by: PHYSICIAN ASSISTANT

## 2018-10-16 PROCEDURE — 87086 URINE CULTURE/COLONY COUNT: CPT | Performed by: HOSPITALIST

## 2018-10-16 PROCEDURE — 84132 ASSAY OF SERUM POTASSIUM: CPT | Performed by: PHYSICIAN ASSISTANT

## 2018-10-16 PROCEDURE — 80048 BASIC METABOLIC PNL TOTAL CA: CPT | Performed by: EMERGENCY MEDICINE

## 2018-10-16 PROCEDURE — 25000132 ZZH RX MED GY IP 250 OP 250 PS 637: Performed by: PHYSICIAN ASSISTANT

## 2018-10-16 PROCEDURE — 97161 PT EVAL LOW COMPLEX 20 MIN: CPT | Mod: GP

## 2018-10-16 PROCEDURE — 99207 ZZC APP CREDIT; MD BILLING SHARED VISIT: CPT | Performed by: PHYSICIAN ASSISTANT

## 2018-10-16 PROCEDURE — 83605 ASSAY OF LACTIC ACID: CPT | Performed by: EMERGENCY MEDICINE

## 2018-10-16 PROCEDURE — 99223 1ST HOSP IP/OBS HIGH 75: CPT | Mod: AI | Performed by: HOSPITALIST

## 2018-10-16 PROCEDURE — 96365 THER/PROPH/DIAG IV INF INIT: CPT

## 2018-10-16 PROCEDURE — 87186 SC STD MICRODIL/AGAR DIL: CPT | Performed by: HOSPITALIST

## 2018-10-16 PROCEDURE — 97530 THERAPEUTIC ACTIVITIES: CPT | Mod: GP

## 2018-10-16 PROCEDURE — 40000193 ZZH STATISTIC PT WARD VISIT

## 2018-10-16 RX ORDER — ONDANSETRON 2 MG/ML
4 INJECTION INTRAMUSCULAR; INTRAVENOUS EVERY 6 HOURS PRN
Status: DISCONTINUED | OUTPATIENT
Start: 2018-10-16 | End: 2018-10-19 | Stop reason: HOSPADM

## 2018-10-16 RX ORDER — TAMSULOSIN HYDROCHLORIDE 0.4 MG/1
0.4 CAPSULE ORAL DAILY
Status: DISCONTINUED | OUTPATIENT
Start: 2018-10-16 | End: 2018-10-19 | Stop reason: HOSPADM

## 2018-10-16 RX ORDER — PROCHLORPERAZINE MALEATE 5 MG
5 TABLET ORAL EVERY 6 HOURS PRN
Status: DISCONTINUED | OUTPATIENT
Start: 2018-10-16 | End: 2018-10-19 | Stop reason: HOSPADM

## 2018-10-16 RX ORDER — POTASSIUM CHLORIDE 29.8 MG/ML
20 INJECTION INTRAVENOUS
Status: DISCONTINUED | OUTPATIENT
Start: 2018-10-16 | End: 2018-10-19 | Stop reason: HOSPADM

## 2018-10-16 RX ORDER — ERTAPENEM 1 G/1
1 INJECTION, POWDER, LYOPHILIZED, FOR SOLUTION INTRAMUSCULAR; INTRAVENOUS EVERY 24 HOURS
Status: DISCONTINUED | OUTPATIENT
Start: 2018-10-17 | End: 2018-10-19 | Stop reason: HOSPADM

## 2018-10-16 RX ORDER — APIXABAN 5 MG/1
TABLET, FILM COATED ORAL
Qty: 180 TABLET | Refills: 1 | Status: SHIPPED | OUTPATIENT
Start: 2018-10-16 | End: 2018-10-19

## 2018-10-16 RX ORDER — SODIUM CHLORIDE 9 MG/ML
INJECTION, SOLUTION INTRAVENOUS CONTINUOUS
Status: DISCONTINUED | OUTPATIENT
Start: 2018-10-16 | End: 2018-10-17

## 2018-10-16 RX ORDER — POTASSIUM CL/LIDO/0.9 % NACL 10MEQ/0.1L
10 INTRAVENOUS SOLUTION, PIGGYBACK (ML) INTRAVENOUS
Status: DISCONTINUED | OUTPATIENT
Start: 2018-10-16 | End: 2018-10-19 | Stop reason: HOSPADM

## 2018-10-16 RX ORDER — SODIUM CHLORIDE 9 MG/ML
1000 INJECTION, SOLUTION INTRAVENOUS CONTINUOUS
Status: DISCONTINUED | OUTPATIENT
Start: 2018-10-16 | End: 2018-10-16

## 2018-10-16 RX ORDER — ACETAMINOPHEN 325 MG/1
650 TABLET ORAL EVERY 8 HOURS PRN
Status: DISCONTINUED | OUTPATIENT
Start: 2018-10-16 | End: 2018-10-19 | Stop reason: HOSPADM

## 2018-10-16 RX ORDER — POTASSIUM CHLORIDE 1.5 G/1.58G
20-40 POWDER, FOR SOLUTION ORAL
Status: DISCONTINUED | OUTPATIENT
Start: 2018-10-16 | End: 2018-10-19 | Stop reason: HOSPADM

## 2018-10-16 RX ORDER — ONDANSETRON 4 MG/1
4 TABLET, ORALLY DISINTEGRATING ORAL EVERY 6 HOURS PRN
Status: DISCONTINUED | OUTPATIENT
Start: 2018-10-16 | End: 2018-10-19 | Stop reason: HOSPADM

## 2018-10-16 RX ORDER — METOPROLOL TARTRATE 50 MG
50 TABLET ORAL 2 TIMES DAILY
Status: DISCONTINUED | OUTPATIENT
Start: 2018-10-16 | End: 2018-10-19 | Stop reason: HOSPADM

## 2018-10-16 RX ORDER — PROCHLORPERAZINE 25 MG
12.5 SUPPOSITORY, RECTAL RECTAL EVERY 12 HOURS PRN
Status: DISCONTINUED | OUTPATIENT
Start: 2018-10-16 | End: 2018-10-19 | Stop reason: HOSPADM

## 2018-10-16 RX ORDER — DIPHENOXYLATE HCL/ATROPINE 2.5-.025MG
1 TABLET ORAL DAILY PRN
Status: DISCONTINUED | OUTPATIENT
Start: 2018-10-16 | End: 2018-10-19 | Stop reason: HOSPADM

## 2018-10-16 RX ORDER — OXYCODONE HYDROCHLORIDE 5 MG/1
5-10 TABLET ORAL
Status: DISCONTINUED | OUTPATIENT
Start: 2018-10-16 | End: 2018-10-19 | Stop reason: HOSPADM

## 2018-10-16 RX ORDER — POTASSIUM CHLORIDE 7.45 MG/ML
10 INJECTION INTRAVENOUS
Status: DISCONTINUED | OUTPATIENT
Start: 2018-10-16 | End: 2018-10-19 | Stop reason: HOSPADM

## 2018-10-16 RX ORDER — NALOXONE HYDROCHLORIDE 0.4 MG/ML
.1-.4 INJECTION, SOLUTION INTRAMUSCULAR; INTRAVENOUS; SUBCUTANEOUS
Status: DISCONTINUED | OUTPATIENT
Start: 2018-10-16 | End: 2018-10-19 | Stop reason: HOSPADM

## 2018-10-16 RX ORDER — ACETAMINOPHEN 325 MG/1
650 TABLET ORAL EVERY 8 HOURS PRN
Status: ON HOLD | COMMUNITY
End: 2019-01-24

## 2018-10-16 RX ORDER — POTASSIUM CHLORIDE 1500 MG/1
20-40 TABLET, EXTENDED RELEASE ORAL
Status: DISCONTINUED | OUTPATIENT
Start: 2018-10-16 | End: 2018-10-19 | Stop reason: HOSPADM

## 2018-10-16 RX ORDER — ERTAPENEM 1 G/1
1 INJECTION, POWDER, LYOPHILIZED, FOR SOLUTION INTRAMUSCULAR; INTRAVENOUS ONCE
Status: COMPLETED | OUTPATIENT
Start: 2018-10-16 | End: 2018-10-16

## 2018-10-16 RX ORDER — MORPHINE 10 MG/ML
0.2 TINCTURE ORAL DAILY PRN
Status: DISCONTINUED | OUTPATIENT
Start: 2018-10-16 | End: 2018-10-19 | Stop reason: HOSPADM

## 2018-10-16 RX ADMIN — ERTAPENEM SODIUM 1 G: 1 INJECTION, POWDER, LYOPHILIZED, FOR SOLUTION INTRAMUSCULAR; INTRAVENOUS at 08:01

## 2018-10-16 RX ADMIN — TAMSULOSIN HYDROCHLORIDE 0.4 MG: 0.4 CAPSULE ORAL at 18:02

## 2018-10-16 RX ADMIN — SODIUM CHLORIDE: 9 INJECTION, SOLUTION INTRAVENOUS at 13:27

## 2018-10-16 RX ADMIN — SODIUM CHLORIDE 1000 ML: 9 INJECTION, SOLUTION INTRAVENOUS at 08:01

## 2018-10-16 RX ADMIN — POTASSIUM CHLORIDE 20 MEQ: 1.5 POWDER, FOR SOLUTION ORAL at 13:07

## 2018-10-16 RX ADMIN — DIPHENOXYLATE HYDROCHLORIDE AND ATROPINE SULFATE 1 TABLET: 2.5; .025 TABLET ORAL at 18:02

## 2018-10-16 RX ADMIN — SODIUM CHLORIDE: 9 INJECTION, SOLUTION INTRAVENOUS at 20:57

## 2018-10-16 RX ADMIN — POTASSIUM CHLORIDE 40 MEQ: 1.5 POWDER, FOR SOLUTION ORAL at 10:42

## 2018-10-16 RX ADMIN — METOPROLOL TARTRATE 50 MG: 50 TABLET ORAL at 20:57

## 2018-10-16 RX ADMIN — ACETAMINOPHEN 650 MG: 325 TABLET, FILM COATED ORAL at 16:36

## 2018-10-16 RX ADMIN — APIXABAN 5 MG: 5 TABLET, FILM COATED ORAL at 20:57

## 2018-10-16 ASSESSMENT — ACTIVITIES OF DAILY LIVING (ADL)
COMMUNICATION: 0 - UNDERSTANDS/COMMUNICATES WITHOUT DIFFICULTY
DRESS: 0-->INDEPENDENT
WHICH_OF_THE_ABOVE_FUNCTIONAL_RISKS_HAD_A_RECENT_ONSET_OR_CHANGE?: AMBULATION;TRANSFERRING;BATHING;DRESSING
AMBULATION: 1-->ASSISTIVE EQUIPMENT
ADLS_ACUITY_SCORE: 14
FALL_HISTORY_WITHIN_LAST_SIX_MONTHS: NO
BATHING: 3 - ASSISTIVE EQUIPMENT AND PERSON
ADLS_ACUITY_SCORE: 14
BATHING: 2-->ASSISTIVE PERSON
COGNITION: 0 - NO COGNITION ISSUES REPORTED
SWALLOWING: 0-->SWALLOWS FOODS/LIQUIDS WITHOUT DIFFICULTY
TOILETING: 1-->ASSISTIVE EQUIPMENT
RETIRED_COMMUNICATION: 0-->UNDERSTANDS/COMMUNICATES WITHOUT DIFFICULTY
RETIRED_EATING: 0-->INDEPENDENT
TRANSFERRING: 3 - ASSISTIVE EQUIPMENT AND PERSON
CHANGE_IN_FUNCTIONAL_STATUS_SINCE_ONSET_OF_CURRENT_ILLNESS/INJURY: YES
SWALLOWING: 0 - SWALLOWS FOODS/LIQUIDS WITHOUT DIFFICULTY
DRESS: 2 - ASSISTIVE PERSON
EATING: 0 - INDEPENDENT
AMBULATION: 3 - ASSISTIVE EQUIPMENT AND PERSON
TOILETING: 3 - ASSISTIVE EQUIPMENT AND PERSON
TRANSFERRING: 1-->ASSISTIVE EQUIPMENT
ADLS_ACUITY_SCORE: 14

## 2018-10-16 ASSESSMENT — ENCOUNTER SYMPTOMS
ABDOMINAL PAIN: 0
COUGH: 1
FEVER: 0

## 2018-10-16 ASSESSMENT — PATIENT HEALTH QUESTIONNAIRE - PHQ9: SUM OF ALL RESPONSES TO PHQ QUESTIONS 1-9: 6

## 2018-10-16 ASSESSMENT — ANXIETY QUESTIONNAIRES: GAD7 TOTAL SCORE: 0

## 2018-10-16 NOTE — IP AVS SNAPSHOT
MRN:[id number]                      After Visit Summary   10/16/2018    Dawson Jones    MRN: [id number]           Thank you!     Thank you for choosing [facility] for your care. Our goal is always to provide you with excellent care. Hearing back from our patients is one way we can continue to improve our services. Please take a few minutes to complete the written survey that you may receive in the mail after you visit with us. Thank you!        Patient Information     Date Of Birth          5/5/1930        Designated Caregiver       Most Recent Value    Caregiver    Will someone help with your care after discharge? yes    Name of designated caregiver Pauly    Phone number of caregiver Same    Caregiver address Same      About your hospital stay     You were admitted on:  October 16, 2018 You last received care in the:  [facility] [address] [facility]    You were discharged on:  October 19, 2018        Reason for your hospital stay       You were admitted with positive blood cultures after back surgery.                  Who to Call     For medical emergencies, please call 911.  For non-urgent questions about your medical care, please call your primary care provider or clinic, [phone]          Attending Provider     Provider Specialty    Leoncio House DO Emergency Medicine    Marquita Cohen MD Internal Medicine       Primary Care Provider Office Phone # Fax #    Judson Mcadams [phone]       When to contact your care team       Call your primary doctor if you have any of the following: increased drainage, increased swelling or increased pain at the surgical site or if spiking fever.                  After Care Instructions     Activity       Your activity upon discharge: activity as tolerated            Activity - Up with nursing assistance           Diet       Follow this diet upon discharge: Orders Placed This Encounter      Snacks/Supplements  Adult: Boost Shake; With Meals  Low-salt diet.            Discharge Instructions       Aggressive incentive spirometry.  Mcdowell catheter care. Midline care.  Consider neurocognitive assessment as outpatient.  Avoid narcotics and benzodiazepines if able to.            Fall precautions           General info for SNF       Length of Stay Estimate: Short Term Care: Estimated # of Days <30  Condition at Discharge: Stable  Level of care:skilled   Rehabilitation Potential: Fair  Admission H&P remains valid and up-to-date: Yes  Recent Chemotherapy: N/A  Use Nursing Home Standing Orders: Yes            Mantoux instructions       Give two-step Mantoux (PPD) Per Facility Policy Yes            Wound care and dressings       Instructions to care for your wound at home: as directed by surgical team.                  Follow-up Appointments     Follow Up and recommended labs and tests       Follow up with Nursing home physician.            Follow-up and recommended labs and tests        Follow up with primary care provider, Judson Mcadams, within 7 days for hospital follow- up.   Monitoring while on IV antibiotics as per ID.    Follow-up with Ortho as per schedule.  Follow-up with urology for voiding trials as per schedule.                  Your next 10 appointments already scheduled     Nov 06, 2018  9:30 AM CST   [facility]urse [facility]    [address] [address]   [phone]            Jan 16, 2019 10:00 AM CST   Office Visit with Judson Mcadams MD   [facility]    [address] [address]   [phone]           Bring a current list of meds and any records pertaining to this visit. For Physicals, please bring immunization records and any forms needing to be filled out. Please arrive 10 minutes early to complete paperwork.              Additional Services     Occupational Therapy Adult Consult       Evaluate  "and treat as clinically indicated.    Reason: Physical deconditioning            Physical Therapy Adult Consult       Evaluate and treat as clinically indicated.    Reason: Physical deconditioning.                  Pending Results     Date and Time Order Name Status Description    10/18/2018 0000 Blood culture Preliminary     10/17/2018 1019 Blood culture Preliminary     10/16/2018 1110 Urine Culture Aerobic Bacterial Preliminary     10/16/2018 0824 Blood culture Preliminary     10/16/2018 0824 Blood culture Preliminary             Statement of Approval     Ordered          10/19/18 1226  I have reviewed and agree with all the recommendations and orders detailed in this document.  EFFECTIVE NOW     Approved and electronically signed by:  Marquita Cohen MD           10/18/18 1024  I have reviewed and agree with all the recommendations and orders detailed in this document.     Approved and electronically signed by:  Juan Arias MD             Admission Information     Date & Time Provider Department Dept. Phone    10/16/2018 Marquita Cohen MD [place] [address] [facility] [phone]      Your Vitals Were     Blood Pressure Pulse Temperature Respirations Height Weight    121/77 (BP Location: Right arm) 126 97.2  F (36.2  C) (Oral) 18 1.727 m (5' 8\") 68.5 kg (151 lb 0.2 oz)    Pulse Oximetry BMI (Body Mass Index)                95% 22.96 kg/m2          MyChart Information     [employer] gives you secure access to your electronic health record. If you see a primary care provider, you can also send messages to your care team and make appointments. If you have questions, please call your primary care clinic.  If you do not have a primary care provider, please call [phone] and they will assist you.        Care EveryWhere ID     This is your Care EveryWhere ID. This could be used by other organizations to access your [facility] medical records  [id number]        Equal Access to Services     " [facility]ULL[facility]I [facility] : Hadii aad ku dione Calero, waaxda luqadaha, qaybta kaalmada adeavery, ky sofiin hayaacriss mosermichael ramos brendan . So [facility] [phone].    ATENCIÓN: Si habla español, tiene a ambrose disposición servicios gratuitos de asistencia lingüística. Llame al [phone].    We comply with applicable federal civil rights laws and [place] laws. We do not discriminate on the basis of race, color, national origin, age, disability, sex, sexual orientation, or gender identity.               Review of your medicines      START taking        Dose / Directions    ertapenem 1 GM injection   Commonly known as:  INVanz        Dose:  1 g   Inject 1 g into the vein every 24 hours for 10 days CBC with differential, creatinine, SGOT weekly while on this medication to be faxed to Dr. Arias office.   Quantity:  100 mL   Refills:  0       ferrous sulfate 325 (65 Fe) MG tablet   Commonly known as:  IRON   Used for:  Crohn's disease of small and large intestines with complication (H)        Dose:  325 mg   Start taking on:  10/20/2018   Take 1 tablet (325 mg) by mouth daily   Quantity:  100 tablet   Refills:  0         CONTINUE these medicines which may have CHANGED, or have new prescriptions. If we are uncertain of the size of tablets/capsules you have at home, strength may be listed as something that might have changed.        Dose / Directions    diphenoxylate-atropine 2.5-0.025 MG per tablet   Commonly known as:  LOMOTIL   This may have changed:    - when to take this  - reasons to take this   Used for:  Crohn's disease of small and large intestines with complication (H)        Dose:  1 tablet   Take 1 tablet by mouth daily as needed for diarrhea   Quantity:  10 tablet   Refills:  0       opium tincture 10 MG/ML (1%) liquid   This may have changed:    - how much to take  - how to take this  - when to take this  - reasons to take this  - additional instructions   Used for:  Crohn's disease of small and large intestines with  complication (H)        Dose:  0.2 mL   Take 0.2 mLs (2 mg) by mouth every 6 hours as needed for diarrhea (4 drops)   Quantity:  118 mL   Refills:  0         CONTINUE these medicines which have NOT CHANGED        Dose / Directions    acetaminophen 325 MG tablet   Commonly known as:  TYLENOL        Dose:  650 mg   Take 650 mg by mouth every 8 hours as needed for mild pain   Refills:  0       cyanocobalamin 1000 MCG/ML injection   Commonly known as:  VITAMIN B12   Used for:  Vitamin B12 deficiency (non anemic)        Dose:  1 mL   Inject 1 mL (1,000 mcg) into the muscle every 30 days   Quantity:  3 mL   Refills:  11       ELIQUIS 5 MG tablet   Used for:  Chronic atrial fibrillation (H)   Generic drug:  apixaban ANTICOAGULANT        Dose:  5 mg   Take 1 tablet (5 mg) by mouth 2 times daily   Quantity:  30 tablet   Refills:  0       FLOMAX 0.4 MG capsule   Used for:  Benign non-nodular prostatic hyperplasia with lower urinary tract symptoms   Generic drug:  tamsulosin        Dose:  0.4 mg   Take 1 capsule (0.4 mg) by mouth daily   Quantity:  90 capsule   Refills:  3       metoprolol tartrate 50 MG tablet   Commonly known as:  LOPRESSOR   Used for:  Benign essential hypertension        TAKE 1 TABLET(50 MG) BY MOUTH TWICE DAILY   Quantity:  180 tablet   Refills:  3       order for DME   Used for:  S/P laminectomy        Equipment being ordered: Walker Wheels () and Walker () Treatment Diagnosis: DIfficulty with gait   Quantity:  1 each   Refills:  0       order for DME   Used for:  Atelectasis        Equipment being ordered: incentive spirometer   Quantity:  1 Units   Refills:  0         STOP taking     oxyCODONE IR 5 MG tablet   Commonly known as:  ROXICODONE                Where to get your medicines      Some of these will need a paper prescription and others can be bought over the counter. Ask your nurse if you have questions.     Bring a paper prescription for each of these medications      diphenoxylate-atropine 2.5-0.025 MG per tablet    opium tincture 10 MG/ML (1%) liquid       You don't need a prescription for these medications     ferrous sulfate 325 (65 Fe) MG tablet         Information about where to get these medications is not yet available     ! Ask your nurse or doctor about these medications     ertapenem 1 GM injection                Protect others around you: Learn how to safely use, store and throw away your medicines at www.disposemymeds.org.        ANTIBIOTIC INSTRUCTION     You've Been Prescribed an Antibiotic - Now What?  Your healthcare team thinks that you or your loved one might have an infection. Some infections can be treated with antibiotics, which are powerful, life-saving drugs. Like all medications, antibiotics have side effects and should only be used when necessary. There are some important things you should know about your antibiotic treatment.      Your healthcare team may run tests before you start taking an antibiotic.    Your team may take samples (e.g., from your blood, urine or other areas) to run tests to look for bacteria. These test can be important to determine if you need an antibiotic at all and, if you do, which antibiotic will work best.      Within a few days, your healthcare team might change or even stop your antibiotic.    Your team may start you on an antibiotic while they are working to find out what is making you sick.    Your team might change your antibiotic because test results show that a different antibiotic would be better to treat your infection.    In some cases, once your team has more information, they learn that you do not need an antibiotic at all. They may find out that you don't have an infection, or that the antibiotic you're taking won't work against your infection. For example, an infection caused by a virus can't be treated with antibiotics. Staying on an antibiotic when you don't need it is more likely to be harmful than helpful.       You may experience side effects from your antibiotic.    Like all medications, antibiotics have side effects. Some of these can be serious.    Let you healthcare team know if you have any known allergies when you are admitted to the hospital.    One significant side effect of nearly all antibiotics is the risk of severe and sometimes deadly diarrhea caused by Clostridium difficile (C. Difficile). This occurs when a person takes antibiotics because some good germs are destroyed. Antibiotic use allows C. diificile to take over, putting patients at high risk for this serious infection.    As a patient or caregiver, it is important to understand your or your loved one's antibiotic treatment. It is especially important for caregivers to speak up when patients can't speak for themselves. Here are some important questions to ask your healthcare team.    What infection is this antibiotic treating and how do you know I have that infection?    What side effects might occur from this antibiotic?    How long will I need to take this antibiotic?    Is it safe to take this antibiotic with other medications or supplements (e.g., vitamins) that I am taking?     Are there any special directions I need to know about taking this antibiotic? For example, should I take it with food?    How will I be monitored to know whether my infection is responding to the antibiotic?    What tests may help to make sure the right antibiotic is prescribed for me?      Information provided by:  www.cdc.gov/getsmart  U.S. Department of Health and Human Services  Centers for disease Control and Prevention  National Center for Emerging and Zoonotic Infectious Diseases  Division of Healthcare Quality Promotion             Medication List: This is a list of all your medications and when to take them. Check marks below indicate your daily home schedule. Keep this list as a reference.      Medications           Morning Afternoon Evening Bedtime As Needed     acetaminophen 325 MG tablet   Commonly known as:  TYLENOL   Take 650 mg by mouth every 8 hours as needed for mild pain   Last time this was given:  650 mg on 10/16/2018  4:36 PM                                cyanocobalamin 1000 MCG/ML injection   Commonly known as:  VITAMIN B12   Inject 1 mL (1,000 mcg) into the muscle every 30 days                                diphenoxylate-atropine 2.5-0.025 MG per tablet   Commonly known as:  LOMOTIL   Take 1 tablet by mouth daily as needed for diarrhea   Last time this was given:  1 tablet on 10/18/2018 12:46 PM                                ELIQUIS 5 MG tablet   Take 1 tablet (5 mg) by mouth 2 times daily   Last time this was given:  5 mg on 10/19/2018  8:50 AM   Generic drug:  apixaban ANTICOAGULANT                                ertapenem 1 GM injection   Commonly known as:  INVanz   Inject 1 g into the vein every 24 hours for 10 days CBC with differential, creatinine, SGOT weekly while on this medication to be faxed to Dr. Arias office.                                ferrous sulfate 325 (65 Fe) MG tablet   Commonly known as:  IRON   Take 1 tablet (325 mg) by mouth daily   Start taking on:  10/20/2018   Last time this was given:  325 mg on 10/19/2018  8:51 AM                                FLOMAX 0.4 MG capsule   Take 1 capsule (0.4 mg) by mouth daily   Last time this was given:  0.4 mg on 10/18/2018  6:13 PM   Generic drug:  tamsulosin                                metoprolol tartrate 50 MG tablet   Commonly known as:  LOPRESSOR   TAKE 1 TABLET(50 MG) BY MOUTH TWICE DAILY   Last time this was given:  50 mg on 10/19/2018  8:50 AM                                opium tincture 10 MG/ML (1%) liquid   Take 0.2 mLs (2 mg) by mouth every 6 hours as needed for diarrhea (4 drops)                                order for DME   Equipment being ordered: Walker Wheels () and Walker () Treatment Diagnosis: DIfficulty with gait                                order for DME    Equipment being ordered: incentive spirometer

## 2018-10-16 NOTE — PROGRESS NOTES
The following letter pertains to your most recent diagnostic tests:    Your x-ray did not show an obvious pneumonia.          Sincerely,    Dr. Mcadams

## 2018-10-16 NOTE — PROGRESS NOTES
RECEIVING UNIT ED HANDOFF REVIEW    ED Nurse Handoff Report was reviewed by: Maddi Guzmán on October 16, 2018 at 8:56 AM

## 2018-10-16 NOTE — ED NOTES
"[facility]  ED Nurse Handoff Report    ED Chief complaint: Abnormal Labs (On 10/1 had Bilateral L2-3 decompression and insertion of a Coflex device. Has indwelling king catheter in place. On Sunday developed chills & shakes. Went to PCP's office yesterday. Showed leukocytosis, positive blood cultures, & UTI. Received call to come to ED today.)      ED Diagnosis:   Final diagnoses:   ESBL (extended spectrum beta-lactamase) producing bacteria infection   Bacteremia       Code Status: Full Code    Allergies: No Known Allergies    Activity level - Baseline/Home:  Independent    Activity Level - Current:   Stand with Assist     Needed?: No    Isolation: Yes  Infection: Not Applicable  ESBL  Bariatric?: No    Vital Signs:   Vitals:    10/16/18 0735 10/16/18 0800   BP: 119/80 125/73   Pulse: 122 94   Resp: 16 16   Temp: 99.4  F (37.4  C)    TempSrc: Oral    SpO2: 98% 96%   Weight: 70.1 kg (154 lb 9.6 oz)    Height: 1.727 m (5' 8\")        Cardiac Rhythm: ,        Pain level: 0-10 Pain Scale: 0    Is this patient confused?: No   Loving - Suicide Severity Rating Scale Completed?  Yes  If yes, what color did the patient score?  White    Patient Report: Initial Complaint: UTI, chills  Focused Assessment: Dawson Jones is a 88 year old male who presents to the emergency department today after a call from his primary care provider indicating a positive blood culture after yesterday's visit. The patient reports that on 10/1/18 he had bilateral L2-3 decompression surgery and Coflex device placement, performed by Dr. Reynolds, due to pain going down the legs for year, and due to urinary retention was discharged with a king catheter. Two nights ago, the patient reports having chills so yesterday he was seen by his primary care provider, Dr. Mcadams, and found to have a white count of 15.5, a urinary tract infection and 1 of 2 positive blood culture for ESBL, which the patient was notified of around 0630 " this morning, results below. Today, the patient reports that he has no pain going down the leg and his back pain is pretty good. He notes a slight cough. He denies any abdominal pain or fever. He currently has Mcdowell catheter placed  Tests Performed: labs, CXR  Abnormal Results: see epic  Treatments provided: IV fluids and IV antibiotic    Family Comments: wife at bedside    OBS brochure/video discussed/provided to patient/family: No              Name of person given brochure if not patient: n/a              Relationship to patient: n/a    ED Medications:   Medications   0.9% sodium chloride BOLUS (1,000 mLs Intravenous New Bag 10/16/18 0801)     Followed by   sodium chloride 0.9% infusion (not administered)   ertapenem (INVanz) 1 g vial to attach to  mL bag (1 g Intravenous New Bag 10/16/18 0801)       Drips infusing?:  No    For the majority of the shift this patient was Green.   Interventions performed were n/a.    Severe Sepsis OR Septic Shock Diagnosis Present: No    To be done/followed up on inpatient unit:  none at this time    ED NURSE PHONE NUMBER: *[phone]

## 2018-10-16 NOTE — TELEPHONE ENCOUNTER
Routing refill request to provider for review/approval because:  Labs out of range:     10/15/18   1058    HGB  11.6*     Labs not current: AST, ALT    Please review and authorize if appropriate.    Thank you,   Josh ECHAVARRIA RN

## 2018-10-16 NOTE — CONSULTS
[facility]    Infectious Disease Consultation     Date of Admission:  10/16/2018  Date of Consult (When I saw the patient): 10/16/18    Assessment & Plan   Dawson Jones is a 88 year old male who was admitted on 10/16/2018.     Impression:  1. 88 y.o male with atrial fibrillation, Crohn's and urinary retention.  2. S/P L2 through L3 decompression 10/01 for chronic back pain with right radicular symptoms.  Hospital course was complicated by urinary retention and he discharged with an indwelling Mcdowell catheter. Which was replaced a week ago due to failing of the voiding trial.   3. Now coming in with fever, UA positive and UC pending, blood cultures positive for E coli.   4. Back incision looks ok, though slight separation of skin, no redness or induration.     Recommendations:   Agree with ertapenem.   Consult spinal surgery.   Follow up on the urine cultures.   Daily blood cultures.         Tena Ni MD    Reason for Consult   Reason for consult: I was asked by Marie Merchant PA-C to evaluate this patient for bacteremia.    Primary Care Physician   Judson Mcadams    Chief Complaint   Chills and fevers     History is obtained from the patient and medical records    History of Present Illness   Dawson Jones is a 88 year old male with atrial fibrillation, Crohn's and urinary retention. History of  L2 through L3 decompression 10/01 by Dr. Reynolds due to chronic back pain with right radicular symptoms.  Hospital course was complicated by urinary retention and he discharged with an indwelling Mcdowell catheter.  He saw Dr. Valverde of Urology approximately 1 week ago.  He failed a voiding trial and his catheter was replaced. 2 days PTA he developed some chills.  He had no other localizing symptoms and was afebrile.  He was seen by his primary care provider yesterday.  He was afebrile at that appointment.  A chest x-ray was clear but found to have leukocytosis with a WBC of 15.5.  Urinalysis was  grossly abnormal.  Urine culture is pending.  One out of two blood cultures is concerning for E. coli ESBL.  The patient was sent to the emergency department for further evaluation.     Past Medical History   I have reviewed this patient's medical history and updated it with pertinent information if needed.   Past Medical History:   Diagnosis Date     Atrial fibrillation (H)      Cardiomyopathy (H)      Crohn's disease of small and large intestines with complication (H)      Hyperlipidemia        Past Surgical History   I have reviewed this patient's surgical history and updated it with pertinent information if needed.  Past Surgical History:   Procedure Laterality Date     APPENDECTOMY       DECOMPRESSION LUMBAR ONE LEVEL N/A 10/1/2018    Procedure: DECOMPRESSION LUMBAR ONE LEVEL;  DECOMPRESSION L2-3 WITH COFLEX DEVICE  ;  Surgeon: Bert Reynolds MD;  Location:  OR     ORTHOPEDIC SURGERY  10/01/2018    Bilateral L2-3 decompression and insertion of a Coflex device       Prior to Admission Medications   Prior to Admission Medications   Prescriptions Last Dose Informant Patient Reported? Taking?   ELIQUIS 5 MG tablet 10/16/2018 at AM Spouse/Significant Other No Yes   Sig: Take 1 tablet (5 mg) by mouth 2 times daily   acetaminophen (TYLENOL) 325 MG tablet 10/15/2018 at 1800 Spouse/Significant Other Yes Yes   Sig: Take 650 mg by mouth every 8 hours as needed for mild pain   cyanocobalamin (VITAMIN B12) 1000 MCG/ML injection 9/3/2018 Spouse/Significant Other No Yes   Sig: Inject 1 mL (1,000 mcg) into the muscle every 30 days   diphenoxylate-atropine (LOMOTIL) 2.5-0.025 MG per tablet Past Month at Unknown time Spouse/Significant Other No Yes   Sig: Take 1 tablet by mouth daily   Patient taking differently: Take 1 tablet by mouth daily as needed    metoprolol tartrate (LOPRESSOR) 50 MG tablet 10/16/2018 at AM Spouse/Significant Other No Yes   Sig: TAKE 1 TABLET(50 MG) BY MOUTH TWICE DAILY   opium tincture 10 MG/ML  (1%) liquid Past Month at 2018 Spouse/Significant Other No Yes   Si drops every 4 hours as needed for diarrhea   Patient taking differently: Take 0.2 mLs by mouth daily as needed (4 drops)   order for DME   No No   Sig: Equipment being ordered: Walker Wheels () and Walker ()  Treatment Diagnosis: DIfficulty with gait   order for DME   No No   Sig: Equipment being ordered: incentive spirometer   oxyCODONE IR (ROXICODONE) 5 MG tablet Past Week at Unknown time Spouse/Significant Other No Yes   Sig: Take 1-2 tablets (5-10 mg) by mouth every 3 hours as needed   tamsulosin (FLOMAX) 0.4 MG capsule 10/15/2018 at HS Spouse/Significant Other Yes Yes   Sig: Take 1 capsule (0.4 mg) by mouth daily      Facility-Administered Medications: None     Allergies   No Known Allergies    Immunization History   Immunization History   Administered Date(s) Administered     Influenza (High Dose) 3 valent vaccine 10/18/2016, 10/03/2017, 2018     Pneumo Conj 13-V (2010&after) 09/15/2016     Pneumococcal 23 valent 2000     Tdap (Adacel,Boostrix) 2012     Zoster vaccine, live 2007       Social History   I have reviewed this patient's social history and updated it with pertinent information if needed. Dawson Jones  reports that he has quit smoking. He has never used smokeless tobacco. He reports that he drinks about 4.2 oz of alcohol per week  He reports that he does not use illicit drugs.    Family History   I have reviewed this patient's family history and updated it with pertinent information if needed.   Family History   Problem Relation Age of Onset     Family History Negative Mother      Cardiac Sudden Death Father      Family History Negative Brother      Other - See Comments Sister      colon issues     Family History Negative Son      Family History Negative Brother      Family History Negative Son      HEART DISEASE No family hx of        Review of Systems   The 10 point Review of Systems  is negative other than noted in the HPI or here.     Physical Exam   Temp: 98.5  F (36.9  C) Temp src: Oral BP: 121/79 Pulse: 100   Resp: 20 SpO2: 97 % O2 Device: None (Room air)    Vital Signs with Ranges  Temp:  [98.5  F (36.9  C)-99.4  F (37.4  C)] 98.5  F (36.9  C)  Pulse:  [] 100  Resp:  [16-20] 20  BP: (118-125)/(73-86) 121/79  SpO2:  [94 %-98 %] 97 %  151 lbs .24 oz  Body mass index is 22.96 kg/(m^2).    GENERAL APPEARANCE:  alert and no distress  EYES: Eyes grossly normal to inspection, PERRL and conjunctivae and sclerae normal  HENT: ear canals and TM's normal and nose and mouth without ulcers or lesions  NECK: no adenopathy, no asymmetry, masses, or scars and thyroid normal to palpation  RESP: lungs clear to auscultation - no rales, rhonchi or wheezes  CV: regular rates and rhythm, normal S1 S2, no S3 or S4 and no murmur, click or rub  LYMPHATICS: normal ant/post cervical and supraclavicular nodes  ABDOMEN: soft, nontender, without hepatosplenomegaly or masses and bowel sounds normal  MS: extremities normal- no gross deformities noted  SKIN: no suspicious lesions or rashes      Data   Lab Results   Component Value Date    WBC 9.5 10/16/2018    HGB 11.6 (L) 10/16/2018    HCT 34.4 (L) 10/16/2018     10/16/2018     10/16/2018    POTASSIUM 3.2 (L) 10/16/2018    CHLORIDE 106 10/16/2018    CO2 24 10/16/2018    BUN 21 10/16/2018    CR 1.02 10/16/2018     (H) 10/16/2018    AST 23 03/27/2017    ALT 25 03/27/2017    ALKPHOS 83 03/27/2017    BILITOTAL 1.5 (H) 03/27/2017    INR 0.98 12/21/2010       Recent Labs  Lab 10/15/18  1057 10/15/18  1048   CULT Cultured on the 1st day of incubation:Gram negative rodsSusceptibility testing in progress*  Critical Value/Significant Value, preliminary result only, called to and read back byDr. Roper Pema 0255 10.16.18 CF/SCG  (Note)POSITIVE for E.COLI by [employer] multiplex nucleic acid test. Finalidentification and antimicrobial susceptibility testing  will beverified by standard methods. Verigene test will not distinguishE.coli from Shigella species including S.dysenteriae, S.flexneri,S.boydii, and S.sonnei. Specimens containing Shigella species orE.coli will be reported as Positive for E.coli.POSITIVE for CTX-M Class A Extended Spectrum beta-lactamase (ESBL)resistance marker by Verigene multiplex nucleic acid test. CTX-Mconfers resistance to penicillins, cephalosporins and variableresistance to beta-lactam beta-lactamase inhibitor combinations(clavulanic acid and tazobactam). Best empiric antibiotic choice ismeropenem. Specific susceptibility testing will be performed.Specimen tested with Verigene multiplex, gram-negative blood culturenucleic acid test for the following targets: Acinetobacter sp.,Citrobacter sp., Enterobacter sp., Proteus sp., E. coli, K.pneumoniae/oxyto ca, P. aeruginosa, and the following resistancemarkers: CTXM, KPC, NDM, VIM, IMP and OXA.Critical Value/Significant Value called to and read back by  .  10.16.18  0606 GJS No growth after 16 hours     Recent Labs   Lab Test  10/15/18   1057  10/15/18   1048   CULT  Cultured on the 1st day of incubation:  Gram negative rods  Susceptibility testing in progress  *  Critical Value/Significant Value, preliminary result only, called to and read back by  Dr. Judson Mcadams 0255 10.16.18 /[facility] [place]    (Note)  POSITIVE for E.COLI by Verigene multiplex nucleic acid test. Final  identification and antimicrobial susceptibility testing will be  verified by standard methods. Verigene test will not distinguish  E.coli from Shigella species including S.dysenteriae, S.flexneri,  S.boydii, and S.sonnei. Specimens containing Shigella species or  E.coli will be reported as Positive for E.coli.    POSITIVE for CTX-M Class A Extended Spectrum beta-lactamase (ESBL)  resistance marker by Verigene multiplex nucleic acid test. CTX-M  confers resistance to penicillins, cephalosporins and variable  resistance to  beta-lactam beta-lactamase inhibitor combinations  (clavulanic acid and tazobactam). Best empiric antibiotic choice is  meropenem. Specific susceptibility testing will be performed.    Specimen tested with [employer] multiplex, gram-negative blood culture  nucleic acid test for the following targets: Acinetobacter sp.,  Citrobacter sp., Enterobacter sp., Proteus sp., E. coli, K.  pneumoniae/oxyto ca, P. aeruginosa, and the following resistance  markers: CTXM, KPC, NDM, VIM, IMP and OXA.    Critical Value/Significant Value called to and read back by  DR. STOCK.  10.16.18  0606 GJS    No growth after 16 hours

## 2018-10-16 NOTE — PHARMACY-ADMISSION MEDICATION HISTORY
Admission medication history interview status for the 10/16/2018  admission is complete. See EPIC admission navigator for prior to admission medications     Medication history source reliability:Good    Actions taken by pharmacist (provider contacted, etc): Interviewed patient.      Additional medication history information not noted on PTA med list :None    Medication reconciliation/reorder completed by provider prior to medication history? No    Time spent in this activity: 10 minutes    Prior to Admission medications    Medication Sig Last Dose Taking? Auth Provider   acetaminophen (TYLENOL) 325 MG tablet Take 650 mg by mouth every 8 hours as needed for mild pain 10/15/2018 at 1800 Yes Unknown, Entered By History   cyanocobalamin (VITAMIN B12) 1000 MCG/ML injection Inject 1 mL (1,000 mcg) into the muscle every 30 days 9/3/2018 Yes Judson Mcadams MD   diphenoxylate-atropine (LOMOTIL) 2.5-0.025 MG per tablet Take 1 tablet by mouth daily  Patient taking differently: Take 1 tablet by mouth daily as needed  Past Month at Unknown time Yes Judson Mcadams MD   ELIQUIS 5 MG tablet Take 1 tablet (5 mg) by mouth 2 times daily 10/16/2018 at AM Yes Bert Reynolds MD   metoprolol tartrate (LOPRESSOR) 50 MG tablet TAKE 1 TABLET(50 MG) BY MOUTH TWICE DAILY 10/16/2018 at AM Yes Judson Mcadams MD   opium tincture 10 MG/ML (1%) liquid 4 drops every 4 hours as needed for diarrhea  Patient taking differently: Take 0.2 mLs by mouth daily as needed (4 drops) Past Month at Sept 2018 Yes Judson Mcadams MD   oxyCODONE IR (ROXICODONE) 5 MG tablet Take 1-2 tablets (5-10 mg) by mouth every 3 hours as needed Past Week at Unknown time Yes Bert Reynolds MD   tamsulosin (FLOMAX) 0.4 MG capsule Take 1 capsule (0.4 mg) by mouth daily 10/15/2018 at HS Yes Judson Mcadams MD   order for DME Equipment being ordered: incentive spirometer   Judson Mcadams MD   order for DME Equipment being ordered: Walker Wheels () and Walker  ()  Treatment Diagnosis: DIfficulty with gait   Bert Reynolds MD Sharon Chandler, PharmD, BCPS

## 2018-10-16 NOTE — ED PROVIDER NOTES
History     Chief Complaint:  Abnormal Labs    HPI   Dawson Jones is a 88 year old male who presents to the emergency department today after a call from his primary care provider indicating a positive blood culture after yesterday's visit. The patient reports that on 10/1/18 he had bilateral L2-3 decompression surgery and Coflex device placement, performed by Dr. Reynolds, due to pain going down the legs for year, and due to urinary retention was discharged with a king catheter. Two nights ago, the patient reports having chills so yesterday he was seen by his primary care provider, Dr. Mcadams, and found to have a white count of 15.5, a urinary tract infection and 1 of 2 positive blood culture for ESBL, which the patient was notified of around 0630 this morning, results below. Today, the patient reports that he has no pain going down the leg and his back pain is pretty good. He notes a slight cough. He denies any abdominal pain or fever. He currently has King catheter placed.     Laboratory Studies 10/15/18  UA: Glucose 100 (A), Bilirubin Moderate (A), Ketones Trace (A), Blood Large (A), Protein Albumin >300 (A), Nitrite Positive (A), Leukocyte Esterase Small (A)  Urine Culture: Pending   CBC: WBC 15.5 (H), HGB 11.6 (L),    BMP: Glucose 134 (H) o/w WNL (Creatinine 1.09)  Blood Culture: Positive for ESBL    XR Chest 2 Views 10/15/18  Lungs are slightly hyperinflated. Reticular opacities  within the lower lobes are favored to represent sequelae of chronic  scarring, interstitial/fibrotic change, or less likely edema.  Attenuation along the right upper aspect of the mediastinum is favored  to represent benign vascular attenuation. Chest CT could be considered  for further evaluation of these findings. Cardiac silhouette is mildly enlarged.  Reading per radiology     Allergies:  No Known Drug Allergies     Medications:    Tylenol  Lomotil  Eliquis  Lopressor  Oxycodone  Flomax    Past Medical History:   "  Atrial fibrillation  Cardiomyopathy  Crohn's disease of small and large intestines with complication  Hyperlipidemia     Past Surgical History:    Appendectomy  Decompression lumbar one level    Family History:    Father: Cardiac history     Social History:  The patient was accompanied to the ED by his wife.  Smoking Status: Former Smoker  Smokeless Tobacco: Never Used  Alcohol Use: Positive  Marital Status:      Review of Systems   Constitutional: Negative for fever.   Respiratory: Positive for cough.    Gastrointestinal: Negative for abdominal pain.   All other systems reviewed and are negative.      Physical Exam     Patient Vitals for the past 24 hrs:   BP Temp Temp src Pulse Resp SpO2 Height Weight   10/16/18 0800 125/73 - - 94 16 96 % - -   10/16/18 0735 119/80 99.4  F (37.4  C) Oral 122 16 98 % 1.727 m (5' 8\") 70.1 kg (154 lb 9.6 oz)     Physical Exam  Physical Exam   General:  Sitting on bed with his wife at bedside.   HENT:  No obvious trauma to head  Right Ear:  External ear normal.   Left Ear:  External ear normal.   Nose:  Nose normal.   Eyes:  Conjunctivae and EOM are normal. Pupils are equal, round, and reactive.   Neck: Normal range of motion. Neck supple. No tracheal deviation present.   CV:  Normal heart sounds. No murmur heard.  Pulm/Chest: Effort normal and breath sounds normal.   Abd: Soft. No distension. There is no tenderness. There is no rigidity, no rebound and no guarding.   M/S: Normal range of motion.   Neuro: Alert. GCS 15.  Skin: Skin is warm and dry. No rash noted. Not diaphoretic.   Posterior lumbar back surgical scar is clean dry and intact.  Psych: Normal mood and affect. Behavior is normal.       Emergency Department Course   Imaging:  Radiology findings were communicated with the patient who voiced understanding of the findings.    XR Chest 2 Views  No radiographic evidence of acute chest abnormality.   Reading per radiology     Laboratory:  Laboratory findings were " communicated with the patient who voiced understanding of the findings.    CBC: WBC 9.5, HGB 11.6 (L),   BMP: Potassium 3.2 (L), Glucose 115 (H) o/w WNL (Creatinine 1.02)  Lactic Acid (Resulted: 0804): 1.6     Interventions:  0801 NS 1000 ml IV  0801 Invanz 1 g IV    Emergency Department Course:    0732 Nursing notes and vitals reviewed.    0740 I performed an exam of the patient as documented above.     0751 IV was inserted and blood was drawn for laboratory testing, results above.    0752 The patient was sent for a XR Chest 2 Views while in the emergency department, results above.     0812 I discussed the treatment plan with the patient. They expressed understanding of this plan and consented to admission. I discussed the patient with Dr. Cohen, who will admit the patient to a monitored bed for further evaluation and treatment.     0823 The patient was rechecked and updated.      I personally reviewed the imaging and laboratory results with the patient and answered all related questions prior to admission.    Impression & Plan      Medical Decision Making:  Dawson Jones is a very pleasant 88 year old male who presents to the emergency department today for evaluation of a positive blood culture.  The patient underwent a back surgery by Dr. Reynolds a few days ago.  The patient had chills so saw his primary, Dr. Mcdaams, yesterday.  A blood culture was obtained along with a white count.  The patient had a leukocytosis in the clinic of 15.5 and a blood culture returned positive for ESBL.  The patient was called and instructed to come to the ED for admission.  The patient is slightly tachycardic but his white count is actually normal now and a lactate is negative.  The patient is provided Invanz for this and I spoke to the hospitalist Dr. Cohen who has agreed to admit the patient for continued evaluation and treatment.  The likely etiology of this is a urinary tract infection and that culture is still  pending.  The patient reported a slight cough and a chest x-ray was performed yesterday and again today.  There is no evidence of pneumonia.  I considered a postoperative epidural abscess but the patient has almost no back pain and no pain going down the leg (why he had the surgery) and this would likely be too early for an epidural abscess to occur, so this is unlikely and I do not believe an emergent MRI is indicated.  Blood cultures were not obtained this morning as 2 blood cultures were already obtained yesterday and growing the bacteria.    Diagnosis:    ICD-10-CM    1. ESBL (extended spectrum beta-lactamase) producing bacteria infection A49.9 CBC with platelets differential    Z16.12 Lactic acid whole blood     Basic metabolic panel   2. Bacteremia R78.81      Disposition:   The patient is admitted into the care of Dr. Cohen.    Scribe Disclosure:  Christina NEWBERRY, am serving as a scribe at 7:33 AM on 10/16/2018 to document services personally performed by Leoncio House DO based on my observations and the provider's statements to me.     EMERGENCY DEPARTMENT       Leoncio House DO  10/16/18 0868

## 2018-10-16 NOTE — H&P
Admitted:     10/16/2018      PRIMARY CARE PHYSICIAN:  Dr. Judson Mcadams       PRIMARY ORTHOPEDIST:  Dr. Bert Reynolds.      CHIEF COMPLAINT:  Bacteremia.      History obtained from the patient with the assistance of his wife who is present in the room as well as chart review.      HISTORY OF PRESENT ILLNESS:  Dawson Moseley is an 88-year-old male with a past medical history of atrial fibrillation, Crohn's and urinary retention, who presented to the emergency department today for evaluation of bacteremia.  He underwent an L2 through L3 decompression 10/01 by Dr. Reynolds due to chronic back pain with right radicular symptoms.  Surgery was relatively uncomplicated.  His right leg pain resolved.  Hospital course was complicated by urinary retention and he discharged with an indwelling Mcdowell catheter.  He saw Dr. Valverde of Urology approximately 1 week ago.  He failed a voiding trial and his catheter was replaced.  He has been doing well from a postoperative standpoint with improving pain control and no concerning symptoms.  Sunday night, however, he developed some chills.  He had no other localizing symptoms and was afebrile.  He was seen by his primary care provider yesterday.  He was afebrile at that appointment.  A chest x-ray was clear.  It was felt that his symptoms may be due to atelectasis.  Laboratory evaluation, however, returned with leukocytosis with a WBC of 15.5.  Urinalysis was grossly abnormal.  Urine culture is pending.  One out of two blood cultures is concerning for E. coli ESBL.  The patient was sent to the emergency department for further evaluation.      On arrival to the emergency department, low-grade fever of 99.4.  Vital signs otherwise stable.  Laboratory evaluation revealed improved leukocytosis.  Chest x-ray was consistently clear.  Incision with no signs of localized infection.  He was initiated on ertapenem and admission was requested for further evaluation.      Presently patient is evaluated  in the emergency department.  Denies significant concerns or complaints.  No other localizing symptoms.  No cough or shortness of breath.  No nausea or vomiting.  He has a history of Crohn's, but no issues with recent diarrhea.      PAST MEDICAL HISTORY:   1.  Atrial fibrillation, chronically anticoagulated with Eliquis.   2.  Nonischemic cardiomyopathy, historically as low as 45%-50%, but most recent echo in 2015 with an ejection fraction of 50%-55%.   3.  History of Crohn's.  Uses tincture of opium p.r.n.   4.  Peripheral artery disease.   5.  Spinal stenosis.      PRIOR TO ADMISSION MEDICATIONS:    Prior to Admission medications    Medication Sig Last Dose Taking? Auth Provider   acetaminophen (TYLENOL) 325 MG tablet Take 650 mg by mouth every 8 hours as needed for mild pain 10/15/2018 at 1800 Yes Unknown, Entered By History   cyanocobalamin (VITAMIN B12) 1000 MCG/ML injection Inject 1 mL (1,000 mcg) into the muscle every 30 days 9/3/2018 Yes Judson Mcadams MD   diphenoxylate-atropine (LOMOTIL) 2.5-0.025 MG per tablet Take 1 tablet by mouth daily  Patient taking differently: Take 1 tablet by mouth daily as needed  Past Month at Unknown time Yes Judson Mcadams MD   ELIQUIS 5 MG tablet Take 1 tablet (5 mg) by mouth 2 times daily 10/16/2018 at AM Yes Bert Reynolds MD   metoprolol tartrate (LOPRESSOR) 50 MG tablet TAKE 1 TABLET(50 MG) BY MOUTH TWICE DAILY 10/16/2018 at AM Yes Judson Mcadams MD   opium tincture 10 MG/ML (1%) liquid 4 drops every 4 hours as needed for diarrhea  Patient taking differently: Take 0.2 mLs by mouth daily as needed (4 drops) Past Month at Sept 2018 Yes Judson Mcadams MD   oxyCODONE IR (ROXICODONE) 5 MG tablet Take 1-2 tablets (5-10 mg) by mouth every 3 hours as needed Past Week at Unknown time Yes Bert Reynolds MD   tamsulosin (FLOMAX) 0.4 MG capsule Take 1 capsule (0.4 mg) by mouth daily 10/15/2018 at HS Yes Judson Mcadams MD   order for DME Equipment being ordered:  incentive spirometer   Judson Mcadams MD   order for DME Equipment being ordered: Walker Wheels () and Walker ()  Treatment Diagnosis: DIfficulty with gait   Bert Reynolds MD          ALLERGIES:  NONE.      PAST SURGICAL HISTORY:   1.  Lumbar laminectomy.   2.  Appendectomy.      FAMILY HISTORY:   1.  Father  of cardiac sudden death.   2.  Sister  of colon cancer.   3.  Son was recently diagnosed with colon cancer.      SOCIAL HISTORY:  He is a previous smoker, smoked when he was 30 for approximately 10 years, drinks 1 glass of wine at bedtime.  He is .  He and his wife live in their own home in [place].      REVIEW OF SYSTEMS:  A 10-point review of systems was completed.  Pertinent positives noted in HPI, other systems negative.      PHYSICAL EXAMINATION:   GENERAL:  Dawson Moseley is a well-developed, well-nourished 88-year-old male who is lying comfortably in bed.   VITAL SIGNS:  Blood pressure is 118/86, pulse 94, temperature 99.4.   HEENT:  Head is normocephalic and atraumatic.   CARDIOVASCULAR:  Regular rate and rhythm, no murmurs appreciated.   PULMONARY:  Normal effort, no wheezing, slight crackles in left base.   MUSCULOSKELETAL:  Lumbar incision clean, dry and intact.  No fluctuance, no drainage.  Nontender to palpation.     ABDOMEN:  Normal bowel sounds.  Soft, nontender.   EXTREMITIES:  Moves all 4 extremities.  Dorsalis pedis and radial pulses are palpable bilaterally.  Lower extremities without edema.   NEUROLOGIC:  He is alert and oriented.  Cranial nerves II-XII grossly intact.      LABORATORY EVALUATION:  Labs reviewed in Epic.      IMAGING:  Personally reviewed chest x-ray and agree with no acute infiltrate.      ASSESSMENT:  Dawson Moseley is an 88-year-old male with past medical history of paroxysmal atrial fibrillation and recent L2 through L3 decompression 10/01/2018.  He presented to the emergency department for evaluation of bacteremia.  He is being  admitted for further evaluation.      1.  Acute gram-negative bacteremia.  Final culture is pending, but concerning for E. coli ESBL.  No current signs of associated sepsis.  He has been initiated on Invanz in the emergency department.  Source is likely UTI.  He will be admitted under inpatient status.  Will continue Invanz.  Will consult Infectious Disease.  He discharged with an indwelling Mcdowell catheter after his recent lumbar decompression.  It was most recently changed 1 week ago at Urology's office.  We will change out the catheter and repeat UA.  Repeat blood cultures have been obtained and are pending.  We will monitor fever curve and culture as indicated.   2.  Status post L2 through L3 lumbar decompression 10/01/2018.  No sign of incisional infection or localized infection.  His back pain is improving.  Given recent infection and concern for bacteremia, we will consult Spine Orthopedics to follow along.  No imaging indicated at this time.   3.  Paroxysmal atrial fibrillation.  Continue rate control prior to admission beta blocker.  Will continue Eliquis for now.  Can hold if any surgical intervention is indicated.   4.  Urinary retention.  Has history of urinary retention, followed by Dr. Valverde.  Had Mcdowell placed after lumbar surgery.  Failed voiding trial 1 week ago.  We will have nursing change out Mcdowell and repeat UA.  Continue to follow with Urology as outpatient.  Will continue prior to admission Flomax.   5.  Crohn's.  Takes tincture of opium p.r.n.  No acute symptoms at present.   6.  Deep venous thrombosis prophylaxis:  Eliquis.      CODE STATUS:  Discussed with patient and wife, and he elects to be full code.      This patient was discussed with Dr. Baer of Hospitalist Service, who independently interviewed the patient.  She is in agreement with the above plan.         JOSE DE JESUS BAER MD       As dictated by KEARA SRIVASTAVA PA-C            D: 10/16/2018   T: 10/16/2018   MT: [id number]       Name:     KING HILTON   MRN:      [id number]        Account:      [id number]   :      1930        Admitted:     10/16/2018                   Document: [id number]       cc: Judson Mcadams MD

## 2018-10-16 NOTE — IP AVS SNAPSHOT
` `     [place] [address] [facility]: [phone]                 INTERAGENCY TRANSFER FORM - NOTES (H&P, Discharge Summary, Consults, Procedures, Therapies)   10/16/2018                    Hospital Admission Date: 10/16/2018  KING HILTON   : 1930  Sex: Male        Patient PCP Information     Provider PCP Type    Judson Mcadams MD General         History & Physicals      H&P signed by Sandy Merchant PA-C at 10/16/2018 10:08 AM  Also signed by Marquita Cohen MD at 10/16/2018 10:59 AM      Author:  Sandy Merchant PA-C Service:  Hospitalist Author Type:  Physician Assistant - C    Filed:  10/16/2018 10:08 AM Date of Service:  10/16/2018  9:16 AM Creation Time:  10/16/2018  9:57 AM    Status:  Attested :  Sandy Merchant PA-C (Physician Assistant - C)    Cosigner:  Marquita Cohen MD at 10/16/2018 10:59 AM        Attestation signed by Marquita Cohen MD at 10/16/2018 10:59 AM        Physician Attestation   IMarquita, saw and evaluated King Hilton as part of a shared visit.  I have reviewed and discussed with the advanced practice provider their history, physical and plan.    I personally reviewed the vital signs, medications, labs and imaging.    My key history or physical exam findings:  Alert and oriented, lumbar incision - clean. No drainage   No suprapubic tenderness.  No costovertebral angle tenderness.    Key management decisions made by me:   King Moseley is an 88-year-old male with medical history of paroxysmal atrial fibrillation and recent L2 through L3 decompression 10/01/2018.  He had postoperative urinary retention, discharged on Mcdowell catheter.  Seen PCP on 10/15 for chills.  Chest x-ray was clear.  WBC count of 15.5, UA grossly abnormal.  Urine cultures pending.  1 out of 2 blood cultures concerning for a E. coli ESBL.  Patient was directed to come into the ED on 10/16.    Impression.  Acute E. coli ESBL  bacteremia.  Possible urinary tract infection.  Postoperative urinary retention with Mcdowell catheterization.  Status post right L2/L3 decompression 10/1 due to spinal stenosis.  Possible mild cognitive impairment/concern for dementia.  Chronic pain syndrome.  History of atrial fibrillation on anticoagulation.    Admit to inpatient.  Gentle hydration, IV Invanz, infectious disease and orthopedic team consulted.  Will follow repeat blood and urine culture results.  Minimize interruptions, frequent reorientation.  Delirium protocol.  Patient, his wife by the bedside and interdisciplinary team involved in care and agree with plan.   He presented to the emergency department for evaluation of bacteremia.  He is being admitted for further evaluation.     Marquita Cohen  Date of Service (when I saw the patient): 10/16/18                               Admitted:     10/16/2018      PRIMARY CARE PHYSICIAN:  Dr. Judson Mcadams       PRIMARY ORTHOPEDIST:  Dr. Bert Reynolds.      CHIEF COMPLAINT:  Bacteremia.      History obtained from the patient with the assistance of his wife who is present in the room as well as chart review.      HISTORY OF PRESENT ILLNESS:  Dawson Moseley is an 88-year-old male with a past medical history of atrial fibrillation, Crohn's and urinary retention, who presented to the emergency department today for evaluation of bacteremia.  He underwent an L2 through L3 decompression 10/01 by Dr. Reynolds due to chronic back pain with right radicular symptoms.  Surgery was relatively uncomplicated.  His right leg pain resolved.  Hospital course was complicated by urinary retention and he discharged with an indwelling Mcdowell catheter.  He saw Dr. Valverde of Urology approximately 1 week ago.  He failed a voiding trial and his catheter was replaced.  He has been doing well from a postoperative standpoint with improving pain control and no concerning symptoms.  Sunday night, however, he developed some chills.  He had no  other localizing symptoms and was afebrile.  He was seen by his primary care provider yesterday.  He was afebrile at that appointment.  A chest x-ray was clear.  It was felt that his symptoms may be due to atelectasis.  Laboratory evaluation, however, returned with leukocytosis with a WBC of 15.5.  Urinalysis was grossly abnormal.  Urine culture is pending.  One out of two blood cultures is concerning for E. coli ESBL.  The patient was sent to the emergency department for further evaluation.      On arrival to the emergency department, low-grade fever of 99.4.  Vital signs otherwise stable.  Laboratory evaluation revealed improved leukocytosis.  Chest x-ray was consistently clear.  Incision with no signs of localized infection.  He was initiated on ertapenem and admission was requested for further evaluation.      Presently patient is evaluated in the emergency department.  Denies significant concerns or complaints.  No other localizing symptoms.  No cough or shortness of breath.  No nausea or vomiting.  He has a history of Crohn's, but no issues with recent diarrhea.      PAST MEDICAL HISTORY:   1.  Atrial fibrillation, chronically anticoagulated with Eliquis.   2.  Nonischemic cardiomyopathy, historically as low as 45%-50%, but most recent echo in 2015 with an ejection fraction of 50%-55%.   3.  History of Crohn's.  Uses tincture of opium p.r.n.   4.  Peripheral artery disease.   5.  Spinal stenosis.      PRIOR TO ADMISSION MEDICATIONS:    Prior to Admission medications    Medication Sig Last Dose Taking? Auth Provider   acetaminophen (TYLENOL) 325 MG tablet Take 650 mg by mouth every 8 hours as needed for mild pain 10/15/2018 at 1800 Yes Unknown, Entered By History   cyanocobalamin (VITAMIN B12) 1000 MCG/ML injection Inject 1 mL (1,000 mcg) into the muscle every 30 days 9/3/2018 Yes Judson Mcadams MD   diphenoxylate-atropine (LOMOTIL) 2.5-0.025 MG per tablet Take 1 tablet by mouth daily  Patient taking  differently: Take 1 tablet by mouth daily as needed  Past Month at Unknown time Yes Judson Mcadams MD   ELIQUIS 5 MG tablet Take 1 tablet (5 mg) by mouth 2 times daily 10/16/2018 at AM Yes Bert Reynolds MD   metoprolol tartrate (LOPRESSOR) 50 MG tablet TAKE 1 TABLET(50 MG) BY MOUTH TWICE DAILY 10/16/2018 at AM Yes Judson Mcadams MD   opium tincture 10 MG/ML (1%) liquid 4 drops every 4 hours as needed for diarrhea  Patient taking differently: Take 0.2 mLs by mouth daily as needed (4 drops) Past Month at 2018 Yes Judson Mcadams MD   oxyCODONE IR (ROXICODONE) 5 MG tablet Take 1-2 tablets (5-10 mg) by mouth every 3 hours as needed Past Week at Unknown time Yes Bert Reynolds MD   tamsulosin (FLOMAX) 0.4 MG capsule Take 1 capsule (0.4 mg) by mouth daily 10/15/2018 at HS Yes Judson Mcadams MD   order for DME Equipment being ordered: incentive spirometer   Judson Mcadams MD   order for DME Equipment being ordered: Walker Wheels () and Walker ()  Treatment Diagnosis: DIfficulty with gait   Bert Reynolds MD          ALLERGIES:  NONE.      PAST SURGICAL HISTORY:   1.  Lumbar laminectomy.   2.  Appendectomy.      FAMILY HISTORY:   1.  Father  of cardiac sudden death.   2.  Sister  of colon cancer.   3.  Son was recently diagnosed with colon cancer.      SOCIAL HISTORY:  He is a previous smoker, smoked when he was 30 for approximately 10 years, drinks 1 glass of wine at bedtime.  He is .  He and his wife live in their own home in [place].      REVIEW OF SYSTEMS:  A 10-point review of systems was completed.  Pertinent positives noted in HPI, other systems negative.      PHYSICAL EXAMINATION:   GENERAL:  Dawson Moseley is a well-developed, well-nourished 88-year-old male who is lying comfortably in bed.   VITAL SIGNS:  Blood pressure is 118/86, pulse 94, temperature 99.4.   HEENT:  Head is normocephalic and atraumatic.   CARDIOVASCULAR:  Regular rate and rhythm, no  murmurs appreciated.   PULMONARY:  Normal effort, no wheezing, slight crackles in left base.   MUSCULOSKELETAL:  Lumbar incision clean, dry and intact.  No fluctuance, no drainage.  Nontender to palpation.     ABDOMEN:  Normal bowel sounds.  Soft, nontender.   EXTREMITIES:  Moves all 4 extremities.  Dorsalis pedis and radial pulses are palpable bilaterally.  Lower extremities without edema.   NEUROLOGIC:  He is alert and oriented.  Cranial nerves II-XII grossly intact.      LABORATORY EVALUATION:  Labs reviewed in Epic.      IMAGING:  Personally reviewed chest x-ray and agree with no acute infiltrate.      ASSESSMENT:  Dawson Moseley is an 88-year-old male with past medical history of paroxysmal atrial fibrillation and recent L2 through L3 decompression 10/01/2018.  He presented to the emergency department for evaluation of bacteremia.  He is being admitted for further evaluation.      1.  Acute gram-negative bacteremia.  Final culture is pending, but concerning for E. coli ESBL.  No current signs of associated sepsis.  He has been initiated on Invanz in the emergency department.  Source is likely UTI.  He will be admitted under inpatient status.  Will continue Invanz.  Will consult Infectious Disease.  He discharged with an indwelling Mcdowell catheter after his recent lumbar decompression.  It was most recently changed 1 week ago at Urology's office.  We will change out the catheter and repeat UA.  Repeat blood cultures have been obtained and are pending.  We will monitor fever curve and culture as indicated.   2.  Status post L2 through L3 lumbar decompression 10/01/2018.  No sign of incisional infection or localized infection.  His back pain is improving.  Given recent infection and concern for bacteremia, we will consult Spine Orthopedics to follow along.  No imaging indicated at this time.   3.  Paroxysmal atrial fibrillation.  Continue rate control prior to admission beta blocker.  Will continue Eliquis for  now.  Can hold if any surgical intervention is indicated.   4.  Urinary retention.  Has history of urinary retention, followed by Dr. Valverde.  Had Mcdowell placed after lumbar surgery.  Failed voiding trial 1 week ago.  We will have nursing change out Mcdowell and repeat UA.  Continue to follow with Urology as outpatient.  Will continue prior to admission Flomax.   5.  Crohn's.  Takes tincture of opium p.r.n.  No acute symptoms at present.   6.  Deep venous thrombosis prophylaxis:  Eliquis.      CODE STATUS:  Discussed with patient and wife, and he elects to be full code.      This patient was discussed with Dr. Baer of Hospitalist Service, who independently interviewed the patient.  She is in agreement with the above plan.         JOSE DE JESUS BAER MD       As dictated by SANDY SRIVASTAVA PA-C            D: 10/16/2018   T: 10/16/2018   MT: CC      Name:     KING HILTON   MRN:      [id number]        Account:      [id number]   :      1930        Admitted:     10/16/2018                   Document: [id number]       cc: Judson Mcadams MD[KG1.1]        Revision History        User Key Date/Time User Provider Type Action    > KG1.1 10/16/2018 10:08 AM Sandy Srivastava PA-C Physician Assistant - BREN Sign     [N/A] 10/16/2018  9:57 AM Sandy Srivastava PA-C Physician Assistant - BREN Edit                  Discharge Summaries     No notes of this type exist for this encounter.         Consult Notes      Consults by Tiffanie Andino RN at 10/18/2018 11:49 AM     Author:  Tiffanie Andino RN Service:  Care Coordinator Author Type:      Filed:  10/18/2018 11:50 AM Date of Service:  10/18/2018 11:49 AM Creation Time:  10/18/2018 11:36 AM    Status:  Addendum :  Tiffanie Andino RN ()     Consult Orders:    1. Care Transition RN/SW IP Consult [[id number]] ordered by Jose De Jesus Baer MD at 10/18/18 1125                Care Transition Initial Assessment - RN        Met with:  Patient, his Spouse Pauly and Son Jaun  DATA   Active Problems:    Bacteremia       Cognitive Status: awake, alert and oriented.        Contact information and PCP information verified: Yes  Lives With: spouse  Living Arrangements: house    Insurance concerns: No Insurance issues identified  ASSESSMENT  Patient currently receives the following services:  NA        Identified issues/concerns regarding health management: Chart reviewed.  Conversed with dr Arias that is recommending 10 more days of IV Ertapenem.  PT is also recommending TCU.  Pt transitioned home after his L2-L3 decompression surgery.  Pt had went home with a king and had failed follow up voiding trial.  Pt has Urology follow-up with Dr Valverde on 10/26/18.  Pauly had planned for pt to transition home.  After we had discussion on needing IV antibiotics and PT's recommendations, they were all in agreement for TCU.  Their 1st choice is [facility] and 2nd choice is [facility]. Will send referrals for tentative hospital discharge tomorrow.[HN1.1]  Midline catheter placement today.[HN1.2]    PLAN  Financial costs for the patient include: NA.  Patient given options and choices for discharge: Choice in rehab facilities  Patient/family is agreeable to the plan?  Yes: TCU  Patient anticipates discharging to [facility] or [facility]        Patient anticipates needs for home equipment: No  Plan/Disposition: TCU   Appointments:     Urology 10/26/18  Dr Reynolds recommends follow-up in one month    Care  (CTS) will continue to follow as needed.[HN1.1]             Revision History        User Key Date/Time User Provider Type Action    > [N/A] 10/18/2018 11:50 AM Tiffanie Andino RN Case Manager Addend     HN1.2 10/18/2018 11:49 AM Tiffanie Andino RN Case Manager Sign     HN1.1 10/18/2018 11:36 AM Tiffanie Andino RN Case Manager             Consults by Fiorella Pollack PA-C at 10/16/2018  3:13 PM     Author:  Fiorella Pollack PA-C  Service:  Orthopedics Author Type:  Physician Assistant - C    Filed:  10/17/2018  2:18 PM Date of Service:  10/16/2018  3:13 PM Creation Time:  10/16/2018  3:13 PM    Status:  Cosign Needed :  Fiorella Pollack PA-C (Physician Assistant - C)    Cosign Required:  Yes         Consult Orders:    1. Orthopedic Surgery IP Consult: Patient to be seen: Routine - within 24 hours; S/p Lumbar decompression 10/1. Admitted with bacteremia; Consultant may enter orders: Yes [[id number]] ordered by Sandy Merchant PA-C at 10/16/18 0906                [facility] Orthopedic Consultation    Dawson Jones MRN# [id number]   Age: 88 year old YOB: 1930     Date of Admission:  10/16/2018    Reason for consult: Recent spine surgery, UTI with bacteremia        Requesting physician: Sandy Merchant PAC       Level of consult:[EC1.1] One time consult for diagnosis and treatment planning[EC1.2]           Assessment and Plan:   Assessment:   Healing L2-L3 decompression with Dr. Reynolds  UTI with E. coli bacteremia      Plan:   At this time recent L2-L3 decompression seems to be healing without issue  Continue to monitor for increased back pain or radicular symptoms  Continue IV antibiotics per infectious disease  Weightbearing as tolerated with walker  Pain medication as needed, avoid narcotics           Chief Complaint:   Recent spine surgery now with bacteremia         History of Present Illness:   This patient is a 88 year old male who presents with the following condition requiring a hospital admission:    Mr. Moseley states that on Sunday he had shaking chills.  Presented on Monday to primary care physician who did routine lab work finding increased PVC as well as positive UA.  Blood cultures were also taken and were positive for E. coli on 10/16/18.  He was then called and asked to present to the ED for further treatment.  Patient had spinal surgery with Dr. Reynolds on 10/1/18, patient at that time did  have right leg radicular symptoms.  Since surgery right leg symptoms have substantially decreased and have not returned.  Of note patient did have urinary retention postoperatively and was discharged with a indwelling Mcdowell catheter.  He did follow-up as directed with Dr. Valverde 1 week ago but did fail a voiding trial and his catheter was replaced.  Patient reports no increase in back pain, no increase in radicular symptoms, no drainage or pain surrounding incision site.          Past Medical History:     Past Medical History:   Diagnosis Date     Atrial fibrillation (H)      Cardiomyopathy (H)      Crohn's disease of small and large intestines with complication (H)      Hyperlipidemia              Past Surgical History:     Past Surgical History:   Procedure Laterality Date     APPENDECTOMY       DECOMPRESSION LUMBAR ONE LEVEL N/A 10/1/2018    Procedure: DECOMPRESSION LUMBAR ONE LEVEL;  DECOMPRESSION L2-3 WITH COFLEX DEVICE  ;  Surgeon: Bert Reynodls MD;  Location:  OR     ORTHOPEDIC SURGERY  10/01/2018    Bilateral L2-3 decompression and insertion of a Coflex device             Social History:     Social History   Substance Use Topics     Smoking status: Former Smoker     Smokeless tobacco: Never Used      Comment: 40 years ago     Alcohol use 4.2 oz/week     7 Standard drinks or equivalent per week      Comment: 1 wine an evening             Family History:     Family History   Problem Relation Age of Onset     Family History Negative Mother      Cardiac Sudden Death Father      Family History Negative Brother      Other - See Comments Sister      colon issues     Family History Negative Son      Family History Negative Brother      Family History Negative Son      HEART DISEASE No family hx of              Immunizations:     VACCINE/DOSE   Diptheria   DPT   DTAP   HBIG   Hepatitis A   Hepatitis B   HIB   Influenza   Measles   Meningococcal   MMR   Mumps   Pneumococcal   Polio   Rubella   Small Pox   TDAP    Varicella   Zoster             Allergies:   No Known Allergies          Medications:     Current Facility-Administered Medications   Medication     acetaminophen (TYLENOL) tablet 650 mg     apixaban ANTICOAGULANT (ELIQUIS) tablet 5 mg     diphenoxylate-atropine (LOMOTIL) 2.5-0.025 MG per tablet 1 tablet     [START ON 10/17/2018] ertapenem (INVanz) 1 g vial to attach to  mL bag     melatonin tablet 1 mg     metoprolol tartrate (LOPRESSOR) tablet 50 mg     naloxone (NARCAN) injection 0.1-0.4 mg     ondansetron (ZOFRAN-ODT) ODT tab 4 mg    Or     ondansetron (ZOFRAN) injection 4 mg     opium tincture 10 MG/ML (1%) liquid 2 mg     oxyCODONE IR (ROXICODONE) tablet 5-10 mg     Patient is already receiving anticoagulation with heparin, enoxaparin (LOVENOX), warfarin (COUMADIN)  or other anticoagulant medication     potassium chloride (KLOR-CON) Packet 20-40 mEq     potassium chloride 10 mEq in 100 mL intermittent infusion with 10 mg lidocaine     potassium chloride 10 mEq in 100 mL sterile water intermittent infusion (premix)     potassium chloride 20 mEq in 50 mL intermittent infusion     potassium chloride SA (K-DUR/KLOR-CON M) CR tablet 20-40 mEq     prochlorperazine (COMPAZINE) injection 5 mg    Or     prochlorperazine (COMPAZINE) tablet 5 mg    Or     prochlorperazine (COMPAZINE) Suppository 12.5 mg     sodium chloride 0.9% infusion     tamsulosin (FLOMAX) capsule 0.4 mg             Review of Systems:   CV: NEGATIVE for chest pain, palpitations or peripheral edema  C: NEGATIVE for fever, change in weight, POSITIVE for chills  E/M: NEGATIVE for ear, mouth and throat problems  R: NEGATIVE for significant cough or SOB    10 point review of systems negative aside from HPI and physical exam.           Physical Exam:   All vitals have been reviewed  Patient Vitals for the past 24 hrs:   BP Temp Temp src Pulse Resp SpO2 Height Weight   10/16/18 1311 121/76 - - 97 - - - -   10/16/18 0920 121/79 98.5  F (36.9  C) Oral  "100 20 97 % 1.727 m (5' 8\") 68.5 kg (151 lb 0.2 oz)   10/16/18 0900 118/86 - - - - 96 % - -   10/16/18 0830 120/79 - - - - 94 % - -   10/16/18 0800 125/73 - - 94 16 96 % - -   10/16/18 0735 119/80 99.4  F (37.4  C) Oral 122 16 98 % 1.727 m (5' 8\") 70.1 kg (154 lb 9.6 oz)       Intake/Output Summary (Last 24 hours) at 10/16/18 1513  Last data filed at 10/16/18 1300   Gross per 24 hour   Intake              876 ml   Output              100 ml   Net              776 ml     Constitutional: Pleasant, alert, appropriate, following commands.  HEENT: Head atraumatic normocephalic. PERRLA.  Respiratory: Unlabored breathing no audible wheeze  Cardiovascular: Regular rate and rhythm  GI: Abdomen soft, non tender, and non distended.  Lymph/Hematologic: No edema or palor  Skin: No rashes, no cyanosis, no edema.  Neuro: Sensation intact to light touch in bilateral lower extremities.  Able to actively straight leg raise bilateral lower extremity  Able to actively dorsi and plantarflex bilateral feet  Unsure if EHL is indeed 3 out of 5 on right or patient had difficulty following instructions  Musculoskeletal:  No tenderness to palpation surrounding previous incision site  No erythema surrounding incision  No drainage surrounding incision  Incision is approximated with some very mild widening middle portion  Positive DP pulse            Data:   All laboratory data reviewed  Results for orders placed or performed during the hospital encounter of 10/16/18   XR Chest 2 Views    Narrative    CHEST TWO VIEWS October 16, 2018 8:01 AM     HISTORY: Cough.    COMPARISON: 10/15/2018.    FINDINGS: Chronic appearing interstitial abnormalities are similar to  the prior study. Heart size is upper normal. No airspace  consolidation, pneumothorax, or pleural effusion.       Impression    IMPRESSION: No radiographic evidence of acute chest abnormality.     MIGUELITO MCCORD MD   CBC with platelets differential   Result Value Ref Range    WBC 9.5 4.0 " - 11.0 10e9/L    RBC Count 3.40 (L) 4.4 - 5.9 10e12/L    Hemoglobin 11.6 (L) 13.3 - 17.7 g/dL    Hematocrit 34.4 (L) 40.0 - 53.0 %     (H) 78 - 100 fl    MCH 34.1 (H) 26.5 - 33.0 pg    MCHC 33.7 31.5 - 36.5 g/dL    RDW 12.4 10.0 - 15.0 %    Platelet Count 247 150 - 450 10e9/L    Diff Method Automated Method     % Neutrophils 83.4 %    % Lymphocytes 11.1 %    % Monocytes 5.0 %    % Eosinophils 0.1 %    % Basophils 0.2 %    % Immature Granulocytes 0.2 %    Nucleated RBCs 0 0 /100    Absolute Neutrophil 7.9 1.6 - 8.3 10e9/L    Absolute Lymphocytes 1.1 0.8 - 5.3 10e9/L    Absolute Monocytes 0.5 0.0 - 1.3 10e9/L    Absolute Eosinophils 0.0 0.0 - 0.7 10e9/L    Absolute Basophils 0.0 0.0 - 0.2 10e9/L    Abs Immature Granulocytes 0.0 0 - 0.4 10e9/L    Absolute Nucleated RBC 0.0    Lactic acid whole blood   Result Value Ref Range    Lactic Acid 1.6 0.7 - 2.0 mmol/L   Basic metabolic panel   Result Value Ref Range    Sodium 139 133 - 144 mmol/L    Potassium 3.2 (L) 3.4 - 5.3 mmol/L    Chloride 106 94 - 109 mmol/L    Carbon Dioxide 24 20 - 32 mmol/L    Anion Gap 9 3 - 14 mmol/L    Glucose 115 (H) 70 - 99 mg/dL    Urea Nitrogen 21 7 - 30 mg/dL    Creatinine 1.02 0.66 - 1.25 mg/dL    GFR Estimate 69 >60 mL/min/1.7m2    GFR Estimate If Black 83 >60 mL/min/1.7m2    Calcium 8.5 8.5 - 10.1 mg/dL   UA with Microscopic reflex to Culture   Result Value Ref Range    Color Urine Yellow     Appearance Urine Cloudy     Glucose Urine Negative NEG^Negative mg/dL    Bilirubin Urine Negative NEG^Negative    Ketones Urine 5 (A) NEG^Negative mg/dL    Specific Gravity Urine 1.019 1.003 - 1.035    Blood Urine Moderate (A) NEG^Negative    pH Urine 6.0 5.0 - 7.0 pH    Protein Albumin Urine 100 (A) NEG^Negative mg/dL    Urobilinogen mg/dL 2.0 0.0 - 2.0 mg/dL    Nitrite Urine Positive (A) NEG^Negative    Leukocyte Esterase Urine Large (A) NEG^Negative    Source Catheterized Urine     WBC Urine >182 (H) 0 - 5 /HPF    RBC Urine 13 (H) 0 - 2 /HPF     WBC Clumps Present (A) NEG^Negative /HPF    Mucous Urine Present (A) NEG^Negative /LPF   Infectious Diseases IP Consult: Patient to be seen: Routine - within 24 hours; Ecoli ESBL bacteremia; Consultant may enter orders: Yes    Narrative    Tena Ni MD     10/16/2018 11:15 AM  [facility]    Infectious Disease Consultation     Date of Admission:  10/16/2018  Date of Consult (When I saw the patient): 10/16/18    Assessment & Plan   Dawson Jones is a 88 year old male who was admitted on   10/16/2018.     Impression:  1. 88 y.o male with atrial fibrillation, Crohn's and urinary   retention.  2. S/P L2 through L3 decompression 10/01 for chronic back pain   with right radicular symptoms.  Hospital course was complicated   by urinary retention and he discharged with an indwelling Mcdowell   catheter. Which was replaced a week ago due to failing of the   voiding trial.   3. Now coming in with fever, UA positive and UC pending, blood   cultures positive for E coli.   4. Back incision looks ok, though slight separation of skin, no   redness or induration.     Recommendations:   Agree with ertapenem.   Consult spinal surgery.   Follow up on the urine cultures.   Daily blood cultures.         Tena Ni MD    Reason for Consult   Reason for consult: I was asked by Marie Merchant PA-C to   evaluate this patient for bacteremia.    Primary Care Physician   Judson Mcadams    Chief Complaint   Chills and fevers     History is obtained from the patient and medical records    History of Present Illness   Dawson Jones is a 88 year old male with atrial fibrillation,   Crohn's and urinary retention. History of  L2 through L3   decompression 10/01 by Dr. Reynolds due to chronic back pain with   right radicular symptoms.  Hospital course was complicated by   urinary retention and he discharged with an indwelling Mcdowell   catheter.  He saw Dr. Valverde of Urology approximately 1 week ago.    He failed a voiding  trial and his catheter was replaced. 2 days   PTA he developed some chills.  He had no other localizing   symptoms and was afebrile.  He was seen by his primary care   provider yesterday.  He was afebrile at that appointment.  A   chest x-ray was clear but found to have leukocytosis with a WBC   of 15.5.  Urinalysis was grossly abnormal.  Urine culture is   pending.  One out of two blood cultures is concerning for E. coli   ESBL.  The patient was sent to the emergency department for   further evaluation.     Past Medical History   I have reviewed this patient's medical history and updated it   with pertinent information if needed.   Past Medical History:   Diagnosis Date     Atrial fibrillation (H)      Cardiomyopathy (H)      Crohn's disease of small and large intestines with complication   (H)      Hyperlipidemia        Past Surgical History   I have reviewed this patient's surgical history and updated it   with pertinent information if needed.  Past Surgical History:   Procedure Laterality Date     APPENDECTOMY       DECOMPRESSION LUMBAR ONE LEVEL N/A 10/1/2018    Procedure: DECOMPRESSION LUMBAR ONE LEVEL;  DECOMPRESSION L2-3   WITH COFLEX DEVICE  ;  Surgeon: Bert Reynolds MD;  Location:    OR     ORTHOPEDIC SURGERY  10/01/2018    Bilateral L2-3 decompression and insertion of a Coflex device       Prior to Admission Medications   Prior to Admission Medications   Prescriptions Last Dose Informant Patient Reported? Taking?   ELIQUIS 5 MG tablet 10/16/2018 at AM Spouse/Significant Other No   Yes   Sig: Take 1 tablet (5 mg) by mouth 2 times daily   acetaminophen (TYLENOL) 325 MG tablet 10/15/2018 at 1800   Spouse/Significant Other Yes Yes   Sig: Take 650 mg by mouth every 8 hours as needed for mild pain   cyanocobalamin (VITAMIN B12) 1000 MCG/ML injection 9/3/2018   Spouse/Significant Other No Yes   Sig: Inject 1 mL (1,000 mcg) into the muscle every 30 days   diphenoxylate-atropine (LOMOTIL) 2.5-0.025 MG per  tablet Past   Month at Unknown time Spouse/Significant Other No Yes   Sig: Take 1 tablet by mouth daily   Patient taking differently: Take 1 tablet by mouth daily as   needed    metoprolol tartrate (LOPRESSOR) 50 MG tablet 10/16/2018 at AM   Spouse/Significant Other No Yes   Sig: TAKE 1 TABLET(50 MG) BY MOUTH TWICE DAILY   opium tincture 10 MG/ML (1%) liquid Past Month at 2018   Spouse/Significant Other No Yes   Si drops every 4 hours as needed for diarrhea   Patient taking differently: Take 0.2 mLs by mouth daily as needed   (4 drops)   order for DME   No No   Sig: Equipment being ordered: Walker Wheels () and Walker   ()  Treatment Diagnosis: DIfficulty with gait   order for DME   No No   Sig: Equipment being ordered: incentive spirometer   oxyCODONE IR (ROXICODONE) 5 MG tablet Past Week at Unknown time   Spouse/Significant Other No Yes   Sig: Take 1-2 tablets (5-10 mg) by mouth every 3 hours as needed   tamsulosin (FLOMAX) 0.4 MG capsule 10/15/2018 at HS   Spouse/Significant Other Yes Yes   Sig: Take 1 capsule (0.4 mg) by mouth daily      Facility-Administered Medications: None     Allergies   No Known Allergies    Immunization History   Immunization History   Administered Date(s) Administered     Influenza (High Dose) 3 valent vaccine 10/18/2016, 10/03/2017,   2018     Pneumo Conj 13-V (2010&after) 09/15/2016     Pneumococcal 23 valent 2000     Tdap (Adacel,Boostrix) 2012     Zoster vaccine, live 2007       Social History   I have reviewed this patient's social history and updated it with   pertinent information if needed. Dawson Jones  reports that   he has quit smoking. He has never used smokeless tobacco. He   reports that he drinks about 4.2 oz of alcohol per week  He   reports that he does not use illicit drugs.    Family History   I have reviewed this patient's family history and updated it with   pertinent information if needed.   Family History   Problem  Relation Age of Onset     Family History Negative Mother      Cardiac Sudden Death Father      Family History Negative Brother      Other - See Comments Sister      colon issues     Family History Negative Son      Family History Negative Brother      Family History Negative Son      HEART DISEASE No family hx of        Review of Systems   The 10 point Review of Systems is negative other than noted in   the HPI or here.     Physical Exam   Temp: 98.5  F (36.9  C) Temp src: Oral BP: 121/79 Pulse: 100     Resp: 20 SpO2: 97 % O2 Device: None (Room air)    Vital Signs with Ranges  Temp:  [98.5  F (36.9  C)-99.4  F (37.4  C)] 98.5  F (36.9  C)  Pulse:  [] 100  Resp:  [16-20] 20  BP: (118-125)/(73-86) 121/79  SpO2:  [94 %-98 %] 97 %  151 lbs .24 oz  Body mass index is 22.96 kg/(m^2).    GENERAL APPEARANCE:  alert and no distress  EYES: Eyes grossly normal to inspection, PERRL and conjunctivae   and sclerae normal  HENT: ear canals and TM's normal and nose and mouth without   ulcers or lesions  NECK: no adenopathy, no asymmetry, masses, or scars and thyroid   normal to palpation  RESP: lungs clear to auscultation - no rales, rhonchi or wheezes  CV: regular rates and rhythm, normal S1 S2, no S3 or S4 and no   murmur, click or rub  LYMPHATICS: normal ant/post cervical and supraclavicular nodes  ABDOMEN: soft, nontender, without hepatosplenomegaly or masses   and bowel sounds normal  MS: extremities normal- no gross deformities noted  SKIN: no suspicious lesions or rashes      Data   Lab Results   Component Value Date    WBC 9.5 10/16/2018    HGB 11.6 (L) 10/16/2018    HCT 34.4 (L) 10/16/2018     10/16/2018     10/16/2018    POTASSIUM 3.2 (L) 10/16/2018    CHLORIDE 106 10/16/2018    CO2 24 10/16/2018    BUN 21 10/16/2018    CR 1.02 10/16/2018     (H) 10/16/2018    AST 23 03/27/2017    ALT 25 03/27/2017    ALKPHOS 83 03/27/2017    BILITOTAL 1.5 (H) 03/27/2017    INR 0.98 12/21/2010       Recent  Labs  Lab 10/15/18  1057 10/15/18  1048   CULT Cultured on the 1st day of incubation:Gram negative   rodsSusceptibility testing in progress*  Critical   Value/Significant Value, preliminary result only, called to and   read back byDr. Judson Mcadams 0255 10.16.18 [place] [place])POSITIVE for E.COLI by Verigene multiplex nucleic acid   test. Finalidentification and antimicrobial susceptibility   testing will beverified by standard methods. Verigene test will   not distinguishE.coli from Shigella species including   S.dysenteriae, S.flexneri,S.boydii, and S.sonnei. Specimens   containing Shigella species orE.coli will be reported as Positive   for E.coli.POSITIVE for CTX-M Class A Extended Spectrum   beta-lactamase (ESBL)resistance marker by Verigene multiplex   nucleic acid test. CTX-Mconfers resistance to penicillins,   cephalosporins and variableresistance to beta-lactam   beta-lactamase inhibitor combinations(clavulanic acid and   tazobactam). Best empiric antibiotic choice ismeropenem. Specific   susceptibility testing will be performed.Specimen tested with   Verigene multiplex, gram-negative blood culturenucleic acid test   for the following targets: Acinetobacter sp.,Citrobacter sp.,   Enterobacter sp., Proteus sp., E. coli, K.pneumoniae/oxyto ca, P.   aeruginosa, and the following resistancemarkers: CTXM, KPC, NDM,   VIM, IMP and OXA.Critical Value/Significant Value called to and   read back by  .  10.16.18  0606 GJS No growth after 16   hours     Recent Labs   Lab Test  10/15/18   1057  10/15/18   1048   CULT  Cultured on the 1st day of incubation:  Gram negative rods  Susceptibility testing in progress  *  Critical Value/Significant Value, preliminary result only,   called to and read back by  Dr. Judson Mcadams 0255 10.16.18 /[facility] [place]    (Note)  POSITIVE for E.COLI by Verigene multiplex nucleic acid test.   Final  identification and antimicrobial susceptibility testing will be  verified by standard  methods. Verigene test will not distinguish  E.coli from Shigella species including S.dysenteriae, S.flexneri,  S.boydii, and S.sonnei. Specimens containing Shigella species or  E.coli will be reported as Positive for E.coli.    POSITIVE for CTX-M Class A Extended Spectrum beta-lactamase   (ESBL)  resistance marker by Verigene multiplex nucleic acid test. CTX-M  confers resistance to penicillins, cephalosporins and variable  resistance to beta-lactam beta-lactamase inhibitor combinations  (clavulanic acid and tazobactam). Best empiric antibiotic choice   is  meropenem. Specific susceptibility testing will be performed.    Specimen tested with Verigene multiplex, gram-negative blood   culture  nucleic acid test for the following targets: Acinetobacter sp.,  Citrobacter sp., Enterobacter sp., Proteus sp., E. coli, K.  pneumoniae/oxyto ca, P. aeruginosa, and the following resistance  markers: CTXM, KPC, NDM, VIM, IMP and OXA.    Critical Value/Significant Value called to and read back by  DR. STOCK.  10.16.18  0606 GJS    No growth after 16 hours              Blood culture   Result Value Ref Range    Specimen Description Blood Left Hand     Special Requests Aerobic and anaerobic bottles received     Culture Micro No growth after 4 hours           Attestation:  I have reviewed today's vital signs, notes, medications, labs and imaging with [EC1.1] Rodolfo[EC1.3].  Amount of time performed on this consult: 20 minutes.    Fiorella Pollack PA-C[EC1.1]        Revision History        User Key Date/Time User Provider Type Action    > EC1.2 10/17/2018  2:18 PM Fiorella Pollack PA-C Physician Assistant - C Sign     EC1.3 10/16/2018  5:54 PM Fiorella Pollack PA-C Physician Assistant - C Sign     EC1.1 10/16/2018  3:31 PM Fiorella Pollack PA-C Physician Assistant - C Sign            Consults by Tena Ni MD at 10/16/2018 10:23 AM     Author:  Tena Ni MD Service:  Infectious Disease Author Type:   Physician    Filed:  10/16/2018 11:15 AM Date of Service:  10/16/2018 10:23 AM Creation Time:  10/16/2018 10:19 AM    Status:  Signed :  Tena Ni MD (Physician)     Consult Orders:    1. Infectious Diseases IP Consult: Patient to be seen: Routine - within 24 hours; Ecoli ESBL bacteremia; Consultant may enter orders: Yes [[id number]] ordered by Sandy Merchant PA-C at 10/16/18 0906                [facility]    Infectious Disease Consultation     Date of Admission:  10/16/2018  Date of Consult (When I saw the patient): 10/16/18    Assessment & Plan   Dawson Jones is a 88 year old male who was admitted on 10/16/2018.     Impression:  1. 88 y.o male with atrial fibrillation, Crohn's and urinary retention.  2. S/P L2 through L3 decompression 10/01 for chronic back pain with right radicular symptoms.  Hospital course was complicated by urinary retention and he discharged with an indwelling Mcdowell catheter. Which was replaced a week ago due to failing of the voiding trial.   3. Now coming in with fever, UA positive and UC pending, blood cultures positive for E coli.   4. Back[DS1.1] incision looks ok, though slight separation of skin, no redness or induration.[DS1.2]     Recommendations:[DS1.1]   Agree with ertapenem.   Consult spinal surgery.   Follow up on the urine cultures.   Daily blood cultures.[DS1.2]         Tena Ni MD    Reason for Consult   Reason for consult: I was asked by Marie Merchant PA-C to evaluate this patient for bacteremia.    Primary Care Physician   Judson Mcadams    Chief Complaint   Chills and fevers     History is obtained from the patient and medical records    History of Present Illness   Dawson Jones is a 88 year old male with atrial fibrillation, Crohn's and urinary retention. History of  L2 through L3 decompression 10/01 by Dr. Reynolds due to chronic back pain with right radicular symptoms.  Hospital course was complicated by urinary retention and he  discharged with an indwelling Mcdowell catheter.  He saw Dr. Valverde of Urology approximately 1 week ago.  He failed a voiding trial and his catheter was replaced. 2 days PTA he developed some chills.  He had no other localizing symptoms and was afebrile.  He was seen by his primary care provider yesterday.  He was afebrile at that appointment.  A chest x-ray was clear but found to have leukocytosis with a WBC of 15.5.  Urinalysis was grossly abnormal.  Urine culture is pending.  One out of two blood cultures is concerning for E. coli ESBL.  The patient was sent to the emergency department for further evaluation.     Past Medical History   I have reviewed this patient's medical history and updated it with pertinent information if needed.   Past Medical History:   Diagnosis Date     Atrial fibrillation (H)      Cardiomyopathy (H)      Crohn's disease of small and large intestines with complication (H)      Hyperlipidemia        Past Surgical History   I have reviewed this patient's surgical history and updated it with pertinent information if needed.  Past Surgical History:   Procedure Laterality Date     APPENDECTOMY       DECOMPRESSION LUMBAR ONE LEVEL N/A 10/1/2018    Procedure: DECOMPRESSION LUMBAR ONE LEVEL;  DECOMPRESSION L2-3 WITH COFLEX DEVICE  ;  Surgeon: Bert Reynolds MD;  Location:  OR     ORTHOPEDIC SURGERY  10/01/2018    Bilateral L2-3 decompression and insertion of a Coflex device       Prior to Admission Medications   Prior to Admission Medications   Prescriptions Last Dose Informant Patient Reported? Taking?   ELIQUIS 5 MG tablet 10/16/2018 at AM Spouse/Significant Other No Yes   Sig: Take 1 tablet (5 mg) by mouth 2 times daily   acetaminophen (TYLENOL) 325 MG tablet 10/15/2018 at 1800 Spouse/Significant Other Yes Yes   Sig: Take 650 mg by mouth every 8 hours as needed for mild pain   cyanocobalamin (VITAMIN B12) 1000 MCG/ML injection 9/3/2018 Spouse/Significant Other No Yes   Sig: Inject 1 mL  (1,000 mcg) into the muscle every 30 days   diphenoxylate-atropine (LOMOTIL) 2.5-0.025 MG per tablet Past Month at Unknown time Spouse/Significant Other No Yes   Sig: Take 1 tablet by mouth daily   Patient taking differently: Take 1 tablet by mouth daily as needed    metoprolol tartrate (LOPRESSOR) 50 MG tablet 10/16/2018 at AM Spouse/Significant Other No Yes   Sig: TAKE 1 TABLET(50 MG) BY MOUTH TWICE DAILY   opium tincture 10 MG/ML (1%) liquid Past Month at 2018 Spouse/Significant Other No Yes   Si drops every 4 hours as needed for diarrhea   Patient taking differently: Take 0.2 mLs by mouth daily as needed (4 drops)   order for DME   No No   Sig: Equipment being ordered: Walker Wheels () and Walker ()  Treatment Diagnosis: DIfficulty with gait   order for DME   No No   Sig: Equipment being ordered: incentive spirometer   oxyCODONE IR (ROXICODONE) 5 MG tablet Past Week at Unknown time Spouse/Significant Other No Yes   Sig: Take 1-2 tablets (5-10 mg) by mouth every 3 hours as needed   tamsulosin (FLOMAX) 0.4 MG capsule 10/15/2018 at HS Spouse/Significant Other Yes Yes   Sig: Take 1 capsule (0.4 mg) by mouth daily      Facility-Administered Medications: None     Allergies   No Known Allergies    Immunization History   Immunization History   Administered Date(s) Administered     Influenza (High Dose) 3 valent vaccine 10/18/2016, 10/03/2017, 2018     Pneumo Conj 13-V (2010&after) 09/15/2016     Pneumococcal 23 valent 2000     Tdap (Adacel,Boostrix) 2012     Zoster vaccine, live 2007       Social History   I have reviewed this patient's social history and updated it with pertinent information if needed. Dawson Jones  reports that he has quit smoking. He has never used smokeless tobacco. He reports that he drinks about 4.2 oz of alcohol per week  He reports that he does not use illicit drugs.    Family History   I have reviewed this patient's family history and updated it  with pertinent information if needed.   Family History   Problem Relation Age of Onset     Family History Negative Mother      Cardiac Sudden Death Father      Family History Negative Brother      Other - See Comments Sister      colon issues     Family History Negative Son      Family History Negative Brother      Family History Negative Son      HEART DISEASE No family hx of        Review of Systems   The 10 point Review of Systems is negative other than noted in the HPI or here.     Physical Exam   Temp: 98.5  F (36.9  C) Temp src: Oral BP: 121/79 Pulse: 100   Resp: 20 SpO2: 97 % O2 Device: None (Room air)    Vital Signs with Ranges  Temp:  [98.5  F (36.9  C)-99.4  F (37.4  C)] 98.5  F (36.9  C)  Pulse:  [] 100  Resp:  [16-20] 20  BP: (118-125)/(73-86) 121/79  SpO2:  [94 %-98 %] 97 %  151 lbs .24 oz  Body mass index is 22.96 kg/(m^2).    GENERAL APPEARANCE:  alert and no distress  EYES: Eyes grossly normal to inspection, PERRL and conjunctivae and sclerae normal  HENT: ear canals and TM's normal and nose and mouth without ulcers or lesions  NECK: no adenopathy, no asymmetry, masses, or scars and thyroid normal to palpation  RESP: lungs clear to auscultation - no rales, rhonchi or wheezes  CV: regular rates and rhythm, normal S1 S2, no S3 or S4 and no murmur, click or rub  LYMPHATICS: normal ant/post cervical and supraclavicular nodes  ABDOMEN: soft, nontender, without hepatosplenomegaly or masses and bowel sounds normal  MS: extremities normal- no gross deformities noted  SKIN: no suspicious lesions or rashes      Data[DS1.1]   Lab Results   Component Value Date    WBC 9.5 10/16/2018    HGB 11.6 (L) 10/16/2018    HCT 34.4 (L) 10/16/2018     10/16/2018     10/16/2018    POTASSIUM 3.2 (L) 10/16/2018    CHLORIDE 106 10/16/2018    CO2 24 10/16/2018    BUN 21 10/16/2018    CR 1.02 10/16/2018     (H) 10/16/2018    AST 23 03/27/2017    ALT 25 03/27/2017    ALKPHOS 83 03/27/2017    BILITOTAL 1.5  (H) 03/27/2017    INR 0.98 12/21/2010[DS1.3]       Recent Labs  Lab 10/15/18  1057 10/15/18  1048   CULT Cultured on the 1st day of incubation:Gram negative rodsSusceptibility testing in progress*  Critical Value/Significant Value, preliminary result only, called to and read back byDr. Judson Mcadams 0255 10.16.18 [place] [place]POSITIVE for E.COLI by Verigene multiplex nucleic acid test. Finalidentification and antimicrobial susceptibility testing will beverified by standard methods. Verigene test will not distinguishE.coli from Shigella species including S.dysenteriae, S.flexneri,S.boydii, and S.sonnei. Specimens containing Shigella species orE.coli will be reported as Positive for E.coli.POSITIVE for CTX-M Class A Extended Spectrum beta-lactamase (ESBL)resistance marker by Verigene multiplex nucleic acid test. CTX-Mconfers resistance to penicillins, cephalosporins and variableresistance to beta-lactam beta-lactamase inhibitor combinations(clavulanic acid and tazobactam). Best empiric antibiotic choice ismeropenem. Specific susceptibility testing will be performed.Specimen tested with Verigene multiplex, gram-negative blood culturenucleic acid test for the following targets: Acinetobacter sp.,Citrobacter sp., Enterobacter sp., Proteus sp., E. coli, K.pneumoniae/oxyto ca, P. aeruginosa, and the following resistancemarkers: CTXM, KPC, NDM, VIM, IMP and OXA.Critical Value/Significant Value called to and read back by  .  10.16.18  0606 GJS No growth after 16 hours     Recent Labs   Lab Test  10/15/18   1057  10/15/18   1048   CULT  Cultured on the 1st day of incubation:  Gram negative rods  Susceptibility testing in progress  *  Critical Value/Significant Value, preliminary result only, called to and read back by  Dr. Judson Mcadams 0255 10.16.18 [place] [place] [facility] [place])  POSITIVE for E.COLI by Verigene multiplex nucleic acid test. Final  identification and antimicrobial susceptibility testing will be  verified by  standard methods. Verigene test will not distinguish  E.coli from Shigella species including S.dysenteriae, S.flexneri,  S.boydii, and S.sonnei. Specimens containing Shigella species or  E.coli will be reported as Positive for E.coli.    POSITIVE for CTX-M Class A Extended Spectrum beta-lactamase (ESBL)  resistance marker by Verigene multiplex nucleic acid test. CTX-M  confers resistance to penicillins, cephalosporins and variable  resistance to beta-lactam beta-lactamase inhibitor combinations  (clavulanic acid and tazobactam). Best empiric antibiotic choice is  meropenem. Specific susceptibility testing will be performed.    Specimen tested with Verigene multiplex, gram-negative blood culture  nucleic acid test for the following targets: Acinetobacter sp.,  Citrobacter sp., Enterobacter sp., Proteus sp., E. coli, K.  pneumoniae/oxyto ca, P. aeruginosa, and the following resistance  markers: CTXM, KPC, NDM, VIM, IMP and OXA.    Critical Value/Significant Value called to and read back by  DR. STOCK.  10.16.18  0606 GJS    No growth after 16 hours[DS1.1]                Revision History        User Key Date/Time User Provider Type Action    > DS1.2 10/16/2018 11:15 AM Tena Ni MD Physician Sign     DS1.3 10/16/2018 10:20 AM Tena Ni MD Physician      DS1.1 10/16/2018 10:19 AM Tena Ni MD Physician                      Progress Notes - Physician (Notes from 10/16/18 through 10/19/18)      Progress Notes by Juan Arias MD at 10/19/2018 11:41 AM     Author:  Juan Arias MD Service:  Infectious Disease Author Type:  Physician    Filed:  10/19/2018 11:41 AM Date of Service:  10/19/2018 11:41 AM Creation Time:  10/19/2018 11:41 AM    Status:  Signed :  Juan Arias MD (Physician)         [facility]    Infectious Disease Progress Note    Date of Service (when I saw the patient): 10/19/2018     Assessment & Plan   Dawson Jones is a 88 year old male who was admitted on  10/16/2018.     Impression:  1. 88 y.o male with atrial fibrillation, Crohn's and urinary retention.  2. S/P L2 through L3 decompression 10/01 for chronic back pain with right radicular symptoms.  Hospital course was complicated by urinary retention and he discharged with an indwelling Mcdowell catheter. Which was replaced a week ago due to failing of the voiding trial.   3. Now coming in with fever, UA positive and UC pending, blood cultures positive for E coli.   4. Back incision looks ok, though slight separation of skin, no redness or induration.      Recommendations:   ESBL E coli in the urine and in blood, Follow-up neg  Continue on ertapenem , plan 2 weeks  Appreciate ortho eval, no concern for infection on the spine so far per ortho.   Repeat UC noted.  Midline and erta orders in [place] disposition   Would get Follow-up UC when tx done, if he relapses needs imaging/urology etc      Juan Arias MD    Interval History   t max of a 100.8 down last 24hrs, feels OK  Repeat UC noted     Physical Exam   Temp: 97.2  F (36.2  C) Temp src: Oral BP: 121/77 Pulse: 126   Resp: 18 SpO2: 95 % O2 Device: None (Room air)    Vitals:    10/16/18 0735 10/16/18 0920   Weight: 70.1 kg (154 lb 9.6 oz) 68.5 kg (151 lb 0.2 oz)     Vital Signs with Ranges  Temp:  [97.2  F (36.2  C)-98.2  F (36.8  C)] 97.2  F (36.2  C)  Pulse:  [] 126  Resp:  [16-18] 18  BP: ()/(55-77) 121/77  SpO2:  [95 %] 95 %    Constitutional: Awake, alert, cooperative, no apparent distress  Lungs: Clear to auscultation bilaterally, no crackles or wheezing  Cardiovascular: Regular rate and rhythm, normal S1 and S2, and no murmur noted  Abdomen: Normal bowel sounds, soft, non-distended, non-tender  Skin: No rashes, no cyanosis, no edema  Other:    Medications     - MEDICATION INSTRUCTIONS -         apixaban ANTICOAGULANT  5 mg Oral BID     ertapenem (INVanz) IV  1 g Intravenous Q24H     ferrous sulfate  325 mg Oral Daily     metoprolol tartrate  50 mg Oral  BID     sodium chloride (PF)  10 mL Intracatheter Q8H     tamsulosin  0.4 mg Oral Daily       Data   All microbiology laboratory data reviewed.  Recent Labs   Lab Test  10/17/18   0831  10/16/18   0749  10/15/18   1058   WBC  7.6  9.5  15.5*   HGB  10.3*  11.6*  11.6*   HCT  31.2*  34.4*  35.8*   MCV  102*  101*  106*   PLT  239  247  338     Recent Labs   Lab Test  10/18/18   0906  10/17/18   1210  10/16/18   0749   CR  0.72  0.80  1.02     No lab results found.  Recent Labs   Lab Test  10/18/18   0906  10/17/18   1210  10/16/18   1110  10/16/18   0847  10/16/18   0840  10/15/18   1057  10/15/18   1056  10/15/18   1048   CULT  No growth after 18 hours  No growth after 2 days  50,000 to 100,000 colonies/mL  Enterococcus faecalis  *  10,000 to 50,000 colonies/mL  Escherichia coli ESBL  ESBL (extended beta lactamase) producing organisms require contact precautions.  *  Enterobacteriaceae that are susceptible to meropenem are usually susceptible to ertapenem.  No growth after 3 days  No growth after 3 days  Cultured on the 1st day of incubation:  Escherichia coli ESBL  Enterobacteriaceae that are susceptible to meropenem are usually susceptible to ertapenem.  *  Critical Value/Significant Value, preliminary result only, called to and read back by  Dr. Judson Mcadams 0255 10.16.18 CF/SCG    ESBL (extended beta lactamase) producing organisms require contact precautions.  (Note)  POSITIVE for E.COLI by Verigene multiplex nucleic acid test. Final  identification and antimicrobial susceptibility testing will be  verified by standard methods. Verigene test will not distinguish  E.coli from Shigella species including S.dysenteriae, S.flexneri,  S.boydii, and S.sonnei. Specimens containing Shigella species or  E.coli will be reported as Positive for E.coli.    POSITIVE for CTX-M Class A Extended Spectrum beta-lactamase (ESBL)  resistance marker by Verigene multiplex nucleic acid test. CTX-M  confers resistance to  penicillins, cephalosporins and variable  resistance to beta-lactam beta-lactamase inhibitor combinations  (clavulanic acid and tazobactam). Best empiric antibiotic choice is  meropenem. Specific susceptibility testing will be performed.    Specimen tested with ExSafeigene multiplex, gram-negative blood culture  nucleic acid test for the following targets: Acinetobacter sp.,  Citrobacter sp., Enterobacter sp., Proteus sp., E. coli, K.  pneumoniae/oxyto ca, P. aeruginosa, and the following resistance  markers: CTXM, KPC, NDM, VIM, IMP and OXA.    Critical Value/Significant Value called to and read back by  DR. STOCK.  10.16.18  0606 GJS    >100,000 colonies/mL  Escherichia coli ESBL  *  Enterobacteriaceae that are susceptible to meropenem are usually susceptible to ertapenem.  ESBL (extended beta lactamase) producing organisms require contact precautions.  Cultured on the 1st day of incubation:  Escherichia coli  Susceptibility testing done on previous specimen  *  Critical Value/Significant Value, preliminary result only, called to and read back by   JACQUELYN WITH RN @2317 10/16/18. CT[SD1.1]          Revision History        User Key Date/Time User Provider Type Action    > SD1.1 10/19/2018 11:41 AM Juan Arias MD Physician Sign            Progress Notes by Julia Rehman LICSW at 10/18/2018  2:30 PM     Author:  Julia Rehman LICSW Service:  (none) Author Type:      Filed:  10/18/2018  4:03 PM Date of Service:  10/18/2018  2:30 PM Creation Time:  10/18/2018  2:30 PM    Status:  Addendum :  Julia Rehman LICSW ()         SW  D;  TCU is recommended, discharge date is anticipated for Friday.  Care Coordinator spoke with patient and based on this conversation, referrals have been sent to [facility], preference is that order.  [facility] vacancies for Friday are assigned, however [facility] will assess patient if the planned admissions cancel.  [facility] will not know  till Friday if they have a vacancy for patient.  Based on above, writer will speak with patient about expanding TCU referrals.[KH1.1]    1440 Update:  Patient  Is sleeping so writer did call patient's wife.  As a backup plan to [facility], wife has agreed to [facility] into a private room understanding there is a daily $36.00 payment not covered by Medicare or any insurance.   Made a referral to [facility] (POB) and left a message asking if they have vacancies for Friday,.  Wife still prefers [facility] or [facility] but will accept POB.[KH1.2]    1600 Update:  [facility] is in the same situation as [facility] and [facility].  They currently will not be able to offer patient a room unless they have cancellations.  PLAN:  On Friday morning will speak with all three TCU's for an update.  If none have cancellations will need to explore other TCU options.[KH1.3]     Revision History        User Key Date/Time User Provider Type Action    > KH1.3 10/18/2018  4:03 PM Julia Rehman LICSW  Addend     KH1.2 10/18/2018  2:42 PM Julia Rehman LICSW  Addend     KH1.1 10/18/2018  2:35 PM Julia Rehman [facility]  Sign            Progress Notes by Marquita Cohen MD at 10/18/2018  9:05 AM     Author:  Marquita Cohen MD Service:  Hospitalist Author Type:  Physician    Filed:  10/18/2018  3:31 PM Date of Service:  10/18/2018  9:05 AM Creation Time:  10/18/2018  9:05 AM    Status:  Signed :  Marquita Cohen MD (Physician)         [facility]  Hospitalist Progress Note   10/18/2018          Assessment and Plan:       Dawson Moseley is an 88-year-old male with medical history of paroxysmal atrial fibrillation and recent L2 through L3 decompression 10/01/2018.  He had postoperative urinary retention, discharged on Mcdowell catheter.  Seen PCP on 10/15 for chills.  Chest x-ray was clear.  WBC count of 15.5, UA  grossly abnormal.  Urine cultures pending.  1 out of 2 blood cultures concerning for a E. coli ESBL.  Patient was directed to come into the ED on 10/16.    Acute E. coli ESBL bacteremia[SN1.1] - cleared[SN1.2].  Possible urinary tract infection.  Patient has[SN1.1] had fever spike in the last 24 hours.  Pro-calcitonin 2.86.  .  WBC trended down to 7.6.  Blood cultures 10/17 no growth.  Blood cultures 10/16 gram-negative rods 1 out of 2 bottles.  Urine cultures 10/16 gram-negative rods.[SN1.2]    Continue IV ertapenem 1 g every 24 hours [10/16]  Mcdowell catheter changed 10/16.  Infectious disease following along - IV Invanz.  Appreciate recommendations    Status post right L2/L3 decompression 10/1 due to spinal stenosis.  No sign of incisional infection or localized infection.  His back pain is improving.   Evaluated by orthopedic/spine surgery team, lumbar decompression wound seems to be healing without issue.[SN1.1]    Lumbar spine x-rays Surgical implanted device in posterior elements of the mid lumbar vertebrae noted. Diffuse intervertebral disc space narrowing.  R[SN1.2]ecommend to monitor for increased back pain or radicular symptoms.    Weightbearing as tolerated with walker.  Appreciate recommendation  Incentive spirometry.    Postoperative urinary retention with Mcdowell catheterization.  Patient had postop urinary retention after lumbar decompression on 10/1/2018.  Failed voiding trial on urology visit after discharge.  Mcdowell catheter change 10/16.  Catheter care, follow-up with urology as outpatient.  Continue PTA Flomax.[SN1.1]    Acute anemia likely dilutional   No blood loss.  Asymptomatic  Hemoglobin in September 2018 14.4, presented with hemoglobin of 11.6, dropped to 10.3.  Serum iron 25, iron saturation index 12.  CPK 75.  Will start on ferrous sulfate supplements once daily.  Monitor hemoglobin levels periodically.[SN1.2]    Possible mild cognitive impairment/concern for dementia.  At risk for  delirium during hospitalization.  Neurocognitive assessment as outpatient.  Minimize interruptions, frequent reorientation.  Avoid narcotics and benzodiazepines as able to.[SN1.1]  PT assessment ongoing.[SN1.2]    History of atrial fibrillation on anticoagulation.  Continue PTA metoprolol 50 mg twice daily.  Continue PTA Eliquis 5 mg twice daily.    Crohn's.   Takes tincture of opium p.r.n.    No acute symptoms at present.   As needed stool softeners ordered.    Active Diet Order      Combination Diet Regular Diet Adult    DVT Prophylaxis: Sequential compression device  Code Status: Full Code  Disposition: Expected discharge[SN1.1] tomorrow if no acute events overnight.[SN1.2]    Patient, his wife by the bedside, interdisciplinary team involved in care and agrees with plan.  Total time - Greater than 25 min. More than 50% of time spent in direct patient care, care coordination, patient/caregiver counseling, and formalizing plan of care.     Marquita Cohen MD        Interval History:      Patient appears comfortable sitting up in the chair.  Denies any chest pain or shortness of breath.  No difficulty passing urine-Mcdowell catheter change 10/16.  No oozing from surgical site.  No nausea vomiting.       Physical Exam:        Physical Exam   Temp:  [97.1  F (36.2  C)-98.6  F (37  C)] 97.1  F (36.2  C)  Pulse:  [101-111] 111  Resp:  [16] 16  BP: ()/(61-85) 125/85  SpO2:  [94 %-98 %] 98 %    Intake/Output Summary (Last 24 hours) at 10/17/18 1328  Last data filed at 10/17/18 1031   Gross per 24 hour   Intake             1871 ml   Output              550 ml   Net             1321 ml     Admission Weight: 70.1 kg (154 lb 9.6 oz)  Current Weight: 68.5 kg (151 lb 0.2 oz)    PHYSICAL EXAM  GENERAL: Patient elderly, comfortable. Alert and oriented.  HEART: Regular rate and rhythm. S1S2. No murmurs  LUNGS: Bilateral slightly decreased breath sounds, no wheezing.  No crackles.  ABDOMEN: Soft, no abdominal tenderness, bowel  sounds heard   NEURO: Moving all extremities.  Musculoskeletal lumbar incision dressing intact.  EXTREMITIES: No pedal edema. 2+ peripheral pulses.  SKIN: Warm, dry.          Medications:          apixaban ANTICOAGULANT  5 mg Oral BID     ertapenem (INVanz) IV  1 g Intravenous Q24H     metoprolol tartrate  50 mg Oral BID     tamsulosin  0.4 mg Oral Daily     acetaminophen, diphenoxylate-atropine, melatonin, naloxone, ondansetron **OR** ondansetron, opium tincture, oxyCODONE IR, - MEDICATION INSTRUCTIONS -, potassium chloride, potassium chloride with lidocaine, potassium chloride, potassium chloride, potassium chloride, prochlorperazine **OR** prochlorperazine **OR** prochlorperazine         Data:      All new lab and imaging data was reviewed.[SN1.1]                Revision History        User Key Date/Time User Provider Type Action    > SN1.2 10/18/2018  3:31 PM Marquita Cohen MD Physician Sign     SN1.1 10/18/2018  9:05 AM Marquita Cohen MD Physician             Progress Notes by Juan Arias MD at 10/18/2018 10:24 AM     Author:  Juan Arias MD Service:  Infectious Disease Author Type:  Physician    Filed:  10/18/2018 10:26 AM Date of Service:  10/18/2018 10:24 AM Creation Time:  10/18/2018 10:24 AM    Status:  Signed :  Juan Arias MD (Physician)         [facility]    Infectious Disease Progress Note    Date of Service (when I saw the patient): 10/18/2018     Assessment & Plan   Dawson Jones is a 88 year old male who was admitted on 10/16/2018.     Impression:  1. 88 y.o male with atrial fibrillation, Crohn's and urinary retention.  2. S/P L2 through L3 decompression 10/01 for chronic back pain with right radicular symptoms.  Hospital course was complicated by urinary retention and he discharged with an indwelling Mcdowell catheter. Which was replaced a week ago due to failing of the voiding trial.   3. Now coming in with fever, UA positive and UC pending, blood  cultures positive for E coli.   4. Back incision looks ok, though slight separation of skin, no redness or induration.      Recommendations:   ESBL E coli in the urine and in blood, Follow-up neg  Continue on ertapenem , plan 2 weeks  Appreciate ortho eval, no concern for infection on the spine so far per ortho.   Repeat UC noted.  Midline and erta orders in OK disposition   Would get Follow-up UC when tx done, if he relapses needs imaging/yrology etc      Juan Arias MD    Interval History   t max of a 100.8 down last 24hrs, feels OK  Repeat UC noted     Physical Exam   Temp: 97.1  F (36.2  C) Temp src: Axillary BP: 125/85 Pulse: 111   Resp: 16 SpO2: 98 % O2 Device: None (Room air)    Vitals:    10/16/18 0735 10/16/18 0920   Weight: 70.1 kg (154 lb 9.6 oz) 68.5 kg (151 lb 0.2 oz)     Vital Signs with Ranges  Temp:  [97.1  F (36.2  C)-98.6  F (37  C)] 97.1  F (36.2  C)  Pulse:  [101-111] 111  Resp:  [16] 16  BP: ()/(61-85) 125/85  SpO2:  [94 %-98 %] 98 %    Constitutional: Awake, alert, cooperative, no apparent distress  Lungs: Clear to auscultation bilaterally, no crackles or wheezing  Cardiovascular: Regular rate and rhythm, normal S1 and S2, and no murmur noted  Abdomen: Normal bowel sounds, soft, non-distended, non-tender  Skin: No rashes, no cyanosis, no edema  Other:    Medications     - MEDICATION INSTRUCTIONS -         apixaban ANTICOAGULANT  5 mg Oral BID     ertapenem (INVanz) IV  1 g Intravenous Q24H     metoprolol tartrate  50 mg Oral BID     tamsulosin  0.4 mg Oral Daily       Data   All microbiology laboratory data reviewed.  Recent Labs   Lab Test  10/17/18   0831  10/16/18   0749  10/15/18   1058   WBC  7.6  9.5  15.5*   HGB  10.3*  11.6*  11.6*   HCT  31.2*  34.4*  35.8*   MCV  102*  101*  106*   PLT  239  247  338     Recent Labs   Lab Test  10/18/18   0906  10/17/18   1210  10/16/18   0749   CR  0.72  0.80  1.02     No lab results found.  Recent Labs   Lab Test  10/17/18   1210   10/16/18   1110  10/16/18   0847  10/16/18   0840  10/15/18   1057  10/15/18   1056  10/15/18   1048   CULT  No growth after 12 hours  50,000 to 100,000 colonies/mL  Enterococcus faecalis  Susceptibility testing in progress  *  10,000 to 50,000 colonies/mL  Gram negative rods  Susceptibility testing in progress  *  No growth after 2 days  No growth after 2 days  Cultured on the 1st day of incubation:  Escherichia coli ESBL  Enterobacteriaceae that are susceptible to meropenem are usually susceptible to ertapenem.  *  Critical Value/Significant Value, preliminary result only, called to and read back by  Dr. Judson Stock 0255 10.16.18 CF/SCG    ESBL (extended beta lactamase) producing organisms require contact precautions.  (Note)  POSITIVE for E.COLI by Verigene multiplex nucleic acid test. Final  identification and antimicrobial susceptibility testing will be  verified by standard methods. Verigene test will not distinguish  E.coli from Shigella species including S.dysenteriae, S.flexneri,  S.boydii, and S.sonnei. Specimens containing Shigella species or  E.coli will be reported as Positive for E.coli.    POSITIVE for CTX-M Class A Extended Spectrum beta-lactamase (ESBL)  resistance marker by Verigene multiplex nucleic acid test. CTX-M  confers resistance to penicillins, cephalosporins and variable  resistance to beta-lactam beta-lactamase inhibitor combinations  (clavulanic acid and tazobactam). Best empiric antibiotic choice is  meropenem. Specific susceptibility testing will be performed.    Specimen tested with Verigene multiplex, gram-negative blood culture  nucleic acid test for the following targets: Acinetobacter sp.,  Citrobacter sp., Enterobacter sp., Proteus sp., E. coli, K.  pneumoniae/oxyto ca, P. aeruginosa, and the following resistance  markers: CTXM, KPC, NDM, VIM, IMP and OXA.    Critical Value/Significant Value called to and read back by  DR. STOCK.  10.16.18  0606 GJS    >100,000  colonies/mL  Escherichia coli ESBL  *  Enterobacteriaceae that are susceptible to meropenem are usually susceptible to ertapenem.  ESBL (extended beta lactamase) producing organisms require contact precautions.  Cultured on the 1st day of incubation:  Escherichia coli  Susceptibility testing done on previous specimen  *  Critical Value/Significant Value, preliminary result only, called to and read back by   TAMIKO WITH RN @2317 10/16/18. CT[SD1.1]          Revision History        User Key Date/Time User Provider Type Action    > SD1.1 10/18/2018 10:26 AM Juan Arias MD Physician Sign            Progress Notes by Bert Reynolds MD at 10/18/2018  6:58 AM     Author:  Bert Reynolds MD Service:  Spinal Surgery Author Type:  Physician    Filed:  10/18/2018  7:01 AM Date of Service:  10/18/2018  6:58 AM Creation Time:  10/18/2018  6:58 AM    Status:  Signed :  Bert Reynolds MD (Physician)         [facility]    Spine Surgery Consult Follow Up Note      ASSESSMENT:  2 weeks status post decompression and implantation of Coflex device L2-3 with good results  Postoperative UTI      PLAN:  Continue to mobilize, up ad alysha., follow-up with me in 1 month  Continue cares for UTI  Bert Reynolds MD      Interval History   Had AP and lateral lumbar x-rays last night.  X-ray was reviewed this morning.  See results below.     Physical Exam   Temp: 98.6  F (37  C) Temp src: Oral BP: 90/61 Pulse: 101   Resp: 16 SpO2: 95 % O2 Device: None (Room air)    Vitals:    10/16/18 0735 10/16/18 0920   Weight: 70.1 kg (154 lb 9.6 oz) 68.5 kg (151 lb 0.2 oz)     Vital Signs with Ranges  Temp:  [98.1  F (36.7  C)-98.6  F (37  C)] 98.6  F (37  C)  Pulse:  [101-106] 101  Resp:  [16] 16  BP: ()/(61-83) 90/61  SpO2:  [93 %-95 %] 95 %  I/O last 3 completed shifts:  In: 1557 [P.O.:480; I.V.:1077]  Out: 650 [Urine:650]      Medications     - MEDICATION INSTRUCTIONS -       sodium chloride 75 mL/hr at  10/18/18 0432        apixaban ANTICOAGULANT  5 mg Oral BID     ertapenem (INVanz) IV  1 g Intravenous Q24H     metoprolol tartrate  50 mg Oral BID     tamsulosin  0.4 mg Oral Daily       Data     Recent Labs  Lab 10/17/18  0831 10/16/18  0749 10/15/18  1058   HGB 10.3* 11.6* 11.6*       Recent Results (from the past 24 hour(s))   XR Lumbar Spine 2/3 Views    Narrative    LUMBAR SPINE TWO - THREE VIEWS  10/17/2018 7:04 PM     HISTORY: Postop films routine.     COMPARISON: None.      Impression    IMPRESSION: Scoliotic curvature of the thoracolumbar spine, apex left.  Surgical implanted device in posterior elements of the mid lumbar  vertebrae noted. Diffuse intervertebral disc space narrowing.    FERNANDA ARRIAGA MD       My review of imaging:  Coflex device is in good position.  No complications[DH1.1]       Revision History        User Key Date/Time User Provider Type Action    > DH1.1 10/18/2018  7:01 AM Bert Reynolds MD Physician Sign            Progress Notes by Bert Reynolds MD at 10/17/2018  5:58 PM     Author:  Bert Reynolds MD Service:  Orthopedics Author Type:  Physician    Filed:  10/17/2018  6:03 PM Date of Service:  10/17/2018  5:58 PM Creation Time:  10/17/2018  5:58 PM    Status:  Signed :  Bert Reynolds MD (Physician)         [facility]    Spine Surgery Consult Follow Up Note      ASSESSMENT:  Healing 2 weeks out status post decompression L2-3 and insertion of Coflex device     PLAN:  Plan for an AP and lateral lumbar spine standing films to assess Coflex device  Obtain films today and I will review tomorrow  Change dressing as needed okay to leave dressing off for showering replace dressing with 4 x 4 and paper tape[DH1.1]    Interval History[DH1.2]   Patient is 2 weeks status post above-noted procedure.  Doing well with no return of leg pain.  Has been ambulating.  Main problem is voiding and recent UTI currently being treated by urology and primary  care.[DH1.1]    Physical Exam   Temp: 98.2  F (36.8  C) Temp src: Oral BP: 104/68 Pulse: 106   Resp: 16 SpO2: 94 % O2 Device: None (Room air)    Vitals:    10/16/18 0735 10/16/18 0920   Weight: 70.1 kg (154 lb 9.6 oz) 68.5 kg (151 lb 0.2 oz)[DH1.2]     Vital Signs with Ranges[DH1.1]  Temp:  [97.2  F (36.2  C)-98.4  F (36.9  C)] 98.2  F (36.8  C)  Pulse:  [] 106  Resp:  [16-20] 16  BP: (102-118)/(68-83) 104/68  SpO2:  [93 %-94 %] 94 %  I/O last 3 completed shifts:  In: 2111 [P.O.:480; I.V.:1631]  Out: 700 [Urine:700][DH1.2]    Alert, Oriented  Incision looked clean and dry today.  I removed the Steri-Strips.  Replaced dressing with a 4 x 4 and paper tape.  Patient has had skin reactions to Medipore tape[DH1.1]      Medications     - MEDICATION INSTRUCTIONS -       sodium chloride 75 mL/hr at 10/17/18 1447        apixaban ANTICOAGULANT  5 mg Oral BID     ertapenem (INVanz) IV  1 g Intravenous Q24H     metoprolol tartrate  50 mg Oral BID     tamsulosin  0.4 mg Oral Daily       Data     Recent Labs  Lab 10/17/18  0831 10/16/18  0749 10/15/18  1058   HGB 10.3* 11.6* 11.6*       No results found for this or any previous visit (from the past 24 hour(s)).[DH1.2]         Revision History        User Key Date/Time User Provider Type Action    > DH1.2 10/17/2018  6:03 PM Bert Reynolds MD Physician Sign     DH1.1 10/17/2018  5:58 PM Bert Reynolds MD Physician             Progress Notes by Tena Ni MD at 10/17/2018  2:31 PM     Author:  Tena Ni MD Service:  Infectious Disease Author Type:  Physician    Filed:  10/17/2018  3:56 PM Date of Service:  10/17/2018  2:31 PM Creation Time:  10/17/2018  2:30 PM    Status:  Signed :  Tena Ni MD (Physician)         [facility]    Infectious Disease Progress Note    Date of Service (when I saw the patient):[DS1.1] 10/17/2018     Assessment & Plan[DS1.2]   Dawson Jones is a 88 year old male who was admitted on 10/16/2018.      Impression:  1. 88 y.o male with atrial fibrillation, Crohn's and urinary retention.  2. S/P L2 through L3 decompression 10/01 for chronic back pain with right radicular symptoms.  Hospital course was complicated by urinary retention and he discharged with an indwelling Mcdowell catheter. Which was replaced a week ago due to failing of the voiding trial.   3. Now coming in with fever, UA positive and UC pending, blood cultures positive for E coli.   4. Back incision looks ok, though slight separation of skin, no redness or induration.      Recommendations:   ESBL E coli in the urine[DS1.1] and in blood/[DS1.3]   Continue on ertapenem   Appreciate ortho eval, no concern for infection on the spine so far per ortho.   Repeat UC noted.[DS1.1]       Tena Ni[DS1.2], MD[DS1.1]    Interval History[DS1.2]   t max of a 100.8   Repeat UC noted[DS1.1]     Physical Exam   Temp: 98.1  F (36.7  C) Temp src: Oral BP: 118/83 Pulse: 104   Resp: 20 SpO2: 93 % O2 Device: None (Room air)    Vitals:    10/16/18 0735 10/16/18 0920   Weight: 70.1 kg (154 lb 9.6 oz) 68.5 kg (151 lb 0.2 oz)[DS1.2]     Vital Signs with Ranges[DS1.1]  Temp:  [97.2  F (36.2  C)-100.8  F (38.2  C)] 98.1  F (36.7  C)  Pulse:  [] 104  Resp:  [18-20] 20  BP: (102-119)/(71-83) 118/83  SpO2:  [93 %-95 %] 93 %[DS1.2]    Constitutional: Awake, alert, cooperative, no apparent distress  Lungs: Clear to auscultation bilaterally, no crackles or wheezing  Cardiovascular: Regular rate and rhythm, normal S1 and S2, and no murmur noted  Abdomen: Normal bowel sounds, soft, non-distended, non-tender  Skin: No rashes, no cyanosis, no edema  Other:[DS1.1]    Medications     - MEDICATION INSTRUCTIONS -       sodium chloride         apixaban ANTICOAGULANT  5 mg Oral BID     ertapenem (INVanz) IV  1 g Intravenous Q24H     metoprolol tartrate  50 mg Oral BID     tamsulosin  0.4 mg Oral Daily       Data[DS1.2]   All microbiology laboratory data reviewed.[DS1.1]  Recent Labs    Lab Test  10/17/18   0831  10/16/18   0749  10/15/18   1058   WBC  7.6  9.5  15.5*   HGB  10.3*  11.6*  11.6*   HCT  31.2*  34.4*  35.8*   MCV  102*  101*  106*   PLT  239  247  338     Recent Labs   Lab Test  10/17/18   1210  10/16/18   0749  10/15/18   1058   CR  0.80  1.02  1.09     No lab results found.  Recent Labs   Lab Test  10/16/18   1110  10/16/18   0847  10/16/18   0840  10/15/18   1057  10/15/18   1056  10/15/18   1048   CULT  50,000 to 100,000 colonies/mL  Enterococcus faecalis  *  10,000 to 50,000 colonies/mL  Gram negative rods  *  Culture in progress  No growth after 21 hours  No growth after 18 hours  Cultured on the 1st day of incubation:  Escherichia coli ESBL  Enterobacteriaceae that are susceptible to meropenem are usually susceptible to ertapenem.  *  Critical Value/Significant Value, preliminary result only, called to and read back by  Dr. Judson Mcadams 0255 10.16.18 /[facility] – [place]    ESBL (extended beta lactamase) producing organisms require contact precautions.  (Note)  POSITIVE for E.COLI by Verigene multiplex nucleic acid test. Final  identification and antimicrobial susceptibility testing will be  verified by standard methods. Verigene test will not distinguish  E.coli from Shigella species including S.dysenteriae, S.flexneri,  S.boydii, and S.sonnei. Specimens containing Shigella species or  E.coli will be reported as Positive for E.coli.    POSITIVE for CTX-M Class A Extended Spectrum beta-lactamase (ESBL)  resistance marker by Verigene multiplex nucleic acid test. CTX-M  confers resistance to penicillins, cephalosporins and variable  resistance to beta-lactam beta-lactamase inhibitor combinations  (clavulanic acid and tazobactam). Best empiric antibiotic choice is  meropenem. Specific susceptibility testing will be performed.    Specimen tested with Verigene multiplex, gram-negative blood culture  nucleic acid test for the following targets: Acinetobacter sp.,  Citrobacter sp.,  Enterobacter sp., Proteus sp., E. coli, K.  pneumoniae/oxyto ca, P. aeruginosa, and the following resistance  markers: CTXM, KPC, NDM, VIM, IMP and OXA.    Critical Value/Significant Value called to and read back by  DR. STOCK.  10.16.18  0606 GJS    >100,000 colonies/mL  Escherichia coli ESBL  *  Enterobacteriaceae that are susceptible to meropenem are usually susceptible to ertapenem.  ESBL (extended beta lactamase) producing organisms require contact precautions.  Cultured on the 1st day of incubation:  Escherichia coli  Susceptibility testing done on previous specimen  *  Critical Value/Significant Value, preliminary result only, called to and read back by   TAMIKO WITH RN @2317 10/16/18. CT[DS1.2]          Revision History        User Key Date/Time User Provider Type Action    > DS1.3 10/17/2018  3:56 PM Tena Ni MD Physician Sign     DS1.2 10/17/2018  2:31 PM Tena Ni MD Physician      DS1.1 10/17/2018  2:30 PM Tena Ni MD Physician             Progress Notes by Marquita Cohen MD at 10/17/2018  1:28 PM     Author:  Marquita Cohen MD Service:  Hospitalist Author Type:  Physician    Filed:  10/17/2018  1:39 PM Date of Service:  10/17/2018  1:28 PM Creation Time:  10/17/2018  1:28 PM    Status:  Signed :  Marquita Cohen MD (Physician)         [facility]  Hospitalist Progress Note   10/17/2018          Assessment and Plan:       Dawson Moseley is an 88-year-old male with medical history of paroxysmal atrial fibrillation and recent L2 through L3 decompression 10/01/2018.  He had postoperative urinary retention, discharged on Mcdowell catheter.  Seen PCP on 10/15 for chills.  Chest x-ray was clear.  WBC count of 15.5, UA grossly abnormal.  Urine cultures pending.  1 out of 2 blood cultures concerning for a E. coli ESBL.  Patient was directed to come into the ED on 10/16.    Acute E. coli ESBL bacteremia.  Possible urinary tract infection.  Patient has spiked to  100.8 since admission.  Continue IV ertapenem 1 g every 24 hours [10/16]  Mcdowell catheter changed 10/16.  Follow blood and urine culture results.  Discontinue IV fluids.  Follow pro calcitonin, ESR levels.  Infectious disease following along -Daily blood cultures, IV Invanz.  Appreciate recommendations    Status post right L2/L3 decompression 10/1 due to spinal stenosis.  No sign of incisional infection or localized infection.  His back pain is improving.   Evaluated by orthopedic/spine surgery team, lumbar decompression wound seems to be healing without issue.  Recommend to monitor for increased back pain or radicular symptoms.  Weightbearing as tolerated with walker.  Appreciate recommendation  Incentive spirometry.    Postoperative urinary retention with Mcdowell catheterization.  Patient had postop urinary retention after lumbar decompression on 10/1/2018.  Failed voiding trial on urology visit after discharge.  Mcdowell catheter change 10/16.  Catheter care, follow-up with urology as outpatient.  Continue PTA Flomax.    Possible mild cognitive impairment/concern for dementia.  At risk for delirium during hospitalization.  Neurocognitive assessment as outpatient.  Minimize interruptions, frequent reorientation.  Avoid narcotics and benzodiazepines as able to.  We will consider PT, OT assessment if any concerns for ambulation [discussed with patient's RN]    History of atrial fibrillation on anticoagulation.  Continue PTA metoprolol 50 mg twice daily.  Continue PTA Eliquis 5 mg twice daily.    Crohn's.   Takes tincture of opium p.r.n.    No acute symptoms at present.   As needed stool softeners ordered.    Active Diet Order      Combination Diet Regular Diet Adult    DVT Prophylaxis: Sequential compression device  Code Status: Full Code  Disposition: Expected discharge in 2-3 days pending clinical improvement/a clearance of bacteremia    Patient, his wife by the bedside, interdisciplinary team involved in care and  agrees with plan.  Total time - Greater than 25 min. More than 50% of time spent in direct patient care, care coordination, patient/caregiver counseling, and formalizing plan of care.     Marquita Cohen MD        Interval History:      Patient appears comfortable sitting up in the chair.  Denies any chest pain or shortness of breath.  No difficulty passing urine-Mcdowell catheter change 10/16.  No oozing from surgical site.  No nausea vomiting.  Deric fever to 100.8 last night.       Physical Exam:        Physical Exam   Temp:  [97.2  F (36.2  C)-100.8  F (38.2  C)] 98.1  F (36.7  C)  Pulse:  [] 104  Resp:  [18-20] 20  BP: (102-119)/(71-83) 118/83  SpO2:  [93 %-95 %] 93 %    Intake/Output Summary (Last 24 hours) at 10/17/18 1328  Last data filed at 10/17/18 1031   Gross per 24 hour   Intake             1871 ml   Output              550 ml   Net             1321 ml     Admission Weight: 70.1 kg (154 lb 9.6 oz)  Current Weight: 68.5 kg (151 lb 0.2 oz)    PHYSICAL EXAM  GENERAL: Patient elderly, comfortable. Alert and oriented.  HEART: Regular rate and rhythm. S1S2. No murmurs  LUNGS: Bilateral slightly decreased breath sounds, no wheezing.  No crackles.  ABDOMEN: Soft, no abdominal tenderness, bowel sounds heard   NEURO: Moving all extremities.  Musculoskeletal lumbar incision dressing intact.  EXTREMITIES: No pedal edema. 2+ peripheral pulses.  SKIN: Warm, dry.          Medications:          apixaban ANTICOAGULANT  5 mg Oral BID     ertapenem (INVanz) IV  1 g Intravenous Q24H     metoprolol tartrate  50 mg Oral BID     tamsulosin  0.4 mg Oral Daily     acetaminophen, diphenoxylate-atropine, melatonin, naloxone, ondansetron **OR** ondansetron, opium tincture, oxyCODONE IR, - MEDICATION INSTRUCTIONS -, potassium chloride, potassium chloride with lidocaine, potassium chloride, potassium chloride, potassium chloride, prochlorperazine **OR** prochlorperazine **OR** prochlorperazine         Data:      All new lab and  imaging data was reviewed.[SN1.1]                Revision History        User Key Date/Time User Provider Type Action    > SN1.1 10/17/2018  1:39 PM Marquita Cohen MD Physician Sign            Progress Notes by Briseyda Valencia PT at 10/16/2018  2:06 PM     Author:  Briseyda Valencia PT Service:  (none) Author Type:  Physical Therapist    Filed:  10/16/2018  2:16 PM Date of Service:  10/16/2018  2:06 PM Creation Time:  10/16/2018  2:06 PM    Status:  Addendum :  Briseyda Valencia PT (Physical Therapist)          10/16/18 1300   Quick Adds   Type of Visit Initial PT Evaluation   Living Environment   Lives With spouse   Living Arrangements house   Home Accessibility bed and bath on same level;stairs to enter home;stairs within home   Number of Stairs to Enter Home 2   Number of Stairs Within Home 0   Stair Railings at Home none  (pt notes he has a post on the L he holds on to)   Self-Care   Usual Activity Tolerance good   Current Activity Tolerance fair   Equipment Currently Used at Home cane, straight;shower chair   Functional Level Prior   Ambulation 1-->assistive equipment   Transferring 1-->assistive equipment   Toileting 1-->assistive equipment   Bathing 2-->assistive person   Dressing 0-->independent   Eating 0-->independent   Communication 0-->understands/communicates without difficulty   Swallowing 0-->swallows foods/liquids without difficulty   Cognition 0 - no cognition issues reported   Fall history within last six months no   Which of the above functional risks had a recent onset or change? ambulation;transferring   General Information   Onset of Illness/Injury or Date of Surgery - Date 10/16/18   Referring Physician Sandy Merchant PA-C   Patient/Family Goals Statement Not stated   Pertinent History of Current Problem (include personal factors and/or comorbidities that impact the POC) Patient admitted on 10/16/18 for abnormal labs. Patient recently had bilateral L2-3  decompression surgery and Coflex device placement by Dr. Reynolds on 10/1/18. Patient found to have UTI. PMH of a fib, cardiomyopathy, HLD, and Chrons disease.    Precautions/Limitations fall precautions   Weight-Bearing Status - LUE full weight-bearing   Weight-Bearing Status - RUE full weight-bearing   Weight-Bearing Status - LLE full weight-bearing   Weight-Bearing Status - RLE full weight-bearing   General Observations Patient in supine upon arrival of therapist, agreeable to working with PT but not wanting to ambulate far    Cognitive Status Examination   Orientation orientation to person, place and time   Level of Consciousness alert   Follows Commands and Answers Questions 100% of the time;able to follow multistep instructions   Pain Assessment   Patient Currently in Pain No   Integumentary/Edema   Integumentary/Edema no deficits were identifed   Posture    Posture Forward head position;Kyphosis   Range of Motion (ROM)   ROM Comment B LE ROM WNL    Strength   Strength Comments Not formally assessed but at least 3+/5 with functional transfers and gait    Bed Mobility   Bed Mobility Comments Supine>sit with proper log rolling technique completed with SBA and use of bed rails   Transfer Skills   Transfer Comments Sit>stand completed with SEC and SBA, patient slightly unsteady upon standing, bracing legs on bed for support initially    Gait   Gait Comments Patient ambulated 10 feet with use of SEC and CGA. Patient slightly unsteady with gait and needing increased assistance at times to correct.    Balance   Balance Comments Sitting balance at EOB good with SBA. Standing balance good with SEC and CGA, slightly unsteady with gait    General Therapy Interventions   Planned Therapy Interventions balance training;bed mobility training;gait training;transfer training;strengthening;home program guidelines   Clinical Impression   Criteria for Skilled Therapeutic Intervention yes, treatment indicated   PT Diagnosis Impaired  "gait    Influenced by the following impairments weakness   Functional limitations due to impairments bed mobility, transfers, gait, stairs    Clinical Presentation Stable/Uncomplicated   Clinical Presentation Rationale based on PMH, current presentation, and social support    Clinical Decision Making (Complexity) Low complexity   Therapy Frequency` daily   Predicted Duration of Therapy Intervention (days/wks) 3 days   Anticipated Discharge Disposition[KV1.1] TCU[KV1.2]   Risk & Benefits of therapy have been explained Yes   Patient, Family & other staff in agreement with plan of care Yes   [facility] TM \"6 Clicks\"   2016, Trustees of [facility], under license to [employer].  All rights reserved.   6 Clicks Short Forms Basic Mobility Inpatient Short Form   [facility] AM-PAC  \"6 Clicks\" V.2 Basic Mobility Inpatient Short Form   1. Turning from your back to your side while in a flat bed without using bedrails? 4 - None   2. Moving from lying on your back to sitting on the side of a flat bed without using bedrails? 3 - A Little   3. Moving to and from a bed to a chair (including a wheelchair)? 3 - A Little   4. Standing up from a chair using your arms (e.g., wheelchair, or bedside chair)? 3 - A Little   5. To walk in hospital room? 3 - A Little   6. Climbing 3-5 steps with a railing? 3 - A Little   Basic Mobility Raw Score (Score out of 24.Lower scores equate to lower levels of function) 19   Total Evaluation Time   Total Evaluation Time (Minutes) 8[KV1.1]        Revision History        User Key Date/Time User Provider Type Action    > KV1.2 10/16/2018  2:16 PM Briseyda Valencia, PT Physical Therapist Addend     KV1.1 10/16/2018  2:07 PM Briseyda Valencia, PT Physical Therapist Sign            Progress Notes by Judson Mcadams MD at 10/16/2018 11:39 AM     Author:  Judson Mcadams MD Service:  (none) Author Type:  Physician    Filed:  10/16/2018 11:39 AM Encounter Date:  10/15/2018 " Status:  Signed    :  Judson Mcadams MD (Physician)           The following letter pertains to your most recent diagnostic tests:    Your x-ray did not show an obvious pneumonia.          Sincerely,    Dr. Mcadams[JL1.1]   Revision History        User Key Date/Time User Provider Type Action    > JL1.1 10/16/2018 11:39 AM Judson Mcadams MD Physician Sign            Progress Notes by Maddi Gutiérrez RN at 10/16/2018  8:56 AM     Author:  Maddi Gutiérrez RN Service:  (none) Author Type:  Registered Nurse    Filed:  10/16/2018  8:56 AM Date of Service:  10/16/2018  8:56 AM Creation Time:  10/16/2018  8:56 AM    Status:  Signed :  Maddi Gutiérrez RN (Registered Nurse)         RECEIVING UNIT ED HANDOFF REVIEW    ED Nurse Handoff Report was reviewed by: Maddi Gutiérrez on October 16, 2018 at 8:56 AM[DM1.1]            Revision History        User Key Date/Time User Provider Type Action    > DM1.1 10/16/2018  8:56 AM Maddi Gutiérrez RN Registered Nurse Sign            ED Notes by Keisha Otoole RN at 10/16/2018  8:31 AM     Author:  Keisha Otoole RN Service:  (none) Author Type:  Registered Nurse    Filed:  10/16/2018  8:35 AM Date of Service:  10/16/2018  8:31 AM Creation Time:  10/16/2018  8:35 AM    Status:  Signed :  Keisha Otoole RN (Registered Nurse)         [facility]  ED Nurse Handoff Report    ED Chief complaint: Abnormal Labs (On 10/1 had Bilateral L2-3 decompression and insertion of a Coflex device. Has indwelling king catheter in place. On Sunday developed chills & shakes. Went to PCP's office yesterday. Showed leukocytosis, positive blood cultures, & UTI. Received call to come to ED today.)      ED Diagnosis:   Final diagnoses:   ESBL (extended spectrum beta-lactamase) producing bacteria infection   Bacteremia       Code Status: Full Code    Allergies: No Known Allergies    Activity level - Baseline/Home:  Independent    Activity Level -  "Current:   Stand with Assist     Needed?: No    Isolation: Yes  Infection: Not Applicable  ESBL  Bariatric?: No    Vital Signs:   Vitals:    10/16/18 0735 10/16/18 0800   BP: 119/80 125/73   Pulse: 122 94   Resp: 16 16   Temp: 99.4  F (37.4  C)    TempSrc: Oral    SpO2: 98% 96%   Weight: 70.1 kg (154 lb 9.6 oz)    Height: 1.727 m (5' 8\")        Cardiac Rhythm: ,        Pain level: 0-10 Pain Scale: 0    Is this patient confused?: No   Shirley - Suicide Severity Rating Scale Completed?  Yes  If yes, what color did the patient score?  White    Patient Report: Initial Complaint: UTI, chills  Focused Assessment: Dawson Jones is a 88 year old male who presents to the emergency department today after a call from his primary care provider indicating a positive blood culture after yesterday's visit. The patient reports that on 10/1/18 he had bilateral L2-3 decompression surgery and Coflex device placement, performed by Dr. Reynolds, due to pain going down the legs for year, and due to urinary retention was discharged with a king catheter. Two nights ago, the patient reports having chills so yesterday he was seen by his primary care provider, Dr. Mcadams, and found to have a white count of 15.5, a urinary tract infection and 1 of 2 positive blood culture for ESBL, which the patient was notified of around 0630 this morning, results below. Today, the patient reports that he has no pain going down the leg and his back pain is pretty good. He notes a slight cough. He denies any abdominal pain or fever. He currently has King catheter placed  Tests Performed: labs, CXR  Abnormal Results: see epic  Treatments provided: IV fluids and IV antibiotic    Family Comments: wife at bedside    OBS brochure/video discussed/provided to patient/family: No              Name of person given brochure if not patient: n/a              Relationship to patient: n/a    ED Medications:   Medications   0.9% sodium chloride BOLUS (1,000 mLs " Intravenous New Bag 10/16/18 0801)     Followed by   sodium chloride 0.9% infusion (not administered)   ertapenem (INVanz) 1 g vial to attach to  mL bag (1 g Intravenous New Bag 10/16/18 0801)       Drips infusing?:  No    For the majority of the shift this patient was Green.   Interventions performed were n/a.    Severe Sepsis OR Septic Shock Diagnosis Present: No    To be done/followed up on inpatient unit:  none at this time    ED NURSE PHONE NUMBER: *[id number][SP1.1]            Revision History        User Key Date/Time User Provider Type Action    > SP1.1 10/16/2018  8:35 AM Keisha Otoole RN Registered Nurse Sign            ED Provider Notes by Leoncio House DO at 10/16/2018  7:26 AM     Author:  Leoncio House DO Service:  Emergency Medicine Author Type:  Physician    Filed:  10/16/2018  8:34 AM Date of Service:  10/16/2018  7:26 AM Creation Time:  10/16/2018  7:32 AM    Status:  Signed :  Leoncio House DO (Physician)           History     Chief Complaint:  Abnormal Labs    HPI   Dawson Jones is a 88 year old male who presents to the emergency department today[LG1.1] after a call from his primary care provider indicating a positive blood culture after yesterday's visit. The patient reports that on 10/1/18 he had bilateral L2-3 decompression surgery and Coflex device placement[LG1.2], performed by Dr. Reynolds[LG1.3], due to pain going down the legs for year, and due to urinary retention was discharged with a king catheter. Two nights ago, the patient reports having chills so yesterday he was seen by his primary care provider, Dr. Mcadams, and found to have a white count of[LG1.2] 15.5[RA1.1], a urinary tract infection and[LG1.2] 1 of 2[RA1.1] positive blood culture[LG1.2] for ESBL[RA1.1], which the patient was notified of around 0630 this morning, results below. Today, the patient reports that he has no pain going down the leg and his back pain is pretty good. He  notes a slight cough. He denies any abdominal pain or fever. He[LG1.2] currently has Mcdowell catheter placed.[LG1.3]     Laboratory Studies 10/15/18  UA: Glucose 100 (A), Bilirubin Moderate (A), Ketones Trace (A), Blood Large (A), Protein Albumin >300 (A), Nitrite Positive (A), Leukocyte Esterase Small (A)  Urine Culture: Pending   CBC: WBC 15.5 (H), HGB 11.6 (L),    B[LG1.4]MP:[LG1.1] Glucose 134 (H)[LG1.4] o/w WNL (Creatinine[LG1.1] 1.09[LG1.4])[LG1.1]  Blood Culture: Positive for ESBL    XR Chest 2 Views 10/15/18  Lungs are slightly hyperinflated. Reticular opacities  within the lower lobes are favored to represent sequelae of chronic  scarring, interstitial/fibrotic change, or less likely edema.  Attenuation along the right upper aspect of the mediastinum is favored  to represent benign vascular attenuation. Chest CT could be considered  for further evaluation of these findings. Cardiac silhouette is mildly enlarged.  Reading per radiology[LG1.4]     Allergies:  No Known Drug Allergies     Medications:    Tylenol  Lomotil  Eliquis  Lopressor  Oxycodone  Flomax    Past Medical History:    Atrial fibrillation  Cardiomyopathy  Crohn's disease of small and large intestines with complication  Hyperlipidemia     Past Surgical History:    Appendectomy  Decompression lumbar one level    Family History:    Father: Cardiac history     Social History:  The patient was accompanied to the ED by[LG1.1] his wife[LG1.2].  Smoking Status: Former Smoker  Smokeless Tobacco: Never Used  Alcohol Use: Positive  Marital Status:      Review of Systems   Constitutional: Negative for[LG1.1] fever[LG1.3].   Respiratory: Positive for[LG1.1] cough[LG1.3].    Gastrointestinal: Negative for[LG1.1] abdominal pain[LG1.3].[LG1.1]   All other systems reviewed and are negative[LG1.3].      Physical Exam[LG1.1]     Patient Vitals for the past 24 hrs:   BP Temp Temp src Pulse Resp SpO2 Height Weight   10/16/18 0800 125/73 - - 94 16 96 %  "- -   10/16/18 0735 119/80 99.4  F (37.4  C) Oral 122 16 98 % 1.727 m (5' 8\") 70.1 kg (154 lb 9.6 oz)[LG1.5]     Physical Exam[LG1.1]  Physical Exam   General:  Sitting on bed with his wife at bedside.   HENT:  No obvious trauma to head  Right Ear:  External ear normal.   Left Ear:  External ear normal.   Nose:  Nose normal.   Eyes:  Conjunctivae and EOM are normal. Pupils are equal, round, and reactive.   Neck: Normal range of motion. Neck supple. No tracheal deviation present.   CV:  Normal heart sounds. No murmur heard.  Pulm/Chest: Effort normal and breath sounds normal.   Abd: Soft. No distension. There is no tenderness. There is no rigidity, no rebound and no guarding.   M/S: Normal range of motion.   Neuro: Alert. GCS 15.  Skin: Skin is warm and dry. No rash noted. Not diaphoretic.[LG1.2]   Posterior lumbar back surgical scar is clean dry and intact.[RA1.1]  Psych: Normal mood and affect. Behavior is normal.[LG1.2]       Emergency Department Course   Imaging:  Radiology findings were communicated with the[LG1.1] patient[LG1.4] who voiced understanding of the findings.[LG1.1]    XR Chest 2 Views  No radiographic evidence of acute chest abnormality.   Reading per radiology[LG1.4]     Laboratory:  Laboratory findings were communicated with the[LG1.1] patient[LG1.4] who voiced understanding of the findings.    CBC: WBC[LG1.1] 9.5[LG1.4], HGB[LG1.1] 11.6 (L)[LG1.4], PLT[LG1.1] 247  B[LG1.4]MP:[LG1.1] Potassium 3.2 (L), Glucose 115 (H)[LG1.4] o/w WNL (Creatinine[LG1.1] 1.02[LG1.4])[LG1.1]  Lactic Acid (Resulted: 0804): 1.6[LG1.4]     Interventions:[LG1.1]  0801 NS 1000 ml IV  0801 Invanz 1 g IV[LG1.4]    Emergency Department Course:[LG1.1]    0732[LG1.3] Nursing notes and vitals reviewed.[LG1.1]    0740[LG1.2] I performed an exam of the patient as documented above.[LG1.1]     0751 IV was inserted and blood was drawn for laboratory testing, results above.    0752 The patient was sent for a[LG1.3] XR Chest 2 " Views[LG1.4] while in the emergency department, results above.[LG1.3]     0812 I discussed the treatment plan with the patient. They expressed understanding of this plan and consented to admission. I discussed the patient with Dr. Cohen, who will admit the patient to a monitored bed for further evaluation and treatment.[LG1.4]     0823 The patient was rechecked and updated.[LG1.6]      I personally reviewed the[LG1.1] imaging and laboratory[LG1.4] results with the[LG1.1] patient[LG1.4] and answered all related questions prior to[LG1.1] admission[LG1.4].    Impression & Plan      Medical Decision Making:  Dawson Jones is a[LG1.1] very pleasant[RA1.1] 88 year old male who presents to the emergency department today for evaluation of[LG1.1] a positive blood culture.  The patient underwent a back surgery by Dr. Reynolds a few days ago.  The patient had chills so saw his primary, Dr. Mcadams, yesterday.  A blood culture was obtained along with a white count.  The patient had a leukocytosis in the clinic of 15.5 and a blood culture returned positive for ESBL.  The patient was called and instructed to come to the ED for admission.  The patient is slightly tachycardic but his white count is actually normal now and a lactate is negative.  The patient is provided Invanz for this and I spoke to the hospitalist [RA1.1] Noel[LG1.4] who has agreed to admit the patient for continued evaluation and treatment.  The likely etiology of this is a urinary tract infection and that culture is still pending.  The patient reported a slight cough and a chest x-ray was performed yesterday and again today.  There is no evidence of pneumonia.  I considered a postoperative epidural abscess but the patient has almost no back pain and no pain going down the leg (why he had the surgery) and this would likely be too early for an epidural abscess to occur, so this is unlikely and I do not believe an emergent MRI is indicated.  Blood cultures  were not obtained this morning as 2 blood cultures were already obtained yesterday and growing the bacteria.[RA1.1]    Diagnosis:[LG1.1]    ICD-10-CM    1. ESBL (extended spectrum beta-lactamase) producing bacteria infection A49.9 CBC with platelets differential    Z16.12 Lactic acid whole blood     Basic metabolic panel   2. Bacteremia R78.81[LG1.5]      Disposition:[LG1.1]   The patient is admitted into the care of Dr. Cohen.[LG1.4]    Scribe Disclosure:  Christina NEWBERRY, am serving as a scribe at 7:33 AM on 10/16/2018 to document services personally performed by Leoncio House,[LG1.1] DO[LG1.2] based on my observations and the provider's statements to me.     EMERGENCY DEPARTMENT[LG1.1]       Leoncio House DO  10/16/18 0834  [RA1.2]     Revision History        User Key Date/Time User Provider Type Action    > RA1.2 10/16/2018  8:34 AM Leoncio House DO Physician Sign     LG1.6 10/16/2018  8:24 AM Christina Martinez Scribe Share     LG1.5 10/16/2018  8:20 AM Christina Martinez Scribe Share     RA1.1 10/16/2018  8:12 AM Leoncio House DO Physician Share     LG1.4 10/16/2018  8:12 AM Andrea Martineza Scribe      LG1.3 10/16/2018  8:00 AM Christina Martinez Scribe Share     LG1.2 10/16/2018  7:48 AM Andrea Martineza Scribe Share     LG1.1 10/16/2018  7:37 AM Andrea Martineza Scribe Share                  Procedure Notes     No notes of this type exist for this encounter.         Progress Notes - Therapies (Notes from 10/16/18 through 10/19/18)      Progress Notes by Briseyda Valencia PT at 10/16/2018  2:06 PM     Author:  Briseyda Valencia PT Service:  (none) Author Type:  Physical Therapist    Filed:  10/16/2018  2:16 PM Date of Service:  10/16/2018  2:06 PM Creation Time:  10/16/2018  2:06 PM    Status:  Addendum :  Briseyda Valencia, PT (Physical Therapist)          10/16/18 1300   Quick Adds   Type of Visit Initial PT Evaluation   Living Environment   Lives  With spouse   Living Arrangements house   Home Accessibility bed and bath on same level;stairs to enter home;stairs within home   Number of Stairs to Enter Home 2   Number of Stairs Within Home 0   Stair Railings at Home none  (pt notes he has a post on the L he holds on to)   Self-Care   Usual Activity Tolerance good   Current Activity Tolerance fair   Equipment Currently Used at Home cane, straight;shower chair   Functional Level Prior   Ambulation 1-->assistive equipment   Transferring 1-->assistive equipment   Toileting 1-->assistive equipment   Bathing 2-->assistive person   Dressing 0-->independent   Eating 0-->independent   Communication 0-->understands/communicates without difficulty   Swallowing 0-->swallows foods/liquids without difficulty   Cognition 0 - no cognition issues reported   Fall history within last six months no   Which of the above functional risks had a recent onset or change? ambulation;transferring   General Information   Onset of Illness/Injury or Date of Surgery - Date 10/16/18   Referring Physician Sandy Merchant PA-C   Patient/Family Goals Statement Not stated   Pertinent History of Current Problem (include personal factors and/or comorbidities that impact the POC) Patient admitted on 10/16/18 for abnormal labs. Patient recently had bilateral L2-3 decompression surgery and Coflex device placement by Dr. Reynolds on 10/1/18. Patient found to have UTI. PMH of a fib, cardiomyopathy, HLD, and Chrons disease.    Precautions/Limitations fall precautions   Weight-Bearing Status - LUE full weight-bearing   Weight-Bearing Status - RUE full weight-bearing   Weight-Bearing Status - LLE full weight-bearing   Weight-Bearing Status - RLE full weight-bearing   General Observations Patient in supine upon arrival of therapist, agreeable to working with PT but not wanting to ambulate far    Cognitive Status Examination   Orientation orientation to person, place and time   Level of Consciousness alert  "  Follows Commands and Answers Questions 100% of the time;able to follow multistep instructions   Pain Assessment   Patient Currently in Pain No   Integumentary/Edema   Integumentary/Edema no deficits were identifed   Posture    Posture Forward head position;Kyphosis   Range of Motion (ROM)   ROM Comment B LE ROM WNL    Strength   Strength Comments Not formally assessed but at least 3+/5 with functional transfers and gait    Bed Mobility   Bed Mobility Comments Supine>sit with proper log rolling technique completed with SBA and use of bed rails   Transfer Skills   Transfer Comments Sit>stand completed with SEC and SBA, patient slightly unsteady upon standing, bracing legs on bed for support initially    Gait   Gait Comments Patient ambulated 10 feet with use of SEC and CGA. Patient slightly unsteady with gait and needing increased assistance at times to correct.    Balance   Balance Comments Sitting balance at EOB good with SBA. Standing balance good with SEC and CGA, slightly unsteady with gait    General Therapy Interventions   Planned Therapy Interventions balance training;bed mobility training;gait training;transfer training;strengthening;home program guidelines   Clinical Impression   Criteria for Skilled Therapeutic Intervention yes, treatment indicated   PT Diagnosis Impaired gait    Influenced by the following impairments weakness   Functional limitations due to impairments bed mobility, transfers, gait, stairs    Clinical Presentation Stable/Uncomplicated   Clinical Presentation Rationale based on PMH, current presentation, and social support    Clinical Decision Making (Complexity) Low complexity   Therapy Frequency` daily   Predicted Duration of Therapy Intervention (days/wks) 3 days   Anticipated Discharge Disposition[KV1.1] TCU[KV1.2]   Risk & Benefits of therapy have been explained Yes   Patient, Family & other staff in agreement with plan of care Yes   [facility] AM-PAC TM \"6 Clicks\" 2016, " "Trustees of [facility], under license to [employer].  All rights reserved.   6 Clicks Short Forms Basic Mobility Inpatient Short Form   [facility] AM-PAC  \"6 Clicks\" V.2 Basic Mobility Inpatient Short Form   1. Turning from your back to your side while in a flat bed without using bedrails? 4 - None   2. Moving from lying on your back to sitting on the side of a flat bed without using bedrails? 3 - A Little   3. Moving to and from a bed to a chair (including a wheelchair)? 3 - A Little   4. Standing up from a chair using your arms (e.g., wheelchair, or bedside chair)? 3 - A Little   5. To walk in hospital room? 3 - A Little   6. Climbing 3-5 steps with a railing? 3 - A Little   Basic Mobility Raw Score (Score out of 24.Lower scores equate to lower levels of function) 19   Total Evaluation Time   Total Evaluation Time (Minutes) 8[KV1.1]        Revision History        User Key Date/Time User Provider Type Action    > KV1.2 10/16/2018  2:16 PM Briseyda Valencia, JEN Physical Therapist Addend     KV1.1 10/16/2018  2:07 PM Briseyda Valencia, PT Physical Therapist Sign            "

## 2018-10-16 NOTE — PLAN OF CARE
Problem: Patient Care Overview  Goal: Plan of Care/Patient Progress Review  Outcome: Improving  A&OX4, VSS on RA, up with SBA. Contact Isolation for possible ESBL, LS clear, denies pain. Low urine ouput, PA aware, IVF order received, Potassium repalced, recheck at 5pm. Mcdowell replaced UA sent and positive. Ambulated with PT, fair appetite, prefers boost shakes with/for meals. Refused meals so far, poor intake

## 2018-10-16 NOTE — IP AVS SNAPSHOT
` [place] [address] [facility]: [phone]            Medication Administration Report for Dawson Jones as of 10/19/18 1229   Legend:    Given Hold Not Given Due Canceled Entry Other Actions    Time Time (Time) Time  Time-Action       Inactive    Active    Linked        Medications 10/13/18 10/14/18 10/15/18 10/16/18 10/17/18 10/18/18 10/19/18    acetaminophen (TYLENOL) tablet 650 mg  Dose: 650 mg  Freq: EVERY 8 HOURS PRN Route: PO  PRN Reason: mild pain  Start: 10/16/18 0926   Admin Instructions: Maximum acetaminophen dose from all sources = 75 mg/kg/day not to exceed 4 grams/day.    Admin. Amount: 2 tablet (2 × 325 mg tablet)  Last Admin: 10/16/18 1636  Dispense Loc: [facility]        1636 (650 mg)-Given              apixaban ANTICOAGULANT (ELIQUIS) tablet 5 mg  Dose: 5 mg  Freq: 2 TIMES DAILY Route: PO  Start: 10/16/18 2100   Admin. Amount: 1 tablet (1 × 5 mg tablet)  Last Admin: 10/19/18 0850  Dispense Loc:  [facility] 66A        2057 (5 mg)-Given        0801 (5 mg)-Given       2048 (5 mg)-Given        0838 (5 mg)-Given       2132 (5 mg)-Given        0850 (5 mg)-Given       [ ] 2100           diphenoxylate-atropine (LOMOTIL) 2.5-0.025 MG per tablet 1 tablet  Dose: 1 tablet  Freq: DAILY PRN Route: PO  PRN Reason: diarrhea  Start: 10/16/18 0926   Admin. Amount: 1 tablet  Last Admin: 10/18/18 1246  Dispense Loc:  [facility] 66A        1802 (1 tablet)-Given        1750 (1 tablet)-Given        1246 (1 tablet)-Given            ertapenem (INVanz) 1 g vial to attach to  mL bag  Dose: 1 g  Freq: EVERY 24 HOURS Route: IV  Indications of Use: BACTEREMIA  Last Dose: 1 g (10/19/18 0851)  Start: 10/17/18 0800   Admin Instructions: Infuse over 30 minutes.    Admin. Amount: 1 g  Last Admin: 10/19/18 0851  Dispense Loc: [facility] 66A  Infused Over: 30 Minutes  Volume: 10 mL   Current Line: Midline Catheter Single Lumen        0801 (1 g)-New Bag        0838 (1 g)-New Bag        0851 (1 g)-New Bag            "ferrous sulfate (IRON) tablet 325 mg  Dose: 325 mg  Freq: DAILY Route: PO  Start: 10/18/18 1545   Admin. Amount: 1 tablet (1 × 325 mg tablet)  Last Admin: 10/19/18 0851  Dispense Loc:  [facility]          1813 (325 mg)-Given        0851 (325 mg)-Given           lidocaine 1 % 0.5-5 mL  Dose: 0.5-5 mL  Freq: EVERY 1 HOUR PRN Route: OTHER  PRN Comment: mild pain with VAD insertion or accessing implanted port.  Start: 10/18/18 1054   Admin Instructions: Do NOT give if patient has a history of allergy to any local anesthetic or any \"greyson\" product. MAX dose 1 mL subcutaneous OR intradermal in divided doses.    Admin. Amount: 0.5-5 mL  Last Admin: 10/18/18 1213  Dispense Loc:  [facility]  Volume: 20 mL          1213 (2 mL)-Given by Other [C]            melatonin tablet 1 mg  Dose: 1 mg  Freq: AT BEDTIME PRN Route: PO  PRN Reason: sleep  Start: 10/16/18 0926   Admin Instructions: Do not give unless at least 6 hours of uninterrupted sleep is expected.    Admin. Amount: 1 tablet (1 × 1 mg tablet)  Dispense Loc:  [facility] 66A               metoprolol tartrate (LOPRESSOR) tablet 50 mg  Dose: 50 mg  Freq: 2 TIMES DAILY Route: PO  Start: 10/16/18 2100   Admin. Amount: 1 tablet (1 × 50 mg tablet)  Last Admin: 10/19/18 0850  Dispense Loc:  [facility]A        2057 (50 mg)-Given        0801 (50 mg)-Given       2048 (50 mg)-Given        0838 (50 mg)-Given       (2133)-Not Given        0850 (50 mg)-Given       [ ] 2100           naloxone (NARCAN) injection 0.1-0.4 mg  Dose: 0.1-0.4 mg  Freq: EVERY 2 MIN PRN Route: IV  PRN Reason: opioid reversal  Start: 10/16/18 0926   Admin Instructions: For respiratory rate LESS than or EQUAL to 8.  Partial reversal dose:  0.1 mg titrated q 2 minutes for Analgesia Side Effects Monitoring Sedation Level of 3 (frequently drowsy, arousable, drifts to sleep during conversation).Full reversal dose:  0.4 mg bolus for Analgesia Side Effects Monitoring Sedation Level of 4 (somnolent, minimal or no response to " stimulation).  For ordered IV doses 0.1-2mg give IVP. Give each 0.4mg over 15 seconds in emergency situations. For non-emergent situations further dilute in 9mL of NS to facilitate titration of response.    Admin. Amount: 0.1-0.4 mg = 0.25-1 mL Conc: 0.4 mg/mL  Dispense Loc:  [facility] 66A  Volume: 1 mL               ondansetron (ZOFRAN-ODT) ODT tab 4 mg  Dose: 4 mg  Freq: EVERY 6 HOURS PRN Route: PO  PRN Reasons: nausea,vomiting  Start: 10/16/18 0926   Admin Instructions: This is Step 1 of nausea and vomiting management.  If nausea not resolved in 15 minutes, go to Step 2 prochlorperazine (COMPAZINE). Do not push through foil backing. Peel back foil and gently remove. Place on tongue immediately. Administration with liquid unnecessary  With dry hands, peel back foil backing and gently remove tablet; do not push oral disintegrating tablet through foil backing; administer immediately on tongue and oral disintegrating tablet dissolves in seconds; then swallow with saliva; liquid not required.    Admin. Amount: 1 tablet (1 × 4 mg tablet)  Dispense Loc:  [facility] 66A              Or  ondansetron (ZOFRAN) injection 4 mg  Dose: 4 mg  Freq: EVERY 6 HOURS PRN Route: IV  PRN Reasons: nausea,vomiting  Start: 10/16/18 0926   Admin Instructions: This is Step 1 of nausea and vomiting management.  If nausea not resolved in 15 minutes, go to Step 2 prochlorperazine (COMPAZINE).  Irritant. For ordered IV doses 0.1-4 mg, give IV Push undiluted over 2-5 minutes.    Admin. Amount: 4 mg = 2 mL Conc: 4 mg/2 mL  Dispense Loc:  [facility] 66A  Infused Over: 2-5 Minutes  Volume: 2 mL               opium tincture 10 MG/ML (1%) liquid 2 mg  Dose: 0.2 mL  Freq: DAILY PRN Route: PO  PRN Reason: diarrhea  Start: 10/16/18 0926   Admin. Amount: 2 mg = 0.2 mL Conc: 10 mg/mL  Dispense Loc:  Main Pharmacy  Volume: 0.2 mL               oxyCODONE IR (ROXICODONE) tablet 5-10 mg  Dose: 5-10 mg  Freq: EVERY 3 HOURS PRN Route: PO  PRN Reason: moderate to severe  pain  Start: 10/16/18 0926   Admin. Amount: 1-2 tablet (1-2 × 5 mg tablet)  Dispense Loc:  [facility] 66A               Patient is already receiving anticoagulation with heparin, enoxaparin (LOVENOX), warfarin (COUMADIN)  or other anticoagulant medication  Freq: CONTINUOUS PRN Route: XX  Start: 10/16/18 0926   Dispense Loc: [facility] Stock               potassium chloride (KLOR-CON) Packet 20-40 mEq  Dose: 20-40 mEq  Freq: EVERY 2 HOURS PRN Route: ORAL OR FEED  PRN Reason: potassium supplementation  Start: 10/16/18 0926   Admin Instructions: Use if unable to tolerate tablets.  If Serum K+ 3.0-3.3, dose = 60 mEq po total dose (40 mEq x1 followed in 2 hours by 20 mEq x1). Recheck K+ level 4 hours after dose and the next AM.  If Serum K+ 2.5-2.9, dose = 80 mEq po total dose (40 mEq Q2H x2). Recheck K+ level 4 hours after dose and the next AM.  If Serum K+ less than 2.5, See IV order.  Dissolve packet contents in 4-8 ounces of cold water or juice.    Admin. Amount: 20-40 mEq  Last Admin: 10/16/18 1307  Dispense Loc:  [facility] 66A        1042 (40 mEq)-Given       1307 (20 mEq)-Given              potassium chloride 10 mEq in 100 mL intermittent infusion with 10 mg lidocaine  Dose: 10 mEq  Freq: EVERY 1 HOUR PRN Route: IV  PRN Reason: potassium supplementation  Start: 10/16/18 0926   Admin Instructions: Infuse via PERIPHERAL LINE. Use potassium with lidocaine for pain with peripheral administration.  If Serum K+ 3.0-3.3, dose = 10 mEq/hr x4 doses (40 mEq IV total dose). Recheck K+ level 2 hours after dose and the next AM.  If Serum K+ less than 3.0, dose = 10 mEq/hr x6 doses (60 mEq IV total dose). Recheck K+ level 2 hours after dose and the next AM.    Admin. Amount: 10 mEq = 100 mL Conc: 10 mEq/100 mL  Dispense Loc: Contact Rx for dose  Infused Over: 1 Hours  Volume: 100 mL               potassium chloride 10 mEq in 100 mL sterile water intermittent infusion (premix)  Dose: 10 mEq  Freq: EVERY 1 HOUR PRN Route: IV  PRN Reason:  potassium supplementation  Start: 10/16/18 0926   Admin Instructions: Infuse via PERIPHERAL LINE or CENTRAL LINE. Use for central line replacement if patient weight less than 65 kg, if patient is on TPN with high potassium content or if unit does not stock 20 mEq bags.   If Serum K+ 3.0-3.3, dose = 10 mEq/hr x4 doses (40 mEq IV total dose). Recheck K+ level 2 hours after dose and the next AM.   If Serum K+ less than 3.0, dose = 10 mEq/hr x6 doses (60 mEq IV total dose). Recheck K+ level 2 hours after dose and the next AM.    Admin. Amount: 10 mEq = 100 mL Conc: 10 mEq/100 mL  Dispense Loc: Contact Rx for dose  Infused Over: 60 Minutes  Volume: 100 mL               potassium chloride 20 mEq in 50 mL intermittent infusion  Dose: 20 mEq  Freq: EVERY 1 HOUR PRN Route: IV  PRN Reason: potassium supplementation  Start: 10/16/18 0926   Admin Instructions: Infuse via CENTRAL LINE Only. May need EKG if less than 65 kg or on TPN - Max rate is 0.3 mEq/kg/hr for patients not on EKG monitoring.   If Serum K+ 3.0-3.3, dose = 20 mEq/hr x2 doses (40 mEq IV total dose). Recheck K+ level 2 hours after dose and the next AM.  If Serum K+ less than 3.0, dose = 20 mEq/hr x3 doses (60 mEq IV total dose). Recheck K+ level 2 hours after dose and the next AM.    Admin. Amount: 20 mEq = 50 mL Conc: 20 mEq/50 mL  Dispense Loc: Contact Rx for dose  Volume: 50 mL               potassium chloride SA (K-DUR/KLOR-CON M) CR tablet 20-40 mEq  Dose: 20-40 mEq  Freq: EVERY 2 HOURS PRN Route: PO  PRN Reason: potassium supplementation  Start: 10/16/18 0926   Admin Instructions: Use if able to take PO.   If Serum K+ 3.0-3.3, dose = 60 mEq po total dose (40 mEq x1 followed in 2 hours by 20 mEq x1). Recheck K+ level 4 hours after dose and the next AM.  If Serum K+ 2.5-2.9, dose = 80 mEq po total dose (40 mEq Q2H x2). Recheck K+ level 4 hours after dose and the next AM.  If Serum K+ less than 2.5, See IV order.  DO NOT CRUSH    Admin. Amount: 1-2 tablet (1-2  × 20 mEq tablet)  Dispense Loc:  [facility] 66A               prochlorperazine (COMPAZINE) injection 5 mg  Dose: 5 mg  Freq: EVERY 6 HOURS PRN Route: IV  PRN Reasons: nausea,vomiting  Start: 10/16/18 0926   Admin Instructions: This is Step 2 of nausea and vomiting management. Give if nausea not resolved 15 minutes after giving ondansetron (ZOFRAN). If nausea not resolved in 15 minutes, go to Step 3 metoclopramide (REGLAN), if ordered.  For ordered IV doses 0.1-10 mg, give IV Push undiluted. Each 5mg over 1 minute.    Admin. Amount: 5 mg = 1 mL Conc: 5 mg/mL  Dispense Loc:  [facility]  Infused Over: 1-2 Minutes  Volume: 1 mL              Or  prochlorperazine (COMPAZINE) tablet 5 mg  Dose: 5 mg  Freq: EVERY 6 HOURS PRN Route: PO  PRN Reason: vomiting  Start: 10/16/18 0926   Admin Instructions: This is Step 2 of nausea and vomiting management. Give if nausea not resolved 15 minutes after giving ondansetron (ZOFRAN). If nausea not resolved in 15 minutes, go to Step 3 metoclopramide (REGLAN), if ordered.    Admin. Amount: 1 tablet (1 × 5 mg tablet)  Dispense Loc:  [facility] 66A              Or  prochlorperazine (COMPAZINE) Suppository 12.5 mg  Dose: 12.5 mg  Freq: EVERY 12 HOURS PRN Route: RE  PRN Reasons: nausea,vomiting  Start: 10/16/18 0926   Admin Instructions: This is Step 2 of nausea and vomiting management. Give if nausea not resolved 15 minutes after giving ondansetron (ZOFRAN). If nausea not resolved in 15 minutes, go to Step 3 metoclopramide (REGLAN), if ordered.    Admin. Amount: 0.5 suppository (0.5 × 25 mg suppository)  Dispense Loc:  Main Pharmacy               sodium chloride (PF) 0.9% PF flush 10 mL  Dose: 10 mL  Freq: EVERY 8 HOURS Route: IK  Start: 10/18/18 1100   Admin Instructions: And Q1H PRN, to lock peripheral midline IV catheter dormant lumen. Recommended to flush Q8H each lumen even during infusions    Admin. Amount: 10 mL  Last Admin: 10/19/18 0326  Dispense Loc: [facility] Floor Stock  Volume: 10 mL   Current  Line: Midline Catheter Single Lumen         (1222)-Not Given [C]       1816 (10 mL)-Given        0326 (10 mL)-Given       [ ] 1100       [ ] 1900           sodium chloride (PF) 0.9% PF flush 10-20 mL  Dose: 10-20 mL  Freq: EVERY 1 HOUR PRN Route: IK  PRN Reasons: line flush,post meds or blood draw  PRN Comment: to flush each peripheral midline IV catheter lumen  Start: 10/18/18 1054   Admin Instructions: 10 mL post IV meds;  20 mL post blood draw    Admin. Amount: 10-20 mL  Last Admin: 10/18/18 1214  Dispense Loc: [facility] Floor Stock  Volume: 20 mL   Current Line: Midline Catheter Single Lumen         1214 (20 mL)-Given by Other [C]            tamsulosin (FLOMAX) capsule 0.4 mg  Dose: 0.4 mg  Freq: DAILY Route: PO  Start: 10/16/18 1800   Admin Instructions: Administer 30 minutes after the same meal each day.  Capsules should be swallowed whole; do not crush chew or open.    Admin. Amount: 1 capsule (1 × 0.4 mg capsule)  Last Admin: 10/18/18 1813  Dispense Loc:  [facility] 66A        1802 (0.4 mg)-Given        1750 (0.4 mg)-Given        1813 (0.4 mg)-Given        [ ] 1800          Discontinued Medications  Medications 10/13/18 10/14/18 10/15/18 10/16/18 10/17/18 10/18/18 10/19/18         Rate: 75 mL/hr   Freq: CONTINUOUS Route: IV  Start: 10/17/18 1430   End: 10/18/18 0907   Last Admin: 10/18/18 0432  Dispense Loc: [facility] Floor Stock  Volume: 1,000 mL         1447 ( )-New Bag       2100 ( )-Rate/Dose Verify        0432 ( )-New Bag       0907-Med Discontinued          Rate: 75 mL/hr   Freq: CONTINUOUS Route: IV  Start: 10/16/18 1330   End: 10/17/18 1020   Last Admin: 10/16/18 2057  Dispense Loc: [facility] Floor Stock  Volume: 1,000 mL        1327 ( )-New Bag       2057 ( )-New Bag        1020-Med Discontinued

## 2018-10-16 NOTE — PLAN OF CARE
Problem: Patient Care Overview  Goal: Plan of Care/Patient Progress Review  PT:  Orders received, chart reviewed, and initial physical therapy evaluation completed. Patient admitted on 10/16/18 for abnormal labs and found to have UTI. Patient with recent bilateral L2-L3 decompression surgery and Corflex device placement on 10/1/18 by Dr. Reynolds. Patient lives in a house with his spouse with 2 steps to enter with no railings, but a post/column to hold onto on the L; no stairs to contend with in the home. Patient uses a SEC at baseline and receives assistance from spouse for bathing.     Discharge Planner PT   Patient plan for discharge: Not stated  Current status: Educated patient on role of PT and PT POC, patient agreeable to working with PT but not wanting to ambulate or mobilize much this date. Supine<>sit completed with SBA and proper log rolling technique for spinal precautions. Sit<>stand from EOB completed with SEC and SBA, patient slightly unsteady upon standing and noted to brace B LE on bed for support. Patient ambulated 35 feet with use of SEC and CGA with partial passing gait progression. Patient with slight unsteadiness and required Min A to support 1 time with slight LOB. Patient not wanting to mobilize any further and wishing to return to bed. Patient in supine at end of session with all needs in reach and bed alarm on.   Barriers to return to prior living situation: current level of assist, decreased tolerance to activity, falls risk   Recommendations for discharge: TCU  Rationale for recommendations: Patient demonstrating decreased tolerance to activity and requiring increased level of assistance for mobility and ambulation. Patient would benefit from continued skilled therapy to further improve strength, balance, and independence with mobility and ambulation to address functional limitations and decrease falls risk. Will continue to work with patient and monitor progress with therapy, as patient may  progress to level of independence for safe discharge home with assist from spouse for household tasks and HH PT to further address strength and balance deficits to improve functional limitations and decrease risk of falls.        Entered by: Briseyda Valencia 10/16/2018 2:10 PM

## 2018-10-16 NOTE — CONSULTS
[facility] Orthopedic Consultation    Dawson Jones MRN# [id number]   Age: 88 year old YOB: 1930     Date of Admission:  10/16/2018    Reason for consult: Recent spine surgery, UTI with bacteremia        Requesting physician: Sandy RUVALCABA       Level of consult: One time consult for diagnosis and treatment planning           Assessment and Plan:   Assessment:   Healing L2-L3 decompression with Dr. Reynolds  UTI with E. coli bacteremia      Plan:   At this time recent L2-L3 decompression seems to be healing without issue  Continue to monitor for increased back pain or radicular symptoms  Continue IV antibiotics per infectious disease  Weightbearing as tolerated with walker  Pain medication as needed, avoid narcotics           Chief Complaint:   Recent spine surgery now with bacteremia         History of Present Illness:   This patient is a 88 year old male who presents with the following condition requiring a hospital admission:    Mr. Moseley states that on Sunday he had shaking chills.  Presented on Monday to primary care physician who did routine lab work finding increased PVC as well as positive UA.  Blood cultures were also taken and were positive for E. coli on 10/16/18.  He was then called and asked to present to the ED for further treatment.  Patient had spinal surgery with Dr. Reynolds on 10/1/18, patient at that time did have right leg radicular symptoms.  Since surgery right leg symptoms have substantially decreased and have not returned.  Of note patient did have urinary retention postoperatively and was discharged with a indwelling Mcdowell catheter.  He did follow-up as directed with Dr. Valverde 1 week ago but did fail a voiding trial and his catheter was replaced.  Patient reports no increase in back pain, no increase in radicular symptoms, no drainage or pain surrounding incision site.          Past Medical History:     Past Medical History:   Diagnosis Date     Atrial fibrillation  (H)      Cardiomyopathy (H)      Crohn's disease of small and large intestines with complication (H)      Hyperlipidemia              Past Surgical History:     Past Surgical History:   Procedure Laterality Date     APPENDECTOMY       DECOMPRESSION LUMBAR ONE LEVEL N/A 10/1/2018    Procedure: DECOMPRESSION LUMBAR ONE LEVEL;  DECOMPRESSION L2-3 WITH COFLEX DEVICE  ;  Surgeon: Bert Reynolds MD;  Location:  OR     ORTHOPEDIC SURGERY  10/01/2018    Bilateral L2-3 decompression and insertion of a Coflex device             Social History:     Social History   Substance Use Topics     Smoking status: Former Smoker     Smokeless tobacco: Never Used      Comment: 40 years ago     Alcohol use 4.2 oz/week     7 Standard drinks or equivalent per week      Comment: 1 wine an evening             Family History:     Family History   Problem Relation Age of Onset     Family History Negative Mother      Cardiac Sudden Death Father      Family History Negative Brother      Other - See Comments Sister      colon issues     Family History Negative Son      Family History Negative Brother      Family History Negative Son      HEART DISEASE No family hx of              Immunizations:     VACCINE/DOSE   Diptheria   DPT   DTAP   HBIG   Hepatitis A   Hepatitis B   HIB   Influenza   Measles   Meningococcal   MMR   Mumps   Pneumococcal   Polio   Rubella   Small Pox   TDAP   Varicella   Zoster             Allergies:   No Known Allergies          Medications:     Current Facility-Administered Medications   Medication     acetaminophen (TYLENOL) tablet 650 mg     apixaban ANTICOAGULANT (ELIQUIS) tablet 5 mg     diphenoxylate-atropine (LOMOTIL) 2.5-0.025 MG per tablet 1 tablet     [START ON 10/17/2018] ertapenem (INVanz) 1 g vial to attach to  mL bag     melatonin tablet 1 mg     metoprolol tartrate (LOPRESSOR) tablet 50 mg     naloxone (NARCAN) injection 0.1-0.4 mg     ondansetron (ZOFRAN-ODT) ODT tab 4 mg    Or     ondansetron  "(ZOFRAN) injection 4 mg     opium tincture 10 MG/ML (1%) liquid 2 mg     oxyCODONE IR (ROXICODONE) tablet 5-10 mg     Patient is already receiving anticoagulation with heparin, enoxaparin (LOVENOX), warfarin (COUMADIN)  or other anticoagulant medication     potassium chloride (KLOR-CON) Packet 20-40 mEq     potassium chloride 10 mEq in 100 mL intermittent infusion with 10 mg lidocaine     potassium chloride 10 mEq in 100 mL sterile water intermittent infusion (premix)     potassium chloride 20 mEq in 50 mL intermittent infusion     potassium chloride SA (K-DUR/KLOR-CON M) CR tablet 20-40 mEq     prochlorperazine (COMPAZINE) injection 5 mg    Or     prochlorperazine (COMPAZINE) tablet 5 mg    Or     prochlorperazine (COMPAZINE) Suppository 12.5 mg     sodium chloride 0.9% infusion     tamsulosin (FLOMAX) capsule 0.4 mg             Review of Systems:   CV: NEGATIVE for chest pain, palpitations or peripheral edema  C: NEGATIVE for fever, change in weight, POSITIVE for chills  E/M: NEGATIVE for ear, mouth and throat problems  R: NEGATIVE for significant cough or SOB    10 point review of systems negative aside from HPI and physical exam.           Physical Exam:   All vitals have been reviewed  Patient Vitals for the past 24 hrs:   BP Temp Temp src Pulse Resp SpO2 Height Weight   10/16/18 1311 121/76 - - 97 - - - -   10/16/18 0920 121/79 98.5  F (36.9  C) Oral 100 20 97 % 1.727 m (5' 8\") 68.5 kg (151 lb 0.2 oz)   10/16/18 0900 118/86 - - - - 96 % - -   10/16/18 0830 120/79 - - - - 94 % - -   10/16/18 0800 125/73 - - 94 16 96 % - -   10/16/18 0735 119/80 99.4  F (37.4  C) Oral 122 16 98 % 1.727 m (5' 8\") 70.1 kg (154 lb 9.6 oz)       Intake/Output Summary (Last 24 hours) at 10/16/18 1513  Last data filed at 10/16/18 1300   Gross per 24 hour   Intake              876 ml   Output              100 ml   Net              776 ml     Constitutional: Pleasant, alert, appropriate, following commands.  HEENT: Head atraumatic " normocephalic. PERRLA.  Respiratory: Unlabored breathing no audible wheeze  Cardiovascular: Regular rate and rhythm  GI: Abdomen soft, non tender, and non distended.  Lymph/Hematologic: No edema or palor  Skin: No rashes, no cyanosis, no edema.  Neuro: Sensation intact to light touch in bilateral lower extremities.  Able to actively straight leg raise bilateral lower extremity  Able to actively dorsi and plantarflex bilateral feet  Unsure if EHL is indeed 3 out of 5 on right or patient had difficulty following instructions  Musculoskeletal:  No tenderness to palpation surrounding previous incision site  No erythema surrounding incision  No drainage surrounding incision  Incision is approximated with some very mild widening middle portion  Positive DP pulse            Data:   All laboratory data reviewed  Results for orders placed or performed during the hospital encounter of 10/16/18   XR Chest 2 Views    Narrative    CHEST TWO VIEWS October 16, 2018 8:01 AM     HISTORY: Cough.    COMPARISON: 10/15/2018.    FINDINGS: Chronic appearing interstitial abnormalities are similar to  the prior study. Heart size is upper normal. No airspace  consolidation, pneumothorax, or pleural effusion.       Impression    IMPRESSION: No radiographic evidence of acute chest abnormality.     MIGUELITO MCCORD MD   CBC with platelets differential   Result Value Ref Range    WBC 9.5 4.0 - 11.0 10e9/L    RBC Count 3.40 (L) 4.4 - 5.9 10e12/L    Hemoglobin 11.6 (L) 13.3 - 17.7 g/dL    Hematocrit 34.4 (L) 40.0 - 53.0 %     (H) 78 - 100 fl    MCH 34.1 (H) 26.5 - 33.0 pg    MCHC 33.7 31.5 - 36.5 g/dL    RDW 12.4 10.0 - 15.0 %    Platelet Count 247 150 - 450 10e9/L    Diff Method Automated Method     % Neutrophils 83.4 %    % Lymphocytes 11.1 %    % Monocytes 5.0 %    % Eosinophils 0.1 %    % Basophils 0.2 %    % Immature Granulocytes 0.2 %    Nucleated RBCs 0 0 /100    Absolute Neutrophil 7.9 1.6 - 8.3 10e9/L    Absolute Lymphocytes 1.1  0.8 - 5.3 10e9/L    Absolute Monocytes 0.5 0.0 - 1.3 10e9/L    Absolute Eosinophils 0.0 0.0 - 0.7 10e9/L    Absolute Basophils 0.0 0.0 - 0.2 10e9/L    Abs Immature Granulocytes 0.0 0 - 0.4 10e9/L    Absolute Nucleated RBC 0.0    Lactic acid whole blood   Result Value Ref Range    Lactic Acid 1.6 0.7 - 2.0 mmol/L   Basic metabolic panel   Result Value Ref Range    Sodium 139 133 - 144 mmol/L    Potassium 3.2 (L) 3.4 - 5.3 mmol/L    Chloride 106 94 - 109 mmol/L    Carbon Dioxide 24 20 - 32 mmol/L    Anion Gap 9 3 - 14 mmol/L    Glucose 115 (H) 70 - 99 mg/dL    Urea Nitrogen 21 7 - 30 mg/dL    Creatinine 1.02 0.66 - 1.25 mg/dL    GFR Estimate 69 >60 mL/min/1.7m2    GFR Estimate If Black 83 >60 mL/min/1.7m2    Calcium 8.5 8.5 - 10.1 mg/dL   UA with Microscopic reflex to Culture   Result Value Ref Range    Color Urine Yellow     Appearance Urine Cloudy     Glucose Urine Negative NEG^Negative mg/dL    Bilirubin Urine Negative NEG^Negative    Ketones Urine 5 (A) NEG^Negative mg/dL    Specific Gravity Urine 1.019 1.003 - 1.035    Blood Urine Moderate (A) NEG^Negative    pH Urine 6.0 5.0 - 7.0 pH    Protein Albumin Urine 100 (A) NEG^Negative mg/dL    Urobilinogen mg/dL 2.0 0.0 - 2.0 mg/dL    Nitrite Urine Positive (A) NEG^Negative    Leukocyte Esterase Urine Large (A) NEG^Negative    Source Catheterized Urine     WBC Urine >182 (H) 0 - 5 /HPF    RBC Urine 13 (H) 0 - 2 /HPF    WBC Clumps Present (A) NEG^Negative /HPF    Mucous Urine Present (A) NEG^Negative /LPF   Infectious Diseases IP Consult: Patient to be seen: Routine - within 24 hours; Ecoli ESBL bacteremia; Consultant may enter orders: Yes    Narrative    Tena Ni MD     10/16/2018 11:15 AM  [facility]    Infectious Disease Consultation     Date of Admission:  10/16/2018  Date of Consult (When I saw the patient): 10/16/18    Assessment & Plan   Dawson Jones is a 88 year old male who was admitted on   10/16/2018.     Impression:  1. 88 y.o male  with atrial fibrillation, Crohn's and urinary   retention.  2. S/P L2 through L3 decompression 10/01 for chronic back pain   with right radicular symptoms.  Hospital course was complicated   by urinary retention and he discharged with an indwelling Mcdowell   catheter. Which was replaced a week ago due to failing of the   voiding trial.   3. Now coming in with fever, UA positive and UC pending, blood   cultures positive for E coli.   4. Back incision looks ok, though slight separation of skin, no   redness or induration.     Recommendations:   Agree with ertapenem.   Consult spinal surgery.   Follow up on the urine cultures.   Daily blood cultures.         Tena Ni MD    Reason for Consult   Reason for consult: I was asked by Marie Merchant PA-C to   evaluate this patient for bacteremia.    Primary Care Physician   Judson Mcadams    Chief Complaint   Chills and fevers     History is obtained from the patient and medical records    History of Present Illness   Dawson Jones is a 88 year old male with atrial fibrillation,   Crohn's and urinary retention. History of  L2 through L3   decompression 10/01 by Dr. Reynolds due to chronic back pain with   right radicular symptoms.  Hospital course was complicated by   urinary retention and he discharged with an indwelling Mcdowell   catheter.  He saw Dr. Valverde of Urology approximately 1 week ago.    He failed a voiding trial and his catheter was replaced. 2 days   PTA he developed some chills.  He had no other localizing   symptoms and was afebrile.  He was seen by his primary care   provider yesterday.  He was afebrile at that appointment.  A   chest x-ray was clear but found to have leukocytosis with a WBC   of 15.5.  Urinalysis was grossly abnormal.  Urine culture is   pending.  One out of two blood cultures is concerning for E. coli   ESBL.  The patient was sent to the emergency department for   further evaluation.     Past Medical History   I have reviewed this  patient's medical history and updated it   with pertinent information if needed.   Past Medical History:   Diagnosis Date     Atrial fibrillation (H)      Cardiomyopathy (H)      Crohn's disease of small and large intestines with complication   (H)      Hyperlipidemia        Past Surgical History   I have reviewed this patient's surgical history and updated it   with pertinent information if needed.  Past Surgical History:   Procedure Laterality Date     APPENDECTOMY       DECOMPRESSION LUMBAR ONE LEVEL N/A 10/1/2018    Procedure: DECOMPRESSION LUMBAR ONE LEVEL;  DECOMPRESSION L2-3   WITH COFLEX DEVICE  ;  Surgeon: Bert Reynolds MD;  Location:    OR     ORTHOPEDIC SURGERY  10/01/2018    Bilateral L2-3 decompression and insertion of a Coflex device       Prior to Admission Medications   Prior to Admission Medications   Prescriptions Last Dose Informant Patient Reported? Taking?   ELIQUIS 5 MG tablet 10/16/2018 at AM Spouse/Significant Other No   Yes   Sig: Take 1 tablet (5 mg) by mouth 2 times daily   acetaminophen (TYLENOL) 325 MG tablet 10/15/2018 at 1800   Spouse/Significant Other Yes Yes   Sig: Take 650 mg by mouth every 8 hours as needed for mild pain   cyanocobalamin (VITAMIN B12) 1000 MCG/ML injection 9/3/2018   Spouse/Significant Other No Yes   Sig: Inject 1 mL (1,000 mcg) into the muscle every 30 days   diphenoxylate-atropine (LOMOTIL) 2.5-0.025 MG per tablet Past   Month at Unknown time Spouse/Significant Other No Yes   Sig: Take 1 tablet by mouth daily   Patient taking differently: Take 1 tablet by mouth daily as   needed    metoprolol tartrate (LOPRESSOR) 50 MG tablet 10/16/2018 at AM   Spouse/Significant Other No Yes   Sig: TAKE 1 TABLET(50 MG) BY MOUTH TWICE DAILY   opium tincture 10 MG/ML (1%) liquid Past Month at 2018   Spouse/Significant Other No Yes   Si drops every 4 hours as needed for diarrhea   Patient taking differently: Take 0.2 mLs by mouth daily as needed   (4 drops)   order  for DME   No No   Sig: Equipment being ordered: Walker Wheels () and Walker   ()  Treatment Diagnosis: DIfficulty with gait   order for DME   No No   Sig: Equipment being ordered: incentive spirometer   oxyCODONE IR (ROXICODONE) 5 MG tablet Past Week at Unknown time   Spouse/Significant Other No Yes   Sig: Take 1-2 tablets (5-10 mg) by mouth every 3 hours as needed   tamsulosin (FLOMAX) 0.4 MG capsule 10/15/2018 at HS   Spouse/Significant Other Yes Yes   Sig: Take 1 capsule (0.4 mg) by mouth daily      Facility-Administered Medications: None     Allergies   No Known Allergies    Immunization History   Immunization History   Administered Date(s) Administered     Influenza (High Dose) 3 valent vaccine 10/18/2016, 10/03/2017,   09/24/2018     Pneumo Conj 13-V (2010&after) 09/15/2016     Pneumococcal 23 valent 01/01/2000     Tdap (Adacel,Boostrix) 01/01/2012     Zoster vaccine, live 01/01/2007       Social History   I have reviewed this patient's social history and updated it with   pertinent information if needed. Dawson Jones  reports that   he has quit smoking. He has never used smokeless tobacco. He   reports that he drinks about 4.2 oz of alcohol per week  He   reports that he does not use illicit drugs.    Family History   I have reviewed this patient's family history and updated it with   pertinent information if needed.   Family History   Problem Relation Age of Onset     Family History Negative Mother      Cardiac Sudden Death Father      Family History Negative Brother      Other - See Comments Sister      colon issues     Family History Negative Son      Family History Negative Brother      Family History Negative Son      HEART DISEASE No family hx of        Review of Systems   The 10 point Review of Systems is negative other than noted in   the HPI or here.     Physical Exam   Temp: 98.5  F (36.9  C) Temp src: Oral BP: 121/79 Pulse: 100     Resp: 20 SpO2: 97 % O2 Device: None (Room air)     Vital Signs with Ranges  Temp:  [98.5  F (36.9  C)-99.4  F (37.4  C)] 98.5  F (36.9  C)  Pulse:  [] 100  Resp:  [16-20] 20  BP: (118-125)/(73-86) 121/79  SpO2:  [94 %-98 %] 97 %  151 lbs .24 oz  Body mass index is 22.96 kg/(m^2).    GENERAL APPEARANCE:  alert and no distress  EYES: Eyes grossly normal to inspection, PERRL and conjunctivae   and sclerae normal  HENT: ear canals and TM's normal and nose and mouth without   ulcers or lesions  NECK: no adenopathy, no asymmetry, masses, or scars and thyroid   normal to palpation  RESP: lungs clear to auscultation - no rales, rhonchi or wheezes  CV: regular rates and rhythm, normal S1 S2, no S3 or S4 and no   murmur, click or rub  LYMPHATICS: normal ant/post cervical and supraclavicular nodes  ABDOMEN: soft, nontender, without hepatosplenomegaly or masses   and bowel sounds normal  MS: extremities normal- no gross deformities noted  SKIN: no suspicious lesions or rashes      Data   Lab Results   Component Value Date    WBC 9.5 10/16/2018    HGB 11.6 (L) 10/16/2018    HCT 34.4 (L) 10/16/2018     10/16/2018     10/16/2018    POTASSIUM 3.2 (L) 10/16/2018    CHLORIDE 106 10/16/2018    CO2 24 10/16/2018    BUN 21 10/16/2018    CR 1.02 10/16/2018     (H) 10/16/2018    AST 23 03/27/2017    ALT 25 03/27/2017    ALKPHOS 83 03/27/2017    BILITOTAL 1.5 (H) 03/27/2017    INR 0.98 12/21/2010       Recent Labs  Lab 10/15/18  1057 10/15/18  1048   CULT Cultured on the 1st day of incubation:Gram negative   rodsSusceptibility testing in progress*  Critical   Value/Significant Value, preliminary result only, called to and   read back byDr. Judson Mcadams 0255 10.16.18 CF/SCG    (Note)POSITIVE for E.COLI by Starlineigene multiplex nucleic acid   test. Finalidentification and antimicrobial susceptibility   testing will beverified by standard methods. Verigene test will   not distinguishE.coli from Shigella species including   S.dysenteriae, S.flexneri,S.boydii, and  S.sonnei. Specimens   containing Shigella species orE.coli will be reported as Positive   for E.coli.POSITIVE for CTX-M Class A Extended Spectrum   beta-lactamase (ESBL)resistance marker by Verigene multiplex   nucleic acid test. CTX-Mconfers resistance to penicillins,   cephalosporins and variableresistance to beta-lactam   beta-lactamase inhibitor combinations(clavulanic acid and   tazobactam). Best empiric antibiotic choice ismeropenem. Specific   susceptibility testing will be performed.Specimen tested with   Verigene multiplex, gram-negative blood culturenucleic acid test   for the following targets: Acinetobacter sp.,Citrobacter sp.,   Enterobacter sp., Proteus sp., E. coli, K.pneumoniae/oxyto ca, P.   aeruginosa, and the following resistancemarkers: CTXM, KPC, NDM,   VIM, IMP and OXA.Critical Value/Significant Value called to and   read back by  .  10.16.18  0606 GJS No growth after 16   hours     Recent Labs   Lab Test  10/15/18   1057  10/15/18   1048   CULT  Cultured on the 1st day of incubation:  Gram negative rods  Susceptibility testing in progress  *  Critical Value/Significant Value, preliminary result only,   called to and read back by  Dr. Judson Mcadams 0255 10.16.18 CF/SCG    (Note)  POSITIVE for E.COLI by Verigene multiplex nucleic acid test.   Final  identification and antimicrobial susceptibility testing will be  verified by standard methods. Verigene test will not distinguish  E.coli from Shigella species including S.dysenteriae, S.flexneri,  S.boydii, and S.sonnei. Specimens containing Shigella species or  E.coli will be reported as Positive for E.coli.    POSITIVE for CTX-M Class A Extended Spectrum beta-lactamase   (ESBL)  resistance marker by Verigene multiplex nucleic acid test. CTX-M  confers resistance to penicillins, cephalosporins and variable  resistance to beta-lactam beta-lactamase inhibitor combinations  (clavulanic acid and tazobactam). Best empiric antibiotic choice    is  meropenem. Specific susceptibility testing will be performed.    Specimen tested with Verigene multiplex, gram-negative blood   culture  nucleic acid test for the following targets: Acinetobacter sp.,  Citrobacter sp., Enterobacter sp., Proteus sp., E. coli, K.  pneumoniae/oxyto ca, P. aeruginosa, and the following resistance  markers: CTXM, KPC, NDM, VIM, IMP and OXA.    Critical Value/Significant Value called to and read back by  DR. STOCK.  10.16.18  0606 GJS    No growth after 16 hours              Blood culture   Result Value Ref Range    Specimen Description Blood Left Hand     Special Requests Aerobic and anaerobic bottles received     Culture Micro No growth after 4 hours           Attestation:  I have reviewed today's vital signs, notes, medications, labs and imaging with Dr. Reynolds.  Amount of time performed on this consult: 20 minutes.    Fiorella Pollack PA-C

## 2018-10-16 NOTE — IP AVS SNAPSHOT
"[place] [address] [facility]: [phone]                                              INTERAGENCY TRANSFER FORM - LAB / IMAGING / EKG / EMG RESULTS   10/16/2018                    Hospital Admission Date: 10/16/2018  KING HILTON   : 1930  Sex: Male        Attending Provider: Marquita Cohen MD     Allergies:  No Known Allergies    Infection:  ESBL   Service:  HOSPITALIST    Ht:  1.727 m (5' 8\")   Wt:  68.5 kg (151 lb 0.2 oz)   Admission Wt:  70.1 kg (154 lb 9.6 oz)    BMI:  22.96 kg/m 2   BSA:  1.81 m 2            Patient PCP Information     Provider PCP Type    Judson Mcadams MD General         Lab Results - 3 Days      Urine Culture Aerobic Bacterial [[id number]] (Abnormal)  Resulted: 10/19/18 0746, Result status: Preliminary result    Ordering provider: Marquita Cohen MD  10/16/18 1110 Resulting lab: [facility]    Specimen Information    Type Source Collected On   Catheterized Urine  10/16/18 1110          Components       Value Reference Range Flag Lab   Specimen Description Catheterized Urine      Culture Micro --  A 225   Result:         50,000 to 100,000 colonies/mL  Enterococcus faecalis     Culture Micro --  A 225   Result:         10,000 to 50,000 colonies/mL  Escherichia coli ESBL  ESBL (extended beta lactamase) producing organisms require contact precautions.     Culture Micro Enterobacteriaceae that are susceptible to meropenem are usually susceptible to ertapenem.   225   Result:              Blood culture [[id number]] (Abnormal)  Resulted: 10/19/18 0738, Result status: Final result    Resulting lab: [facility]     Specimen Information    Type Source Collected On   Blood  10/15/18 1048   Comment:  Right Arm          Components       Value Reference Range Flag Lab   Specimen Description Blood Right Arm      Special Requests Aerobic and anaerobic bottles received   75   Culture Micro --  A 225 "   Result:         Cultured on the 1st day of incubation:  Escherichia coli  Susceptibility testing done on previous specimen     Culture Micro --   225   Result:         Critical Value/Significant Value, preliminary result only, called to and read back by   TAMIKO WITH RN @5717 10/16/18. CT              Blood culture [[id number]]  Resulted: 10/19/18 0719, Result status: Preliminary result    Ordering provider: Marquita Cohen MD  10/18/18 0000 Resulting lab: [facility]    Specimen Information    Type Source Collected On   Blood  10/18/18 0906   Comment:  Left Arm          Components       Value Reference Range Flag Lab   Specimen Description Blood Left Arm      Special Requests Aerobic and anaerobic bottles received   FrStHsLb   Culture Micro No growth after 18 hours   225            Blood culture [[id number]]  Resulted: 10/19/18 0211, Result status: Preliminary result    Ordering provider: Marquita Cohen MD  10/17/18 1019 Resulting lab: [facility]    Specimen Information    Type Source Collected On   Blood  10/17/18 1210   Comment:  Right Arm          Components       Value Reference Range Flag Lab   Specimen Description Blood Right Arm      Special Requests Aerobic and anaerobic bottles received   FrStHsLb   Culture Micro No growth after 2 days   75            Blood culture [[id number]]  Resulted: 10/19/18 0210, Result status: Preliminary result    Ordering provider: Sandy Merchant PA-C  10/16/18 0824 Resulting lab: [facility]    Specimen Information    Type Source Collected On   Blood  10/16/18 0847   Comment:  Left Hand          Components       Value Reference Range Flag Lab   Specimen Description Blood Left Hand      Special Requests Aerobic and anaerobic bottles received   FrStHsLb   Culture Micro No growth after 3 days   75            Blood culture [[id number]]  Resulted: 10/19/18 0210, Result status:  Preliminary result    Ordering provider: Sandy Merchant PA-C  10/16/18 0824 Resulting lab: [facility]    Specimen Information    Type Source Collected On   Blood  10/16/18 0840   Comment:  Left Wrist          Components       Value Reference Range Flag Lab   Specimen Description Blood Left Wrist      Special Requests Aerobic and anaerobic bottles received   FrStHsLb   Culture Micro No growth after 3 days   75            Iron and iron binding capacity [[id number]] (Abnormal)  Resulted: 10/18/18 0948, Result status: Final result    Ordering provider: Marquita Cohen MD  10/18/18 0000 Resulting lab: [facility]    Specimen Information    Type Source Collected On   Blood  10/18/18 0906          Components       Value Reference Range Flag Lab   Iron 25 35 - 180 ug/dL L FrStHsLb   Iron Binding Cap 203 240 - 430 ug/dL L FrStHsLb   Iron Saturation Index 12 15 - 46 % L FrStHsLb            Comprehensive metabolic panel [[id number]] (Abnormal)  Resulted: 10/18/18 0948, Result status: Final result    Ordering provider: Marquita Cohen MD  10/18/18 0000 Resulting lab: [facility]    Specimen Information    Type Source Collected On   Blood  10/18/18 0906          Components       Value Reference Range Flag Lab   Sodium 142 133 - 144 mmol/L  FrStHsLb   Potassium 3.5 3.4 - 5.3 mmol/L  FrStHsLb   Chloride 111 94 - 109 mmol/L H FrStHsLb   Carbon Dioxide 24 20 - 32 mmol/L  FrStHsLb   Anion Gap 7 3 - 14 mmol/L  FrStHsLb   Glucose 101 70 - 99 mg/dL H FrStHsLb   Urea Nitrogen 11 7 - 30 mg/dL  FrStHsLb   Creatinine 0.72 0.66 - 1.25 mg/dL  FrStHsLb   GFR Estimate >90 >60 mL/min/1.7m2  FrStHsLb   Comment:  Non  GFR Calc   GFR Estimate If Black >90 >60 mL/min/1.7m2  FrStHsLb   Comment:  African American GFR Calc   Calcium 8.2 8.5 - 10.1 mg/dL L FrStHsLb   Bilirubin Total 0.4 0.2 - 1.3 mg/dL  FrStHsLb   Albumin 2.5 3.4 - 5.0 g/dL L FrStHsLb   Protein  Total 6.0 6.8 - 8.8 g/dL L FrStHsLb   Alkaline Phosphatase 74 40 - 150 U/L  FrStHsLb   ALT 20 0 - 70 U/L  FrStHsLb   AST 18 0 - 45 U/L  FrStHsLb            Blood culture [[id number]] (Abnormal)  Resulted: 10/18/18 0807, Result status: Final result    Resulting lab: [facility]     Specimen Information    Type Source Collected On   Blood  10/15/18 1057   Comment:  Left Arm          Components       Value Reference Range Flag Lab   Specimen Description Blood Left Arm      Special Requests Aerobic and anaerobic bottles received   75   Culture Micro --  A 225   Result:         Cultured on the 1st day of incubation:  Escherichia coli ESBL  Enterobacteriaceae that are susceptible to meropenem are usually susceptible to ertapenem.     Culture Micro --   225   Result:         Critical Value/Significant Value, preliminary result only, called to and read back by  Dr. Judson Mcadams 0255 10.16.18 CF/SCG     Culture Micro ESBL (extended beta lactamase) producing organisms require contact precautions.   225   Result:     Culture Micro --   225   Result:         (Note)  POSITIVE for E.COLI by Verigene multiplex nucleic acid test. Final  identification and antimicrobial susceptibility testing will be  verified by standard methods. Verigene test will not distinguish  E.coli from Shigella species including S.dysenteriae, S.flexneri,  S.boydii, and S.sonnei. Specimens containing Shigella species or  E.coli will be reported as Positive for E.coli.    POSITIVE for CTX-M Class A Extended Spectrum beta-lactamase (ESBL)  resistance marker by Verigene multiplex nucleic acid test. CTX-M  confers resistance to penicillins, cephalosporins and variable  resistance to beta-lactam beta-lactamase inhibitor combinations  (clavulanic acid and tazobactam). Best empiric antibiotic choice is  meropenem. Specific susceptibility testing will be performed.    Specimen tested with Verigene multiplex, gram-negative blood  culture  nucleic acid test for the following targets: Acinetobacter sp.,  Citrobacter sp., Enterobacter sp., Proteus sp., E. coli, K.  pneumoniae/oxyto ca, P. aeruginosa, and the following resistance  markers: CTXM, KPC, NDM, VIM, IMP and OXA.    Critical Value/Significant Value called to and read back by  DR. STOCK.  10.16.18  0606 GJS              CRP inflammation [[id number]] (Abnormal)  Resulted: 10/17/18 1501, Result status: Final result    Ordering provider: Marquita Cohen MD  10/17/18 1210 Resulting lab: [facility]    Specimen Information    Type Source Collected On     10/17/18 1210          Components       Value Reference Range Flag Lab   CRP Inflammation 109.0 0.0 - 8.0 mg/L H FrStHsLb            Procalcitonin [[id number]]  Resulted: 10/17/18 1420, Result status: Final result    Ordering provider: Marquita Cohen MD  10/17/18 1020 Resulting lab: [facility]    Specimen Information    Type Source Collected On   Blood  10/17/18 1210          Components       Value Reference Range Flag Lab   Procalcitonin 2.86 ng/ml  FrStHsLb   Comment:         2.00-9.99 ng/ml  High risk for progression to severe sepsis.  Recommendation: Strongly recommend initiating or continuing antibiotics.   Evaluate culture results and clinical features to target antibacterial   therapy. Obtain blood cultures and other relevant cultures if not done. Repeat   PCT in 2 days to guide antibiotic de-escalation. Consider de-escalating   antibiotics when PCT concentration is <80% of peak or abs PCT <0.5.              Comprehensive metabolic panel [[id number]] (Abnormal)  Resulted: 10/17/18 1353, Result status: Final result    Ordering provider: Marquita Cohen MD  10/17/18 1020 Resulting lab: [facility]    Specimen Information    Type Source Collected On   Blood  10/17/18 1210          Components       Value Reference Range Flag Lab   Sodium 141 133 - 144 mmol/L  FrStHsLb   Potassium  3.6 3.4 - 5.3 mmol/L  FrStHsLb   Chloride 110 94 - 109 mmol/L H FrStHsLb   Carbon Dioxide 22 20 - 32 mmol/L  FrStHsLb   Anion Gap 9 3 - 14 mmol/L  FrStHsLb   Glucose 153 70 - 99 mg/dL H FrStHsLb   Urea Nitrogen 14 7 - 30 mg/dL  FrStHsLb   Creatinine 0.80 0.66 - 1.25 mg/dL  FrStHsLb   GFR Estimate >90 >60 mL/min/1.7m2  FrStHsLb   Comment:  Non  GFR Calc   GFR Estimate If Black >90 >60 mL/min/1.7m2  FrStHsLb   Comment:  African American GFR Calc   Calcium 8.0 8.5 - 10.1 mg/dL L FrStHsLb   Bilirubin Total 0.4 0.2 - 1.3 mg/dL  FrStHsLb   Albumin 2.5 3.4 - 5.0 g/dL L FrStHsLb   Protein Total 6.3 6.8 - 8.8 g/dL L FrStHsLb   Alkaline Phosphatase 82 40 - 150 U/L  FrStHsLb   ALT 22 0 - 70 U/L  FrStHsLb   AST 24 0 - 45 U/L  FrStHsLb            CK total [[id number]]  Resulted: 10/17/18 1353, Result status: Final result    Ordering provider: Marquita Cohen MD  10/17/18 1020 Resulting lab: [facility]    Specimen Information    Type Source Collected On   Blood  10/17/18 1210          Components       Value Reference Range Flag Lab   CK Total 75 30 - 300 U/L  FrStHsLb            CBC with platelets [[id number]] (Abnormal)  Resulted: 10/17/18 1011, Result status: Final result    Ordering provider: Sandy Merchant PA-C  10/17/18 0000 Resulting lab: [facility]    Specimen Information    Type Source Collected On   Blood  10/17/18 0831          Components       Value Reference Range Flag Lab   WBC 7.6 4.0 - 11.0 10e9/L  FrStHsLb   RBC Count 3.06 4.4 - 5.9 10e12/L L FrStHsLb   Hemoglobin 10.3 13.3 - 17.7 g/dL L FrStHsLb   Hematocrit 31.2 40.0 - 53.0 % L FrStHsLb    78 - 100 fl H FrStHsLb   MCH 33.7 26.5 - 33.0 pg H FrStHsLb   MCHC 33.0 31.5 - 36.5 g/dL  FrStHsLb   RDW 12.5 10.0 - 15.0 %  FrStHsLb   Platelet Count 239 150 - 450 10e9/L  FrStHsLb            Urine Culture Aerobic Bacterial [[id number]] (Abnormal)  Resulted: 10/17/18 0736, Result status: Final result    Ordering  provider: Judson Mcadams MD  10/15/18 1236 Resulting lab: [facility]    Specimen Information    Type Source Collected On   Capillary blood  10/15/18 1056   Comment:  Catheter          Components       Value Reference Range Flag Lab   Specimen Description Capillary blood Catheter      Culture Micro --  A 225   Result:         >100,000 colonies/mL  Escherichia coli ESBL     Culture Micro Enterobacteriaceae that are susceptible to meropenem are usually susceptible to ertapenem.   225   Result:     Culture Micro ESBL (extended beta lactamase) producing organisms require contact precautions.   225            Potassium [[id number]]  Resulted: 10/16/18 1836, Result status: Final result    Ordering provider: Sandy Merchant PA-C  10/16/18 1418 Resulting lab: [facility]    Specimen Information    Type Source Collected On   Blood  10/16/18 1750          Components       Value Reference Range Flag Lab   Potassium 3.6 3.4 - 5.3 mmol/L  FrStHsLb            UA with Microscopic reflex to Culture [[id number]] (Abnormal)  Resulted: 10/16/18 1138, Result status: Final result    Ordering provider: Sandy Merchant PA-C  10/16/18 0926 Resulting lab: [facility]    Specimen Information    Type Source Collected On   Catheterized Urine Urine catheter 10/16/18 1110          Components       Value Reference Range Flag Lab   Color Urine Yellow   FrStHsLb   Appearance Urine Cloudy   FrStHsLb   Glucose Urine Negative NEG^Negative mg/dL  FrStHsLb   Bilirubin Urine Negative NEG^Negative  FrStHsLb   Ketones Urine 5 NEG^Negative mg/dL A FrStHsLb   Specific [place] Urine 1.019 1.003 - 1.035  FrStHsLb   Blood Urine Moderate NEG^Negative A FrStHsLb   pH Urine 6.0 5.0 - 7.0 pH  FrStHsLb   Protein Albumin Urine 100 NEG^Negative mg/dL A FrStHsLb   Urobilinogen mg/dL 2.0 0.0 - 2.0 mg/dL  FrStHsLb   Nitrite Urine Positive NEG^Negative A FrStHsLb   Leukocyte Esterase Urine Large  NEG^Negative A FrStHsLb   Source Catheterized Urine   FrStHsLb   WBC Urine >182 0 - 5 /HPF H FrStHsLb   RBC Urine 13 0 - 2 /HPF H FrStHsLb   WBC Clumps Present NEG^Negative /HPF A FrStHsLb   Mucous Urine Present NEG^Negative /LPF A FrStHsLb            Basic metabolic panel [[id number]] (Abnormal)  Resulted: 10/16/18 0813, Result status: Final result    Ordering provider: Leoncio House, DO  10/16/18 0738 Resulting lab: [facility]    Specimen Information    Type Source Collected On   Blood  10/16/18 0749          Components       Value Reference Range Flag Lab   Sodium 139 133 - 144 mmol/L  FrStHsLb   Potassium 3.2 3.4 - 5.3 mmol/L L FrStHsLb   Chloride 106 94 - 109 mmol/L  FrStHsLb   Carbon Dioxide 24 20 - 32 mmol/L  FrStHsLb   Anion Gap 9 3 - 14 mmol/L  FrStHsLb   Glucose 115 70 - 99 mg/dL H FrStHsLb   Urea Nitrogen 21 7 - 30 mg/dL  FrStHsLb   Creatinine 1.02 0.66 - 1.25 mg/dL  FrStHsLb   GFR Estimate 69 >60 mL/min/1.7m2  FrStHsLb   Comment:  Non  GFR Calc   GFR Estimate If Black 83 >60 mL/min/1.7m2  FrStHsLb   Comment:  African American GFR Calc   Calcium 8.5 8.5 - 10.1 mg/dL  FrStHsLb            Lactic acid whole blood [[id number]]  Resulted: 10/16/18 0804, Result status: Final result    Ordering provider: Leoncio House,   10/16/18 0738 Resulting lab: [facility]    Specimen Information    Type Source Collected On   Blood  10/16/18 0749          Components       Value Reference Range Flag Lab   Lactic Acid 1.6 0.7 - 2.0 mmol/L  FrStHsLb            CBC with platelets differential [[id number]] (Abnormal)  Resulted: 10/16/18 0800, Result status: Final result    Ordering provider: Leoncio House DO  10/16/18 0738 Resulting lab: [facility]    Specimen Information    Type Source Collected On   Blood  10/16/18 0749          Components       Value Reference Range Flag Lab   WBC 9.5 4.0 - 11.0 10e9/L  FrStHsLb   RBC Count 3.40 4.4 -  "5.9 10e12/L L FrStHsLb   Hemoglobin 11.6 13.3 - 17.7 g/dL L FrStHsLb   Hematocrit 34.4 40.0 - 53.0 % L FrStHsLb    78 - 100 fl H FrStHsLb   MCH 34.1 26.5 - 33.0 pg H FrStHsLb   MCHC 33.7 31.5 - 36.5 g/dL  FrStHsLb   RDW 12.4 10.0 - 15.0 %  FrStHsLb   Platelet Count 247 150 - 450 10e9/L  FrStHsLb   Diff Method Automated Method   FrStHsLb   % Neutrophils 83.4 %  FrStHsLb   % Lymphocytes 11.1 %  FrStHsLb   % Monocytes 5.0 %  FrStHsLb   % Eosinophils 0.1 %  FrStHsLb   % Basophils 0.2 %  FrStHsLb   % Immature Granulocytes 0.2 %  FrStHsLb   Nucleated RBCs 0 0 /100  FrStHsLb   Absolute Neutrophil 7.9 1.6 - 8.3 10e9/L  FrStHsLb   Absolute Lymphocytes 1.1 0.8 - 5.3 10e9/L  FrStHsLb   Absolute Monocytes 0.5 0.0 - 1.3 10e9/L  FrStHsLb   Absolute Eosinophils 0.0 0.0 - 0.7 10e9/L  FrStHsLb   Absolute Basophils 0.0 0.0 - 0.2 10e9/L  FrStHsLb   Abs Immature Granulocytes 0.0 0 - 0.4 10e9/L  FrStHsLb   Absolute Nucleated RBC 0.0   FrStHsLb            Testing Performed By     Lab - Abbreviation Name Director Address Valid Date Range    14 - FrStHsLb [facility] Unknown [address] [address] 05/08/15 1057 - Present    75 - Unknown [facility] Unknown [address] [address] 01/15/15 1019 - Present    225 - Unknown [facility] Unknown [address] [facility] [address] 12/19/14 0954 - Present            Unresulted Labs (24h ago through future)    Start       Ordered    Unscheduled  Potassium  (Potassium Replacement - \"Standard\" - For K levels less than 3.4 mmol/L - UU,UR,UA,RH,SH,PH,WY )  CONDITIONAL (SPECIFY),   Routine     Comments:  Obtain Potassium Level for these conditions:  *IF no potassium result within 24 hours before initiation of order set, draw potassium level with next lab collect.    *2 HOURS AFTER last IV potassium replacement dose and 4 hours after an oral replacement dose.  *Next morning after potassium " dose.     Repeat Potassium Replacement if necessary.    10/16/18 0926         Imaging Results - 3 Days      XR Lumbar Spine 2/3 Views [[id number]]  Resulted: 10/17/18 1921, Result status: Final result    Ordering provider: Bert Reynolds MD  10/17/18 1758 Resulted by: Fernanda Arriaga MD    Performed: 10/17/18 1855 - 10/17/18 1904 Resulting lab: RADIOLOGY RESULTS    Narrative:       LUMBAR SPINE TWO - THREE VIEWS  10/17/2018 7:04 PM     HISTORY: Postop films routine.     COMPARISON: None.      Impression:       IMPRESSION: Scoliotic curvature of the thoracolumbar spine, apex left.  Surgical implanted device in posterior elements of the mid lumbar  vertebrae noted. Diffuse intervertebral disc space narrowing.    FERNANDA ARRIAGA MD      XR Chest 2 Views [[id number]]  Resulted: 10/16/18 0811, Result status: Final result    Ordering provider: Leoncio House DO  10/16/18 0746 Resulted by: Miguelito Mccord MD    Performed: 10/16/18 0752 - 10/16/18 0801 Resulting lab: RADIOLOGY RESULTS    Narrative:       CHEST TWO VIEWS October 16, 2018 8:01 AM     HISTORY: Cough.    COMPARISON: 10/15/2018.    FINDINGS: Chronic appearing interstitial abnormalities are similar to  the prior study. Heart size is upper normal. No airspace  consolidation, pneumothorax, or pleural effusion.       Impression:       IMPRESSION: No radiographic evidence of acute chest abnormality.     MIGUELITO MCCORD MD      Testing Performed By     Lab - Abbreviation Name Director Address Valid Date Range    104 - Rad Rslts RADIOLOGY RESULTS Unknown Unknown 02/16/05 1553 - Present            Encounter-Level Documents:     There are no encounter-level documents.      Order-Level Documents:     There are no order-level documents.

## 2018-10-16 NOTE — IP AVS SNAPSHOT
"    [place] [address] [facility]: [phone]                                              INTERAGENCY TRANSFER FORM - PHYSICIAN ORDERS   10/16/2018                    Hospital Admission Date: 10/16/2018  KING HILTON   : 1930  Sex: Male        Attending Provider: Marquita Cohen MD     Allergies:  No Known Allergies    Infection:  ESBL   Service:  HOSPITALIST    Ht:  1.727 m (5' 8\")   Wt:  68.5 kg (151 lb 0.2 oz)   Admission Wt:  70.1 kg (154 lb 9.6 oz)    BMI:  22.96 kg/m 2   BSA:  1.81 m 2            Patient PCP Information     Provider PCP Type    Judson Mcadams MD General      ED Clinical Impression     Diagnosis Description Comment Added By Time Added    ESBL (extended spectrum beta-lactamase) producing bacteria infection [A49.9, Z16.12] ESBL (extended spectrum beta-lactamase) producing bacteria infection [A49.9, Z16.12]  Leoncio House DO 10/16/2018  7:38 AM    Bacteremia [R78.81] Bacteremia [R78.81]  Leoncio House DO 10/16/2018  7:38 AM      Hospital Problems as of 10/19/2018              Priority Class Noted POA    Bacteremia Medium  10/16/2018 Yes      Non-Hospital Problems as of 10/19/2018              Priority Class Noted    Atrial fibrillation (H) Medium  2015    Cardiomyopathy, unspecified type (H) Medium  Unknown    Crohn's disease of small and large intestines with complication (H) Medium  9/15/2016    Spinal stenosis of lumbosacral region Medium  9/15/2016    Vitamin B12 deficiency (non anemic) Medium  9/15/2016    Chronic pain syndrome Medium  2018    PAD (peripheral artery disease) (H) Medium  2018    Spinal stenosis Medium  10/1/2018      Code Status History     Date Active Date Inactive Code Status Order ID Comments User Context    This patient has a current code status but no historical code status.         Medication Review      START taking        Dose / Directions Comments    ertapenem 1 GM injection   Commonly known as:  " INVanz        Dose:  1 g   Inject 1 g into the vein every 24 hours for 10 days CBC with differential, creatinine, SGOT weekly while on this medication to be faxed to Dr. Arias office.   Quantity:  100 mL   Refills:  0        ferrous sulfate 325 (65 Fe) MG tablet   Commonly known as:  IRON   Used for:  Crohn's disease of small and large intestines with complication (H)        Dose:  325 mg   Start taking on:  10/20/2018   Take 1 tablet (325 mg) by mouth daily   Quantity:  100 tablet   Refills:  0          CONTINUE these medications which may have CHANGED, or have new prescriptions. If we are uncertain of the size of tablets/capsules you have at home, strength may be listed as something that might have changed.        Dose / Directions Comments    diphenoxylate-atropine 2.5-0.025 MG per tablet   Commonly known as:  LOMOTIL   This may have changed:    - when to take this  - reasons to take this   Used for:  Crohn's disease of small and large intestines with complication (H)        Dose:  1 tablet   Take 1 tablet by mouth daily as needed for diarrhea   Quantity:  10 tablet   Refills:  0        opium tincture 10 MG/ML (1%) liquid   This may have changed:    - how much to take  - how to take this  - when to take this  - reasons to take this  - additional instructions   Used for:  Crohn's disease of small and large intestines with complication (H)        Dose:  0.2 mL   Take 0.2 mLs (2 mg) by mouth every 6 hours as needed for diarrhea (4 drops)   Quantity:  118 mL   Refills:  0          CONTINUE these medications which have NOT CHANGED        Dose / Directions Comments    acetaminophen 325 MG tablet   Commonly known as:  TYLENOL        Dose:  650 mg   Take 650 mg by mouth every 8 hours as needed for mild pain   Refills:  0        cyanocobalamin 1000 MCG/ML injection   Commonly known as:  VITAMIN B12   Used for:  Vitamin B12 deficiency (non anemic)        Dose:  1 mL   Inject 1 mL (1,000 mcg) into the muscle every 30 days    Quantity:  3 mL   Refills:  11    Dispense appropriate syringes and needles as well       ELIQUIS 5 MG tablet   Used for:  Chronic atrial fibrillation (H)   Generic drug:  apixaban ANTICOAGULANT        Dose:  5 mg   Take 1 tablet (5 mg) by mouth 2 times daily   Quantity:  30 tablet   Refills:  0        FLOMAX 0.4 MG capsule   Used for:  Benign non-nodular prostatic hyperplasia with lower urinary tract symptoms   Generic drug:  tamsulosin        Dose:  0.4 mg   Take 1 capsule (0.4 mg) by mouth daily   Quantity:  90 capsule   Refills:  3        metoprolol tartrate 50 MG tablet   Commonly known as:  LOPRESSOR   Used for:  Benign essential hypertension        TAKE 1 TABLET(50 MG) BY MOUTH TWICE DAILY   Quantity:  180 tablet   Refills:  3        order for DME   Used for:  S/P laminectomy        Equipment being ordered: Walker Wheels () and Walker () Treatment Diagnosis: DIfficulty with gait   Quantity:  1 each   Refills:  0        order for DME   Used for:  Atelectasis        Equipment being ordered: incentive spirometer   Quantity:  1 Units   Refills:  0          STOP taking     oxyCODONE IR 5 MG tablet   Commonly known as:  ROXICODONE                   Summary of Visit     Reason for your hospital stay       You were admitted with positive blood cultures after back surgery.             After Care     Activity       Your activity upon discharge: activity as tolerated       Activity - Up with nursing assistance           Diet       Follow this diet upon discharge: Orders Placed This Encounter      Snacks/Supplements Adult: Boost Shake; With Meals  Low-salt diet.       Discharge Instructions       Aggressive incentive spirometry.  Mcdowell catheter care. Midline care.  Consider neurocognitive assessment as outpatient.  Avoid narcotics and benzodiazepines if able to.       Fall precautions           General info for SNF       Length of Stay Estimate: Short Term Care: Estimated # of Days <30  Condition at  Discharge: Stable  Level of care:skilled   Rehabilitation Potential: Fair  Admission H&P remains valid and up-to-date: Yes  Recent Chemotherapy: N/A  Use Nursing Home Standing Orders: Yes       Mantoux instructions       Give two-step Mantoux (PPD) Per Facility Policy Yes       Wound care and dressings       Instructions to care for your wound at home: as directed by surgical team.             Referrals     Occupational Therapy Adult Consult       Evaluate and treat as clinically indicated.    Reason: Physical deconditioning       Physical Therapy Adult Consult       Evaluate and treat as clinically indicated.    Reason: Physical deconditioning.             Your next 10 appointments already scheduled     Nov 06, 2018  9:30 AM CST   Nurse Only with CS NURSE   [facility] ([facility])    [address] [address]   [phone]            Jan 16, 2019 10:00 AM CST   Office Visit with Judson Mcadams MD   [facility] ([facility])    [address] [facility] [address]   [phone]           Bring a current list of meds and any records pertaining to this visit. For Physicals, please bring immunization records and any forms needing to be filled out. Please arrive 10 minutes early to complete paperwork.              Follow-Up Appointment Instructions     Future Labs/Procedures    Follow Up and recommended labs and tests     Comments:    Follow up with Nursing home physician.    Follow-up and recommended labs and tests      Comments:    Follow up with primary care provider, Judson Mcadams, within 7 days for hospital follow- up.   Monitoring while on IV antibiotics as per ID.    Follow-up with Ortho as per schedule.  Follow-up with urology for voiding trials as per schedule.      Follow-Up Appointment Instructions     Follow Up and recommended labs and tests       Follow up with Nursing home physician.       Follow-up and recommended labs and tests         Follow up with primary care provider, Judson Mcadams, within 7 days for hospital follow- up.   Monitoring while on IV antibiotics as per ID.    Follow-up with Ortho as per schedule.  Follow-up with urology for voiding trials as per schedule.             Statement of Approval     Ordered          10/19/18 1226  I have reviewed and agree with all the recommendations and orders detailed in this document.  EFFECTIVE NOW     Approved and electronically signed by:  Marquita Cohen MD           10/18/18 1024  I have reviewed and agree with all the recommendations and orders detailed in this document.     Approved and electronically signed by:  Juan Arias MD

## 2018-10-16 NOTE — PROVIDER NOTIFICATION
No current order for IVF.  Had been running NS at 125 ml but realized this was a ED order so stopped it.  King was replaced at 1100 and has had only 50 ml at most so far.  Emptied 100 ml from leg bag and previous king, patient states that was his urine output since this morning when he put the leg bag on.  He has refused to order meals, taken small amount of his supplement and oral potassium with juice, sips of water.  Text page to KAROL Muñoz to update on urine output and question if need IVF ordrered.

## 2018-10-16 NOTE — IP AVS SNAPSHOT
[facility] [address] [facility]    [address] [place] [address]    Phone:  [phone]                                       After Visit Summary   10/16/2018    Dawson Jones    MRN: [id number]           After Visit Summary Signature Page     I have received my discharge instructions, and my questions have been answered. I have discussed any challenges I see with this plan with the nurse or doctor.    ..........................................................................................................................................  Patient/Patient Representative Signature      ..........................................................................................................................................  Patient Representative Print Name and Relationship to Patient    ..................................................               ................................................  Date                                   Time    ..........................................................................................................................................  Reviewed by Signature/Title    ...................................................              ..............................................  Date                                               Time          22EPIC Rev 08/18

## 2018-10-16 NOTE — IP AVS SNAPSHOT
` ` Patient Information     Patient Name Sex     Dawson Jones ([id number]) Male 1930       Room Bed    [address] [place] [address]      Patient Demographics     Address Phone E-mail Address    [address]  [facility] [address] [phone] (Home) *Preferred*  [phone] (Mobile) [email or web address]      Patient Ethnicity & Race     Ethnic Group Patient Race    American White      Emergency Contact(s)     Name Relation Home Work Mobile    Pauly Jones Spouse [phone]    Erick Jones Son [phone]      Documents on File        Status Date Received Description       Documents for the Patient    Consent for Services - Hospital/Clinic Received () 01/08/15     Consent for EHR Access Received 01/08/15     Privacy Notice - [place] Received 01/08/15     Insurance Card Received () 01/08/15     External Medication Information Consent Accepted 01/08/15     Patient ID Received () 01/08/15     [facility] Specified Other       Consent for Services/Privacy Notice - Hospital/Clinic Received () 08/10/16     Insurance Card Received () 08/10/16 [facility]     Consent to Communicate Received 17 erick blackburn    Insurance Card Received () 17 [facility] OSMEL Authorization  17     Advance Directives and Living Will Not Received  Validation of AD 17    Advance Directives and Living Will Received 08/15/17 Health Care Directive 17    Consent for Services/Privacy Notice - Hospital/Clinic Received () 17     HIM OSMEL Authorization  () 17 Authorization for batch CIOX -- - 1    HIM OSMEL Authorization  () 10/17/17 Authorization for batch [id number]    Care Everywhere Prospective Auth Received 17     Patient ID Received 17 [place]     Insurance Card Received 18 [facility]     Business/Insurance/Care Coordination/Health Form - Patient  18 APPLICATION FOR DISABILITY PARKING CERTIFICATE     Consent for Services - Hospital and Clinic Received 18     HIE Auth Received 18     Insurance Card Received 18 *New Medicare A/B, [employer]- 2018    HIM OSMEL Authorization  () 18 Authorization for batch CIOX     HIM OSMEL Authorization  18     HIM OSMEL Authorization  10/03/18        Documents for the Encounter    CMS IM for Patient Signature Received 10/16/18       Admission Information     Attending Provider Admitting Provider Admission Type Admission Date/Time    Marquita Cohen MD Neshangi, Srivani, MD Emergency 10/16/18  0730    Discharge Date Hospital Service Auth/Cert Status Service Area     Hospitalist [facility]    Unit Room/Bed Admission Status       [address] UNIT 6612/6612-01 Admission (Confirmed)       Admission     Complaint    Bacteremia      Hospital Account     Name Acct ID Class Status Primary Coverage    Dawson Jones [id number] Inpatient Open UCARE - [employer] FOR SENIORS            Guarantor Account (for Hospital Account #[id number])     Name Relation to Pt Service Area Active? Acct Type    Dawson Jones Self FCS Yes Personal/Family    Address Phone          [address] [place] [address] [phone](H)              Coverage Information (for Hospital Account #[id number])     F/O Payor/Plan Precert #    UCARE/UCARE FOR SENIORS     Subscriber Subscriber #    Dawson Jones [id number]    Address Phone    [address]  [place] [address] [phone]

## 2018-10-16 NOTE — PROGRESS NOTES
10/16/18 1300   Quick Adds   Type of Visit Initial PT Evaluation   Living Environment   Lives With spouse   Living Arrangements house   Home Accessibility bed and bath on same level;stairs to enter home;stairs within home   Number of Stairs to Enter Home 2   Number of Stairs Within Home 0   Stair Railings at Home none  (pt notes he has a post on the L he holds on to)   Self-Care   Usual Activity Tolerance good   Current Activity Tolerance fair   Equipment Currently Used at Home cane, straight;shower chair   Functional Level Prior   Ambulation 1-->assistive equipment   Transferring 1-->assistive equipment   Toileting 1-->assistive equipment   Bathing 2-->assistive person   Dressing 0-->independent   Eating 0-->independent   Communication 0-->understands/communicates without difficulty   Swallowing 0-->swallows foods/liquids without difficulty   Cognition 0 - no cognition issues reported   Fall history within last six months no   Which of the above functional risks had a recent onset or change? ambulation;transferring   General Information   Onset of Illness/Injury or Date of Surgery - Date 10/16/18   Referring Physician Sandy Merchant PA-C   Patient/Family Goals Statement Not stated   Pertinent History of Current Problem (include personal factors and/or comorbidities that impact the POC) Patient admitted on 10/16/18 for abnormal labs. Patient recently had bilateral L2-3 decompression surgery and Coflex device placement by Dr. Reynolds on 10/1/18. Patient found to have UTI. PMH of a fib, cardiomyopathy, HLD, and Chrons disease.    Precautions/Limitations fall precautions   Weight-Bearing Status - LUE full weight-bearing   Weight-Bearing Status - RUE full weight-bearing   Weight-Bearing Status - LLE full weight-bearing   Weight-Bearing Status - RLE full weight-bearing   General Observations Patient in supine upon arrival of therapist, agreeable to working with PT but not wanting to ambulate far    Cognitive Status  Examination   Orientation orientation to person, place and time   Level of Consciousness alert   Follows Commands and Answers Questions 100% of the time;able to follow multistep instructions   Pain Assessment   Patient Currently in Pain No   Integumentary/Edema   Integumentary/Edema no deficits were identifed   Posture    Posture Forward head position;Kyphosis   Range of Motion (ROM)   ROM Comment B LE ROM WNL    Strength   Strength Comments Not formally assessed but at least 3+/5 with functional transfers and gait    Bed Mobility   Bed Mobility Comments Supine>sit with proper log rolling technique completed with SBA and use of bed rails   Transfer Skills   Transfer Comments Sit>stand completed with SEC and SBA, patient slightly unsteady upon standing, bracing legs on bed for support initially    Gait   Gait Comments Patient ambulated 10 feet with use of SEC and CGA. Patient slightly unsteady with gait and needing increased assistance at times to correct.    Balance   Balance Comments Sitting balance at EOB good with SBA. Standing balance good with SEC and CGA, slightly unsteady with gait    General Therapy Interventions   Planned Therapy Interventions balance training;bed mobility training;gait training;transfer training;strengthening;home program guidelines   Clinical Impression   Criteria for Skilled Therapeutic Intervention yes, treatment indicated   PT Diagnosis Impaired gait    Influenced by the following impairments weakness   Functional limitations due to impairments bed mobility, transfers, gait, stairs    Clinical Presentation Stable/Uncomplicated   Clinical Presentation Rationale based on PMH, current presentation, and social support    Clinical Decision Making (Complexity) Low complexity   Therapy Frequency` daily   Predicted Duration of Therapy Intervention (days/wks) 3 days   Anticipated Discharge Disposition TCU   Risk & Benefits of therapy have been explained Yes   Patient, Family & other staff in  "agreement with plan of care Yes   [facility] TM \"6 Clicks\"   2016, Trustees of [facility], under license to [employer].  All rights reserved.   6 Clicks Short Forms Basic Mobility Inpatient Short Form   [facility]  \"6 Clicks\" V.2 Basic Mobility Inpatient Short Form   1. Turning from your back to your side while in a flat bed without using bedrails? 4 - None   2. Moving from lying on your back to sitting on the side of a flat bed without using bedrails? 3 - A Little   3. Moving to and from a bed to a chair (including a wheelchair)? 3 - A Little   4. Standing up from a chair using your arms (e.g., wheelchair, or bedside chair)? 3 - A Little   5. To walk in hospital room? 3 - A Little   6. Climbing 3-5 steps with a railing? 3 - A Little   Basic Mobility Raw Score (Score out of 24.Lower scores equate to lower levels of function) 19   Total Evaluation Time   Total Evaluation Time (Minutes) 8     "

## 2018-10-16 NOTE — IP AVS SNAPSHOT
"` `           [place] [address] [facility]: [phone]                                              INTERAGENCY TRANSFER FORM - NURSING   10/16/2018                    Hospital Admission Date: 10/16/2018  KING HILTON   : 1930  Sex: Male        Attending Provider: Marquita Cohen MD     Allergies:  No Known Allergies    Infection:  ESBL   Service:  HOSPITALIST    Ht:  1.727 m (5' 8\")   Wt:  68.5 kg (151 lb 0.2 oz)   Admission Wt:  70.1 kg (154 lb 9.6 oz)    BMI:  22.96 kg/m 2   BSA:  1.81 m 2            Patient PCP Information     Provider PCP Type    Judson Mcadams MD General      Current Code Status     Date Active Code Status Order ID Comments User Context       10/16/2018  9:26 AM Full Code [id number]  Sandy Merchant PA-C Inpatient       Questions for Current Code Status     Question Answer Comment    Code status determined by: Discussion with patient/legal decision maker       Code Status History     Date Active Date Inactive Code Status Order ID Comments User Context    This patient has a current code status but no historical code status.      Advance Directives        Scanned docmt in ACP Activity?           Yes, scanned ACP docmt        Hospital Problems as of 10/19/2018              Priority Class Noted POA    Bacteremia Medium  10/16/2018 Yes      Non-Hospital Problems as of 10/19/2018              Priority Class Noted    Atrial fibrillation (H) Medium  2015    Cardiomyopathy, unspecified type (H) Medium  Unknown    Crohn's disease of small and large intestines with complication (H) Medium  9/15/2016    Spinal stenosis of lumbosacral region Medium  9/15/2016    Vitamin B12 deficiency (non anemic) Medium  9/15/2016    Chronic pain syndrome Medium  2018    PAD (peripheral artery disease) (H) Medium  2018    Spinal stenosis Medium  10/1/2018      Immunizations     Name Date      Influenza (High Dose) 3 valent vaccine 18     Influenza (High Dose) 3 " valent vaccine 10/03/17     Influenza (High Dose) 3 valent vaccine 10/18/16     Pneumo Conj 13-V (2010&after) 09/15/16     Pneumococcal 23 valent 01/01/00     Tdap (Adacel,Boostrix) 01/01/12     Zoster vaccine, live 01/01/07          END      ASSESSMENT     Discharge Profile Flowsheet     EXPECTED DISCHARGE     COMMUNICATION ASSESSMENT      Expected Discharge Date  10/19/18 (TCU) 10/18/18 1605   Patient's communication style  spoken language (English or Bilingual);hard of hearing 10/16/18 1012    DISCHARGE NEEDS ASSESSMENT     Patient's preferred means of communication  English speaker with hearing loss, no speech problems. 10/16/18 1012    Equipment Currently Used at Home  cane, straight;shower chair 10/16/18 1350   Describe hearing loss  bilateral hearing loss 10/16/18 1012    Transportation Available  car 10/16/18 0954   Use of hearing assistive devices  none 10/16/18 1012    # of Referrals Placed by CTS  Post Acute Facilities 10/18/18 1443    services offered to the patient? (Install  services phone or TTY, if applicable)  no 10/16/18 1012    FUNCTIONAL LEVEL CURRENT     Did the patient decline [place]  and sign paper waiver form? (Place signed waiver form on chart)  no 10/16/18 1012    Ambulation  3 - assistive equipment and person 10/16/18 0949   SKIN      Transferring  3 - assistive equipment and person 10/16/18 0949   Inspection of bony prominences  Full 10/19/18 1001    Toileting  3 - assistive equipment and person 10/16/18 0949   Inspection under devices  Full except (identify device(s) not inspected) 10/19/18 1001    Bathing  3 - assistive equipment and person 10/16/18 0949   Skin WDL  ex 10/19/18 1001    Dressing  2 - assistive person 10/16/18 0949   Skin Color/Characteristics  pale 10/19/18 1001    Eating  0 - independent 10/16/18 0949   Skin Temperature  warm 10/19/18 1001    Communication  0 - understands/communicates without difficulty 10/16/18 0949   Skin Moisture   "dry 10/19/18 1001    Swallowing  0 - swallows foods/liquids without difficulty 10/16/18 0949   Skin Elasticity  slow return to original state 10/19/18 1001    Change in Functional Status Since Onset of Current Illness/Injury  yes 10/16/18 0949   Skin Integrity  bruise(s);drain/device(s);incision(s);scab(s);scar(s) 10/19/18 1001    GASTROINTESTINAL (ADULT,PEDIATRIC,OB)     Full except areas not inspected   Coccyx;Sacrum;Buttock, right;Buttock, left 10/19/18 0350    GI WDL  WDL 10/19/18 1001   Not Inspected under devices  -- (Lumbar Dressing) 10/19/18 1001    Last Bowel Movement  10/19/18 10/19/18 1001   SAFETY      GI Signs/Symptoms  diarrhea 10/18/18 1513   Safety WDL  WDL 10/19/18 1033    Passing flatus  yes 10/19/18 1001   All Alarms  alarm(s) activated and audible 10/19/18 1033                 Assessment WDL (Within Defined Limits) Definitions           Safety WDL     Effective: 09/28/15    Row Information: <b>WDL Definition:</b> Bed in low position, wheels locked; call light in reach; upper side rails up x 2; ID band on<br> <font color=\"gray\"><i>Item=AS safety wdl>>List=AS safety wdl>>Version=F14</i></font>      Skin WDL     Effective: 09/28/15    Row Information: <b>WDL Definition:</b> Warm; dry; intact; elastic; without discoloration; pressure points without redness<br> <font color=\"gray\"><i>Item=AS skin wdl>>List=AS skin wdl>>Version=F14</i></font>      Vitals     Vital Signs Flowsheet     VITAL SIGNS     Best Verbal Response  5-->(V5) oriented 10/18/18 0311    Temp  97.2  F (36.2  C) 10/19/18 0759   [place] Coma Scale Score  15 10/18/18 0311    Temp src  Oral 10/19/18 0759   HEIGHT AND WEIGHT      Resp  18 10/19/18 0759   Height  1.727 m (5' 8\") 10/16/18 0924    Pulse  126 10/19/18 0759   Height Method  Actual 10/16/18 0735    Pulse/Heart Rate Source  Monitor 10/19/18 0759   Weight  68.5 kg (151 lb 0.2 oz) 10/16/18 0924    BP  121/77 10/19/18 0759   BSA (Calculated - sq m)  1.81 10/16/18 0924    BP Location  " Right arm 10/19/18 0759   BMI (Calculated)  23.01 10/16/18 0924    OXYGEN THERAPY     POSITIONING      SpO2  95 % 10/19/18 0759   Body Position  independently positioning 10/19/18 1033    O2 Device  None (Room air) 10/19/18 0759   Head of Bed (HOB)  HOB at 20-30 degrees 10/19/18 1033    PAIN/COMFORT     Positioning/Transfer Devices  pillows;in use 10/19/18 0931    Patient Currently in Pain  denies 10/19/18 0325   Chair  Upright in chair 10/19/18 0931    0-10 Pain Scale  0 10/16/18 1904   DAILY CARE      Pain Location  Back 10/17/18 0115   Activity Management  up in chair;ambulated to bathroom 10/19/18 0957    MELISSA COMA SCALE     Activity Assistance Provided  assistance, 1 person 10/19/18 0957    Best Eye Response  4-->(E4) spontaneous 10/18/18 0311   Assistive Device Utilized  gait belt;cane 10/19/18 0957    Best Motor Response  6-->(M6) obeys commands 10/18/18 0311                 Patient Lines/Drains/Airways Status    Active LINES/DRAINS/AIRWAYS     Name: Placement date: Placement time: Site: Days: Last dressing change:    Urethral Catheter Coude 16 fr 03/31/17   1451   Coude   566     Urethral Catheter Coude 16 fr 10/02/18   2320   Coude   16     Urethral Catheter Coude 16 fr 10/16/18   1100   Coude   3     Incision/Surgical Site 10/01/18 Back 10/01/18   1024    18             Patient Lines/Drains/Airways Status    Active PICC/CVC     Name: Placement date: Placement time: Site: Days: Additional Info Last dressing change:    Midline Catheter Single Lumen 10/18/18   1140    1 External Cath Length (cm): 0 cm            Size (Fr): 4 Fr            Orientation: Right            Extremity Circumference (cm): 25 cm            Vein : Brachial vein medial            Dressing Intervention: Chlorhexidine patch;Transparent;Securing device;New dressing            Site Prep: Chlorhexidine            Local Anesthetic: Injectable            Inserted by: Raven Sen RN            Insertion attempts with ultrasound: 1             Patient Tolerance: Tolerated well            Line Flushed (See MAR): Yes            Total Catheter Length Trimmed (cm): 8 cm            Difficulty with threading line: No            Description: Non - valved (open ended)            Placement Verification: Blood Return            Tip location: Axilla            Full barrier precautions done: Yes            Consent Signed: No            Time Out performed: Yes            Lot #: [id number]               Intake/Output Detail Report     Date Intake     Output   Net    Shift P.O. I.V. IV Piggyback Total Urine Blood Total       Noc 10/17/18 2300 - 10/18/18 0659 -- -- -- -- -- -- -- 0    Day 10/18/18 0700 - 10/18/18 3731 053 8580 -- 1702 400 -- 400 1302    Carmen 10/18/18 1500 - 10/18/18 2259 400 -- -- 400 75 -- 75 325    Noc 10/18/18 2300 - 10/19/18 0659 -- -- -- -- -- -- -- 0    Day 10/19/18 0700 - 10/19/18 1459 -- -- -- -- 225 -- 225 -225      Last Void/BM       Most Recent Value    Urine Occurrence     Stool Occurrence 1 at 10/19/2018 0957      Case Management/Discharge Planning     Case Management/Discharge Planning Flowsheet     REFERRAL INFORMATION     Transportation Available  car 10/16/18 0954    # of Referrals Placed by CTS  Post Acute Facilities 10/18/18 1443   FINAL RESOURCES      CTS Assigned to Case  star maradiaga 10/18/18 1443   Equipment Currently Used at Home  cane, straight;shower chair 10/16/18 1350    LIVING ENVIRONMENT     ABUSE RISK SCREEN      Lives With  spouse 10/16/18 1350   QUESTION TO PATIENT:  Has a member of your family or a partner(now or in the past) intimidated, hurt, manipulated, or controlled you in any way?  no 10/16/18 0729    Living Arrangements  house 10/16/18 1350   QUESTION TO PATIENT: Do you feel safe going back to the place where you are living?  yes 10/16/18 0729    COPING/STRESS     OBSERVATION: Is there reason to believe there has been maltreatment of a vulnerable adult (ie. Physical/Sexual/Emotional abuse, self neglect, lack  of adequate food, shelter, medical care, or financial exploitation)?  no 10/16/18 0729    Major Change/Loss/Stressor  none;denies 10/16/18 1643   OTHER      EXPECTED DISCHARGE     Are you depressed or being treated for depression?  No 10/16/18 0949    Expected Discharge Date  10/19/18 (TCU) 10/18/18 1605   HOMICIDE RISK      DISCHARGE PLANNING     Feels Like Hurting Others  no 10/16/18 0738

## 2018-10-17 ENCOUNTER — APPOINTMENT (OUTPATIENT)
Dept: GENERAL RADIOLOGY | Facility: CLINIC | Age: 83
DRG: 699 | End: 2018-10-17
Attending: ORTHOPAEDIC SURGERY
Payer: COMMERCIAL

## 2018-10-17 ENCOUNTER — APPOINTMENT (OUTPATIENT)
Dept: PHYSICAL THERAPY | Facility: CLINIC | Age: 83
DRG: 699 | End: 2018-10-17
Attending: PHYSICIAN ASSISTANT
Payer: COMMERCIAL

## 2018-10-17 LAB
ALBUMIN SERPL-MCNC: 2.5 G/DL (ref 3.4–5)
ALP SERPL-CCNC: 82 U/L (ref 40–150)
ALT SERPL W P-5'-P-CCNC: 22 U/L (ref 0–70)
ANION GAP SERPL CALCULATED.3IONS-SCNC: 9 MMOL/L (ref 3–14)
AST SERPL W P-5'-P-CCNC: 24 U/L (ref 0–45)
BACTERIA SPEC CULT: ABNORMAL
BILIRUB SERPL-MCNC: 0.4 MG/DL (ref 0.2–1.3)
BUN SERPL-MCNC: 14 MG/DL (ref 7–30)
CALCIUM SERPL-MCNC: 8 MG/DL (ref 8.5–10.1)
CHLORIDE SERPL-SCNC: 110 MMOL/L (ref 94–109)
CK SERPL-CCNC: 75 U/L (ref 30–300)
CO2 SERPL-SCNC: 22 MMOL/L (ref 20–32)
CREAT SERPL-MCNC: 0.8 MG/DL (ref 0.66–1.25)
CRP SERPL-MCNC: 109 MG/L (ref 0–8)
ERYTHROCYTE [DISTWIDTH] IN BLOOD BY AUTOMATED COUNT: 12.5 % (ref 10–15)
GFR SERPL CREATININE-BSD FRML MDRD: >90 ML/MIN/1.7M2
GLUCOSE SERPL-MCNC: 153 MG/DL (ref 70–99)
HCT VFR BLD AUTO: 31.2 % (ref 40–53)
HGB BLD-MCNC: 10.3 G/DL (ref 13.3–17.7)
MCH RBC QN AUTO: 33.7 PG (ref 26.5–33)
MCHC RBC AUTO-ENTMCNC: 33 G/DL (ref 31.5–36.5)
MCV RBC AUTO: 102 FL (ref 78–100)
PLATELET # BLD AUTO: 239 10E9/L (ref 150–450)
POTASSIUM SERPL-SCNC: 3.6 MMOL/L (ref 3.4–5.3)
PROCALCITONIN SERPL-MCNC: 2.86 NG/ML
PROT SERPL-MCNC: 6.3 G/DL (ref 6.8–8.8)
RBC # BLD AUTO: 3.06 10E12/L (ref 4.4–5.9)
SODIUM SERPL-SCNC: 141 MMOL/L (ref 133–144)
SPECIMEN SOURCE: ABNORMAL
WBC # BLD AUTO: 7.6 10E9/L (ref 4–11)

## 2018-10-17 PROCEDURE — 84145 PROCALCITONIN (PCT): CPT | Performed by: HOSPITALIST

## 2018-10-17 PROCEDURE — 25000128 H RX IP 250 OP 636: Performed by: HOSPITALIST

## 2018-10-17 PROCEDURE — 82550 ASSAY OF CK (CPK): CPT | Performed by: HOSPITALIST

## 2018-10-17 PROCEDURE — 99232 SBSQ HOSP IP/OBS MODERATE 35: CPT | Performed by: HOSPITALIST

## 2018-10-17 PROCEDURE — 86140 C-REACTIVE PROTEIN: CPT | Performed by: HOSPITALIST

## 2018-10-17 PROCEDURE — 36415 COLL VENOUS BLD VENIPUNCTURE: CPT | Performed by: PHYSICIAN ASSISTANT

## 2018-10-17 PROCEDURE — 85027 COMPLETE CBC AUTOMATED: CPT | Performed by: PHYSICIAN ASSISTANT

## 2018-10-17 PROCEDURE — 87040 BLOOD CULTURE FOR BACTERIA: CPT | Performed by: HOSPITALIST

## 2018-10-17 PROCEDURE — 25000128 H RX IP 250 OP 636: Performed by: PHYSICIAN ASSISTANT

## 2018-10-17 PROCEDURE — 80053 COMPREHEN METABOLIC PANEL: CPT | Performed by: HOSPITALIST

## 2018-10-17 PROCEDURE — 36415 COLL VENOUS BLD VENIPUNCTURE: CPT | Performed by: HOSPITALIST

## 2018-10-17 PROCEDURE — 40000193 ZZH STATISTIC PT WARD VISIT: Performed by: PHYSICAL THERAPIST

## 2018-10-17 PROCEDURE — 40000986 XR LUMBAR SPINE 2-3 VIEWS

## 2018-10-17 PROCEDURE — 12000000 ZZH R&B MED SURG/OB

## 2018-10-17 PROCEDURE — 25000132 ZZH RX MED GY IP 250 OP 250 PS 637: Performed by: PHYSICIAN ASSISTANT

## 2018-10-17 PROCEDURE — 97110 THERAPEUTIC EXERCISES: CPT | Mod: GP | Performed by: PHYSICAL THERAPIST

## 2018-10-17 PROCEDURE — 97116 GAIT TRAINING THERAPY: CPT | Mod: GP | Performed by: PHYSICAL THERAPIST

## 2018-10-17 RX ORDER — SODIUM CHLORIDE 9 MG/ML
INJECTION, SOLUTION INTRAVENOUS CONTINUOUS
Status: DISCONTINUED | OUTPATIENT
Start: 2018-10-17 | End: 2018-10-18

## 2018-10-17 RX ADMIN — ERTAPENEM SODIUM 1 G: 1 INJECTION, POWDER, LYOPHILIZED, FOR SOLUTION INTRAMUSCULAR; INTRAVENOUS at 08:01

## 2018-10-17 RX ADMIN — APIXABAN 5 MG: 5 TABLET, FILM COATED ORAL at 20:48

## 2018-10-17 RX ADMIN — SODIUM CHLORIDE: 9 INJECTION, SOLUTION INTRAVENOUS at 14:47

## 2018-10-17 RX ADMIN — APIXABAN 5 MG: 5 TABLET, FILM COATED ORAL at 08:01

## 2018-10-17 RX ADMIN — METOPROLOL TARTRATE 50 MG: 50 TABLET ORAL at 20:48

## 2018-10-17 RX ADMIN — DIPHENOXYLATE HYDROCHLORIDE AND ATROPINE SULFATE 1 TABLET: 2.5; .025 TABLET ORAL at 17:50

## 2018-10-17 RX ADMIN — TAMSULOSIN HYDROCHLORIDE 0.4 MG: 0.4 CAPSULE ORAL at 17:50

## 2018-10-17 RX ADMIN — METOPROLOL TARTRATE 50 MG: 50 TABLET ORAL at 08:01

## 2018-10-17 ASSESSMENT — ACTIVITIES OF DAILY LIVING (ADL)
ADLS_ACUITY_SCORE: 15
ADLS_ACUITY_SCORE: 14

## 2018-10-17 NOTE — PROGRESS NOTES
[facility]  Hospitalist Progress Note   10/17/2018          Assessment and Plan:       Dawson Moseley is an 88-year-old male with medical history of paroxysmal atrial fibrillation and recent L2 through L3 decompression 10/01/2018.  He had postoperative urinary retention, discharged on Mcdowell catheter.  Seen PCP on 10/15 for chills.  Chest x-ray was clear.  WBC count of 15.5, UA grossly abnormal.  Urine cultures pending.  1 out of 2 blood cultures concerning for a E. coli ESBL.  Patient was directed to come into the ED on 10/16.    Acute E. coli ESBL bacteremia.  Possible urinary tract infection.  Patient has spiked to 100.8 since admission.  Continue IV ertapenem 1 g every 24 hours [10/16]  Mcdowell catheter changed 10/16.  Follow blood and urine culture results.  Discontinue IV fluids.  Follow pro calcitonin, ESR levels.  Infectious disease following along -Daily blood cultures, IV Invanz.  Appreciate recommendations    Status post right L2/L3 decompression 10/1 due to spinal stenosis.  No sign of incisional infection or localized infection.  His back pain is improving.   Evaluated by orthopedic/spine surgery team, lumbar decompression wound seems to be healing without issue.  Recommend to monitor for increased back pain or radicular symptoms.  Weightbearing as tolerated with walker.  Appreciate recommendation  Incentive spirometry.    Postoperative urinary retention with Mcdowell catheterization.  Patient had postop urinary retention after lumbar decompression on 10/1/2018.  Failed voiding trial on urology visit after discharge.  Mcdowell catheter change 10/16.  Catheter care, follow-up with urology as outpatient.  Continue PTA Flomax.    Possible mild cognitive impairment/concern for dementia.  At risk for delirium during hospitalization.  Neurocognitive assessment as outpatient.  Minimize interruptions, frequent reorientation.  Avoid narcotics and benzodiazepines as able to.  We will consider PT, OT  assessment if any concerns for ambulation [discussed with patient's RN]    History of atrial fibrillation on anticoagulation.  Continue PTA metoprolol 50 mg twice daily.  Continue PTA Eliquis 5 mg twice daily.    Crohn's.   Takes tincture of opium p.r.n.    No acute symptoms at present.   As needed stool softeners ordered.    Active Diet Order      Combination Diet Regular Diet Adult    DVT Prophylaxis: Sequential compression device  Code Status: Full Code  Disposition: Expected discharge in 2-3 days pending clinical improvement/a clearance of bacteremia    Patient, his wife by the bedside, interdisciplinary team involved in care and agrees with plan.  Total time - Greater than 25 min. More than 50% of time spent in direct patient care, care coordination, patient/caregiver counseling, and formalizing plan of care.     Marquita Cohen MD        Interval History:      Patient appears comfortable sitting up in the chair.  Denies any chest pain or shortness of breath.  No difficulty passing urine-Mcdowell catheter change 10/16.  No oozing from surgical site.  No nausea vomiting.  Deric fever to 100.8 last night.       Physical Exam:        Physical Exam   Temp:  [97.2  F (36.2  C)-100.8  F (38.2  C)] 98.1  F (36.7  C)  Pulse:  [] 104  Resp:  [18-20] 20  BP: (102-119)/(71-83) 118/83  SpO2:  [93 %-95 %] 93 %    Intake/Output Summary (Last 24 hours) at 10/17/18 1328  Last data filed at 10/17/18 1031   Gross per 24 hour   Intake             1871 ml   Output              550 ml   Net             1321 ml     Admission Weight: 70.1 kg (154 lb 9.6 oz)  Current Weight: 68.5 kg (151 lb 0.2 oz)    PHYSICAL EXAM  GENERAL: Patient elderly, comfortable. Alert and oriented.  HEART: Regular rate and rhythm. S1S2. No murmurs  LUNGS: Bilateral slightly decreased breath sounds, no wheezing.  No crackles.  ABDOMEN: Soft, no abdominal tenderness, bowel sounds heard   NEURO: Moving all extremities.  Musculoskeletal lumbar incision  dressing intact.  EXTREMITIES: No pedal edema. 2+ peripheral pulses.  SKIN: Warm, dry.          Medications:          apixaban ANTICOAGULANT  5 mg Oral BID     ertapenem (INVanz) IV  1 g Intravenous Q24H     metoprolol tartrate  50 mg Oral BID     tamsulosin  0.4 mg Oral Daily     acetaminophen, diphenoxylate-atropine, melatonin, naloxone, ondansetron **OR** ondansetron, opium tincture, oxyCODONE IR, - MEDICATION INSTRUCTIONS -, potassium chloride, potassium chloride with lidocaine, potassium chloride, potassium chloride, potassium chloride, prochlorperazine **OR** prochlorperazine **OR** prochlorperazine         Data:      All new lab and imaging data was reviewed.

## 2018-10-17 NOTE — PLAN OF CARE
Problem: Patient Care Overview  Goal: Plan of Care/Patient Progress Review  Outcome: Improving  A&Ox4. Full code. VSS on RA except tachy in low 100s. Denies pain. Mcdowell in place for retention. IV fluids restarted d/t low UOP for shift. Ortho following, dressing changed on back. Up w/ SBA and walker/cane. Contact precautions maintained. ID following, BC taken. PT following. Discharge probably home in 2-3 days. Fall precautions in place. Will cont to monitor.

## 2018-10-17 NOTE — PLAN OF CARE
Problem: Patient Care Overview  Goal: Plan of Care/Patient Progress Review  Outcome: Improving  Pt is A&Ox4. [place] bilaterally. VSS on RA ex elevated temp 100.8F. PRN Tylenol given x1 with relief. Reported discomfort from incision site. Dressing changed per pt request d/t discomfort, no drainage noted. LS clear throughout. Infrequent nonproductive cough. Reg diet with poor appetite. Declined to drink supplemental shake. Needs encouragement with oral fluid intake. Mcdowell intact with minimal output 100 ml this shift. Ambulate to bathroom with A-1 with walker and gb. Loose stool x2. PRN Lomotil given x1 with effect. NS infusing at 75 ml/hr. PT following. Contact for ESBL. Will continue to monitor.

## 2018-10-17 NOTE — PROGRESS NOTES
[facility]    Infectious Disease Progress Note    Date of Service (when I saw the patient): 10/17/2018     Assessment & Plan   Dawson Jones is a 88 year old male who was admitted on 10/16/2018.     Impression:  1. 88 y.o male with atrial fibrillation, Crohn's and urinary retention.  2. S/P L2 through L3 decompression 10/01 for chronic back pain with right radicular symptoms.  Hospital course was complicated by urinary retention and he discharged with an indwelling Mcdowell catheter. Which was replaced a week ago due to failing of the voiding trial.   3. Now coming in with fever, UA positive and UC pending, blood cultures positive for E coli.   4. Back incision looks ok, though slight separation of skin, no redness or induration.      Recommendations:   ESBL E coli in the urine and in blood/   Continue on ertapenem   Appreciate ortho eval, no concern for infection on the spine so far per ortho.   Repeat UC noted.       Tena Ni MD    Interval History   t max of a 100.8   Repeat UC noted     Physical Exam   Temp: 98.1  F (36.7  C) Temp src: Oral BP: 118/83 Pulse: 104   Resp: 20 SpO2: 93 % O2 Device: None (Room air)    Vitals:    10/16/18 0735 10/16/18 0920   Weight: 70.1 kg (154 lb 9.6 oz) 68.5 kg (151 lb 0.2 oz)     Vital Signs with Ranges  Temp:  [97.2  F (36.2  C)-100.8  F (38.2  C)] 98.1  F (36.7  C)  Pulse:  [] 104  Resp:  [18-20] 20  BP: (102-119)/(71-83) 118/83  SpO2:  [93 %-95 %] 93 %    Constitutional: Awake, alert, cooperative, no apparent distress  Lungs: Clear to auscultation bilaterally, no crackles or wheezing  Cardiovascular: Regular rate and rhythm, normal S1 and S2, and no murmur noted  Abdomen: Normal bowel sounds, soft, non-distended, non-tender  Skin: No rashes, no cyanosis, no edema  Other:    Medications     - MEDICATION INSTRUCTIONS -       sodium chloride         apixaban ANTICOAGULANT  5 mg Oral BID     ertapenem (INVanz) IV  1 g Intravenous Q24H     metoprolol  tartrate  50 mg Oral BID     tamsulosin  0.4 mg Oral Daily       Data   All microbiology laboratory data reviewed.  Recent Labs   Lab Test  10/17/18   0831  10/16/18   0749  10/15/18   1058   WBC  7.6  9.5  15.5*   HGB  10.3*  11.6*  11.6*   HCT  31.2*  34.4*  35.8*   MCV  102*  101*  106*   PLT  239  247  338     Recent Labs   Lab Test  10/17/18   1210  10/16/18   0749  10/15/18   1058   CR  0.80  1.02  1.09     No lab results found.  Recent Labs   Lab Test  10/16/18   1110  10/16/18   0847  10/16/18   0840  10/15/18   1057  10/15/18   1056  10/15/18   1048   CULT  50,000 to 100,000 colonies/mL  Enterococcus faecalis  *  10,000 to 50,000 colonies/mL  Gram negative rods  *  Culture in progress  No growth after 21 hours  No growth after 18 hours  Cultured on the 1st day of incubation:  Escherichia coli ESBL  Enterobacteriaceae that are susceptible to meropenem are usually susceptible to ertapenem.  *  Critical Value/Significant Value, preliminary result only, called to and read back by  Dr. Judson Mcadams 0255 10.16.18 CF/SCG    ESBL (extended beta lactamase) producing organisms require contact precautions.  (Note)  POSITIVE for E.COLI by Verigene multiplex nucleic acid test. Final  identification and antimicrobial susceptibility testing will be  verified by standard methods. Verigene test will not distinguish  E.coli from Shigella species including S.dysenteriae, S.flexneri,  S.boydii, and S.sonnei. Specimens containing Shigella species or  E.coli will be reported as Positive for E.coli.    POSITIVE for CTX-M Class A Extended Spectrum beta-lactamase (ESBL)  resistance marker by Verigene multiplex nucleic acid test. CTX-M  confers resistance to penicillins, cephalosporins and variable  resistance to beta-lactam beta-lactamase inhibitor combinations  (clavulanic acid and tazobactam). Best empiric antibiotic choice is  meropenem. Specific susceptibility testing will be performed.    Specimen tested with Verigene  multiplex, gram-negative blood culture  nucleic acid test for the following targets: Acinetobacter sp.,  Citrobacter sp., Enterobacter sp., Proteus sp., E. coli, K.  pneumoniae/oxyto ca, P. aeruginosa, and the following resistance  markers: CTXM, KPC, NDM, VIM, IMP and OXA.    Critical Value/Significant Value called to and read back by  DR. STOCK.  10.16.18  0606 GJS    >100,000 colonies/mL  Escherichia coli ESBL  *  Enterobacteriaceae that are susceptible to meropenem are usually susceptible to ertapenem.  ESBL (extended beta lactamase) producing organisms require contact precautions.  Cultured on the 1st day of incubation:  Escherichia coli  Susceptibility testing done on previous specimen  *  Critical Value/Significant Value, preliminary result only, called to and read back by   TAMIKO WITH RN @2317 10/16/18. CT

## 2018-10-17 NOTE — PLAN OF CARE
Problem: Patient Care Overview  Goal: Plan of Care/Patient Progress Review  Outcome: Improving  Alert and oriented. Up with 1 assist. VSS on room air. Lung sounds diminished. HR irregular. Skin pale. Mcdowell patent. Dressing to midline back incision CDI. IV fluids at 75/hr. Contact isolation maintained. Regular diet with poor appetite.

## 2018-10-17 NOTE — PROGRESS NOTES
[facility]    Spine Surgery Consult Follow Up Note      ASSESSMENT:  Healing 2 weeks out status post decompression L2-3 and insertion of Coflex device     PLAN:  Plan for an AP and lateral lumbar spine standing films to assess Coflex device  Obtain films today and I will review tomorrow  Change dressing as needed okay to leave dressing off for showering replace dressing with 4 x 4 and paper tape    Interval History   Patient is 2 weeks status post above-noted procedure.  Doing well with no return of leg pain.  Has been ambulating.  Main problem is voiding and recent UTI currently being treated by urology and primary care.    Physical Exam   Temp: 98.2  F (36.8  C) Temp src: Oral BP: 104/68 Pulse: 106   Resp: 16 SpO2: 94 % O2 Device: None (Room air)    Vitals:    10/16/18 0735 10/16/18 0920   Weight: 70.1 kg (154 lb 9.6 oz) 68.5 kg (151 lb 0.2 oz)     Vital Signs with Ranges  Temp:  [97.2  F (36.2  C)-98.4  F (36.9  C)] 98.2  F (36.8  C)  Pulse:  [] 106  Resp:  [16-20] 16  BP: (102-118)/(68-83) 104/68  SpO2:  [93 %-94 %] 94 %  I/O last 3 completed shifts:  In: 2111 [P.O.:480; I.V.:1631]  Out: 700 [Urine:700]    Alert, Oriented  Incision looked clean and dry today.  I removed the Steri-Strips.  Replaced dressing with a 4 x 4 and paper tape.  Patient has had skin reactions to Medipore tape      Medications     - MEDICATION INSTRUCTIONS -       sodium chloride 75 mL/hr at 10/17/18 1447        apixaban ANTICOAGULANT  5 mg Oral BID     ertapenem (INVanz) IV  1 g Intravenous Q24H     metoprolol tartrate  50 mg Oral BID     tamsulosin  0.4 mg Oral Daily       Data     Recent Labs  Lab 10/17/18  0831 10/16/18  0749 10/15/18  1058   HGB 10.3* 11.6* 11.6*       No results found for this or any previous visit (from the past 24 hour(s)).

## 2018-10-17 NOTE — PLAN OF CARE
Problem: Patient Care Overview  Goal: Plan of Care/Patient Progress Review  Discharge Planner PT   Patient plan for discharge: not stated  Current status: Patient needed encouragement of therapist and spouse to participate; Patient needing CGA and safety cues for sit<>stand transfers; use of walker in standing; able to ambulate 200 feet with use of a walker and CGA assist today; no SOB; no dizziness; no complaint of pain or significant fatigue; educated in seated ex's; wife present and educated in ex's also to assist patient  Barriers to return to prior living situation: 2 stairs; assist for all mobility  Recommendations for discharge: Anticipate with continued therapy and current use of a walker, patient should be able to discharge to home with assist of spouse and home ex program; If wife feels unable to give support currently required, may benefit from a brief rehab stay.  Rationale for recommendations: Patient would benefit from ongoing PT to address deficits with strength, balance, and independence with functional mobility to allow for eventual return to PLOF.       Entered by: Deborah Cosby 10/17/2018 12:16 PM

## 2018-10-17 NOTE — PROVIDER NOTIFICATION
MD Notification    Notified Person: MD    Notified Person Name: Dr. Zapata    Notification Date/Time: 10/16/18 at 2320    Notification Interaction: Phone page    Purpose of Notification: Positive  Blood culture    Orders Received: None    Comments: Already on Abx

## 2018-10-18 LAB
ALBUMIN SERPL-MCNC: 2.5 G/DL (ref 3.4–5)
ALP SERPL-CCNC: 74 U/L (ref 40–150)
ALT SERPL W P-5'-P-CCNC: 20 U/L (ref 0–70)
ANION GAP SERPL CALCULATED.3IONS-SCNC: 7 MMOL/L (ref 3–14)
AST SERPL W P-5'-P-CCNC: 18 U/L (ref 0–45)
BACTERIA SPEC CULT: ABNORMAL
BILIRUB SERPL-MCNC: 0.4 MG/DL (ref 0.2–1.3)
BUN SERPL-MCNC: 11 MG/DL (ref 7–30)
CALCIUM SERPL-MCNC: 8.2 MG/DL (ref 8.5–10.1)
CHLORIDE SERPL-SCNC: 111 MMOL/L (ref 94–109)
CO2 SERPL-SCNC: 24 MMOL/L (ref 20–32)
CREAT SERPL-MCNC: 0.72 MG/DL (ref 0.66–1.25)
GFR SERPL CREATININE-BSD FRML MDRD: >90 ML/MIN/1.7M2
GLUCOSE SERPL-MCNC: 101 MG/DL (ref 70–99)
IRON SATN MFR SERPL: 12 % (ref 15–46)
IRON SERPL-MCNC: 25 UG/DL (ref 35–180)
Lab: ABNORMAL
POTASSIUM SERPL-SCNC: 3.5 MMOL/L (ref 3.4–5.3)
PROT SERPL-MCNC: 6 G/DL (ref 6.8–8.8)
SODIUM SERPL-SCNC: 142 MMOL/L (ref 133–144)
SPECIMEN SOURCE: ABNORMAL
TIBC SERPL-MCNC: 203 UG/DL (ref 240–430)

## 2018-10-18 PROCEDURE — 25000132 ZZH RX MED GY IP 250 OP 250 PS 637: Performed by: HOSPITALIST

## 2018-10-18 PROCEDURE — 99232 SBSQ HOSP IP/OBS MODERATE 35: CPT | Performed by: HOSPITALIST

## 2018-10-18 PROCEDURE — 12000000 ZZH R&B MED SURG/OB

## 2018-10-18 PROCEDURE — 80053 COMPREHEN METABOLIC PANEL: CPT | Performed by: HOSPITALIST

## 2018-10-18 PROCEDURE — 25000128 H RX IP 250 OP 636: Performed by: PHYSICIAN ASSISTANT

## 2018-10-18 PROCEDURE — 87040 BLOOD CULTURE FOR BACTERIA: CPT | Performed by: HOSPITALIST

## 2018-10-18 PROCEDURE — 27211407 ZZ H KIT CATH IV 18 OR 20 G, POWERGLIDE BASIC

## 2018-10-18 PROCEDURE — 36415 COLL VENOUS BLD VENIPUNCTURE: CPT | Performed by: HOSPITALIST

## 2018-10-18 PROCEDURE — 83550 IRON BINDING TEST: CPT | Performed by: HOSPITALIST

## 2018-10-18 PROCEDURE — 83540 ASSAY OF IRON: CPT | Performed by: HOSPITALIST

## 2018-10-18 PROCEDURE — 25000128 H RX IP 250 OP 636: Performed by: HOSPITALIST

## 2018-10-18 PROCEDURE — 25000132 ZZH RX MED GY IP 250 OP 250 PS 637: Performed by: PHYSICIAN ASSISTANT

## 2018-10-18 PROCEDURE — 36569 INSJ PICC 5 YR+ W/O IMAGING: CPT

## 2018-10-18 RX ORDER — FERROUS SULFATE 325(65) MG
325 TABLET ORAL DAILY
Status: DISCONTINUED | OUTPATIENT
Start: 2018-10-18 | End: 2018-10-19 | Stop reason: HOSPADM

## 2018-10-18 RX ADMIN — FERROUS SULFATE TAB 325 MG (65 MG ELEMENTAL FE) 325 MG: 325 (65 FE) TAB at 18:13

## 2018-10-18 RX ADMIN — APIXABAN 5 MG: 5 TABLET, FILM COATED ORAL at 21:32

## 2018-10-18 RX ADMIN — SODIUM CHLORIDE: 9 INJECTION, SOLUTION INTRAVENOUS at 04:32

## 2018-10-18 RX ADMIN — APIXABAN 5 MG: 5 TABLET, FILM COATED ORAL at 08:38

## 2018-10-18 RX ADMIN — METOPROLOL TARTRATE 50 MG: 50 TABLET ORAL at 08:38

## 2018-10-18 RX ADMIN — TAMSULOSIN HYDROCHLORIDE 0.4 MG: 0.4 CAPSULE ORAL at 18:13

## 2018-10-18 RX ADMIN — DIPHENOXYLATE HYDROCHLORIDE AND ATROPINE SULFATE 1 TABLET: 2.5; .025 TABLET ORAL at 12:46

## 2018-10-18 RX ADMIN — ERTAPENEM SODIUM 1 G: 1 INJECTION, POWDER, LYOPHILIZED, FOR SOLUTION INTRAMUSCULAR; INTRAVENOUS at 08:38

## 2018-10-18 ASSESSMENT — ACTIVITIES OF DAILY LIVING (ADL)
ADLS_ACUITY_SCORE: 14
ADLS_ACUITY_SCORE: 15
ADLS_ACUITY_SCORE: 14

## 2018-10-18 NOTE — CONSULTS
Care Transition Initial Assessment - RN        Met with: Patient, his Spouse Pauly and Son Jaun  DATA   Active Problems:    Bacteremia       Cognitive Status: awake, alert and oriented.        Contact information and PCP information verified: Yes  Lives With: spouse  Living Arrangements: house    Insurance concerns: No Insurance issues identified  ASSESSMENT  Patient currently receives the following services:  NA        Identified issues/concerns regarding health management: Chart reviewed.  Conversed with dr Arias that is recommending 10 more days of IV Ertapenem.  PT is also recommending TCU.  Pt transitioned home after his L2-L3 decompression surgery.  Pt had went home with a king and had failed follow up voiding trial.  Pt has Urology follow-up with Dr Valverde on 10/26/18.  Pauly had planned for pt to transition home.  After we had discussion on needing IV antibiotics and PT's recommendations, they were all in agreement for TCU.  Their 1st choice is [facility] and 2nd choice is [facility]. Will send referrals for tentative hospital discharge tomorrow.  Midline catheter placement today.    PLAN  Financial costs for the patient include: NA.  Patient given options and choices for discharge: Choice in rehab facilities  Patient/family is agreeable to the plan?  Yes: TCU  Patient anticipates discharging to [facility] or [facility]        Patient anticipates needs for home equipment: No  Plan/Disposition: TCU   Appointments:     Urology 10/26/18  Dr Reynolds recommends follow-up in one month    Care  (CTS) will continue to follow as needed.

## 2018-10-18 NOTE — PLAN OF CARE
Problem: Patient Care Overview  Goal: Plan of Care/Patient Progress Review  PT: Pt resting upon attempt, awakens to name. Pt states this is his first chance to rest today and he has been up throughout the morning. Discussed previous PTs recommendation that he could safely discharge home with FWW pending progress with PT, however pt content with TCU discharge tomorrow and requests time to rest this PM. Will re-schedule.

## 2018-10-18 NOTE — PLAN OF CARE
Problem: Patient Care Overview  Goal: Plan of Care/Patient Progress Review  Outcome: Improving  Pt orientedx4. Up SBA. ID consulted, no change to plan. IVF at 75. Little urine output; 150ml, bladderscan shows only 130. Contact for ESBL in urine. Mcdowell patent. Regular diet, decreased appetite. X-ray of spine completed tonight, ortho to view tomorrow.VSS. . Nursing will continue to monitor.

## 2018-10-18 NOTE — PROGRESS NOTES
[facility]    Spine Surgery Consult Follow Up Note      ASSESSMENT:  2 weeks status post decompression and implantation of Coflex device L2-3 with good results  Postoperative UTI      PLAN:  Continue to mobilize, up ad alysha., follow-up with me in 1 month  Continue cares for UTI  Bert Reynolds MD      Interval History   Had AP and lateral lumbar x-rays last night.  X-ray was reviewed this morning.  See results below.     Physical Exam   Temp: 98.6  F (37  C) Temp src: Oral BP: 90/61 Pulse: 101   Resp: 16 SpO2: 95 % O2 Device: None (Room air)    Vitals:    10/16/18 0735 10/16/18 0920   Weight: 70.1 kg (154 lb 9.6 oz) 68.5 kg (151 lb 0.2 oz)     Vital Signs with Ranges  Temp:  [98.1  F (36.7  C)-98.6  F (37  C)] 98.6  F (37  C)  Pulse:  [101-106] 101  Resp:  [16] 16  BP: ()/(61-83) 90/61  SpO2:  [93 %-95 %] 95 %  I/O last 3 completed shifts:  In: 1557 [P.O.:480; I.V.:1077]  Out: 650 [Urine:650]      Medications     - MEDICATION INSTRUCTIONS -       sodium chloride 75 mL/hr at 10/18/18 0432        apixaban ANTICOAGULANT  5 mg Oral BID     ertapenem (INVanz) IV  1 g Intravenous Q24H     metoprolol tartrate  50 mg Oral BID     tamsulosin  0.4 mg Oral Daily       Data     Recent Labs  Lab 10/17/18  0831 10/16/18  0749 10/15/18  1058   HGB 10.3* 11.6* 11.6*       Recent Results (from the past 24 hour(s))   XR Lumbar Spine 2/3 Views    Narrative    LUMBAR SPINE TWO - THREE VIEWS  10/17/2018 7:04 PM     HISTORY: Postop films routine.     COMPARISON: None.      Impression    IMPRESSION: Scoliotic curvature of the thoracolumbar spine, apex left.  Surgical implanted device in posterior elements of the mid lumbar  vertebrae noted. Diffuse intervertebral disc space narrowing.    FERNANDA ARRIAGA MD       My review of imaging:  Coflex device is in good position.  No complications

## 2018-10-18 NOTE — PLAN OF CARE
Problem: Patient Care Overview  Goal: Plan of Care/Patient Progress Review  Outcome: Improving  A&Ox4. Full code. VSS on RA except tachy in low 100s at times. Denies pain. Mcdowell in place for retention, patent. Ortho following, dressing changed on back today. Up w/ SBA and walker/cane. Contact precautions maintained. ID following. New midline in place in RUE. PT following. Discharge probably tomorrow to a TCU. Fall precautions in place. Will cont to monitor.

## 2018-10-18 NOTE — PROGRESS NOTES
HELLEN  D;  TCU is recommended, discharge date is anticipated for Friday.  Care Coordinator spoke with patient and based on this conversation, referrals have been sent to [facility] and [facility], preference is that order.  [facility]'s vacancies for Friday are assigned, however [facility] will assess patient if the planned admissions cancel.  [facility] will not know till Friday if they have a vacancy for patient.  Based on above, writer will speak with patient about expanding TCU referrals.    1440 Update:  Patient  Is sleeping so writer did call patient's wife.  As a backup plan to [facility] and [facility], wife has agreed to [facility] into a private room understanding there is a daily $36.00 payment not covered by Medicare or any insurance.   Made a referral to [facility] ([email or web address]) and left a message asking if they have vacancies for Friday,.  Wife still prefers [facility] or [facility] but will accept POB.    1600 Update:  [facility] is in the same situation as [facility] and [facility].  They currently will not be able to offer patient a room unless they have cancellations.  PLAN:  On Friday morning will speak with all three TCU's for an update.  If none have cancellations will need to explore other TCU options.

## 2018-10-18 NOTE — PROGRESS NOTES
[facility]  Hospitalist Progress Note   10/18/2018          Assessment and Plan:       Dawson Moseley is an 88-year-old male with medical history of paroxysmal atrial fibrillation and recent L2 through L3 decompression 10/01/2018.  He had postoperative urinary retention, discharged on Mcdowell catheter.  Seen PCP on 10/15 for chills.  Chest x-ray was clear.  WBC count of 15.5, UA grossly abnormal.  Urine cultures pending.  1 out of 2 blood cultures concerning for a E. coli ESBL.  Patient was directed to come into the ED on 10/16.    Acute E. coli ESBL bacteremia - cleared.  Possible urinary tract infection.  Patient has had fever spike in the last 24 hours.  Pro-calcitonin 2.86.  .  WBC trended down to 7.6.  Blood cultures 10/17 no growth.  Blood cultures 10/16 gram-negative rods 1 out of 2 bottles.  Urine cultures 10/16 gram-negative rods.    Continue IV ertapenem 1 g every 24 hours [10/16]  Mcdowell catheter changed 10/16.  Infectious disease following along - IV Invanz.  Appreciate recommendations    Status post right L2/L3 decompression 10/1 due to spinal stenosis.  No sign of incisional infection or localized infection.  His back pain is improving.   Evaluated by orthopedic/spine surgery team, lumbar decompression wound seems to be healing without issue.    Lumbar spine x-rays Surgical implanted device in posterior elements of the mid lumbar vertebrae noted. Diffuse intervertebral disc space narrowing.  Recommend to monitor for increased back pain or radicular symptoms.    Weightbearing as tolerated with walker.  Appreciate recommendation  Incentive spirometry.    Postoperative urinary retention with Mcdowell catheterization.  Patient had postop urinary retention after lumbar decompression on 10/1/2018.  Failed voiding trial on urology visit after discharge.  Mcdowell catheter change 10/16.  Catheter care, follow-up with urology as outpatient.  Continue PTA Flomax.    Acute anemia likely dilutional    No blood loss.  Asymptomatic  Hemoglobin in September 2018 14.4, presented with hemoglobin of 11.6, dropped to 10.3.  Serum iron 25, iron saturation index 12.  CPK 75.  Will start on ferrous sulfate supplements once daily.  Monitor hemoglobin levels periodically.    Possible mild cognitive impairment/concern for dementia.  At risk for delirium during hospitalization.  Neurocognitive assessment as outpatient.  Minimize interruptions, frequent reorientation.  Avoid narcotics and benzodiazepines as able to.  PT assessment ongoing.    History of atrial fibrillation on anticoagulation.  Continue PTA metoprolol 50 mg twice daily.  Continue PTA Eliquis 5 mg twice daily.    Crohn's.   Takes tincture of opium p.r.n.    No acute symptoms at present.   As needed stool softeners ordered.    Active Diet Order      Combination Diet Regular Diet Adult    DVT Prophylaxis: Sequential compression device  Code Status: Full Code  Disposition: Expected discharge tomorrow if no acute events overnight.    Patient, his wife by the bedside, interdisciplinary team involved in care and agrees with plan.  Total time - Greater than 25 min. More than 50% of time spent in direct patient care, care coordination, patient/caregiver counseling, and formalizing plan of care.     Marquita Cohen MD        Interval History:      Patient appears comfortable sitting up in the chair.  Denies any chest pain or shortness of breath.  No difficulty passing urine-Mcdowell catheter change 10/16.  No oozing from surgical site.  No nausea vomiting.       Physical Exam:        Physical Exam   Temp:  [97.1  F (36.2  C)-98.6  F (37  C)] 97.1  F (36.2  C)  Pulse:  [101-111] 111  Resp:  [16] 16  BP: ()/(61-85) 125/85  SpO2:  [94 %-98 %] 98 %    Intake/Output Summary (Last 24 hours) at 10/17/18 1328  Last data filed at 10/17/18 1031   Gross per 24 hour   Intake             1871 ml   Output              550 ml   Net             1321 ml     Admission Weight: 70.1  kg (154 lb 9.6 oz)  Current Weight: 68.5 kg (151 lb 0.2 oz)    PHYSICAL EXAM  GENERAL: Patient elderly, comfortable. Alert and oriented.  HEART: Regular rate and rhythm. S1S2. No murmurs  LUNGS: Bilateral slightly decreased breath sounds, no wheezing.  No crackles.  ABDOMEN: Soft, no abdominal tenderness, bowel sounds heard   NEURO: Moving all extremities.  Musculoskeletal lumbar incision dressing intact.  EXTREMITIES: No pedal edema. 2+ peripheral pulses.  SKIN: Warm, dry.          Medications:          apixaban ANTICOAGULANT  5 mg Oral BID     ertapenem (INVanz) IV  1 g Intravenous Q24H     metoprolol tartrate  50 mg Oral BID     tamsulosin  0.4 mg Oral Daily     acetaminophen, diphenoxylate-atropine, melatonin, naloxone, ondansetron **OR** ondansetron, opium tincture, oxyCODONE IR, - MEDICATION INSTRUCTIONS -, potassium chloride, potassium chloride with lidocaine, potassium chloride, potassium chloride, potassium chloride, prochlorperazine **OR** prochlorperazine **OR** prochlorperazine         Data:      All new lab and imaging data was reviewed.

## 2018-10-18 NOTE — PLAN OF CARE
Problem: Patient Care Overview  Goal: Plan of Care/Patient Progress Review  Outcome: Improving  Patient A/Ox4. Weight bearing as tolerated. Up with assist of 1 walker and GB. VSS except BP soft and tachy at times. Denies pain or SOB. Surgical incision on back CDI. NS running at 75ml/hr. Regular diet. Contact precautions maintained for ESBL in the urine and blood.Mcdowell in place and patent. Decreased urine output. LS diminished with crackles in the lower bases. UC still pending. Ortho to see today to evaluate back x-ray. Ortho and ID following. Discharge pending improvement.

## 2018-10-18 NOTE — PROGRESS NOTES
[facility]    Infectious Disease Progress Note    Date of Service (when I saw the patient): 10/18/2018     Assessment & Plan   Dawson Jones is a 88 year old male who was admitted on 10/16/2018.     Impression:  1. 88 y.o male with atrial fibrillation, Crohn's and urinary retention.  2. S/P L2 through L3 decompression 10/01 for chronic back pain with right radicular symptoms.  Hospital course was complicated by urinary retention and he discharged with an indwelling Mcdowell catheter. Which was replaced a week ago due to failing of the voiding trial.   3. Now coming in with fever, UA positive and UC pending, blood cultures positive for E coli.   4. Back incision looks ok, though slight separation of skin, no redness or induration.      Recommendations:   ESBL E coli in the urine and in blood, Follow-up neg  Continue on ertapenem , plan 2 weeks  Appreciate ortho eval, no concern for infection on the spine so far per ortho.   Repeat UC noted.  Midline and erta orders in OK disposition   Would get Follow-up UC when tx done, if he relapses needs imaging/yrology etc      Juan Arias MD    Interval History   t max of a 100.8 down last 24hrs, feels OK  Repeat UC noted     Physical Exam   Temp: 97.1  F (36.2  C) Temp src: Axillary BP: 125/85 Pulse: 111   Resp: 16 SpO2: 98 % O2 Device: None (Room air)    Vitals:    10/16/18 0735 10/16/18 0920   Weight: 70.1 kg (154 lb 9.6 oz) 68.5 kg (151 lb 0.2 oz)     Vital Signs with Ranges  Temp:  [97.1  F (36.2  C)-98.6  F (37  C)] 97.1  F (36.2  C)  Pulse:  [101-111] 111  Resp:  [16] 16  BP: ()/(61-85) 125/85  SpO2:  [94 %-98 %] 98 %    Constitutional: Awake, alert, cooperative, no apparent distress  Lungs: Clear to auscultation bilaterally, no crackles or wheezing  Cardiovascular: Regular rate and rhythm, normal S1 and S2, and no murmur noted  Abdomen: Normal bowel sounds, soft, non-distended, non-tender  Skin: No rashes, no cyanosis, no  edema  Other:    Medications     - MEDICATION INSTRUCTIONS -         apixaban ANTICOAGULANT  5 mg Oral BID     ertapenem (INVanz) IV  1 g Intravenous Q24H     metoprolol tartrate  50 mg Oral BID     tamsulosin  0.4 mg Oral Daily       Data   All microbiology laboratory data reviewed.  Recent Labs   Lab Test  10/17/18   0831  10/16/18   0749  10/15/18   1058   WBC  7.6  9.5  15.5*   HGB  10.3*  11.6*  11.6*   HCT  31.2*  34.4*  35.8*   MCV  102*  101*  106*   PLT  239  247  338     Recent Labs   Lab Test  10/18/18   0906  10/17/18   1210  10/16/18   0749   CR  0.72  0.80  1.02     No lab results found.  Recent Labs   Lab Test  10/17/18   1210  10/16/18   1110  10/16/18   0847  10/16/18   0840  10/15/18   1057  10/15/18   1056  10/15/18   1048   CULT  No growth after 12 hours  50,000 to 100,000 colonies/mL  Enterococcus faecalis  Susceptibility testing in progress  *  10,000 to 50,000 colonies/mL  Gram negative rods  Susceptibility testing in progress  *  No growth after 2 days  No growth after 2 days  Cultured on the 1st day of incubation:  Escherichia coli ESBL  Enterobacteriaceae that are susceptible to meropenem are usually susceptible to ertapenem.  *  Critical Value/Significant Value, preliminary result only, called to and read back by  Dr. Judson Mcadams 0255 10.16.18 [facility]    ESBL (extended beta lactamase) producing organisms require contact precautions.  (Note)  POSITIVE for E.COLI by Verigene multiplex nucleic acid test. Final  identification and antimicrobial susceptibility testing will be  verified by standard methods. Verigene test will not distinguish  E.coli from Shigella species including S.dysenteriae, S.flexneri,  S.boydii, and S.sonnei. Specimens containing Shigella species or  E.coli will be reported as Positive for E.coli.    POSITIVE for CTX-M Class A Extended Spectrum beta-lactamase (ESBL)  resistance marker by Verigene multiplex nucleic acid test. CTX-M  confers resistance to penicillins,  cephalosporins and variable  resistance to beta-lactam beta-lactamase inhibitor combinations  (clavulanic acid and tazobactam). Best empiric antibiotic choice is  meropenem. Specific susceptibility testing will be performed.    Specimen tested with [employer] multiplex, gram-negative blood culture  nucleic acid test for the following targets: Acinetobacter sp.,  Citrobacter sp., Enterobacter sp., Proteus sp., E. coli, K.  pneumoniae/oxyto ca, P. aeruginosa, and the following resistance  markers: CTXM, KPC, NDM, VIM, IMP and OXA.    Critical Value/Significant Value called to and read back by  DR. STOCK.  10.16.18  0606 GJS    >100,000 colonies/mL  Escherichia coli ESBL  *  Enterobacteriaceae that are susceptible to meropenem are usually susceptible to ertapenem.  ESBL (extended beta lactamase) producing organisms require contact precautions.  Cultured on the 1st day of incubation:  Escherichia coli  Susceptibility testing done on previous specimen  *  Critical Value/Significant Value, preliminary result only, called to and read back by   TAMIKO WITH RN @1294 10/16/18. CT

## 2018-10-19 VITALS
WEIGHT: 151.01 LBS | HEART RATE: 126 BPM | DIASTOLIC BLOOD PRESSURE: 77 MMHG | SYSTOLIC BLOOD PRESSURE: 121 MMHG | OXYGEN SATURATION: 95 % | RESPIRATION RATE: 18 BRPM | TEMPERATURE: 97.2 F | BODY MASS INDEX: 22.89 KG/M2 | HEIGHT: 68 IN

## 2018-10-19 LAB
BACTERIA SPEC CULT: ABNORMAL
Lab: ABNORMAL
SPECIMEN SOURCE: ABNORMAL
SPECIMEN SOURCE: ABNORMAL

## 2018-10-19 PROCEDURE — 25000132 ZZH RX MED GY IP 250 OP 250 PS 637: Performed by: PHYSICIAN ASSISTANT

## 2018-10-19 PROCEDURE — 25000128 H RX IP 250 OP 636: Performed by: PHYSICIAN ASSISTANT

## 2018-10-19 PROCEDURE — 25000132 ZZH RX MED GY IP 250 OP 250 PS 637: Performed by: HOSPITALIST

## 2018-10-19 PROCEDURE — 40000239 ZZH STATISTIC VAT ROUNDS

## 2018-10-19 PROCEDURE — 99239 HOSP IP/OBS DSCHRG MGMT >30: CPT | Performed by: HOSPITALIST

## 2018-10-19 RX ORDER — MORPHINE 10 MG/ML
0.2 TINCTURE ORAL EVERY 6 HOURS PRN
Qty: 118 ML | Refills: 0 | Status: SHIPPED | OUTPATIENT
Start: 2018-10-19 | End: 2019-04-17

## 2018-10-19 RX ORDER — DIPHENOXYLATE HCL/ATROPINE 2.5-.025MG
1 TABLET ORAL DAILY PRN
Qty: 10 TABLET | Refills: 0 | Status: SHIPPED | OUTPATIENT
Start: 2018-10-19 | End: 2018-12-27

## 2018-10-19 RX ORDER — FERROUS SULFATE 325(65) MG
325 TABLET ORAL DAILY
Qty: 100 TABLET | Refills: 0 | DISCHARGE
Start: 2018-10-20 | End: 2018-10-31

## 2018-10-19 RX ADMIN — APIXABAN 5 MG: 5 TABLET, FILM COATED ORAL at 08:50

## 2018-10-19 RX ADMIN — ERTAPENEM SODIUM 1 G: 1 INJECTION, POWDER, LYOPHILIZED, FOR SOLUTION INTRAMUSCULAR; INTRAVENOUS at 08:51

## 2018-10-19 RX ADMIN — FERROUS SULFATE TAB 325 MG (65 MG ELEMENTAL FE) 325 MG: 325 (65 FE) TAB at 08:51

## 2018-10-19 RX ADMIN — METOPROLOL TARTRATE 50 MG: 50 TABLET ORAL at 08:50

## 2018-10-19 ASSESSMENT — ACTIVITIES OF DAILY LIVING (ADL)
ADLS_ACUITY_SCORE: 15

## 2018-10-19 NOTE — PROGRESS NOTES
SW:  D[facility].  Patient and wife explain he has friends that live at [place] and thus they choose [place].  [place] staff transported patient today at 1300.  Prior to discharge orders were sent thru the In basket and scripts were faxed.  Reviewed general information with patient and spouse regarding SNF benefits, MD/NP coverage at TCU,   Therapy 2x a day and need for clothing.    PAS-RR    D: Per DHS regulation, SW completed and submitted PAS-RR to [place] [employer] via the Senior LinkAge Line.  PAS-RR confirmation # is : [id number]    I: SW spoke with patient and spouse and they are aware a PAS-RR has been submitted.  SW reviewed with them that they may be contacted for a follow up appointment within 10 days of hospital discharge if their SNF stay is < 30 days.  Contact information for [facility] LinkAge Line was also provided.    A: They verbalized understanding.    P: Further questions may be directed to [facility] LinkAge Line at #[phone], option #4 for PAS-RR staff.

## 2018-10-19 NOTE — PROGRESS NOTES
[facility]    Infectious Disease Progress Note    Date of Service (when I saw the patient): 10/19/2018     Assessment & Plan   Dawson Jones is a 88 year old male who was admitted on 10/16/2018.     Impression:  1. 88 y.o male with atrial fibrillation, Crohn's and urinary retention.  2. S/P L2 through L3 decompression 10/01 for chronic back pain with right radicular symptoms.  Hospital course was complicated by urinary retention and he discharged with an indwelling Mcdowell catheter. Which was replaced a week ago due to failing of the voiding trial.   3. Now coming in with fever, UA positive and UC pending, blood cultures positive for E coli.   4. Back incision looks ok, though slight separation of skin, no redness or induration.      Recommendations:   ESBL E coli in the urine and in blood, Follow-up neg  Continue on ertapenem , plan 2 weeks  Appreciate ortho eval, no concern for infection on the spine so far per ortho.   Repeat UC noted.  Midline and erta orders in OK disposition   Would get Follow-up UC when tx done, if he relapses needs imaging/urology etc      Juan Arias MD    Interval History   t max of a 100.8 down last 24hrs, feels OK  Repeat UC noted     Physical Exam   Temp: 97.2  F (36.2  C) Temp src: Oral BP: 121/77 Pulse: 126   Resp: 18 SpO2: 95 % O2 Device: None (Room air)    Vitals:    10/16/18 0735 10/16/18 0920   Weight: 70.1 kg (154 lb 9.6 oz) 68.5 kg (151 lb 0.2 oz)     Vital Signs with Ranges  Temp:  [97.2  F (36.2  C)-98.2  F (36.8  C)] 97.2  F (36.2  C)  Pulse:  [] 126  Resp:  [16-18] 18  BP: ()/(55-77) 121/77  SpO2:  [95 %] 95 %    Constitutional: Awake, alert, cooperative, no apparent distress  Lungs: Clear to auscultation bilaterally, no crackles or wheezing  Cardiovascular: Regular rate and rhythm, normal S1 and S2, and no murmur noted  Abdomen: Normal bowel sounds, soft, non-distended, non-tender  Skin: No rashes, no cyanosis, no  edema  Other:    Medications     - MEDICATION INSTRUCTIONS -         apixaban ANTICOAGULANT  5 mg Oral BID     ertapenem (INVanz) IV  1 g Intravenous Q24H     ferrous sulfate  325 mg Oral Daily     metoprolol tartrate  50 mg Oral BID     sodium chloride (PF)  10 mL Intracatheter Q8H     tamsulosin  0.4 mg Oral Daily       Data   All microbiology laboratory data reviewed.  Recent Labs   Lab Test  10/17/18   0831  10/16/18   0749  10/15/18   1058   WBC  7.6  9.5  15.5*   HGB  10.3*  11.6*  11.6*   HCT  31.2*  34.4*  35.8*   MCV  102*  101*  106*   PLT  239  247  338     Recent Labs   Lab Test  10/18/18   0906  10/17/18   1210  10/16/18   0749   CR  0.72  0.80  1.02     No lab results found.  Recent Labs   Lab Test  10/18/18   0906  10/17/18   1210  10/16/18   1110  10/16/18   0847  10/16/18   0840  10/15/18   1057  10/15/18   1056  10/15/18   1048   CULT  No growth after 18 hours  No growth after 2 days  50,000 to 100,000 colonies/mL  Enterococcus faecalis  *  10,000 to 50,000 colonies/mL  Escherichia coli ESBL  ESBL (extended beta lactamase) producing organisms require contact precautions.  *  Enterobacteriaceae that are susceptible to meropenem are usually susceptible to ertapenem.  No growth after 3 days  No growth after 3 days  Cultured on the 1st day of incubation:  Escherichia coli ESBL  Enterobacteriaceae that are susceptible to meropenem are usually susceptible to ertapenem.  *  Critical Value/Significant Value, preliminary result only, called to and read back by  Dr. Judson Mcadams 0255 10.16.18 /[facility]    ESBL (extended beta lactamase) producing organisms require contact precautions.  (Note)  POSITIVE for E.COLI by Verigene multiplex nucleic acid test. Final  identification and antimicrobial susceptibility testing will be  verified by standard methods. Verigene test will not distinguish  E.coli from Shigella species including S.dysenteriae, S.flexneri,  S.boydii, and S.sonnei. Specimens containing Shigella  species or  E.coli will be reported as Positive for E.coli.    POSITIVE for CTX-M Class A Extended Spectrum beta-lactamase (ESBL)  resistance marker by [employer] multiplex nucleic acid test. CTX-M  confers resistance to penicillins, cephalosporins and variable  resistance to beta-lactam beta-lactamase inhibitor combinations  (clavulanic acid and tazobactam). Best empiric antibiotic choice is  meropenem. Specific susceptibility testing will be performed.    Specimen tested with [employer]ig[employer] multiplex, gram-negative blood culture  nucleic acid test for the following targets: Acinetobacter sp.,  Citrobacter sp., Enterobacter sp., Proteus sp., E. coli, K.  pneumoniae/oxyto ca, P. aeruginosa, and the following resistance  markers: CTXM, KPC, NDM, VIM, IMP and OXA.    Critical Value/Significant Value called to and read back by  DR. STOCK.  10.16.18  0606 GJS    >100,000 colonies/mL  Escherichia coli ESBL  *  Enterobacteriaceae that are susceptible to meropenem are usually susceptible to ertapenem.  ESBL (extended beta lactamase) producing organisms require contact precautions.  Cultured on the 1st day of incubation:  Escherichia coli  Susceptibility testing done on previous specimen  *  Critical Value/Significant Value, preliminary result only, called to and read back by   TAMIKO WITH RN @2317 10/16/18. CT

## 2018-10-19 NOTE — DISCHARGE SUMMARY
Discharge Summary  Hospitalist    Date of Admission:  10/16/2018  Date of Discharge:  10/19/2018  Discharging Provider: Marquita Cohen MD    Primary Care Physician   Judson Mcadams  Primary Care Provider Phone Number: [phone]  Primary Care Provider Fax Number: [phone]    PRINCIPAL DIAGNOSIS  Acute E. coli bacteremia - cleared.  ESBL Ecoli UTI likely associated with Catheter  Status post right L2/L3 decompression 10/1 due to spinal stenosis.  Postoperative urinary retention with Mcdowell catheterization.  Acute anemia likely dilutional   Possible mild cognitive impairment/dementia.    Past Medical History:   Diagnosis Date     Atrial fibrillation (H)      Cardiomyopathy (H)      Crohn's disease of small and large intestines with complication (H)      Hyperlipidemia        History of Present Illness   Dawson Jones is an 88 year old male who presented with positive blood cultures.    Hospital Course   Dawson Moseley is an 88-year-old male with medical history of paroxysmal atrial fibrillation and recent L2 through L3 decompression 10/01/2018.  He had postoperative urinary retention, discharged on Mcdowell catheter.  Seen PCP on 10/15 for chills.  Chest x-ray was clear.  WBC count of 15.5, UA grossly abnormal.  Urine cultures pending.  1 out of 2 blood cultures concerning for a E. coli ESBL.  Patient was directed to come into the ED on 10/16.     Acute E. coli ESBL bacteremia - cleared.  ESBL Ecoli UTI likely associated with Catheter  Patient has had fever spike in the last 24 hours.  Pro-calcitonin 2.86.  .  WBC trended down to 7.6.  Blood cultures 10/17 no growth.  Blood cultures 10/16 gram-negative rods 1 out of 2 bottles.  [E. coli ESBL/ E faecalis]  Urine cultures 10/16 gram-negative rods.  [E. coli ESBL/ E faecalis]     Continue IV ertapenem 1 g every 24 hours [10/16]  Mcdowell catheter changed 10/16.  Infectious disease following along - IV Invanz for total of 2 weeks.  Midline place, plan for  discharge to TCU.   Recommend follow-up urine cultures when treatment done, if he relapses needs imaging/urology     Status post right L2/L3 decompression 10/1 due to spinal stenosis.  No sign of incisional infection or localized infection.  His back pain is improving.   Lumbar spine x-rays Surgical implanted device in posterior elements of the mid lumbar vertebrae noted. Diffuse intervertebral disc space narrowing.   Evaluated by orthopedic/spine surgery team, lumbar decompression wound seems to be healing without issue.   Recommend to monitor for increased back pain or radicular symptoms.    Weightbearing as tolerated with walker.  Appreciate recommendation  Incentive spirometry.     Postoperative urinary retention with Mcdowell catheterization.  Patient had postop urinary retention after lumbar decompression on 10/1/2018.  Failed voiding trial on urology visit after discharge.  Mcdowell catheter change 10/16.  Catheter care, follow-up with urology as outpatient.  Continue PTA Flomax.     Acute anemia likely dilutional   No blood loss.  Asymptomatic  Hemoglobin in September 2018 14.4, presented with hemoglobin of 11.6, dropped to 10.3.  Serum iron 25, iron saturation index 12.  CPK 75.  Started on ferrous sulfate supplements once daily.  Monitor hemoglobin levels periodically.     Possible mild cognitive impairment/concern for dementia.  At risk for delirium during hospitalization.  Neurocognitive assessment as outpatient.  Minimize interruptions, frequent reorientation.  Avoid narcotics and benzodiazepines as able to.  PT recommend TCU     History of atrial fibrillation on anticoagulation.  Continue PTA metoprolol 50 mg twice daily.  Continue PTA Eliquis 5 mg twice daily.     Crohn's.   Takes tincture of opium p.r.n.    No acute symptoms at present.   As needed stool softeners ordered.     # Discharge Pain Plan:   - Patient currently has NO PAIN and is not being prescribed pain medications on discharge.  Patient had  PRN narcotics that were prescribed to him postoperatively at the time of admission.  During hospitalization he has not taken any dose of narcotics.      Marquita Cohen MD    Pending Results   These results will be followed up by ordering provider  Unresulted Labs Ordered in the Past 30 Days of this Admission     Date and Time Order Name Status Description    10/18/2018 0000 Blood culture Preliminary     10/17/2018 1019 Blood culture Preliminary     10/16/2018 1110 Urine Culture Aerobic Bacterial Preliminary     10/16/2018 0824 Blood culture Preliminary     10/16/2018 0824 Blood culture Preliminary              Physical Exam   Vitals:    10/16/18 0735 10/16/18 0920   Weight: 70.1 kg (154 lb 9.6 oz) 68.5 kg (151 lb 0.2 oz)     Vital Signs with Ranges  Temp:  [97.2  F (36.2  C)-98.2  F (36.8  C)] 97.2  F (36.2  C)  Pulse:  [] 126  Resp:  [16-18] 18  BP: ()/(55-77) 121/77  SpO2:  [95 %] 95 %  I/O last 3 completed shifts:  In: 2102 [P.O.:640; I.V.:1462]  Out: 475 [Urine:475]  PHYSICAL EXAM  GENERAL: Patient elderly, comfortable. Alert and oriented.  HEART: Regular rate and rhythm. S1S2. No murmurs  LUNGS: Bilateral slightly decreased breath sounds, no wheezing.  No crackles.  ABDOMEN: Soft, no abdominal tenderness, bowel sounds heard   NEURO: Moving all extremities.  Mcdowell catheter in place  Musculoskeletal lumbar incision dressing intact.  EXTREMITIES: No pedal edema. 2+ peripheral pulses.  SKIN: Warm, dry.     )Consultations This Hospital Stay   PHYSICAL THERAPY ADULT IP CONSULT  INFECTIOUS DISEASES IP CONSULT  ORTHOPEDIC SURGERY IP CONSULT  VASCULAR ACCESS ADULT IP CONSULT  VASCULAR ACCESS ADULT IP CONSULT  CARE TRANSITION RN/SW IP CONSULT  PHYSICAL THERAPY ADULT IP CONSULT  OCCUPATIONAL THERAPY ADULT IP CONSULT    Time Spent on this Encounter   Marquita NEWBERRY, personally saw the patient today and spent greater than 30 minutes discharging this patient.  More than 50% of time spent in direct patient  care, care coordination, patient/caregiver counseling, and formalizing plan of care.    Discharge Orders     Reason for your hospital stay   You were admitted with positive blood cultures after back surgery.     Follow-up and recommended labs and tests    Follow up with primary care provider, Judson Mcadams, within 7 days for hospital follow- up.   Monitoring while on IV antibiotics as per ID.    Follow-up with Ortho as per schedule.  Follow-up with urology for voiding trials as per schedule.     Activity   Your activity upon discharge: activity as tolerated     When to contact your care team   Call your primary doctor if you have any of the following: increased drainage, increased swelling or increased pain at the surgical site or if spiking fever.     Wound care and dressings   Instructions to care for your wound at home: as directed by surgical team.     Discharge Instructions   Aggressive incentive spirometry.  Mcdowell catheter care. Midline care.  Consider neurocognitive assessment as outpatient.  Avoid narcotics and benzodiazepines if able to.     Mantoux instructions   Give two-step Mantoux (PPD) Per Facility Policy Yes     General info for SNF   Length of Stay Estimate: Short Term Care: Estimated # of Days <30  Condition at Discharge: Stable  Level of care:skilled   Rehabilitation Potential: Fair  Admission H&P remains valid and up-to-date: Yes  Recent Chemotherapy: N/A  Use Nursing Home Standing Orders: Yes     Activity - Up with nursing assistance     Follow Up and recommended labs and tests   Follow up with Nursing home physician.  Recommend follow-up urine cultures when treatment done, if he relapses needs imaging/urology     Physical Therapy Adult Consult   Evaluate and treat as clinically indicated.    Reason: Physical deconditioning.     Occupational Therapy Adult Consult   Evaluate and treat as clinically indicated.    Reason: Physical deconditioning     Fall precautions     Diet   Follow this diet  upon discharge: Orders Placed This Encounter     Snacks/Supplements Adult: Boost Shake; With Meals  Low-salt diet.         Discharge Medications   Current Discharge Medication List      START taking these medications    Details   ertapenem (INVANZ) 1 GM injection Inject 1 g into the vein every 24 hours for 10 days CBC with differential, creatinine, SGOT weekly while on this medication to be faxed to Dr. Arias office.  Qty: 100 mL, Refills: 0    Associated Diagnoses: Bacteremia      ferrous sulfate (IRON) 325 (65 Fe) MG tablet Take 1 tablet (325 mg) by mouth daily  Qty: 100 tablet, Refills: 0    Associated Diagnoses: Crohn's disease of small and large intestines with complication (H)         CONTINUE these medications which have CHANGED    Details   diphenoxylate-atropine (LOMOTIL) 2.5-0.025 MG per tablet Take 1 tablet by mouth daily as needed for diarrhea  Qty: 10 tablet, Refills: 0    Associated Diagnoses: Crohn's disease of small and large intestines with complication (H)      opium tincture 10 MG/ML (1%) liquid Take 0.2 mLs (2 mg) by mouth every 6 hours as needed for diarrhea (4 drops)  Qty: 118 mL, Refills: 0    Associated Diagnoses: Crohn's disease of small and large intestines with complication (H)         CONTINUE these medications which have NOT CHANGED    Details   acetaminophen (TYLENOL) 325 MG tablet Take 650 mg by mouth every 8 hours as needed for mild pain      cyanocobalamin (VITAMIN B12) 1000 MCG/ML injection Inject 1 mL (1,000 mcg) into the muscle every 30 days  Qty: 3 mL, Refills: 11    Comments: Dispense appropriate syringes and needles as well  Associated Diagnoses: Vitamin B12 deficiency (non anemic)      ELIQUIS 5 MG tablet Take 1 tablet (5 mg) by mouth 2 times daily  Qty: 30 tablet, Refills: 0    Associated Diagnoses: Chronic atrial fibrillation (H)      metoprolol tartrate (LOPRESSOR) 50 MG tablet TAKE 1 TABLET(50 MG) BY MOUTH TWICE DAILY  Qty: 180 tablet, Refills: 3    Associated Diagnoses:  Benign essential hypertension      tamsulosin (FLOMAX) 0.4 MG capsule Take 1 capsule (0.4 mg) by mouth daily  Qty: 90 capsule, Refills: 3    Associated Diagnoses: Benign non-nodular prostatic hyperplasia with lower urinary tract symptoms      !! order for DME Equipment being ordered: incentive spirometer  Qty: 1 Units, Refills: 0    Associated Diagnoses: Atelectasis      !! order for DME Equipment being ordered: Walker Wheels () and Walker ()  Treatment Diagnosis: DIfficulty with gait  Qty: 1 each, Refills: 0    Associated Diagnoses: S/P laminectomy       !! - Potential duplicate medications found. Please discuss with provider.      STOP taking these medications       oxyCODONE IR (ROXICODONE) 5 MG tablet Comments:   Reason for Stopping:             Allergies   No Known Allergies    Discharge Disposition   Discharged to short-term care facility  Condition at discharge: Stable    DATA  Most Recent 3 CBC's:  Recent Labs   Lab Test  10/17/18   0831  10/16/18   0749  10/15/18   1058   WBC  7.6  9.5  15.5*   HGB  10.3*  11.6*  11.6*   MCV  102*  101*  106*   PLT  239  247  338      Most Recent 3 BMP's:  Recent Labs   Lab Test  10/18/18   0906  10/17/18   1210  10/16/18   1750  10/16/18   0749   NA  142  141   --   139   POTASSIUM  3.5  3.6  3.6  3.2*   CHLORIDE  111*  110*   --   106   CO2  24  22   --   24   BUN  11  14   --   21   CR  0.72  0.80   --   1.02   ANIONGAP  7  9   --   9   CHANG  8.2*  8.0*   --   8.5   GLC  101*  153*   --   115*     Most Recent 2 LFT's:  Recent Labs   Lab Test  10/18/18   0906  10/17/18   1210   AST  18  24   ALT  20  22   ALKPHOS  74  82   BILITOTAL  0.4  0.4     Most Recent 6 Bacteria Isolates From Any Culture (See EPIC Reports for Culture Details):  Recent Labs   Lab Test  10/18/18   0906  10/17/18   1210  10/16/18   1110  10/16/18   0847  10/16/18   0840  10/15/18   1057   CULT  No growth after 18 hours  No growth after 2 days  50,000 to 100,000 colonies/mL  Enterococcus  faecalis  *  10,000 to 50,000 colonies/mL  Escherichia coli ESBL  ESBL (extended beta lactamase) producing organisms require contact precautions.  *  Enterobacteriaceae that are susceptible to meropenem are usually susceptible to ertapenem.  No growth after 3 days  No growth after 3 days  Cultured on the 1st day of incubation:  Escherichia coli ESBL  Enterobacteriaceae that are susceptible to meropenem are usually susceptible to ertapenem.  *  Critical Value/Significant Value, preliminary result only, called to and read back by  Dr. Judson Stock 0255 10.16.18 CF/SCG    ESBL (extended beta lactamase) producing organisms require contact precautions.  (Note)  POSITIVE for E.COLI by [employer] multiplex nucleic acid test. Final  identification and antimicrobial susceptibility testing will be  verified by standard methods. Verigene test will not distinguish  E.coli from Shigella species including S.dysenteriae, S.flexneri,  S.boydii, and S.sonnei. Specimens containing Shigella species or  E.coli will be reported as Positive for E.coli.    POSITIVE for CTX-M Class A Extended Spectrum beta-lactamase (ESBL)  resistance marker by [employer] multiplex nucleic acid test. CTX-M  confers resistance to penicillins, cephalosporins and variable  resistance to beta-lactam beta-lactamase inhibitor combinations  (clavulanic acid and tazobactam). Best empiric antibiotic choice is  meropenem. Specific susceptibility testing will be performed.    Specimen tested with [employer] multiplex, gram-negative blood culture  nucleic acid test for the following targets: Acinetobacter sp.,  Citrobacter sp., Enterobacter sp., Proteus sp., E. coli, K.  pneumoniae/oxyto ca, P. aeruginosa, and the following resistance  markers: CTXM, KPC, NDM, VIM, IMP and OXA.    Critical Value/Significant Value called to and read back by  DR. STOCK.  10.16.18  0606 GJS       Most Recent TSH, T4 and A1c Labs:  Recent Labs   Lab Test  09/24/18   1232  03/27/17   1136    TSH   --   1.06   A1C  4.8   --      Results for orders placed or performed during the hospital encounter of 10/16/18   XR Chest 2 Views    Narrative    CHEST TWO VIEWS October 16, 2018 8:01 AM     HISTORY: Cough.    COMPARISON: 10/15/2018.    FINDINGS: Chronic appearing interstitial abnormalities are similar to  the prior study. Heart size is upper normal. No airspace  consolidation, pneumothorax, or pleural effusion.       Impression    IMPRESSION: No radiographic evidence of acute chest abnormality.     MIGUELITO MCCORD MD   XR Lumbar Spine 2/3 Views    Narrative    LUMBAR SPINE TWO - THREE VIEWS  10/17/2018 7:04 PM     HISTORY: Postop films routine.     COMPARISON: None.      Impression    IMPRESSION: Scoliotic curvature of the thoracolumbar spine, apex left.  Surgical implanted device in posterior elements of the mid lumbar  vertebrae noted. Diffuse intervertebral disc space narrowing.    FERNANDA ARRIAGA MD

## 2018-10-19 NOTE — PLAN OF CARE
Problem: Patient Care Overview  Goal: Plan of Care/Patient Progress Review  Physical Therapy Discharge Summary    Reason for therapy discharge:    Discharged to transitional care facility.    Progress towards therapy goal(s). See goals on Care Plan in [facility] electronic health record for goal details.  Goals partially met.  Barriers to achieving goals:   discharge from facility.    Therapy recommendation(s):    Continued therapy is recommended.  Rationale/Recommendations:  Patient would benefit from ongoing PT to address deficits with strength, balance, and independence with functional mobility to allow for eventual return to PLOF..

## 2018-10-19 NOTE — PLAN OF CARE
Problem: Patient Care Overview  Goal: Plan of Care/Patient Progress Review  Outcome: Adequate for Discharge Date Met: 10/19/18  Discharge    Patient discharged to [facility] via wheelchair with [facility]  Care plan note A+Ox4, forgetful. Up with 1 and cane. Catheter bag switched to a leg bag. Midline to right arm. Packet given to [facility] transport. Wife left to meet pt at [facility].    Listed belongings gathered and returned to patient. Yes  Care Plan and Patient education resolved: Yes  Prescriptions if needed, hard copies sent with patient  Yes  Home and hospital acquired medications returned to patient: NA  Medication Bin checked and emptied on discharge Yes  Follow up appointment made for patient: No, discharging to TCU

## 2018-10-19 NOTE — PROVIDER NOTIFICATION
MD Notification    Notified Person: MD    Notified Person Name:Jaqueline    Notification Date/Time:10/18/2018, 7:36 PM    Notification Interaction: via phone    Purpose of Notification:urine output 75cc in 5 hours    Orders Received: continue to monitor, encourage liquids    Comments:

## 2018-10-19 NOTE — PLAN OF CARE
Problem: Patient Care Overview  Goal: Plan of Care/Patient Progress Review  Outcome: Improving  A/Ox4. RA. VSS. SBA. Mcdowell patent. Regular diet. Midline catheter in place; peripheral IV removed. K 3.5. Dressing on lower back from surgery; CDI. Denies SOB, chest pain, pain, N/V. Contact precautions maintained. Discharge today 10/19 if no overnight events.

## 2018-10-19 NOTE — PLAN OF CARE
Problem: Patient Care Overview  Goal: Plan of Care/Patient Progress Review  Outcome: Improving  A&Ox4. Up w/ SBA and walker/cane. VSS on RA, afebrile. BP low at 2140 90/50. Denies pain. Mcdowell in place for retention, patent with low UO, on-call paged and orders to continue to monitor. Liquids encourage, good PO. Dressing on lower back from back surgery. c/d/i.Contact precautions maintained. ID following. New midline in place in RUE. PT following. Discharge probably tomorrow to a TCU. Will cont to monitor.

## 2018-10-22 ENCOUNTER — NURSING HOME VISIT (OUTPATIENT)
Dept: GERIATRICS | Facility: CLINIC | Age: 83
End: 2018-10-22
Payer: COMMERCIAL

## 2018-10-22 ENCOUNTER — TELEPHONE (OUTPATIENT)
Dept: FAMILY MEDICINE | Facility: CLINIC | Age: 83
End: 2018-10-22

## 2018-10-22 VITALS
RESPIRATION RATE: 18 BRPM | BODY MASS INDEX: 31.77 KG/M2 | HEIGHT: 60 IN | OXYGEN SATURATION: 95 % | WEIGHT: 161.8 LBS | HEART RATE: 115 BPM | DIASTOLIC BLOOD PRESSURE: 72 MMHG | SYSTOLIC BLOOD PRESSURE: 115 MMHG | TEMPERATURE: 98.4 F

## 2018-10-22 VITALS
DIASTOLIC BLOOD PRESSURE: 80 MMHG | TEMPERATURE: 98 F | HEART RATE: 76 BPM | SYSTOLIC BLOOD PRESSURE: 117 MMHG | HEIGHT: 60 IN | RESPIRATION RATE: 17 BRPM | BODY MASS INDEX: 31.77 KG/M2 | OXYGEN SATURATION: 98 % | WEIGHT: 161.8 LBS

## 2018-10-22 DIAGNOSIS — R33.9 URINARY RETENTION: ICD-10-CM

## 2018-10-22 DIAGNOSIS — I48.20 CHRONIC ATRIAL FIBRILLATION (H): ICD-10-CM

## 2018-10-22 DIAGNOSIS — M48.07 SPINAL STENOSIS OF LUMBOSACRAL REGION: ICD-10-CM

## 2018-10-22 DIAGNOSIS — R78.81 BACTEREMIA: Primary | ICD-10-CM

## 2018-10-22 DIAGNOSIS — R53.81 PHYSICAL DECONDITIONING: ICD-10-CM

## 2018-10-22 DIAGNOSIS — Z98.890 HISTORY OF LUMBAR LAMINECTOMY FOR SPINAL CORD DECOMPRESSION: ICD-10-CM

## 2018-10-22 LAB
BACTERIA SPEC CULT: NO GROWTH
BACTERIA SPEC CULT: NO GROWTH
Lab: NORMAL
Lab: NORMAL
SPECIMEN SOURCE: NORMAL
SPECIMEN SOURCE: NORMAL

## 2018-10-22 PROCEDURE — 99310 SBSQ NF CARE HIGH MDM 45: CPT | Performed by: NURSE PRACTITIONER

## 2018-10-22 NOTE — TELEPHONE ENCOUNTER
Patient discharged to [facility] and being followed by [facility].    Anna GODINEZ RN  [employer] Triage

## 2018-10-22 NOTE — TELEPHONE ENCOUNTER
Esbl (Extended Spectrum Beta-Lactamase) Producing Bacteria Infection 10/19/2018 6 mo ED/IP 0/1  [phone] (home)

## 2018-10-22 NOTE — LETTER
10/22/2018        RE: Dawson Jones  [address]  [place] [address]        [facility]  PRIMARY CARE PROVIDER AND CLINIC:  Judson Mcadams [address] [facility] [address] / [place] [address]  Chief Complaint   Patient presents with     Hospital F/U     [facility] Record Number:  [id number]  Place of Service where encounter took place:  [facility]) [[id number]]    HPI:    Dawson Jones is a 88 year old  (5/5/1930),admitted to the above facility from  [facility].  Hospital stay 10/16/2018 through 10/19/2018.  Admitted to this facility for  rehab, medical management and nursing care.  HPI information obtained from: facility chart records, facility staff, patient report and [facility] chart review.  Current issues are:      Bacteremia  Patient transferred to TCU following hospital stay due to positive blood cultures. Patient presented to ED from home after blood cultures done at PCP (due to chills) were positive. He was started on Ivanz for E coli ESBL.   Follow up blood cultures were negative. ID was consulted and recommends two weeks of ertapenem.   Today he is comfortable, afebrile.   History of lumbar laminectomy for spinal cord decompression  Spinal stenosis of lumbosacral region  Surgery, which included insertion of Coflex device on 10/1 due to radiculopathy in right leg. He now has now pain. He is working with therapy.     Urinary retention  Post back surgery unable to void. He did return to urology for TOV but was still unsuccessful. He returns to Dr Valverde's office in the am.     Chronic atrial fibrillation (H)  He continues on eliquis and metoprolol.     Physical deconditioning  lives with wife in house. There is some concern for cognitive impairment.        BP's: 115/72, 110/73, 95/65    CODE STATUS/ADVANCE DIRECTIVES DISCUSSION:   CPR/Full code   Patient's living condition: lives with spouse    ALLERGIES:Review of patient's allergies  indicates no known allergies.  PAST MEDICAL HISTORY:  has a past medical history of Atrial fibrillation (H); Cardiomyopathy (H); Crohn's disease of small and large intestines with complication (H); and Hyperlipidemia.  PAST SURGICAL HISTORY:  has a past surgical history that includes appendectomy; Decompression lumbar one level (N/A, 10/1/2018); and orthopedic surgery (10/01/2018).  FAMILY HISTORY: family history includes Cardiac Sudden Death in his father; Family History Negative in his brother, brother, mother, son, and son; Other - See Comments in his sister. There is no history of HEART DISEASE.  SOCIAL HISTORY:  reports that he has quit smoking. He has never used smokeless tobacco. He reports that he drinks about 4.2 oz of alcohol per week  He reports that he does not use illicit drugs.    Post Discharge Medication Reconciliation Status: discharge medications reconciled, continue medications without change.  Current Outpatient Prescriptions   Medication Sig Dispense Refill     acetaminophen (TYLENOL) 325 MG tablet Take 650 mg by mouth every 8 hours as needed for mild pain       cyanocobalamin (VITAMIN B12) 1000 MCG/ML injection Inject 1 mL (1,000 mcg) into the muscle every 30 days 3 mL 11     diphenoxylate-atropine (LOMOTIL) 2.5-0.025 MG per tablet Take 1 tablet by mouth daily as needed for diarrhea 10 tablet 0     ELIQUIS 5 MG tablet Take 1 tablet (5 mg) by mouth 2 times daily 30 tablet 0     ertapenem (INVANZ) 1 GM injection Inject 1 g into the vein every 24 hours for 10 days CBC with differential, creatinine, SGOT weekly while on this medication to be faxed to Dr. Arias office. 100 mL 0     ferrous sulfate (IRON) 325 (65 Fe) MG tablet Take 1 tablet (325 mg) by mouth daily 100 tablet 0     metoprolol tartrate (LOPRESSOR) 50 MG tablet TAKE 1 TABLET(50 MG) BY MOUTH TWICE DAILY 180 tablet 3     opium tincture 10 MG/ML (1%) liquid Take 0.2 mLs (2 mg) by mouth every 6 hours as needed for diarrhea (4 drops) 118 mL  "0     order for DME Equipment being ordered: incentive spirometer 1 Units 0     order for DME Equipment being ordered: Walker Wheels () and Walker ()  Treatment Diagnosis: DIfficulty with gait 1 each 0     tamsulosin (FLOMAX) 0.4 MG capsule Take 1 capsule (0.4 mg) by mouth daily 90 capsule 3       ROS:  4 point ROS including Respiratory, CV, GI and , other than that noted in the HPI,  is negative    Exam:  /72  Pulse 115  Temp 98.4  F (36.9  C)  Resp 18  Ht 4' 10\" (1.473 m)  Wt 161 lb 12.8 oz (73.4 kg)  SpO2 95%  BMI 33.82 kg/m2  GENERAL APPEARANCE:  Alert, in no distress  ENT:  Mouth and posterior oropharynx normal, moist mucous membranes, hearing acuity adequate   EYES:  EOM, conjunctivae, lids, pupils and irises normal  NECK:  No adenopathy,masses or thyromegaly  RESP:  respiratory effort and palpation of chest normal, no respiratory distress, Lung sounds clear  CV:  Palpation and auscultation of heart done , rate and rhythm reg, no murmur, no rub or gallop, Edema none  ABDOMEN:  normal bowel sounds, soft, nontender, no hepatosplenomegaly or other masses  M/S:   Gait and station not observed, Digits and nails normal   SKIN:  Inspection/Palpation of skin and subcutaneous tissue no rash  NEURO: 2-12 in normal limits and at patient's baseline  PSYCH:  insight and judgement, memory mild cognitive impairment  , affect and mood normal      Lab/Diagnostic data:  CBC RESULTS:   Recent Labs   Lab Test  10/17/18   0831  10/16/18   0749   WBC  7.6  9.5   RBC  3.06*  3.40*   HGB  10.3*  11.6*   HCT  31.2*  34.4*   MCV  102*  101*   MCH  33.7*  34.1*   MCHC  33.0  33.7   RDW  12.5  12.4   PLT  239  247       Last Basic Metabolic Panel:  Recent Labs   Lab Test  10/18/18   0906  10/17/18   1210   NA  142  141   POTASSIUM  3.5  3.6   CHLORIDE  111*  110*   CHANG  8.2*  8.0*   CO2  24  22   BUN  11  14   CR  0.72  0.80   GLC  101*  153*       Liver Function Studies -   Recent Labs   Lab Test  10/18/18   " 0906  10/17/18   1210   PROTTOTAL  6.0*  6.3*   ALBUMIN  2.5*  2.5*   BILITOTAL  0.4  0.4   ALKPHOS  74  82   AST  18  24   ALT  20  22       TSH   Date Value Ref Range Status   03/27/2017 1.06 0.40 - 4.00 mU/L Final   ]    Lab Results   Component Value Date    A1C 4.8 09/24/2018    A1C 5.2 07/28/2014       ASSESSMENT/PLAN:  (R78.81) Bacteremia  (primary encounter diagnosis)  Comment: likely urinary source. Follow up blood cultures were negative. He is being treated with ivanz for total of two weeks. He is afebrile.   Plan: as above. Monitor temp. Weekly labs while on antibiotics.     (Z98.890) History of lumbar laminectomy for spinal cord decompression  (M48.07) Spinal stenosis of lumbosacral region  Comment: he is doing quite well after spine surgery. No pain. He is working with physical therapy   Plan: physical therapy     (R33.9) Urinary retention  Comment: following surgery. He failed first TOV at urology  Plan: follow up with urology    (I48.2) Chronic atrial fibrillation (H)  Comment: rate controlled on metop and anticoagulated on eliquis  Plan: no change    (R53.81) Physical deconditioning  Comment: due to bacteremia and spine surgery.   Plan: physical therapy and OCCUPATIONAL THERAPY             Electronically signed by:  YAMILKA Das CNP                    Sincerely,        YAMILKA Das CNP

## 2018-10-22 NOTE — PROGRESS NOTES
[facility]  PRIMARY CARE PROVIDER AND CLINIC:  Judson Mcadams BR[facility] [address] / [place] [address]  Chief Complaint   Patient presents with     Hospital F/U     [facility] Record Number:  [id number]  Place of Service where encounter took place:  [facility] [[id number]]    HPI:    Dawson Jones is a 88 year old  (5/5/1930),admitted to the above facility from  [facility].  Hospital stay 10/16/2018 through 10/19/2018.  Admitted to this facility for  rehab, medical management and nursing care.  HPI information obtained from: facility chart records, facility staff, patient report and [facility] chart review.  Current issues are:      Bacteremia  Patient transferred to TCU following hospital stay due to positive blood cultures. Patient presented to ED from home after blood cultures done at PCP (due to chills) were positive. He was started on Ivanz for E coli ESBL.   Follow up blood cultures were negative. ID was consulted and recommends two weeks of ertapenem.   Today he is comfortable, afebrile.   History of lumbar laminectomy for spinal cord decompression  Spinal stenosis of lumbosacral region  Surgery, which included insertion of Coflex device on 10/1 due to radiculopathy in right leg. He now has now pain. He is working with therapy.     Urinary retention  Post back surgery unable to void. He did return to urology for TOV but was still unsuccessful. He returns to Dr Valverde's office in the am.     Chronic atrial fibrillation (H)  He continues on eliquis and metoprolol.     Physical deconditioning  lives with wife in house. There is some concern for cognitive impairment.        BP's: 115/72, 110/73, 95/65    CODE STATUS/ADVANCE DIRECTIVES DISCUSSION:   CPR/Full code   Patient's living condition: lives with spouse    ALLERGIES:Review of patient's allergies indicates no known allergies.  PAST MEDICAL HISTORY:  has a past medical history of Atrial  fibrillation (H); Cardiomyopathy (H); Crohn's disease of small and large intestines with complication (H); and Hyperlipidemia.  PAST SURGICAL HISTORY:  has a past surgical history that includes appendectomy; Decompression lumbar one level (N/A, 10/1/2018); and orthopedic surgery (10/01/2018).  FAMILY HISTORY: family history includes Cardiac Sudden Death in his father; Family History Negative in his brother, brother, mother, son, and son; Other - See Comments in his sister. There is no history of HEART DISEASE.  SOCIAL HISTORY:  reports that he has quit smoking. He has never used smokeless tobacco. He reports that he drinks about 4.2 oz of alcohol per week  He reports that he does not use illicit drugs.    Post Discharge Medication Reconciliation Status: discharge medications reconciled, continue medications without change.  Current Outpatient Prescriptions   Medication Sig Dispense Refill     acetaminophen (TYLENOL) 325 MG tablet Take 650 mg by mouth every 8 hours as needed for mild pain       cyanocobalamin (VITAMIN B12) 1000 MCG/ML injection Inject 1 mL (1,000 mcg) into the muscle every 30 days 3 mL 11     diphenoxylate-atropine (LOMOTIL) 2.5-0.025 MG per tablet Take 1 tablet by mouth daily as needed for diarrhea 10 tablet 0     ELIQUIS 5 MG tablet Take 1 tablet (5 mg) by mouth 2 times daily 30 tablet 0     ertapenem (INVANZ) 1 GM injection Inject 1 g into the vein every 24 hours for 10 days CBC with differential, creatinine, SGOT weekly while on this medication to be faxed to Dr. Arias office. 100 mL 0     ferrous sulfate (IRON) 325 (65 Fe) MG tablet Take 1 tablet (325 mg) by mouth daily 100 tablet 0     metoprolol tartrate (LOPRESSOR) 50 MG tablet TAKE 1 TABLET(50 MG) BY MOUTH TWICE DAILY 180 tablet 3     opium tincture 10 MG/ML (1%) liquid Take 0.2 mLs (2 mg) by mouth every 6 hours as needed for diarrhea (4 drops) 118 mL 0     order for DME Equipment being ordered: incentive spirometer 1 Units 0     order for  "DME Equipment being ordered: Walker Wheels () and Walker ()  Treatment Diagnosis: DIfficulty with gait 1 each 0     tamsulosin (FLOMAX) 0.4 MG capsule Take 1 capsule (0.4 mg) by mouth daily 90 capsule 3       ROS:  4 point ROS including Respiratory, CV, GI and , other than that noted in the HPI,  is negative    Exam:  /72  Pulse 115  Temp 98.4  F (36.9  C)  Resp 18  Ht 4' 10\" (1.473 m)  Wt 161 lb 12.8 oz (73.4 kg)  SpO2 95%  BMI 33.82 kg/m2  GENERAL APPEARANCE:  Alert, in no distress  ENT:  Mouth and posterior oropharynx normal, moist mucous membranes, hearing acuity adequate   EYES:  EOM, conjunctivae, lids, pupils and irises normal  NECK:  No adenopathy,masses or thyromegaly  RESP:  respiratory effort and palpation of chest normal, no respiratory distress, Lung sounds clear  CV:  Palpation and auscultation of heart done , rate and rhythm reg, no murmur, no rub or gallop, Edema none  ABDOMEN:  normal bowel sounds, soft, nontender, no hepatosplenomegaly or other masses  M/S:   Gait and station not observed, Digits and nails normal   SKIN:  Inspection/Palpation of skin and subcutaneous tissue no rash  NEURO: 2-12 in normal limits and at patient's baseline  PSYCH:  insight and judgement, memory mild cognitive impairment  , affect and mood normal      Lab/Diagnostic data:  CBC RESULTS:   Recent Labs   Lab Test  10/17/18   0831  10/16/18   0749   WBC  7.6  9.5   RBC  3.06*  3.40*   HGB  10.3*  11.6*   HCT  31.2*  34.4*   MCV  102*  101*   MCH  33.7*  34.1*   MCHC  33.0  33.7   RDW  12.5  12.4   PLT  239  247       Last Basic Metabolic Panel:  Recent Labs   Lab Test  10/18/18   0906  10/17/18   1210   NA  142  141   POTASSIUM  3.5  3.6   CHLORIDE  111*  110*   CHANG  8.2*  8.0*   CO2  24  22   BUN  11  14   CR  0.72  0.80   GLC  101*  153*       Liver Function Studies -   Recent Labs   Lab Test  10/18/18   0906  10/17/18   1210   PROTTOTAL  6.0*  6.3*   ALBUMIN  2.5*  2.5*   BILITOTAL  0.4  0.4 "   ALKPHOS  74  82   AST  18  24   ALT  20  22       TSH   Date Value Ref Range Status   03/27/2017 1.06 0.40 - 4.00 mU/L Final   ]    Lab Results   Component Value Date    A1C 4.8 09/24/2018    A1C 5.2 07/28/2014       ASSESSMENT/PLAN:  (R78.81) Bacteremia  (primary encounter diagnosis)  Comment: likely urinary source. Follow up blood cultures were negative. He is being treated with ivanz for total of two weeks. He is afebrile.   Plan: as above. Monitor temp. Weekly labs while on antibiotics.     (Z98.890) History of lumbar laminectomy for spinal cord decompression  (M48.07) Spinal stenosis of lumbosacral region  Comment: he is doing quite well after spine surgery. No pain. He is working with physical therapy   Plan: physical therapy     (R33.9) Urinary retention  Comment: following surgery. He failed first TOV at urology  Plan: follow up with urology    (I48.2) Chronic atrial fibrillation (H)  Comment: rate controlled on metop and anticoagulated on eliquis  Plan: no change    (R53.81) Physical deconditioning  Comment: due to bacteremia and spine surgery.   Plan: physical therapy and OCCUPATIONAL THERAPY             Electronically signed by:  YAMILKA Das CNP

## 2018-10-23 ENCOUNTER — NURSING HOME VISIT (OUTPATIENT)
Dept: GERIATRICS | Facility: CLINIC | Age: 83
End: 2018-10-23
Payer: COMMERCIAL

## 2018-10-23 DIAGNOSIS — Z16.12 URINARY TRACT INFECTION DUE TO EXTENDED-SPECTRUM BETA LACTAMASE (ESBL) PRODUCING ESCHERICHIA COLI: Primary | ICD-10-CM

## 2018-10-23 DIAGNOSIS — I48.20 CHRONIC ATRIAL FIBRILLATION (H): ICD-10-CM

## 2018-10-23 DIAGNOSIS — R53.81 PHYSICAL DECONDITIONING: ICD-10-CM

## 2018-10-23 DIAGNOSIS — N39.0 URINARY TRACT INFECTION DUE TO EXTENDED-SPECTRUM BETA LACTAMASE (ESBL) PRODUCING ESCHERICHIA COLI: Primary | ICD-10-CM

## 2018-10-23 DIAGNOSIS — M48.061 SPINAL STENOSIS OF LUMBAR REGION, UNSPECIFIED WHETHER NEUROGENIC CLAUDICATION PRESENT: ICD-10-CM

## 2018-10-23 DIAGNOSIS — D62 ANEMIA DUE TO BLOOD LOSS, ACUTE: ICD-10-CM

## 2018-10-23 DIAGNOSIS — Z98.890 HISTORY OF LUMBAR LAMINECTOMY FOR SPINAL CORD DECOMPRESSION: ICD-10-CM

## 2018-10-23 DIAGNOSIS — B96.29 URINARY TRACT INFECTION DUE TO EXTENDED-SPECTRUM BETA LACTAMASE (ESBL) PRODUCING ESCHERICHIA COLI: Primary | ICD-10-CM

## 2018-10-23 DIAGNOSIS — R33.9 URINARY RETENTION: ICD-10-CM

## 2018-10-23 LAB
BACTERIA SPEC CULT: NO GROWTH
Lab: NORMAL
SPECIMEN SOURCE: NORMAL

## 2018-10-23 PROCEDURE — 99306 1ST NF CARE HIGH MDM 50: CPT | Performed by: INTERNAL MEDICINE

## 2018-10-23 NOTE — LETTER
"    10/23/2018        RE: Dawson Jones  [address]  [facility] [address]        Dawson Jones is a 88 year old male seen October 23, 2018 at [facility] where he was admitted this week after [facility] hospitalization for Ecoli ESBL bacteremia and catheter associated UTI.  Patient had L2-3 decompression surgery on 10/1 with insertion of a Coflex device due to RLE radiculopathy.      Patient had urinary retention post op and has failed another trial of voiding.    He has an indwelling Mcdowell catheter, and failed a trial of voiding   He presented back to ER with chills and fever, found to bacteremic as above.   Seen by ID and recommended 2 week course of ertapenem.      Today patient is seen in his room, up to WC   Reports he is \"not too bad\"    He has been working with therapies, still fairly weak.  Denies pain.   Appetite is okay.       Past Medical History:   Diagnosis Date     Atrial fibrillation (H)      Cardiomyopathy (H)      Crohn's disease of small and large intestines with complication (H)      Hyperlipidemia    Spinal stenosis with RLE radiculopathy  Urinary retention    Past Surgical History:   Procedure Laterality Date     APPENDECTOMY       DECOMPRESSION LUMBAR ONE LEVEL N/A 10/1/2018    Procedure: DECOMPRESSION LUMBAR ONE LEVEL;  DECOMPRESSION L2-3 WITH COFLEX DEVICE  ;  Surgeon: Bert Reynolds MD;  Location:  OR     ORTHOPEDIC SURGERY  10/01/2018    Bilateral L2-3 decompression and insertion of a Coflex device       Family History   Problem Relation Age of Onset     Family History Negative Mother      Cardiac Sudden Death Father      Family History Negative Brother      Other - See Comments Sister      colon issues     Family History Negative Son      Family History Negative Brother      Family History Negative Son      HEART DISEASE No family hx of        Social History   Substance Use Topics     Smoking status: Former Smoker     Smokeless tobacco: Never Used      Comment: 40 years ago " "    Alcohol use 4.2 oz/week     7 Standard drinks or equivalent per week      Comment: 1 wine an evening      SH: Lives with his wife, house in [place].     Review Of Systems  Skin: negative   Eyes: impaired vision, glasses  Ears/Nose/Throat: hearing loss  Respiratory: No shortness of breath, dyspnea on exertion, cough, or hemoptysis  Cardiovascular: irregular heart beat, lower extremity edema and exercise intolerance  Gastrointestinal: negative  Genitourinary: retention  Musculoskeletal: generalized weakness, ambulatory with FWW and assistance.     Neurologic: some report of cognitive impairment, limited history.    Psychiatric: negative  Hematologic/Lymphatic/Immunologic: negative  Endocrine: negative      GENERAL APPEARANCE: alert and no distress  /80  Pulse 76  Temp 98  F (36.7  C)  Resp 17  Ht 4' 10\" (1.473 m)  Wt 161 lb 12.8 oz (73.4 kg)  SpO2 98%  BMI 33.82 kg/m2   HEENT: normocephalic, no lesion or abnormalities  NECK: no adenopathy, no asymmetry, masses, or scars and thyroid normal to palpation  RESP:decreased BS, right basilar crackles  CV: irregular rate and rhythm, normal S1 S2  ABDOMEN:  soft, nontender, no HSM or masses and bowel sounds normal  MS: extremities normal- no gross deformities noted, no evidence of inflammation in joints, 1-2+ LE edema, wearing tubi-     SKIN: no suspicious lesions or rashes  NEURO: Normal strength and tone, sensory exam grossly normal, and speech normal  PSYCH: affect okay  LYMPHATICS: No cervical,  or supraclavicular nodes     Last Comprehensive Metabolic Panel:  Sodium   Date Value Ref Range Status   10/18/2018 142 133 - 144 mmol/L Final     Potassium   Date Value Ref Range Status   10/18/2018 3.5 3.4 - 5.3 mmol/L Final     Chloride   Date Value Ref Range Status   10/18/2018 111 (H) 94 - 109 mmol/L Final     Carbon Dioxide   Date Value Ref Range Status   10/18/2018 24 20 - 32 mmol/L Final     Anion Gap   Date Value Ref Range Status   10/18/2018 7 3 " - 14 mmol/L Final     Glucose   Date Value Ref Range Status   10/18/2018 101 (H) 70 - 99 mg/dL Final     Urea Nitrogen   Date Value Ref Range Status   10/18/2018 11 7 - 30 mg/dL Final     Creatinine   Date Value Ref Range Status   10/18/2018 0.72 0.66 - 1.25 mg/dL Final     GFR Estimate   Date Value Ref Range Status   10/18/2018 >90 >60 mL/min/1.7m2 Final     Comment:     Non  GFR Calc     Calcium   Date Value Ref Range Status   10/18/2018 8.2 (L) 8.5 - 10.1 mg/dL Final     Lab Results   Component Value Date    WBC 7.6 10/17/2018      HGB 10.3 10/17/2018       10/17/2018       10/17/2018        IMP/PLAN:   (N39.0,  B96.29,  Z16.12) Urinary tract infection due to extended-spectrum beta lactamase (ESBL) producing Escherichia coli  (primary encounter diagnosis)  Comment: UTI  Plan: IV ertapenem until 10/28      (R33.9) Urinary retention  Comment: has failed trial of voiding   Plan: continue tamsulosin with goal to have have another voiding trial.  He has a follow up appointment with Dr Valverde on 10/26       (M48.061) Spinal stenosis of lumbar region, unspecified whether neurogenic claudication present  (Z98.890) History of lumbar laminectomy for spinal cord decompression  Comment: doing well post op and no current pain     Plan: prn acetaminophen    (R53.81) Physical deconditioning  Comment: after acute illness and surgery     Plan: PHYSICAL THERAPY / OCCUPATIONAL THERAPY for transfers, balance, gait, strengthening, ADLs and cognitive testing.   Discharge goal is return home with his wife.        (D62) Anemia due to blood loss, acute  Comment: mild     Plan: continue Fe, B12 and follow hgb       (I48.2) Chronic atrial fibrillation (H)  Comment: controlled VR on metoprolol     Plan: apixaban for stroke prophylaxis.        Joelle Broussard MD       Sincerely,        Joelle Broussard MD

## 2018-10-24 LAB
BACTERIA SPEC CULT: NO GROWTH
Lab: NORMAL
SPECIMEN SOURCE: NORMAL

## 2018-10-24 NOTE — PROGRESS NOTES
"Dawson Jones is a 88 year old male seen October 23, 2018 at [facility] where he was admitted this week after [facility] hospitalization for Ecoli ESBL bacteremia and catheter associated UTI.  Patient had L2-3 decompression surgery on 10/1 with insertion of a Coflex device due to RLE radiculopathy.      Patient had urinary retention post op and has failed another trial of voiding.    He has an indwelling Mcdowell catheter, and failed a trial of voiding   He presented back to ER with chills and fever, found to bacteremic as above.   Seen by ID and recommended 2 week course of ertapenem.      Today patient is seen in his room, up to WC   Reports he is \"not too bad\"    He has been working with therapies, still fairly weak.  Denies pain.   Appetite is okay.       Past Medical History:   Diagnosis Date     Atrial fibrillation (H)      Cardiomyopathy (H)      Crohn's disease of small and large intestines with complication (H)      Hyperlipidemia    Spinal stenosis with RLE radiculopathy  Urinary retention    Past Surgical History:   Procedure Laterality Date     APPENDECTOMY       DECOMPRESSION LUMBAR ONE LEVEL N/A 10/1/2018    Procedure: DECOMPRESSION LUMBAR ONE LEVEL;  DECOMPRESSION L2-3 WITH COFLEX DEVICE  ;  Surgeon: Bert Reynolds MD;  Location:  OR     ORTHOPEDIC SURGERY  10/01/2018    Bilateral L2-3 decompression and insertion of a Coflex device       Family History   Problem Relation Age of Onset     Family History Negative Mother      Cardiac Sudden Death Father      Family History Negative Brother      Other - See Comments Sister      colon issues     Family History Negative Son      Family History Negative Brother      Family History Negative Son      HEART DISEASE No family hx of        Social History   Substance Use Topics     Smoking status: Former Smoker     Smokeless tobacco: Never Used      Comment: 40 years ago     Alcohol use 4.2 oz/week     7 Standard drinks or equivalent per week      Comment: 1 wine " "an evening      SH: Lives with his wife, house in [place].     Review Of Systems  Skin: negative   Eyes: impaired vision, glasses  Ears/Nose/Throat: hearing loss  Respiratory: No shortness of breath, dyspnea on exertion, cough, or hemoptysis  Cardiovascular: irregular heart beat, lower extremity edema and exercise intolerance  Gastrointestinal: negative  Genitourinary: retention  Musculoskeletal: generalized weakness, ambulatory with FWW and assistance.     Neurologic: some report of cognitive impairment, limited history.    Psychiatric: negative  Hematologic/Lymphatic/Immunologic: negative  Endocrine: negative      GENERAL APPEARANCE: alert and no distress  /80  Pulse 76  Temp 98  F (36.7  C)  Resp 17  Ht 4' 10\" (1.473 m)  Wt 161 lb 12.8 oz (73.4 kg)  SpO2 98%  BMI 33.82 kg/m2   HEENT: normocephalic, no lesion or abnormalities  NECK: no adenopathy, no asymmetry, masses, or scars and thyroid normal to palpation  RESP:decreased BS, right basilar crackles  CV: irregular rate and rhythm, normal S1 S2  ABDOMEN:  soft, nontender, no HSM or masses and bowel sounds normal  MS: extremities normal- no gross deformities noted, no evidence of inflammation in joints, 1-2+ LE edema, wearing tubi-     SKIN: no suspicious lesions or rashes  NEURO: Normal strength and tone, sensory exam grossly normal, and speech normal  PSYCH: affect okay  LYMPHATICS: No cervical,  or supraclavicular nodes     Last Comprehensive Metabolic Panel:  Sodium   Date Value Ref Range Status   10/18/2018 142 133 - 144 mmol/L Final     Potassium   Date Value Ref Range Status   10/18/2018 3.5 3.4 - 5.3 mmol/L Final     Chloride   Date Value Ref Range Status   10/18/2018 111 (H) 94 - 109 mmol/L Final     Carbon Dioxide   Date Value Ref Range Status   10/18/2018 24 20 - 32 mmol/L Final     Anion Gap   Date Value Ref Range Status   10/18/2018 7 3 - 14 mmol/L Final     Glucose   Date Value Ref Range Status   10/18/2018 101 (H) 70 - 99 mg/dL " Final     Urea Nitrogen   Date Value Ref Range Status   10/18/2018 11 7 - 30 mg/dL Final     Creatinine   Date Value Ref Range Status   10/18/2018 0.72 0.66 - 1.25 mg/dL Final     GFR Estimate   Date Value Ref Range Status   10/18/2018 >90 >60 mL/min/1.7m2 Final     Comment:     Non  GFR Calc     Calcium   Date Value Ref Range Status   10/18/2018 8.2 (L) 8.5 - 10.1 mg/dL Final     Lab Results   Component Value Date    WBC 7.6 10/17/2018      HGB 10.3 10/17/2018       10/17/2018       10/17/2018        IMP/PLAN:   (N39.0,  B96.29,  Z16.12) Urinary tract infection due to extended-spectrum beta lactamase (ESBL) producing Escherichia coli  (primary encounter diagnosis)  Comment: UTI  Plan: IV ertapenem until 10/28      (R33.9) Urinary retention  Comment: has failed trial of voiding   Plan: continue tamsulosin with goal to have have another voiding trial.  He has a follow up appointment with Dr Valverde on 10/26       (M48.061) Spinal stenosis of lumbar region, unspecified whether neurogenic claudication present  (Z98.890) History of lumbar laminectomy for spinal cord decompression  Comment: doing well post op and no current pain     Plan: prn acetaminophen    (R53.81) Physical deconditioning  Comment: after acute illness and surgery     Plan: PHYSICAL THERAPY / OCCUPATIONAL THERAPY for transfers, balance, gait, strengthening, ADLs and cognitive testing.   Discharge goal is return home with his wife.        (D62) Anemia due to blood loss, acute  Comment: mild     Plan: continue Fe, B12 and follow hgb       (I48.2) Chronic atrial fibrillation (H)  Comment: controlled VR on metoprolol     Plan: apixaban for stroke prophylaxis.        Joelle Broussard MD

## 2018-10-25 ENCOUNTER — TRANSFERRED RECORDS (OUTPATIENT)
Dept: HEALTH INFORMATION MANAGEMENT | Facility: CLINIC | Age: 83
End: 2018-10-25

## 2018-10-25 LAB
AST SERPL-CCNC: 18 U/L (ref 0–45)
CREAT SERPL-MCNC: 0.84 MG/DL (ref 0.66–1.25)
DIFFERENTIAL: ABNORMAL
ERYTHROCYTE [DISTWIDTH] IN BLOOD BY AUTOMATED COUNT: 12.8 % (ref 10–15)
GFR SERPL CREATININE-BSD FRML MDRD: 86 ML/MIN/1.7M2
HCT VFR BLD AUTO: 33.1 % (ref 40–53)
HEMOGLOBIN: 11 G/DL (ref 13.3–17.7)
MCH RBC QN AUTO: 33.4 PG (ref 26.5–33)
MCHC RBC AUTO-ENTMCNC: 33.2 G/DL (ref 31.5–36.5)
MCV RBC AUTO: 101 FL (ref 78–100)
PLATELET # BLD AUTO: 319 10E9/L (ref 150–450)
RBC # BLD AUTO: 3.29 10E12/L (ref 4.4–5.9)
WBC # BLD AUTO: 7.1 10E9/L (ref 4–11)

## 2018-10-26 ENCOUNTER — NURSING HOME VISIT (OUTPATIENT)
Dept: GERIATRICS | Facility: CLINIC | Age: 83
End: 2018-10-26
Payer: COMMERCIAL

## 2018-10-26 VITALS
DIASTOLIC BLOOD PRESSURE: 89 MMHG | OXYGEN SATURATION: 99 % | HEIGHT: 68 IN | BODY MASS INDEX: 23.98 KG/M2 | HEART RATE: 116 BPM | SYSTOLIC BLOOD PRESSURE: 154 MMHG | TEMPERATURE: 96.5 F | RESPIRATION RATE: 18 BRPM | WEIGHT: 158.2 LBS

## 2018-10-26 DIAGNOSIS — R78.81 BACTEREMIA: Primary | ICD-10-CM

## 2018-10-26 DIAGNOSIS — I48.20 CHRONIC ATRIAL FIBRILLATION (H): ICD-10-CM

## 2018-10-26 DIAGNOSIS — M48.07 SPINAL STENOSIS OF LUMBOSACRAL REGION: ICD-10-CM

## 2018-10-26 DIAGNOSIS — Z98.890 HISTORY OF LUMBAR LAMINECTOMY FOR SPINAL CORD DECOMPRESSION: ICD-10-CM

## 2018-10-26 DIAGNOSIS — R33.9 URINARY RETENTION: ICD-10-CM

## 2018-10-26 DIAGNOSIS — R53.81 PHYSICAL DECONDITIONING: ICD-10-CM

## 2018-10-26 PROCEDURE — 99309 SBSQ NF CARE MODERATE MDM 30: CPT | Performed by: NURSE PRACTITIONER

## 2018-10-26 NOTE — PROGRESS NOTES
[facility]    Chief Complaint   Patient presents with     VALDEZ       [facility] Medical Record Number:  [id number]  Place of Service where encounter took place:  [facility] [[id number]]    HPI:    Dawson Jones is a 88 year old  (5/5/1930), who is being seen today for an episodic care visit.  HPI information obtained from: facility chart records, facility staff, patient report and [facility] Epic chart review.  Current issues are:        Bacteremia  Patient transferred to TCU following hospital stay 10/16-10/19 due to positive blood cultures. Patient presented to ED from home after blood cultures done at PCP (due to chills) were positive. He was started on Ivanz for E coli ESBL.   Follow up blood cultures were negative. ID was consulted and recommends two weeks of ertapenem (through 10/29).   Today he is comfortable, afebrile.     History of lumbar laminectomy for spinal cord decompression  Spinal stenosis of lumbosacral region  Surgery, which included insertion of Coflex device on 10/1 due to radiculopathy in right leg. He now has pain (partiuclarly in his right leg), but states that has improved with working with therapy. Has Tylenol available for pain but is not taking anything.     Urinary retention  Post back surgery unable to void. He did return to urology for TOV but was still unsuccessful. He saw Dr. Valverde this morning (10/26) with another failed trial at voiding - king replaced again. See follow-up plans below.     Chronic atrial fibrillation (H)  He continues on eliquis and metoprolol. BP elevated to 154/89 this morning (typically runs 90's-110's/60's-80's). Continue to monitor and adjust medications as needed. Asymptotic.     Physical deconditioning  Lives with wife in house-bed and bath on same level;stairs to enter home;stairs within home. There is some concern for cognitive impairment (Slums 17/30), which is concerning for dementia. TREJO score is 32/52  indicating that he requires assistance with walking (i.e. falls risk if attempting to navigate independently). Able to walk 250 feet SBA with PT. Gait speed only 0.41 m/s, TUG 30.5 seconds.        ALLERGIES: Review of patient's allergies indicates no known allergies.  Past Medical, Surgical, Family and Social History reviewed and updated in [facility].    Current Outpatient Prescriptions   Medication Sig Dispense Refill     acetaminophen (TYLENOL) 325 MG tablet Take 650 mg by mouth every 8 hours as needed for mild pain       cyanocobalamin (VITAMIN B12) 1000 MCG/ML injection Inject 1 mL (1,000 mcg) into the muscle every 30 days 3 mL 11     diphenoxylate-atropine (LOMOTIL) 2.5-0.025 MG per tablet Take 1 tablet by mouth daily as needed for diarrhea 10 tablet 0     ELIQUIS 5 MG tablet Take 1 tablet (5 mg) by mouth 2 times daily 30 tablet 0     ertapenem (INVANZ) 1 GM injection Inject 1 g into the vein every 24 hours for 10 days CBC with differential, creatinine, SGOT weekly while on this medication to be faxed to Dr. Arias office. 100 mL 0     ferrous sulfate (IRON) 325 (65 Fe) MG tablet Take 1 tablet (325 mg) by mouth daily 100 tablet 0     metoprolol tartrate (LOPRESSOR) 50 MG tablet TAKE 1 TABLET(50 MG) BY MOUTH TWICE DAILY 180 tablet 3     opium tincture 10 MG/ML (1%) liquid Take 0.2 mLs (2 mg) by mouth every 6 hours as needed for diarrhea (4 drops) 118 mL 0     order for DME Equipment being ordered: incentive spirometer 1 Units 0     order for DME Equipment being ordered: Walker Wheels () and Walker ()  Treatment Diagnosis: DIfficulty with gait 1 each 0     tamsulosin (FLOMAX) 0.4 MG capsule Take 1 capsule (0.4 mg) by mouth daily 90 capsule 3     Medications reviewed:  Medications reconciled to facility chart and changes were made to reflect current medications as identified as above med list. Below are the changes that were made:   Medications stopped since last EPIC medication reconciliation:   There are no  "discontinued medications.    Medications started since last [facility] medication reconciliation:  No orders of the defined types were placed in this encounter.      REVIEW OF SYSTEMS:  4 point ROS including Respiratory, CV, GI and , other than that noted in the HPI,  is negative    Physical Exam:  /89  Pulse 116  Temp 96.5  F (35.8  C)  Resp 18  Ht 5' 8\" (1.727 m)  Wt 158 lb 3.2 oz (71.8 kg)  SpO2 99%  BMI 24.05 kg/m2  GENERAL APPEARANCE:  Alert, in no distress  ENT:  Mouth and posterior oropharynx normal, moist mucous membranes, hearing acuity adequate   EYES:  EOM, conjunctivae, lids, pupils and irises normal  NECK:  No adenopathy,masses or thyromegaly  RESP:  respiratory effort and palpation of chest normal, no respiratory distress, Lung sounds clear  CV:  Palpation and auscultation of heart done, irregularly irregular rate and rhythm, no murmur, rub or gallop, no edema, peripheral pulses 2+ in BUE, good cap refill  ABDOMEN:  normal bowel sounds, soft, nontender, no hepatosplenomegaly or other masses  : Leg-bag king in place  M/S:   Gait and station not observed, Digits and nails normal   SKIN:  Inspection/Palpation of skin and subcutaneous tissue no rash  NEURO: 2-12 in normal limits and at patient's baseline  PSYCH:  insight and judgement, memory mild-moderate cognitive impairment, affect and mood normal      Recent Labs:  CBC RESULTS:   Recent Labs   Lab Test 10/25/18  10/17/18   0831   WBC  7.1  7.6   RBC  3.29*  3.06*   HGB  11.0*  10.3*   HCT  33.1*  31.2*   MCV  101*  102*   MCH  33.4*  33.7*   MCHC  33.2  33.0   RDW  12.8  12.5   PLT  319  239       Last Basic Metabolic Panel:  Recent Labs   Lab Test 10/25/18  10/18/18   0906  10/17/18   1210   NA   --   142  141   POTASSIUM   --   3.5  3.6   CHLORIDE   --   111*  110*   CHANG   --   8.2*  8.0*   CO2   --   24  22   BUN   --   11  14   CR  0.84  0.72  0.80   GLC   --   101*  153*       Liver Function Studies -   Recent Labs   Lab Test 10/25/18  " 10/18/18   0906  10/17/18   1210   PROTTOTAL   --   6.0*  6.3*   ALBUMIN   --   2.5*  2.5*   BILITOTAL   --   0.4  0.4   ALKPHOS   --   74  82   AST  18  18  24   ALT   --   20  22       TSH   Date Value Ref Range Status   03/27/2017 1.06 0.40 - 4.00 mU/L Final     Lab Results   Component Value Date    A1C 4.8 09/24/2018    A1C 5.2 07/28/2014       Assessment/Plan:  (R78.81) Bacteremia  (primary encounter diagnosis)  Comment: Afebrile, vital signs stable aside from some hypertension this moring  Plan: Continue Ertapenem through 10/29/18 and monitor for signs of infection in the interim.     (Z98.890) History of lumbar laminectomy for spinal cord decompression  Comment: Continue work with PT  Plan: Continue to monitor pain control & encourage mobility; new issue with urinary retention    (M48.07) Spinal stenosis of lumbosacral region  Comment: lumbar laminectomy for spinal cord decompression and insertion of Coflex device 10/1. Patient endorses radiculopathy from the lumbar stenosis; reports that pain in right leg has improved.   Plan: Continue PT, Tylenol available for pain as needed.     (R33.9) Urinary retention  Comment: Failed voiding trial with urology today (10/26), king replaced.   Plan: Next appointment with urology in 2 weeks to re-evaluate.     (I48.2) Chronic atrial fibrillation (H)  Comment: Still irregularly irregular; otherwise asymptomatic  Plan: Continue Metoprolol & Apixiban for DVT prophylaxis.     (R53.81) Physical deconditioning  Comment: Needs improvement still in gait speed and overall mobility.   Plan: Continue PT; recommend home PT for continued improvement.     Orders:  Recommend home PT & home care resources as will be done with IV antibiotics Monday; if spouse comfortable with taking patient home, could use assistance with king cares and continuing to address patient's deconditioning. Social work to speak with spouse today.    The above evaluation was completed by SHEFALI Dias and  transcribed by MARISELA Kumar student.        Electronically signed by  YAMILKA Das CNP

## 2018-10-26 NOTE — LETTER
10/26/2018        RE: Dawson Jones  [address]  [facility] [address]        [facility]    Chief Complaint   Patient presents with     RECHECK       [facility] Medical Record Number:  [id number]  Place of Service where encounter took place[facility] [[id number]]    HPI:    Dawson Jones is a 88 year old  (5/5/1930), who is being seen today for an episodic care visit.  HPI information obtained from: facility chart records, facility staff, patient report and [facility] chart review.  Current issues are:        Bacteremia  Patient transferred to TCU following hospital stay 10/16-10/19 due to positive blood cultures. Patient presented to ED from home after blood cultures done at PCP (due to chills) were positive. He was started on Ivanz for E coli ESBL.   Follow up blood cultures were negative. ID was consulted and recommends two weeks of ertapenem (through 10/29).   Today he is comfortable, afebrile.     History of lumbar laminectomy for spinal cord decompression  Spinal stenosis of lumbosacral region  Surgery, which included insertion of Coflex device on 10/1 due to radiculopathy in right leg. He now has pain (partiuclarly in his right leg), but states that has improved with working with therapy. Has Tylenol available for pain but is not taking anything.     Urinary retention  Post back surgery unable to void. He did return to urology for TOV but was still unsuccessful. He saw Dr. Valverde this morning (10/26) with another failed trial at voiding - king replaced again. See follow-up plans below.     Chronic atrial fibrillation (H)  He continues on eliquis and metoprolol. BP elevated to 154/89 this morning (typically runs 90's-110's/60's-80's). Continue to monitor and adjust medications as needed. Asymptotic.     Physical deconditioning  Lives with wife in house-bed and bath on same level;stairs to enter home;stairs within home. There is some concern for  cognitive impairment (Slums 17/30), which is concerning for dementia. TREJO score is 32/52 indicating that he requires assistance with walking (i.e. falls risk if attempting to navigate independently). Able to walk 250 feet SBA with PT. Gait speed only 0.41 m/s, TUG 30.5 seconds.        ALLERGIES: Review of patient's allergies indicates no known allergies.  Past Medical, Surgical, Family and Social History reviewed and updated in [facility].    Current Outpatient Prescriptions   Medication Sig Dispense Refill     acetaminophen (TYLENOL) 325 MG tablet Take 650 mg by mouth every 8 hours as needed for mild pain       cyanocobalamin (VITAMIN B12) 1000 MCG/ML injection Inject 1 mL (1,000 mcg) into the muscle every 30 days 3 mL 11     diphenoxylate-atropine (LOMOTIL) 2.5-0.025 MG per tablet Take 1 tablet by mouth daily as needed for diarrhea 10 tablet 0     ELIQUIS 5 MG tablet Take 1 tablet (5 mg) by mouth 2 times daily 30 tablet 0     ertapenem (INVANZ) 1 GM injection Inject 1 g into the vein every 24 hours for 10 days CBC with differential, creatinine, SGOT weekly while on this medication to be faxed to Dr. Arias office. 100 mL 0     ferrous sulfate (IRON) 325 (65 Fe) MG tablet Take 1 tablet (325 mg) by mouth daily 100 tablet 0     metoprolol tartrate (LOPRESSOR) 50 MG tablet TAKE 1 TABLET(50 MG) BY MOUTH TWICE DAILY 180 tablet 3     opium tincture 10 MG/ML (1%) liquid Take 0.2 mLs (2 mg) by mouth every 6 hours as needed for diarrhea (4 drops) 118 mL 0     order for DME Equipment being ordered: incentive spirometer 1 Units 0     order for DME Equipment being ordered: Walker Wheels () and Walker ()  Treatment Diagnosis: DIfficulty with gait 1 each 0     tamsulosin (FLOMAX) 0.4 MG capsule Take 1 capsule (0.4 mg) by mouth daily 90 capsule 3     Medications reviewed:  Medications reconciled to facility chart and changes were made to reflect current medications as identified as above med list. Below are the changes that  "were made:   Medications stopped since last EPIC medication reconciliation:   There are no discontinued medications.    Medications started since last [facility] medication reconciliation:  No orders of the defined types were placed in this encounter.      REVIEW OF SYSTEMS:  4 point ROS including Respiratory, CV, GI and , other than that noted in the HPI,  is negative    Physical Exam:  /89  Pulse 116  Temp 96.5  F (35.8  C)  Resp 18  Ht 5' 8\" (1.727 m)  Wt 158 lb 3.2 oz (71.8 kg)  SpO2 99%  BMI 24.05 kg/m2  GENERAL APPEARANCE:  Alert, in no distress  ENT:  Mouth and posterior oropharynx normal, moist mucous membranes, hearing acuity adequate   EYES:  EOM, conjunctivae, lids, pupils and irises normal  NECK:  No adenopathy,masses or thyromegaly  RESP:  respiratory effort and palpation of chest normal, no respiratory distress, Lung sounds clear  CV:  Palpation and auscultation of heart done, irregularly irregular rate and rhythm, no murmur, rub or gallop, no edema, peripheral pulses 2+ in BUE, good cap refill  ABDOMEN:  normal bowel sounds, soft, nontender, no hepatosplenomegaly or other masses  : Leg-bag king in place  M/S:   Gait and station not observed, Digits and nails normal   SKIN:  Inspection/Palpation of skin and subcutaneous tissue no rash  NEURO: 2-12 in normal limits and at patient's baseline  PSYCH:  insight and judgement, memory mild-moderate cognitive impairment, affect and mood normal      Recent Labs:  CBC RESULTS:   Recent Labs   Lab Test 10/25/18  10/17/18   0831   WBC  7.1  7.6   RBC  3.29*  3.06*   HGB  11.0*  10.3*   HCT  33.1*  31.2*   MCV  101*  102*   MCH  33.4*  33.7*   MCHC  33.2  33.0   RDW  12.8  12.5   PLT  319  239       Last Basic Metabolic Panel:  Recent Labs   Lab Test 10/25/18  10/18/18   0906  10/17/18   1210   NA   --   142  141   POTASSIUM   --   3.5  3.6   CHLORIDE   --   111*  110*   CHANG   --   8.2*  8.0*   CO2   --   24  22   BUN   --   11  14   CR  0.84  0.72  " 0.80   GLC   --   101*  153*       Liver Function Studies -   Recent Labs   Lab Test 10/25/18  10/18/18   0906  10/17/18   1210   PROTTOTAL   --   6.0*  6.3*   ALBUMIN   --   2.5*  2.5*   BILITOTAL   --   0.4  0.4   ALKPHOS   --   74  82   AST  18  18  24   ALT   --   20  22       TSH   Date Value Ref Range Status   03/27/2017 1.06 0.40 - 4.00 mU/L Final     Lab Results   Component Value Date    A1C 4.8 09/24/2018    A1C 5.2 07/28/2014       Assessment/Plan:  (R78.81) Bacteremia  (primary encounter diagnosis)  Comment: Afebrile, vital signs stable aside from some hypertension this moring  Plan: Continue Ertapenem through 10/29/18 and monitor for signs of infection in the interim.     (Z98.890) History of lumbar laminectomy for spinal cord decompression  Comment: Continue work with PT  Plan: Continue to monitor pain control & encourage mobility; new issue with urinary retention    (M48.07) Spinal stenosis of lumbosacral region  Comment: lumbar laminectomy for spinal cord decompression and insertion of Coflex device 10/1. Patient endorses radiculopathy from the lumbar stenosis; reports that pain in right leg has improved.   Plan: Continue PT, Tylenol available for pain as needed.     (R33.9) Urinary retention  Comment: Failed voiding trial with urology today (10/26), king replaced.   Plan: Next appointment with urology in 2 weeks to re-evaluate.     (I48.2) Chronic atrial fibrillation (H)  Comment: Still irregularly irregular; otherwise asymptomatic  Plan: Continue Metoprolol & Apixiban for DVT prophylaxis.     (R53.81) Physical deconditioning  Comment: Needs improvement still in gait speed and overall mobility.   Plan: Continue PT; recommend home PT for continued improvement.     Orders:  Recommend home PT & home care resources as will be done with IV antibiotics Monday; if spouse comfortable with taking patient home, could use assistance with king cares and continuing to address patient's deconditioning. Social  work to speak with spouse today.    The above evaluation was completed by SHEFALI Dias and transcribed by MARISELA Kumar student.        Electronically signed by  YAMILKA Das CNP                      Sincerely,        YAMILKA Das CNP

## 2018-10-30 ENCOUNTER — NURSING HOME VISIT (OUTPATIENT)
Dept: GERIATRICS | Facility: CLINIC | Age: 83
End: 2018-10-30
Payer: COMMERCIAL

## 2018-10-30 ENCOUNTER — HOSPITAL LABORATORY (OUTPATIENT)
Dept: OTHER | Facility: CLINIC | Age: 83
End: 2018-10-30

## 2018-10-30 VITALS
HEIGHT: 68 IN | SYSTOLIC BLOOD PRESSURE: 133 MMHG | TEMPERATURE: 98.4 F | RESPIRATION RATE: 18 BRPM | HEART RATE: 83 BPM | BODY MASS INDEX: 23.98 KG/M2 | OXYGEN SATURATION: 97 % | DIASTOLIC BLOOD PRESSURE: 83 MMHG | WEIGHT: 158.2 LBS

## 2018-10-30 DIAGNOSIS — R33.9 URINARY RETENTION: ICD-10-CM

## 2018-10-30 DIAGNOSIS — Z98.890 HISTORY OF LUMBAR LAMINECTOMY FOR SPINAL CORD DECOMPRESSION: ICD-10-CM

## 2018-10-30 DIAGNOSIS — M48.07 SPINAL STENOSIS OF LUMBOSACRAL REGION: ICD-10-CM

## 2018-10-30 DIAGNOSIS — R53.81 PHYSICAL DECONDITIONING: ICD-10-CM

## 2018-10-30 DIAGNOSIS — R78.81 BACTEREMIA: Primary | ICD-10-CM

## 2018-10-30 DIAGNOSIS — I48.20 CHRONIC ATRIAL FIBRILLATION (H): ICD-10-CM

## 2018-10-30 PROCEDURE — 99309 SBSQ NF CARE MODERATE MDM 30: CPT | Performed by: NURSE PRACTITIONER

## 2018-10-30 NOTE — LETTER
10/30/2018        RE: Dawson Jones  [address]  [facility] [address]        [place] [facility]    Chief Complaint   Patient presents with     RECHECK       [place] Medical Record Number:  [id number]  Place of Service where encounter took place:  [facility] [[id number]]    HPI:    Dawson Jones is a 88 year old  (5/5/1930), who is being seen today for an episodic care visit.  HPI information obtained from: facility chart records, facility staff, patient report and [facility] chart review.  Current issues are:        Bacteremia  Patient transferred to TCU following hospital stay 10/16-10/19 due to positive blood cultures. Patient presented to ED from home after blood cultures done at PCP (due to chills) were positive. He was started on Ivanz for E coli ESBL.   Follow up blood cultures were negative. ID was consulted and recommends two weeks of ertapenem (through 10/29).   Today he is comfortable, afebrile.     History of lumbar laminectomy for spinal cord decompression  Spinal stenosis of lumbosacral region  Surgery, which included insertion of Coflex device on 10/1 due to radiculopathy in right leg. He now has pain (partiuclarly in his right leg), but states that has improved with working with therapy. Has Tylenol available for pain but is not taking anything.     Urinary retention  Post back surgery unable to void. He did return to urology for TOV but was still unsuccessful. He saw Dr. Valverde 10/26 with another failed trial at voiding - king replaced again. See follow-up plans below.     Chronic atrial fibrillation (H)  He continues on eliquis and metoprolol. BP elevated to 154/89 this morning (typically runs 90's-110's/60's-80's). Continue to monitor and adjust medications as needed. Asymptotic.     Physical deconditioning  Lives with wife in house-bed and bath on same level;stairs to enter home;stairs within home. There is some concern for cognitive  impairment (Slums 17/30), which is concerning for dementia. TREJO score is 32/52 indicating that he requires assistance with walking (i.e. falls risk if attempting to navigate independently). Able to walk 250 feet SBA with PT. Gait speed only 0.41 m/s, TUG 30.5 seconds.        ALLERGIES: Review of patient's allergies indicates no known allergies.  Past Medical, Surgical, Family and Social History reviewed and updated in [facility].    Current Outpatient Prescriptions   Medication Sig Dispense Refill     acetaminophen (TYLENOL) 325 MG tablet Take 650 mg by mouth every 8 hours as needed for mild pain       cyanocobalamin (VITAMIN B12) 1000 MCG/ML injection Inject 1 mL (1,000 mcg) into the muscle every 30 days 3 mL 11     diphenoxylate-atropine (LOMOTIL) 2.5-0.025 MG per tablet Take 1 tablet by mouth daily as needed for diarrhea 10 tablet 0     ELIQUIS 5 MG tablet Take 1 tablet (5 mg) by mouth 2 times daily 30 tablet 0     ferrous sulfate (IRON) 325 (65 Fe) MG tablet Take 1 tablet (325 mg) by mouth daily 100 tablet 0     metoprolol tartrate (LOPRESSOR) 50 MG tablet TAKE 1 TABLET(50 MG) BY MOUTH TWICE DAILY 180 tablet 3     opium tincture 10 MG/ML (1%) liquid Take 0.2 mLs (2 mg) by mouth every 6 hours as needed for diarrhea (4 drops) 118 mL 0     order for DME Equipment being ordered: incentive spirometer 1 Units 0     order for DME Equipment being ordered: Walker Wheels () and Walker ()  Treatment Diagnosis: DIfficulty with gait 1 each 0     tamsulosin (FLOMAX) 0.4 MG capsule Take 1 capsule (0.4 mg) by mouth daily 90 capsule 3     Medications reviewed:  Medications reconciled to facility chart and changes were made to reflect current medications as identified as above med list. Below are the changes that were made:   Medications stopped since last EPIC medication reconciliation:   There are no discontinued medications.    Medications started since last [facility] medication reconciliation:  No orders of the defined types  "were placed in this encounter.      REVIEW OF SYSTEMS:  4 point ROS including Respiratory, CV, GI and , other than that noted in the HPI,  is negative    Physical Exam:  /83  Pulse 83  Temp 98.4  F (36.9  C)  Resp 18  Ht 5' 8\" (1.727 m)  Wt 158 lb 3.2 oz (71.8 kg)  SpO2 97%  BMI 24.05 kg/m2  GENERAL APPEARANCE:  Alert, in no distress  ENT:  Mouth and posterior oropharynx normal, moist mucous membranes, hearing acuity adequate   EYES:  EOM, conjunctivae, lids, pupils and irises normal  NECK:  No adenopathy,masses or thyromegaly  RESP:  respiratory effort and palpation of chest normal, no respiratory distress, Lung sounds end insp rales R>L  CV:  Palpation and auscultation of heart done, irregularly irregular rate and rhythm, no murmur, rub or gallop, no edema, peripheral pulses 2+ in BUE, good cap refill  ABDOMEN:  normal bowel sounds, soft, nontender, no hepatosplenomegaly or other masses  : Leg-bag king in place  M/S:   Gait and station not observed, Digits and nails normal   SKIN:  Inspection/Palpation of skin and subcutaneous tissue no rash  NEURO: 2-12 in normal limits and at patient's baseline  PSYCH:  insight and judgement, memory mild-moderate cognitive impairment, affect and mood normal      Recent Labs:  10/15/18   >100,000 colonies/mL   Escherichia coli ESBL   Culture Micro (Abnormal) 10/16/2018 11:10    50,000 to 100,000 colonies/mL   Enterococcus faecalis      Culture Micro (Abnormal) 10/16/2018 11:10    10,000 to 50,000 colonies/mL   Escherichia coli ESBL   ESBL (extended beta lactamase) producing organisms require contact precautions.        CBC RESULTS:   Recent Labs   Lab Test 10/25/18  10/17/18   0831   WBC  7.1  7.6   RBC  3.29*  3.06*   HGB  11.0*  10.3*   HCT  33.1*  31.2*   MCV  101*  102*   MCH  33.4*  33.7*   MCHC  33.2  33.0   RDW  12.8  12.5   PLT  319  239       Last Basic Metabolic Panel:  Recent Labs   Lab Test 10/25/18  10/18/18   0906  10/17/18   1210   NA  "  --   142  141   POTASSIUM   --   3.5  3.6   CHLORIDE   --   111*  110*   CHANG   --   8.2*  8.0*   CO2   --   24  22   BUN   --   11  14   CR  0.84  0.72  0.80   GLC   --   101*  153*       Liver Function Studies -   Recent Labs   Lab Test 10/25/18  10/18/18   0906  10/17/18   1210   PROTTOTAL   --   6.0*  6.3*   ALBUMIN   --   2.5*  2.5*   BILITOTAL   --   0.4  0.4   ALKPHOS   --   74  82   AST  18  18  24   ALT   --   20  22       TSH   Date Value Ref Range Status   03/27/2017 1.06 0.40 - 4.00 mU/L Final     Lab Results   Component Value Date    A1C 4.8 09/24/2018    A1C 5.2 07/28/2014     Assessment/Plan:  (R78.81) Bacteremia  (primary encounter diagnosis)  Comment: Afebrile, vital signs stable aside from some hypertension this moring. Ertapenem is completed. Hospital notes recommend follow up Urine culture.   Plan: [facility]. Will send results to Dr. Valverde    (Z98.890) History of lumbar laminectomy for spinal cord decompression  Comment: he is doing well with therapy and would like to discharge on Thursday.   Plan: Continue to monitor pain control & encourage mobility    (M48.07) Spinal stenosis of lumbosacral region  Comment: lumbar laminectomy for spinal cord decompression and insertion of Coflex device 10/1. Patient endorses radiculopathy from the lumbar stenosis; reports that pain in right leg has improved.   Plan: Continue PT, Tylenol available for pain as needed.     (R33.9) Urinary retention  Comment: Failed voiding trial with urology today (10/26), king replaced.   Plan: Next appointment with urology in 1 week to re-evaluate.     (I48.2) Chronic atrial fibrillation (H)  Comment: Still irregularly irregular; otherwise asymptomatic  Plan: Continue Metoprolol & Apixiban for DVT prophylaxis.     (R53.81) Physical deconditioning  Comment; family would like to have patient discharge later this week. Therapy report he is walking up 250'   Plan: Continue PT; recommend home PT for continued improvement.                Electronically signed by  YAMILKA Das CNP                      Sincerely,        YAMILKA Das CNP

## 2018-10-30 NOTE — PROGRESS NOTES
[place] [facility]    Chief Complaint   Patient presents with     VALDEZ       [place] Medical Record Number:  [id number]  Place of Service where encounter took place:  [facility] [[id number]]    HPI:    Dawson Jones is a 88 year old  (5/5/1930), who is being seen today for an episodic care visit.  HPI information obtained from: facility chart records, facility staff, patient report and [facility] chart review.  Current issues are:        Bacteremia  Patient transferred to TCU following hospital stay 10/16-10/19 due to positive blood cultures. Patient presented to ED from home after blood cultures done at PCP (due to chills) were positive. He was started on Ivanz for E coli ESBL.   Follow up blood cultures were negative. ID was consulted and recommends two weeks of ertapenem (through 10/29).   Today he is comfortable, afebrile.     History of lumbar laminectomy for spinal cord decompression  Spinal stenosis of lumbosacral region  Surgery, which included insertion of Coflex device on 10/1 due to radiculopathy in right leg. He now has pain (partiuclarly in his right leg), but states that has improved with working with therapy. Has Tylenol available for pain but is not taking anything.     Urinary retention  Post back surgery unable to void. He did return to urology for TOV but was still unsuccessful. He saw Dr. Valverde 10/26 with another failed trial at voiding - king replaced again. See follow-up plans below.     Chronic atrial fibrillation (H)  He continues on eliquis and metoprolol. BP elevated to 154/89 this morning (typically runs 90's-110's/60's-80's). Continue to monitor and adjust medications as needed. Asymptotic.     Physical deconditioning  Lives with wife in house-bed and bath on same level;stairs to enter home;stairs within home. There is some concern for cognitive impairment (Slums 17/30), which is concerning for dementia. TREJO score is 32/52 indicating that he  requires assistance with walking (i.e. falls risk if attempting to navigate independently). Able to walk 250 feet SBA with PT. Gait speed only 0.41 m/s, TUG 30.5 seconds.        ALLERGIES: Review of patient's allergies indicates no known allergies.  Past Medical, Surgical, Family and Social History reviewed and updated in [facility].    Current Outpatient Prescriptions   Medication Sig Dispense Refill     acetaminophen (TYLENOL) 325 MG tablet Take 650 mg by mouth every 8 hours as needed for mild pain       cyanocobalamin (VITAMIN B12) 1000 MCG/ML injection Inject 1 mL (1,000 mcg) into the muscle every 30 days 3 mL 11     diphenoxylate-atropine (LOMOTIL) 2.5-0.025 MG per tablet Take 1 tablet by mouth daily as needed for diarrhea 10 tablet 0     ELIQUIS 5 MG tablet Take 1 tablet (5 mg) by mouth 2 times daily 30 tablet 0     ferrous sulfate (IRON) 325 (65 Fe) MG tablet Take 1 tablet (325 mg) by mouth daily 100 tablet 0     metoprolol tartrate (LOPRESSOR) 50 MG tablet TAKE 1 TABLET(50 MG) BY MOUTH TWICE DAILY 180 tablet 3     opium tincture 10 MG/ML (1%) liquid Take 0.2 mLs (2 mg) by mouth every 6 hours as needed for diarrhea (4 drops) 118 mL 0     order for DME Equipment being ordered: incentive spirometer 1 Units 0     order for DME Equipment being ordered: Walker Wheels () and Walker ()  Treatment Diagnosis: DIfficulty with gait 1 each 0     tamsulosin (FLOMAX) 0.4 MG capsule Take 1 capsule (0.4 mg) by mouth daily 90 capsule 3     Medications reviewed:  Medications reconciled to facility chart and changes were made to reflect current medications as identified as above med list. Below are the changes that were made:   Medications stopped since last EPIC medication reconciliation:   There are no discontinued medications.    Medications started since last [facility] medication reconciliation:  No orders of the defined types were placed in this encounter.      REVIEW OF SYSTEMS:  4 point ROS including Respiratory, CV, GI  "and , other than that noted in the HPI,  is negative    Physical Exam:  /83  Pulse 83  Temp 98.4  F (36.9  C)  Resp 18  Ht 5' 8\" (1.727 m)  Wt 158 lb 3.2 oz (71.8 kg)  SpO2 97%  BMI 24.05 kg/m2  GENERAL APPEARANCE:  Alert, in no distress  ENT:  Mouth and posterior oropharynx normal, moist mucous membranes, hearing acuity adequate   EYES:  EOM, conjunctivae, lids, pupils and irises normal  NECK:  No adenopathy,masses or thyromegaly  RESP:  respiratory effort and palpation of chest normal, no respiratory distress, Lung sounds end insp rales R>L  CV:  Palpation and auscultation of heart done, irregularly irregular rate and rhythm, no murmur, rub or gallop, no edema, peripheral pulses 2+ in BUE, good cap refill  ABDOMEN:  normal bowel sounds, soft, nontender, no hepatosplenomegaly or other masses  : Leg-bag king in place  M/S:   Gait and station not observed, Digits and nails normal   SKIN:  Inspection/Palpation of skin and subcutaneous tissue no rash  NEURO: 2-12 in normal limits and at patient's baseline  PSYCH:  insight and judgement, memory mild-moderate cognitive impairment, affect and mood normal      Recent Labs:  10/15/18   >100,000 colonies/mL   Escherichia coli ESBL   Culture Micro (Abnormal) 10/16/2018 11:10    50,000 to 100,000 colonies/mL   Enterococcus faecalis      Culture Micro (Abnormal) 10/16/2018 11:10    10,000 to 50,000 colonies/mL   Escherichia coli ESBL   ESBL (extended beta lactamase) producing organisms require contact precautions.        CBC RESULTS:   Recent Labs   Lab Test 10/25/18  10/17/18   0831   WBC  7.1  7.6   RBC  3.29*  3.06*   HGB  11.0*  10.3*   HCT  33.1*  31.2*   MCV  101*  102*   MCH  33.4*  33.7*   MCHC  33.2  33.0   RDW  12.8  12.5   PLT  319  239       Last Basic Metabolic Panel:  Recent Labs   Lab Test 10/25/18  10/18/18   0906  10/17/18   1210   NA   --   142  141   POTASSIUM   --   3.5  3.6   CHLORIDE   --   111*  110*   CHANG   --   8.2*  8.0* "   CO2   --   24  22   BUN   --   11  14   CR  0.84  0.72  0.80   GLC   --   101*  153*       Liver Function Studies -   Recent Labs   Lab Test 10/25/18  10/18/18   0906  10/17/18   1210   PROTTOTAL   --   6.0*  6.3*   ALBUMIN   --   2.5*  2.5*   BILITOTAL   --   0.4  0.4   ALKPHOS   --   74  82   AST  18  18  24   ALT   --   20  22       TSH   Date Value Ref Range Status   03/27/2017 1.06 0.40 - 4.00 mU/L Final     Lab Results   Component Value Date    A1C 4.8 09/24/2018    A1C 5.2 07/28/2014     Assessment/Plan:  (R78.81) Bacteremia  (primary encounter diagnosis)  Comment: Afebrile, vital signs stable aside from some hypertension this moring. Ertapenem is completed. Hospital notes recommend follow up Urine culture.   Plan: [facility]. Will send results to Dr. Valverde    (Z98.890) History of lumbar laminectomy for spinal cord decompression  Comment: he is doing well with therapy and would like to discharge on Thursday.   Plan: Continue to monitor pain control & encourage mobility    (M48.07) Spinal stenosis of lumbosacral region  Comment: lumbar laminectomy for spinal cord decompression and insertion of Coflex device 10/1. Patient endorses radiculopathy from the lumbar stenosis; reports that pain in right leg has improved.   Plan: Continue PT, Tylenol available for pain as needed.     (R33.9) Urinary retention  Comment: Failed voiding trial with urology today (10/26), king replaced.   Plan: Next appointment with urology in 1 week to re-evaluate.     (I48.2) Chronic atrial fibrillation (H)  Comment: Still irregularly irregular; otherwise asymptomatic  Plan: Continue Metoprolol & Apixiban for DVT prophylaxis.     (R53.81) Physical deconditioning  Comment; family would like to have patient discharge later this week. Therapy report he is walking up 250'   Plan: Continue PT; recommend home PT for continued improvement.               Electronically signed by  YAMILKA Das CNP

## 2018-10-31 ENCOUNTER — DISCHARGE SUMMARY NURSING HOME (OUTPATIENT)
Dept: GERIATRICS | Facility: CLINIC | Age: 83
End: 2018-10-31
Payer: COMMERCIAL

## 2018-10-31 VITALS
RESPIRATION RATE: 18 BRPM | TEMPERATURE: 98.4 F | DIASTOLIC BLOOD PRESSURE: 83 MMHG | HEIGHT: 68 IN | SYSTOLIC BLOOD PRESSURE: 133 MMHG | BODY MASS INDEX: 23.98 KG/M2 | OXYGEN SATURATION: 97 % | WEIGHT: 158.2 LBS | HEART RATE: 83 BPM

## 2018-10-31 DIAGNOSIS — R53.81 PHYSICAL DECONDITIONING: ICD-10-CM

## 2018-10-31 DIAGNOSIS — K50.819 CROHN'S DISEASE OF SMALL AND LARGE INTESTINES WITH COMPLICATION (H): ICD-10-CM

## 2018-10-31 DIAGNOSIS — M48.07 SPINAL STENOSIS OF LUMBOSACRAL REGION: ICD-10-CM

## 2018-10-31 DIAGNOSIS — R78.81 BACTEREMIA: Primary | ICD-10-CM

## 2018-10-31 DIAGNOSIS — R33.9 URINARY RETENTION: ICD-10-CM

## 2018-10-31 DIAGNOSIS — N40.1 BENIGN NON-NODULAR PROSTATIC HYPERPLASIA WITH LOWER URINARY TRACT SYMPTOMS: ICD-10-CM

## 2018-10-31 DIAGNOSIS — I10 BENIGN ESSENTIAL HYPERTENSION: ICD-10-CM

## 2018-10-31 DIAGNOSIS — I48.20 CHRONIC ATRIAL FIBRILLATION (H): ICD-10-CM

## 2018-10-31 DIAGNOSIS — Z98.890 HISTORY OF LUMBAR LAMINECTOMY FOR SPINAL CORD DECOMPRESSION: ICD-10-CM

## 2018-10-31 PROCEDURE — 99316 NF DSCHRG MGMT 30 MIN+: CPT | Performed by: NURSE PRACTITIONER

## 2018-10-31 RX ORDER — TAMSULOSIN HYDROCHLORIDE 0.4 MG/1
0.4 CAPSULE ORAL DAILY
Qty: 90 CAPSULE | Refills: 3 | Status: SHIPPED | OUTPATIENT
Start: 2018-10-31 | End: 2019-04-17

## 2018-10-31 RX ORDER — APIXABAN 5 MG/1
5 TABLET, FILM COATED ORAL 2 TIMES DAILY
Qty: 30 TABLET | Refills: 0 | Status: SHIPPED | OUTPATIENT
Start: 2018-10-31 | End: 2018-11-20

## 2018-10-31 RX ORDER — METOPROLOL TARTRATE 50 MG
TABLET ORAL
Qty: 180 TABLET | Refills: 0 | Status: SHIPPED | OUTPATIENT
Start: 2018-10-31 | End: 2019-10-02

## 2018-10-31 RX ORDER — FERROUS SULFATE 325(65) MG
325 TABLET ORAL DAILY
Qty: 100 TABLET | Refills: 0 | Status: SHIPPED | OUTPATIENT
Start: 2018-10-31 | End: 2019-01-16

## 2018-10-31 NOTE — LETTER
10/31/2018        RE: Dawson Jones  [address]  [place] [address]          [place] [facility] DISCHARGE SUMMARY    PATIENT'S NAME: Dawson Jones  YOB: 1930  MEDICAL RECORD NUMBER:  [id number]  Place of Service where encounter took place:  [facility] [[id number]]    PRIMARY CARE PROVIDER AND CLINIC RESPONSIBLE AFTER TRANSFER: Judson Mcadams [address] / [place] [address]     TRANSFERRING PROVIDERS: YAMILKA Das CNP, Joelle Broussard MD  DATE OF SNF ADMISSION:  October / 19 / 2018  DATE OF SNF (anticipated) DISCHARGE: November / 01 / 2018  DISCHARGE DISPOSITION: [facility] Provider   RECENT HOSPITALIZATION/ED:  [facility] stay 10/16/2018 to 10/19/2018.     CODE STATUS/ADVANCE DIRECTIVES DISCUSSION:   CPR/Full code    No Known Allergies  Condition on Discharge:  Improving.  Function:  Needs some assist with ADLs. Walking up to 300' with walker.   Cognitive Scores: SLUMS 17/30 and CPT 4.7/5.6    Equipment: walker    DISCHARGE DIAGNOSIS:   1. Bacteremia    2. History of lumbar laminectomy for spinal cord decompression    3. Spinal stenosis of lumbosacral region    4. Urinary retention    5. Chronic atrial fibrillation (H)    6. Physical deconditioning        HPI Nursing Facility Course:  HPI information obtained from: facility chart records, facility staff, patient report and [facility] chart review.         Bacteremia  Patient transferred to TCU following hospital stay 10/16-10/19 due to positive blood cultures. Patient presented to ED from home after blood cultures done at PCP (due to chills) were positive. He was started on Ivanz for E coli ESBL.   Follow up blood cultures were negative. ID was consulted and recommends two weeks of ertapenem (through 10/29).   Today he is comfortable, afebrile.   We did collect a follow up UA . Will send to Dr Valverde.     History of lumbar laminectomy for spinal cord  decompression  Spinal stenosis of lumbosacral region  Surgery, which included insertion of Coflex device on 10/1 due to radiculopathy in right leg. He now has pain (partiuclarly in his right leg), but states that has improved with working with therapy. Has Tylenol available for pain but is not taking anything.     Urinary retention  Post back surgery unable to void. He did return to urology for TOV but was still unsuccessful. He saw Dr. Valverde 10/26 with another failed trial at voiding - king replaced again. He will return to clinic in one week for another TOV.     Chronic atrial fibrillation (H)  He continues on eliquis and metoprolol. BP elevated to 154/89 this morning (typically runs 90's-110's/60's-80's). Continue to monitor and adjust medications as needed. Asymptotic.     Physical deconditioning  Lives with wife in house-bed and bath on same level;stairs to enter home;stairs within home. There is some concern for cognitive impairment (Slums 17/30), which is concerning for dementia. TREJO score is 32/52 indicating that he requires assistance with walking (i.e. falls risk if attempting to navigate independently). Able to walk 250 feet SBA with PT. Gait speed only 0.41 m/s, TUG 30.5 seconds.        PAST MEDICAL HISTORY:  has a past medical history of Atrial fibrillation (H); Cardiomyopathy (H); Crohn's disease of small and large intestines with complication (H); and Hyperlipidemia.    DISCHARGE MEDICATIONS:  Current Outpatient Prescriptions   Medication Sig Dispense Refill     acetaminophen (TYLENOL) 325 MG tablet Take 650 mg by mouth every 8 hours as needed for mild pain       cyanocobalamin (VITAMIN B12) 1000 MCG/ML injection Inject 1 mL (1,000 mcg) into the muscle every 30 days 3 mL 11     diphenoxylate-atropine (LOMOTIL) 2.5-0.025 MG per tablet Take 1 tablet by mouth daily as needed for diarrhea 10 tablet 0     ELIQUIS 5 MG tablet Take 1 tablet (5 mg) by mouth 2 times daily 30 tablet 0     ferrous sulfate  "(IRON) 325 (65 Fe) MG tablet Take 1 tablet (325 mg) by mouth daily 100 tablet 0     metoprolol tartrate (LOPRESSOR) 50 MG tablet TAKE 1 TABLET(50 MG) BY MOUTH TWICE DAILY 180 tablet 3     opium tincture 10 MG/ML (1%) liquid Take 0.2 mLs (2 mg) by mouth every 6 hours as needed for diarrhea (4 drops) 118 mL 0     order for DME Equipment being ordered: incentive spirometer 1 Units 0     order for DME Equipment being ordered: Walker Wheels () and Walker ()  Treatment Diagnosis: DIfficulty with gait 1 each 0     tamsulosin (FLOMAX) 0.4 MG capsule Take 1 capsule (0.4 mg) by mouth daily 90 capsule 3       MEDICATION CHANGES/RATIONALE:   none  Controlled medications sent with patient:   not applicable/none     ROS:    4 point ROS including Respiratory, CV, GI and , other than that noted in the HPI,  is negative    Physical Exam:   Vitals: /83  Pulse 83  Temp 98.4  F (36.9  C)  Resp 18  Ht 5' 8\" (1.727 m)  Wt 158 lb 3.2 oz (71.8 kg)  SpO2 97%  BMI 24.05 kg/m2  BMI= Body mass index is 24.05 kg/(m^2).    GENERAL APPEARANCE:  Alert, in no distress  ENT:  Mouth and posterior oropharynx normal, moist mucous membranes, hearing acuity adequate   EYES:  EOM, conjunctivae, lids, pupils and irises normal  NECK:  No adenopathy,masses or thyromegaly  RESP:  respiratory effort and palpation of chest normal, no respiratory distress, Lung sounds end insp rales R>L  CV:  Palpation and auscultation of heart done, irregularly irregular rate and rhythm, no murmur, rub or gallop, no edema, peripheral pulses 2+ in BUE, good cap refill  ABDOMEN:  normal bowel sounds, soft, nontender, no hepatosplenomegaly or other masses  : Leg-bag king in place  M/S:   Gait and station not observed, Digits and nails normal   SKIN:  Inspection/Palpation of skin and subcutaneous tissue no rash  NEURO: 2-12 in normal limits and at patient's baseline  PSYCH:  insight and judgement, memory mild-moderate cognitive impairment, affect and " mood normal      DISCHARGE PLAN:  Occupational Therapy, Physical Therapy, Registered Nurse, Home Health Aide and From:  [facility]  Patient instructed to follow-up with:  PCP in 7 days      Current [place] scheduled appointments:  Future Appointments  Date Time Provider Department Center   11/6/2018 9:30 AM CS NURSE [facility]   1/16/2019 10:00 AM Judson Mcadams MD [facility]       MTM referral needed and placed by this provider: No    Pending labs: [facility] labs   CBC RESULTS:   Recent Labs   Lab Test 10/25/18  10/17/18   0831   WBC  7.1  7.6   RBC  3.29*  3.06*   HGB  11.0*  10.3*   HCT  33.1*  31.2*   MCV  101*  102*   MCH  33.4*  33.7*   MCHC  33.2  33.0   RDW  12.8  12.5   PLT  319  239       Last Basic Metabolic Panel:  Recent Labs   Lab Test 10/25/18  10/18/18   0906  10/17/18   1210   NA   --   142  141   POTASSIUM   --   3.5  3.6   CHLORIDE   --   111*  110*   CHANG   --   8.2*  8.0*   CO2   --   24  22   BUN   --   11  14   CR  0.84  0.72  0.80   GLC   --   101*  153*       Liver Function Studies -   Recent Labs   Lab Test 10/25/18  10/18/18   0906  10/17/18   1210   PROTTOTAL   --   6.0*  6.3*   ALBUMIN   --   2.5*  2.5*   BILITOTAL   --   0.4  0.4   ALKPHOS   --   74  82   AST  18  18  24   ALT   --   20  22       TSH   Date Value Ref Range Status   03/27/2017 1.06 0.40 - 4.00 mU/L Final   ]    Lab Results   Component Value Date    A1C 4.8 09/24/2018    A1C 5.2 07/28/2014       Discharge Treatments:catheter cares    TOTAL DISCHARGE TIME:   Greater than 30 minutes  Electronically signed by:  YAMILKA Das CNP      Sincerely,        YAMILKA Das CNP

## 2018-10-31 NOTE — PROGRESS NOTES
[facility] DISCHARGE SUMMARY    PATIENT'S NAME: Dawson Jones  YOB: 1930  MEDICAL RECORD NUMBER:  [id number]  Place of Service where encounter took place:  [facility] [[id number]]    PRIMARY CARE PROVIDER AND CLINIC RESPONSIBLE AFTER TRANSFER: Judson Mcadams [facility] [address] / [place] [address]     TRANSFERRING PROVIDERS: YAMILKA Das CNP, Joelle Broussard MD  DATE OF SNF ADMISSION:  October / 19 / 2018  DATE OF SNF (anticipated) DISCHARGE: November / 01 / 2018  DISCHARGE DISPOSITION: [facility] Provider   RECENT HOSPITALIZATION/ED:  [facility] Hospital stay 10/16/2018 to 10/19/2018.     CODE STATUS/ADVANCE DIRECTIVES DISCUSSION:   CPR/Full code    No Known Allergies  Condition on Discharge:  Improving.  Function:  Needs some assist with ADLs. Walking up to 300' with walker.   Cognitive Scores: SLUMS 17/30 and CPT 4.7/5.6    Equipment: walker    DISCHARGE DIAGNOSIS:   1. Bacteremia    2. History of lumbar laminectomy for spinal cord decompression    3. Spinal stenosis of lumbosacral region    4. Urinary retention    5. Chronic atrial fibrillation (H)    6. Physical deconditioning        HPI Nursing Facility Course:  HPI information obtained from: facility chart records, facility staff, patient report and [facility] chart review.         Bacteremia  Patient transferred to TCU following hospital stay 10/16-10/19 due to positive blood cultures. Patient presented to ED from home after blood cultures done at PCP (due to chills) were positive. He was started on Ivanz for E coli ESBL.   Follow up blood cultures were negative. ID was consulted and recommends two weeks of ertapenem (through 10/29).   Today he is comfortable, afebrile.   We did collect a follow up UA . Will send to Dr Valverde.     History of lumbar laminectomy for spinal cord decompression  Spinal stenosis of lumbosacral region  Surgery, which included insertion of  Coflex device on 10/1 due to radiculopathy in right leg. He now has pain (partiuclarly in his right leg), but states that has improved with working with therapy. Has Tylenol available for pain but is not taking anything.     Urinary retention  Post back surgery unable to void. He did return to urology for TOV but was still unsuccessful. He saw Dr. Valverde 10/26 with another failed trial at voiding - king replaced again. He will return to clinic in one week for another TOV.     Chronic atrial fibrillation (H)  He continues on eliquis and metoprolol. BP elevated to 154/89 this morning (typically runs 90's-110's/60's-80's). Continue to monitor and adjust medications as needed. Asymptotic.     Physical deconditioning  Lives with wife in house-bed and bath on same level;stairs to enter home;stairs within home. There is some concern for cognitive impairment (Slums 17/30), which is concerning for dementia. TREJO score is 32/52 indicating that he requires assistance with walking (i.e. falls risk if attempting to navigate independently). Able to walk 250 feet SBA with PT. Gait speed only 0.41 m/s, TUG 30.5 seconds.        PAST MEDICAL HISTORY:  has a past medical history of Atrial fibrillation (H); Cardiomyopathy (H); Crohn's disease of small and large intestines with complication (H); and Hyperlipidemia.    DISCHARGE MEDICATIONS:  Current Outpatient Prescriptions   Medication Sig Dispense Refill     acetaminophen (TYLENOL) 325 MG tablet Take 650 mg by mouth every 8 hours as needed for mild pain       cyanocobalamin (VITAMIN B12) 1000 MCG/ML injection Inject 1 mL (1,000 mcg) into the muscle every 30 days 3 mL 11     diphenoxylate-atropine (LOMOTIL) 2.5-0.025 MG per tablet Take 1 tablet by mouth daily as needed for diarrhea 10 tablet 0     ELIQUIS 5 MG tablet Take 1 tablet (5 mg) by mouth 2 times daily 30 tablet 0     ferrous sulfate (IRON) 325 (65 Fe) MG tablet Take 1 tablet (325 mg) by mouth daily 100 tablet 0     metoprolol  "tartrate (LOPRESSOR) 50 MG tablet TAKE 1 TABLET(50 MG) BY MOUTH TWICE DAILY 180 tablet 3     opium tincture 10 MG/ML (1%) liquid Take 0.2 mLs (2 mg) by mouth every 6 hours as needed for diarrhea (4 drops) 118 mL 0     order for DME Equipment being ordered: incentive spirometer 1 Units 0     order for DME Equipment being ordered: Walker Wheels () and Walker ()  Treatment Diagnosis: DIfficulty with gait 1 each 0     tamsulosin (FLOMAX) 0.4 MG capsule Take 1 capsule (0.4 mg) by mouth daily 90 capsule 3       MEDICATION CHANGES/RATIONALE:   none  Controlled medications sent with patient:   not applicable/none     ROS:    4 point ROS including Respiratory, CV, GI and , other than that noted in the HPI,  is negative    Physical Exam:   Vitals: /83  Pulse 83  Temp 98.4  F (36.9  C)  Resp 18  Ht 5' 8\" (1.727 m)  Wt 158 lb 3.2 oz (71.8 kg)  SpO2 97%  BMI 24.05 kg/m2  BMI= Body mass index is 24.05 kg/(m^2).    GENERAL APPEARANCE:  Alert, in no distress  ENT:  Mouth and posterior oropharynx normal, moist mucous membranes, hearing acuity adequate   EYES:  EOM, conjunctivae, lids, pupils and irises normal  NECK:  No adenopathy,masses or thyromegaly  RESP:  respiratory effort and palpation of chest normal, no respiratory distress, Lung sounds end insp rales R>L  CV:  Palpation and auscultation of heart done, irregularly irregular rate and rhythm, no murmur, rub or gallop, no edema, peripheral pulses 2+ in BUE, good cap refill  ABDOMEN:  normal bowel sounds, soft, nontender, no hepatosplenomegaly or other masses  : Leg-bag king in place  M/S:   Gait and station not observed, Digits and nails normal   SKIN:  Inspection/Palpation of skin and subcutaneous tissue no rash  NEURO: 2-12 in normal limits and at patient's baseline  PSYCH:  insight and judgement, memory mild-moderate cognitive impairment, affect and mood normal      DISCHARGE PLAN:  Occupational Therapy, Physical Therapy, Registered Nurse, Home " Health Aide and From:  [facility]  Patient instructed to follow-up with:  PCP in 7 days      Current [facility] scheduled appointments:  Future Appointments  Date Time Provider Department Center   11/6/2018 9:30 AM CS NURSE [facility]   1/16/2019 10:00 AM Judson Mcadams MD [facility]       MTM referral needed and placed by this provider: No    Pending labs:   SNF labs   CBC RESULTS:   Recent Labs   Lab Test 10/25/18  10/17/18   0831   WBC  7.1  7.6   RBC  3.29*  3.06*   HGB  11.0*  10.3*   HCT  33.1*  31.2*   MCV  101*  102*   MCH  33.4*  33.7*   MCHC  33.2  33.0   RDW  12.8  12.5   PLT  319  239       Last Basic Metabolic Panel:  Recent Labs   Lab Test 10/25/18  10/18/18   0906  10/17/18   1210   NA   --   142  141   POTASSIUM   --   3.5  3.6   CHLORIDE   --   111*  110*   CHANG   --   8.2*  8.0*   CO2   --   24  22   BUN   --   11  14   CR  0.84  0.72  0.80   GLC   --   101*  153*       Liver Function Studies -   Recent Labs   Lab Test 10/25/18  10/18/18   0906  10/17/18   1210   PROTTOTAL   --   6.0*  6.3*   ALBUMIN   --   2.5*  2.5*   BILITOTAL   --   0.4  0.4   ALKPHOS   --   74  82   AST  18  18  24   ALT   --   20  22       TSH   Date Value Ref Range Status   03/27/2017 1.06 0.40 - 4.00 mU/L Final   ]    Lab Results   Component Value Date    A1C 4.8 09/24/2018    A1C 5.2 07/28/2014       Discharge Treatments:catheter cares    TOTAL DISCHARGE TIME:   Greater than 30 minutes  Electronically signed by:  YAMILKA Das CNP

## 2018-11-02 ENCOUNTER — TELEPHONE (OUTPATIENT)
Dept: FAMILY MEDICINE | Facility: CLINIC | Age: 83
End: 2018-11-02

## 2018-11-02 ENCOUNTER — PATIENT OUTREACH (OUTPATIENT)
Dept: INTERNAL MEDICINE | Facility: CLINIC | Age: 83
End: 2018-11-02

## 2018-11-02 NOTE — PROGRESS NOTES
Clinic Care Coordination Contact  Care Coordination Communication    Referral Source: SNF/TCU    DATE OF SNF ADMISSION:  October / 19 / 2018 [facility]  DATE OF SNF (anticipated) DISCHARGE: November / 01 / 2018  DISCHARGE DISPOSITION: [facility] Provider   RECENT HOSPITALIZATION/ED:  [facility] Hospital stay 10/16/2018 to 10/19/2018.       Patient transferred to TCU following hospital stay 10/16-10/19 due to positive blood cultures.  Recent lumbar laminectomy for spinal cord decompression.  Hx of urinary retention, failed void trial - patient discharged home with king catheter in place.    Home Care Contact:              Home Care Agency: [facility] RN/PT/OT/HHA              Contact name () and phone number: Erika [phone]              Care Coordination contacted home care:  Via email with request to be updated on patient status and discharge plans.              Anticipated start of care date: 11/2/18- per [facility]      Plan: RN/SW Care Coordinator will await notification from home care staff informing RN/SW Care Coordinator of patients discharge plans/needs. RN/SW Care Coordinator will review chart and outreach to home care every 4 weeks and as needed.      Joleen Colin RN-BSN   Care Coordination  Phone:  [phone]  Email: [email or web address]  [facility]

## 2018-11-04 LAB
BACTERIA SPEC CULT: NORMAL
Lab: NORMAL
SPECIMEN SOURCE: NORMAL

## 2018-11-05 ENCOUNTER — TELEPHONE (OUTPATIENT)
Dept: FAMILY MEDICINE | Facility: CLINIC | Age: 83
End: 2018-11-05

## 2018-11-05 NOTE — TELEPHONE ENCOUNTER
Reason for Call:  Home Health Care    Patricia with [facility] called regarding (reason for call): orders    Orders are needed for this patient.     PT: one more visit for reassessment the week of 11/11    OT:     Skilled Nursing:     Pt Provider:   Dr. Mcadams  Phone Number Homecare Nurse can be reached at:   [phone]  Can we leave a detailed message on this number?   yes  Phone number patient can be reached at: NA  Best Time: any    Call taken on 11/5/2018 at 3:47 PM by Sangeeta Hill

## 2018-11-05 NOTE — TELEPHONE ENCOUNTER
Returned call. OK'd requested orders.  Orders will be faxed to PCP for review and signature.   Ivette Mcduffie RN

## 2018-11-09 ENCOUNTER — OFFICE VISIT (OUTPATIENT)
Dept: FAMILY MEDICINE | Facility: CLINIC | Age: 83
End: 2018-11-09
Payer: COMMERCIAL

## 2018-11-09 VITALS
OXYGEN SATURATION: 99 % | DIASTOLIC BLOOD PRESSURE: 65 MMHG | BODY MASS INDEX: 24.2 KG/M2 | HEART RATE: 86 BPM | WEIGHT: 159.7 LBS | TEMPERATURE: 97.1 F | SYSTOLIC BLOOD PRESSURE: 112 MMHG | HEIGHT: 68 IN

## 2018-11-09 DIAGNOSIS — M48.07 SPINAL STENOSIS OF LUMBOSACRAL REGION: ICD-10-CM

## 2018-11-09 DIAGNOSIS — R33.9 URINARY RETENTION: ICD-10-CM

## 2018-11-09 DIAGNOSIS — R31.9 URINARY TRACT INFECTION WITH HEMATURIA, SITE UNSPECIFIED: Primary | ICD-10-CM

## 2018-11-09 DIAGNOSIS — R78.81 BACTEREMIA: ICD-10-CM

## 2018-11-09 DIAGNOSIS — N39.0 URINARY TRACT INFECTION WITH HEMATURIA, SITE UNSPECIFIED: Primary | ICD-10-CM

## 2018-11-09 PROCEDURE — 99213 OFFICE O/P EST LOW 20 MIN: CPT | Performed by: INTERNAL MEDICINE

## 2018-11-09 NOTE — PROGRESS NOTES
SUBJECTIVE:   Dawson Jones is a 88 year old male who presents to clinic today for the following health issues:          Hospital Follow-up Visit:    Hospital/Nursing Home/IP Rehab Facility: [facility]   Date of Admission: 10-  Date of Discharge: 11-1-2018  Reason(s) for Admission: Bacteremia            Problems taking medications regularly:  None       Medication changes since discharge: None       Problems adhering to non-medication therapy:  None    Summary of hospitalization:  [facility] discharge summary reviewed  Diagnostic Tests/Treatments reviewed.  Follow up needed: urology   Other Healthcare Providers Involved in Patient s Care:         None  Update since discharge: improved.     Post Discharge Medication Reconciliation: discharge medications reconciled, continue medications without change.  Plan of care communicated with patient and family     Coding guidelines for this visit:  Type of Medical   Decision Making Face-to-Face Visit       within 7 Days of discharge Face-to-Face Visit        within 14 days of discharge   Moderate Complexity [id number]   High Complexity [id number]          Has felt well since TCU discharge   Still needs [place]   Is following with Dr. Valverde  May need laser TURP      Problem list and histories reviewed & adjusted, as indicated.  Additional history: as documented    Patient Active Problem List   Diagnosis     Atrial fibrillation (H)     Cardiomyopathy, unspecified type (H)     Crohn's disease of small and large intestines with complication (H)     Spinal stenosis of lumbosacral region     Vitamin B12 deficiency (non anemic)     Chronic pain syndrome     PAD (peripheral artery disease) (H)     Spinal stenosis     Bacteremia     History of lumbar laminectomy for spinal cord decompression     Urinary retention     Physical deconditioning     Past Surgical History:   Procedure Laterality Date     APPENDECTOMY       DECOMPRESSION LUMBAR ONE LEVEL N/A  10/1/2018    Procedure: DECOMPRESSION LUMBAR ONE LEVEL;  DECOMPRESSION L2-3 WITH COFLEX DEVICE  ;  Surgeon: Bert Reynolds MD;  Location: [facility] OR     ORTHOPEDIC SURGERY  10/01/2018    Bilateral L2-3 decompression and insertion of a Coflex device       Social History   Substance Use Topics     Smoking status: Former Smoker     Smokeless tobacco: Never Used      Comment: 40 years ago     Alcohol use 4.2 oz/week     7 Standard drinks or equivalent per week      Comment: 1 wine an evening     Family History   Problem Relation Age of Onset     Family History Negative Mother      Cardiac Sudden Death Father      Family History Negative Brother      Other - See Comments Sister      colon issues     Family History Negative Son      Family History Negative Brother      Family History Negative Son      HEART DISEASE No family hx of          Current Outpatient Prescriptions   Medication Sig Dispense Refill     acetaminophen (TYLENOL) 325 MG tablet Take 650 mg by mouth every 8 hours as needed for mild pain       cyanocobalamin (VITAMIN B12) 1000 MCG/ML injection Inject 1 mL (1,000 mcg) into the muscle every 30 days 3 mL 11     diphenoxylate-atropine (LOMOTIL) 2.5-0.025 MG per tablet Take 1 tablet by mouth daily as needed for diarrhea 10 tablet 0     ELIQUIS 5 MG tablet Take 1 tablet (5 mg) by mouth 2 times daily 30 tablet 0     metoprolol tartrate (LOPRESSOR) 50 MG tablet TAKE 1 TABLET(50 MG) BY MOUTH TWICE DAILY 180 tablet 0     opium tincture 10 MG/ML (1%) liquid Take 0.2 mLs (2 mg) by mouth every 6 hours as needed for diarrhea (4 drops) 118 mL 0     order for DME Equipment being ordered: incentive spirometer 1 Units 0     tamsulosin (FLOMAX) 0.4 MG capsule Take 1 capsule (0.4 mg) by mouth daily 90 capsule 3     ferrous sulfate (IRON) 325 (65 Fe) MG tablet Take 1 tablet (325 mg) by mouth daily (Patient not taking: Reported on 11/9/2018) 100 tablet 0     order for DME Equipment being ordered: Walker Wheels () and Walker  "()  Treatment Diagnosis: DIfficulty with gait (Patient not taking: Reported on 11/9/2018) 1 each 0     No Known Allergies    Reviewed and updated as needed this visit by clinical staff       Reviewed and updated as needed this visit by Provider         ROS:  Constitutional, HEENT, cardiovascular, pulmonary, gi and gu systems are negative, except as otherwise noted.    OBJECTIVE:     /65 (BP Location: Right arm, Cuff Size: Adult Regular)  Pulse 86  Temp 97.1  F (36.2  C) (Oral)  Ht 5' 8\" (1.727 m)  Wt 159 lb 11.2 oz (72.4 kg)  SpO2 99%  BMI 24.28 kg/m2  Body mass index is 24.28 kg/(m^2).  GENERAL: healthy, alert and no distress  NECK: no adenopathy, no asymmetry, masses, or scars and thyroid normal to palpation  RESP: lungs clear to auscultation - no rales, rhonchi or wheezes  CV: The heart has an irregularly irregular rhythm with a normal rate  ABDOMEN: soft, nontender, no hepatosplenomegaly, no masses and bowel sounds normal  MS: Trace bilateral lower extremity edema   NEURO: Normal strength and tone, mentation intact and speech normal  PSYCH: mentation appears normal, affect normal/bright    Diagnostic Test Results:  Results for orders placed or performed in visit on 10/30/18   Urine Culture Aerobic Bacterial   Result Value Ref Range    Specimen Description Catheterized Urine     Special Requests Specimen received in preservative     Culture Micro       <1000 colonies/mL  mixed urogenital jose  Susceptibility testing not routinely done         ASSESSMENT/PLAN:         1. Urinary tract infection with hematuria, site unspecified  Treated    2. Bacteremia  Negative follow up blood cultures    3. Urinary retention  Continue follow up with urology; may need laser surgery for NUNN    4. Spinal stenosis of lumbosacral region  Status post spinal stenosis surgery           Judson Mcadams MD  [facility]  "

## 2018-11-09 NOTE — MR AVS SNAPSHOT
After Visit Summary   11/9/2018    Dawson Jones    MRN: [id number]           Patient Information     Date Of Birth          5/5/1930        Visit Information        Provider Department      11/9/2018 2:30 PM Judson Mcadams MD [facility]        Today's Diagnoses     Urinary tract infection with hematuria, site unspecified    -  1    Bacteremia        Urinary retention        Spinal stenosis of lumbosacral region           Follow-ups after your visit        Follow-up notes from your care team     Return in about 3 months (around 2/9/2019) for routine recheck .      Your next 10 appointments already scheduled     Jan 16, 2019 10:00 AM CST   Office Visit with Judson [facility]adams MD   [facility]    [address] [address] [phone]           Bring a current list of meds and any records pertaining to this visit. For Physicals, please bring immunization records and any forms needing to be filled out. Please arrive 10 minutes early to complete paperwork.              Who to contact     If you have questions or need follow up information about today's clinic visit or your schedule please contact [facility] directly at [phone].  Normal or non-critical lab and imaging results will be communicated to you by MyChart, letter or phone within 4 business days after the clinic has received the results. If you do not hear from us within 7 days, please contact the clinic through [employer] or phone. If you have a critical or abnormal lab result, we will notify you by phone as soon as possible.  Submit refill requests through e[employer]ight or call your pharmacy and they will forward the refill request to us. Please allow 3 business days for your refill to be completed.          Additional Information About Your Visit        MyChart Information     eSight gives you secure access to your electronic health record. If you see a primary care  "provider, you can also send messages to your care team and make appointments. If you have questions, please call your primary care clinic.  If you do not have a primary care provider, please call [phone] and they will assist you.        Care EveryWhere ID     This is your Care EveryWhere ID. This could be used by other organizations to access your [place] medical records  [id number]        Your Vitals Were     Pulse Temperature Height Pulse Oximetry BMI (Body Mass Index)       86 97.1  F (36.2  C) (Oral) 5' 8\" (1.727 m) 99% 24.28 kg/m2        Blood Pressure from Last 3 Encounters:   11/09/18 112/65   10/31/18 133/83   10/30/18 133/83    Weight from Last 3 Encounters:   11/09/18 159 lb 11.2 oz (72.4 kg)   10/31/18 158 lb 3.2 oz (71.8 kg)   10/30/18 158 lb 3.2 oz (71.8 kg)              We Performed the Following     PAF COMPLETED        Primary Care Provider Office Phone # Fax #    Judson Mcadams [phone] [address]  [facility] [address]        Equal Access to Services     [facility]: Hadii amada alvarado hadsummero Somelanieali, waaxda luqadaha, qaybta kaalmada adeegyada, ky cardona . So [facility] [phone].    ATENCIÓN: Si habla español, tiene a ambrose disposición servicios gratuitos de asistencia lingüística. Llame al [phone].    We comply with applicable federal civil rights laws and [place] laws. We do not discriminate on the basis of race, color, national origin, age, disability, sex, sexual orientation, or gender identity.            Thank you!     Thank you for choosing [facility]  for your care. Our goal is always to provide you with excellent care. Hearing back from our patients is one way we can continue to improve our services. Please take a few minutes to complete the written survey that you may receive in the mail after your visit with us. Thank you!             Your Updated Medication List - Protect others around you: Learn how " to safely use, store and throw away your medicines at www.disposemymeds.org.          This list is accurate as of 11/9/18  3:21 PM.  Always use your most recent med list.                   Brand Name Dispense Instructions for use Diagnosis    acetaminophen 325 MG tablet    TYLENOL     Take 650 mg by mouth every 8 hours as needed for mild pain        cyanocobalamin 1000 MCG/ML injection    VITAMIN B12    3 mL    Inject 1 mL (1,000 mcg) into the muscle every 30 days    Vitamin B12 deficiency (non anemic)       diphenoxylate-atropine 2.5-0.025 MG per tablet    LOMOTIL    10 tablet    Take 1 tablet by mouth daily as needed for diarrhea    Crohn's disease of small and large intestines with complication (H)       ELIQUIS 5 MG tablet   Generic drug:  apixaban ANTICOAGULANT     30 tablet    Take 1 tablet (5 mg) by mouth 2 times daily    Chronic atrial fibrillation (H)       ferrous sulfate 325 (65 Fe) MG tablet    IRON    100 tablet    Take 1 tablet (325 mg) by mouth daily    Crohn's disease of small and large intestines with complication (H)       metoprolol tartrate 50 MG tablet    LOPRESSOR    180 tablet    TAKE 1 TABLET(50 MG) BY MOUTH TWICE DAILY    Benign essential hypertension       opium tincture 10 MG/ML (1%) liquid     118 mL    Take 0.2 mLs (2 mg) by mouth every 6 hours as needed for diarrhea (4 drops)    Crohn's disease of small and large intestines with complication (H)       order for DME     1 each    Equipment being ordered: Walker Wheels () and Walker () Treatment Diagnosis: DIfficulty with gait    S/P laminectomy       order for DME     1 Units    Equipment being ordered: incentive spirometer    Atelectasis       tamsulosin 0.4 MG capsule    FLOMAX    90 capsule    Take 1 capsule (0.4 mg) by mouth daily    Benign non-nodular prostatic hyperplasia with lower urinary tract symptoms

## 2018-11-20 DIAGNOSIS — I48.20 CHRONIC ATRIAL FIBRILLATION (H): ICD-10-CM

## 2018-11-20 NOTE — TELEPHONE ENCOUNTER
ELIQUIS 5 MG tablet 30 tablet 0 10/31/2018         Last Written Prescription Date:  10/31/2018  Last Fill Quantity: 30,  # refills: 0   Last office visit: 11/09/2018 with prescribing provider:     Future Office Visit: 01/16/2019  Next 5 appointments (look out 90 days)     Jan 16, 2019 10:00 AM CST   Office Visit with Judson Mcadams MD   [facility]    [address] [address] [phone]                 Requested Prescriptions   Pending Prescriptions Disp Refills     ELIQUIS 5 MG tablet [Pharmacy Med Name: ELIQUIS 5MG TABLETS] 30 tablet 0     Sig: TAKE 1 TABLET(5 MG) BY MOUTH TWICE DAILY    Direct Oral Anticoagulant Agents Failed    11/20/2018 12:28 PM       Failed - Patient is 18-79 years of age       Passed - Normal Platelets on file in past 12 months    Recent Labs   Lab Test 10/25/18   PLT  319              Passed - Medication is active on med list       Passed - Serum creatinine less than or equal to 1.4 on file in past 12 mos    Recent Labs   Lab Test 10/25/18   CR  0.84            Passed - Weight is greater than 60 kg for the past year    Wt Readings from Last 3 Encounters:   11/09/18 159 lb 11.2 oz (72.4 kg)   10/31/18 158 lb 3.2 oz (71.8 kg)   10/30/18 158 lb 3.2 oz (71.8 kg)            Passed - Recent (6 mo) or future (30 days) visit within the authorizing provider's specialty

## 2018-11-21 RX ORDER — APIXABAN 5 MG/1
TABLET, FILM COATED ORAL
Qty: 30 TABLET | Refills: 0 | Status: SHIPPED | OUTPATIENT
Start: 2018-11-21 | End: 2019-07-17

## 2018-11-21 NOTE — TELEPHONE ENCOUNTER
Future Office visit:    Next 5 appointments (look out 90 days)     Jan 16, 2019 10:00 AM CST   Office Visit with Judson Mcadams MD   [facility]    [address] [facility] [address]   [phone]                   Routing refill request to provider for review/approval because:  age

## 2018-12-27 DIAGNOSIS — K50.819 CROHN'S DISEASE OF SMALL AND LARGE INTESTINES WITH COMPLICATION (H): ICD-10-CM

## 2018-12-27 RX ORDER — DIPHENOXYLATE HCL/ATROPINE 2.5-.025MG
TABLET ORAL
Qty: 90 TABLET | Refills: 0 | Status: SHIPPED | OUTPATIENT
Start: 2018-12-27 | End: 2019-04-17

## 2018-12-27 NOTE — TELEPHONE ENCOUNTER
Last Written Prescription Date:  10/19/18  Last Fill Quantity: 10,  # refills: 0   Last office visit: 11/9/2018 with prescribing provider:     Future Office Visit:   Next 5 appointments (look out 90 days)    Jan 16, 2019 10:00 AM CST  Office Visit with Judson Mcadams MD  [facility] [address] [address]  [phone]         Requested Prescriptions   Pending Prescriptions Disp Refills     diphenoxylate-atropine (LOMOTIL) 2.5-0.025 MG tablet [Pharmacy Med Name: DIPHENOXYLATE/ATROPINE TABLETS] 90 tablet 0     Sig: TAKE 1 TABLET BY MOUTH DAILY    There is no refill protocol information for this order

## 2018-12-31 ENCOUNTER — PATIENT OUTREACH (OUTPATIENT)
Dept: CARE COORDINATION | Facility: CLINIC | Age: 83
End: 2018-12-31

## 2019-01-01 DIAGNOSIS — E53.8 VITAMIN B12 DEFICIENCY (NON ANEMIC): ICD-10-CM

## 2019-01-01 NOTE — PROGRESS NOTES
Clinic Care Coordination Contact    Situation: Patient chart reviewed by care coordinator.    Background:     Assessment: Patient is still receiving Home Care Services.  Patient has Combined Cystoscopy, Transurethral Resection (TUR) Prostate scheduled for 1/24/19     Plan/Recommendations: No further outreaches will be made at this time unless a new referral is made after patient's surgery.    Diamante Garcia RN   Care Coordinator  [facility]  Phone:  [phone]  Email: [email or web address]

## 2019-01-16 ENCOUNTER — OFFICE VISIT (OUTPATIENT)
Dept: FAMILY MEDICINE | Facility: CLINIC | Age: 84
End: 2019-01-16
Payer: COMMERCIAL

## 2019-01-16 VITALS
OXYGEN SATURATION: 97 % | DIASTOLIC BLOOD PRESSURE: 88 MMHG | BODY MASS INDEX: 23.19 KG/M2 | TEMPERATURE: 98.1 F | SYSTOLIC BLOOD PRESSURE: 132 MMHG | HEIGHT: 68 IN | WEIGHT: 153 LBS | HEART RATE: 90 BPM

## 2019-01-16 DIAGNOSIS — Z01.818 PREOP GENERAL PHYSICAL EXAM: Primary | ICD-10-CM

## 2019-01-16 DIAGNOSIS — I48.20 CHRONIC ATRIAL FIBRILLATION (H): ICD-10-CM

## 2019-01-16 DIAGNOSIS — R33.9 URINARY RETENTION: ICD-10-CM

## 2019-01-16 DIAGNOSIS — K50.819 CROHN'S DISEASE OF SMALL AND LARGE INTESTINES WITH COMPLICATION (H): ICD-10-CM

## 2019-01-16 DIAGNOSIS — I42.9 CARDIOMYOPATHY, UNSPECIFIED TYPE (H): ICD-10-CM

## 2019-01-16 LAB
ANION GAP SERPL CALCULATED.3IONS-SCNC: 6 MMOL/L (ref 3–14)
BUN SERPL-MCNC: 12 MG/DL (ref 7–30)
CALCIUM SERPL-MCNC: 9.4 MG/DL (ref 8.5–10.1)
CHLORIDE SERPL-SCNC: 102 MMOL/L (ref 94–109)
CO2 SERPL-SCNC: 27 MMOL/L (ref 20–32)
CREAT SERPL-MCNC: 0.97 MG/DL (ref 0.66–1.25)
ERYTHROCYTE [DISTWIDTH] IN BLOOD BY AUTOMATED COUNT: 14.4 % (ref 10–15)
GFR SERPL CREATININE-BSD FRML MDRD: 69 ML/MIN/{1.73_M2}
GLUCOSE SERPL-MCNC: 108 MG/DL (ref 70–99)
HCT VFR BLD AUTO: 47.2 % (ref 40–53)
HGB BLD-MCNC: 15.6 G/DL (ref 13.3–17.7)
MCH RBC QN AUTO: 33.2 PG (ref 26.5–33)
MCHC RBC AUTO-ENTMCNC: 33.1 G/DL (ref 31.5–36.5)
MCV RBC AUTO: 100 FL (ref 78–100)
PLATELET # BLD AUTO: 157 10E9/L (ref 150–450)
POTASSIUM SERPL-SCNC: 4.5 MMOL/L (ref 3.4–5.3)
RBC # BLD AUTO: 4.7 10E12/L (ref 4.4–5.9)
SODIUM SERPL-SCNC: 135 MMOL/L (ref 133–144)
WBC # BLD AUTO: 8.7 10E9/L (ref 4–11)

## 2019-01-16 PROCEDURE — 80048 BASIC METABOLIC PNL TOTAL CA: CPT | Performed by: INTERNAL MEDICINE

## 2019-01-16 PROCEDURE — 99215 OFFICE O/P EST HI 40 MIN: CPT | Performed by: INTERNAL MEDICINE

## 2019-01-16 PROCEDURE — 85027 COMPLETE CBC AUTOMATED: CPT | Performed by: INTERNAL MEDICINE

## 2019-01-16 PROCEDURE — 36415 COLL VENOUS BLD VENIPUNCTURE: CPT | Performed by: INTERNAL MEDICINE

## 2019-01-16 ASSESSMENT — MIFFLIN-ST. JEOR: SCORE: 1338.5

## 2019-01-16 NOTE — PROGRESS NOTES
[facility]  [address] [place] [address]  [phone]  Dept: [phone]    PRE-OP EVALUATION:  Today's date: 2019    Dawson Jones (: 1930) presents for pre-operative evaluation assessment as requested by Dr. Valverde.  He requires evaluation and anesthesia risk assessment prior to undergoing surgery/procedure for treatment of Catheter.    Proposed Surgery/ Procedure: Cystoscopy and Transurethral Resection of the Prostate  Date of Surgery/ Procedure: 19  Time of Surgery/ Procedure: 8:40 AM  Hospital/Surgical Facility: [facility]  Fax number for surgical facility: [phone]  Primary Physician: Judson Mcadams  Type of Anesthesia Anticipated: to be determined    Patient has a Health Care Directive or Living Will:  YES     1. NO - Do you have a history of heart attack, stroke, stent, bypass or surgery on an artery in the head, neck, heart or legs?  2. NO - Do you ever have any pain or discomfort in your chest?  3. NO - Do you have a history of  Heart Failure?  4. NO - Are you troubled by shortness of breath when: walking on the level, up a slight hill or at night?  5. NO - Do you currently have a cold, bronchitis or other respiratory infection?  6. NO - Do you have a cough, shortness of breath or wheezing?  7. NO - Do you sometimes get pains in the calves of your legs when you walk?  8. NO - Do you or anyone in your family have previous history of blood clots?  9. NO - Do you or does anyone in your family have a serious bleeding problem such as prolonged bleeding following surgeries or cuts?  10. NO - Have you ever had problems with anemia or been told to take iron pills?  11. NO - Have you had any abnormal blood loss such as black, tarry or bloody stools, or abnormal vaginal bleeding?  12. NO - Have you ever had a blood transfusion?  13. NO - Have you or any of your relatives ever had problems with anesthesia?  14. NO - Do you have sleep apnea, excessive snoring or daytime  drowsiness?  15. NO - Do you have any prosthetic heart valves?  16. NO - Do you have prosthetic joints?  17. NO - Is there any chance that you may be pregnant?      HPI:     HPI related to upcoming procedure: Urinary retention noted after back surgery in October requiring Mcdowell catheter in place.  Complicated by sepsis.  Now plans TURP in hopes of being able to remove Mcdowell.  He perform 4 METS of physical activity without chest pain or dyspnea.       MEDICAL HISTORY:     Patient Active Problem List    Diagnosis Date Noted     History of lumbar laminectomy for spinal cord decompression 10/22/2018     Priority: Medium     Urinary retention 10/22/2018     Priority: Medium     Physical deconditioning 10/22/2018     Priority: Medium     Bacteremia 10/16/2018     Priority: Medium     Spinal stenosis 10/01/2018     Priority: Medium     PAD (peripheral artery disease) (H) 07/31/2018     Priority: Medium     Chronic pain syndrome 01/04/2018     Priority: Medium     Patient is followed by Judson Mcadams MD for ongoing prescription of pain medication.  All refills should only be approved by this provider, or covering partner.    Medication(s): Tramadol  mg po bid prn # 120 per month.   Maximum quantity per month: 120  Clinic visit frequency required: Q 3 months     Controlled substance agreement:  Encounter-Level CSA - 01/04/2018:          Controlled Substance Agreement - Scan on 1/24/2018 12:08 PM : CONTROLLED SUBSTANCE AGREEMENT (below)              Pain Clinic evaluation in the past: No    DIRE Total Score(s):  No flowsheet data found.    Last [facility] website verification: No concerns 08/21/18 [facility]         Crohn's disease of small and large intestines with complication (H) 09/15/2016     Priority: Medium     Spinal stenosis of lumbosacral region 09/15/2016     Priority: Medium     Vitamin B12 deficiency (non anemic) 09/15/2016     Priority: Medium     Atrial fibrillation (H) 01/05/2015     Priority: Medium      Cardiomyopathy, unspecified type (H)      Priority: Medium      Past Medical History:   Diagnosis Date     Atrial fibrillation (H)      Cardiomyopathy (H)      Crohn's disease of small and large intestines with complication (H)      Hyperlipidemia      Past Surgical History:   Procedure Laterality Date     APPENDECTOMY       DECOMPRESSION LUMBAR ONE LEVEL N/A 10/1/2018    Procedure: DECOMPRESSION LUMBAR ONE LEVEL;  DECOMPRESSION L2-3 WITH COFLEX DEVICE  ;  Surgeon: Bert Reynolds MD;  Location: [facility] OR     ORTHOPEDIC SURGERY  10/01/2018    Bilateral L2-3 decompression and insertion of a Coflex device     Current Outpatient Medications   Medication Sig Dispense Refill     acetaminophen (TYLENOL) 325 MG tablet Take 650 mg by mouth every 8 hours as needed for mild pain       cyanocobalamin (VITAMIN B12) 1000 MCG/ML injection Inject 1 mL (1,000 mcg) into the muscle every 30 days 3 mL 11     diphenoxylate-atropine (LOMOTIL) 2.5-0.025 MG tablet TAKE 1 TABLET BY MOUTH DAILY 90 tablet 0     ELIQUIS 5 MG tablet TAKE 1 TABLET(5 MG) BY MOUTH TWICE DAILY 30 tablet 0     metoprolol tartrate (LOPRESSOR) 50 MG tablet TAKE 1 TABLET(50 MG) BY MOUTH TWICE DAILY 180 tablet 0     opium tincture 10 MG/ML (1%) liquid Take 0.2 mLs (2 mg) by mouth every 6 hours as needed for diarrhea (4 drops) 118 mL 0     order for DME Equipment being ordered: incentive spirometer 1 Units 0     order for DME Equipment being ordered: Walker Wheels () and Walker ()  Treatment Diagnosis: DIfficulty with gait 1 each 0     tamsulosin (FLOMAX) 0.4 MG capsule Take 1 capsule (0.4 mg) by mouth daily 90 capsule 3     OTC products: None, except as noted above    No Known Allergies   Latex Allergy: NO    Social History     Tobacco Use     Smoking status: Former Smoker     Smokeless tobacco: Never Used     Tobacco comment: 40 years ago   Substance Use Topics     Alcohol use: Yes     Alcohol/week: 4.2 oz     Types: 7 Standard drinks or equivalent per week  "    Comment: 1 wine an evening     History   Drug Use No       REVIEW OF SYSTEMS:   Constitutional, neuro, ENT, endocrine, pulmonary, cardiac, gastrointestinal, genitourinary, musculoskeletal, integument and psychiatric systems are negative, except as otherwise noted.    EXAM:   /88 (BP Location: Right arm, Patient Position: Sitting, Cuff Size: Adult Regular)   Pulse 90   Temp 98.1  F (36.7  C) (Tympanic)   Ht 1.727 m (5' 8\")   Wt 69.4 kg (153 lb)   SpO2 97%   BMI 23.26 kg/m      GENERAL APPEARANCE: healthy, alert and no distress     EYES: EOMI,  PERRL     HENT: ear canals and TM's normal and nose and mouth without ulcers or lesions     NECK: no adenopathy, no asymmetry, masses, or scars and thyroid normal to palpation     RESP: lungs clear to auscultation - no rales, rhonchi or wheezes     CV: The heart has an irregularly irregular rhythm with a normal rate. No carotid bruits      ABDOMEN:  soft, nontender, no HSM or masses and bowel sounds normal     MS: extremities normal- no gross deformities noted, no evidence of inflammation in joints, FROM in all extremities.     SKIN: no suspicious lesions or rashes     NEURO: Normal strength and tone, sensory exam grossly normal, mentation intact and speech normal     PSYCH: mentation appears normal. and affect normal/bright     LYMPHATICS: No cervical adenopathy    DIAGNOSTICS:   EK/24 atrial fibrillation rate 81 no ST changes     Recent Labs   Lab Test 10/25/18 10/18/18  0906 10/17/18  1210 10/17/18  0831  18  1232  07/28/14  12/21/10  1242 11/11/10  0657   HGB 11.0*  --   --  10.3*   < > 14.4   < >  --   --   --   --      --   --  239   < > 159   < >  --   --   --   --    INR  --   --   --   --   --   --   --   --   --  0.98 2.60*   NA  --  142 141  --    < > 137   < >  --   --   --  141   POTASSIUM  --  3.5 3.6  --    < > 4.6   < >  --   --   --  4.6   CR 0.84 0.72 0.80  --    < > 0.94   < >  --   --   --   --    A1C  --   --   --   --   --  " 4.8  --  5.2   < >  --   --     < > = values in this interval not displayed.        IMPRESSION:   Reason for surgery/procedure: Urinary retention   Diagnosis/reason for consult: Preoperative evaluation     The proposed surgical procedure is considered INTERMEDIATE risk.    REVISED CARDIAC RISK INDEX  The patient has the following serious cardiovascular risks for perioperative complications such as (MI, PE, VFib and 3  AV Block):  No serious cardiac risks  INTERPRETATION: 1 risks: Class II (low risk - 0.9% complication rate)    The patient has the following additional risks for perioperative complications:  No identified additional risks      ICD-10-CM    1. Preop general physical exam Z01.818    2. Chronic atrial fibrillation (H) I48.2    3. Crohn's disease of small and large intestines with complication (H) K50.819    4. Cardiomyopathy, unspecified type (H) I42.9    5. Urinary retention R33.9      OK to proceed with planned surgery  Hold NOAC in anticipation of surgery as below   Volume statu swel compensated  Ventricular rate in atrial fibrillation well controlled     RECOMMENDATIONS:     --Consult hospital rounder / IM to assist post-op medical management    --Patient is to take all scheduled medications on the day of surgery EXCEPT for modifications listed below.    Anticoagulant or Antiplatelet Medication Use  Hold Eliquis at least 72 hours prior to procedure or as directed by urology clinic.  Restart Eliquis as soon as appropriate following surgery.          APPROVAL GIVEN to proceed with proposed procedure, without further diagnostic evaluation       Signed Electronically by: Judson Mcadams MD    Copy of this evaluation report is provided to requesting physician.    Ben Preop Guidelines    Revised Cardiac Risk Index

## 2019-01-16 NOTE — LETTER
[place] [address] [facility]  [address]  [place]  [address]  Tel: [phone]    January 17, 2019    Dawson Jones  [address] [facility] [address]        Dear Mr. Jones,      The following letter pertains to your most recent diagnostic tests:       Lab results including the hemoglobin look okay for surgery.         Sincerely,     Dr. Mcadams  / loulou     (These results are viewable via MY CHART.   If you are having trouble accessing your account call  [phone])     Results for orders placed or performed in visit on 01/16/19   Basic metabolic panel   Result Value Ref Range    Sodium 135 133 - 144 mmol/L    Potassium 4.5 3.4 - 5.3 mmol/L    Chloride 102 94 - 109 mmol/L    Carbon Dioxide 27 20 - 32 mmol/L    Anion Gap 6 3 - 14 mmol/L    Glucose 108 (H) 70 - 99 mg/dL    Urea Nitrogen 12 7 - 30 mg/dL    Creatinine 0.97 0.66 - 1.25 mg/dL    GFR Estimate 69 >60 mL/min/[1.73_m2]    GFR Estimate If Black 80 >60 mL/min/[1.73_m2]    Calcium 9.4 8.5 - 10.1 mg/dL   CBC with platelets   Result Value Ref Range    WBC 8.7 4.0 - 11.0 10e9/L    RBC Count 4.70 4.4 - 5.9 10e12/L    Hemoglobin 15.6 13.3 - 17.7 g/dL    Hematocrit 47.2 40.0 - 53.0 %     78 - 100 fl    MCH 33.2 (H) 26.5 - 33.0 pg    MCHC 33.1 31.5 - 36.5 g/dL    RDW 14.4 10.0 - 15.0 %    Platelet Count 157 150 - 450 10e9/L

## 2019-01-16 NOTE — H&P (VIEW-ONLY)
[facility]  [address] [place] [address]  [phone]  Dept: [phone]    PRE-OP EVALUATION:  Today's date: 2019    Dawson Jones (: 1930) presents for pre-operative evaluation assessment as requested by Dr. Valverde.  He requires evaluation and anesthesia risk assessment prior to undergoing surgery/procedure for treatment of Catheter.    Proposed Surgery/ Procedure: Cystoscopy and Transurethral Resection of the Prostate  Date of Surgery/ Procedure: 19  Time of Surgery/ Procedure: 8:40 AM  Hospital/Surgical Facility: [facility]  Fax number for surgical facility: [phone]  Primary Physician: Judson Mcadams  Type of Anesthesia Anticipated: to be determined    Patient has a Health Care Directive or Living Will:  YES     1. NO - Do you have a history of heart attack, stroke, stent, bypass or surgery on an artery in the head, neck, heart or legs?  2. NO - Do you ever have any pain or discomfort in your chest?  3. NO - Do you have a history of  Heart Failure?  4. NO - Are you troubled by shortness of breath when: walking on the level, up a slight hill or at night?  5. NO - Do you currently have a cold, bronchitis or other respiratory infection?  6. NO - Do you have a cough, shortness of breath or wheezing?  7. NO - Do you sometimes get pains in the calves of your legs when you walk?  8. NO - Do you or anyone in your family have previous history of blood clots?  9. NO - Do you or does anyone in your family have a serious bleeding problem such as prolonged bleeding following surgeries or cuts?  10. NO - Have you ever had problems with anemia or been told to take iron pills?  11. NO - Have you had any abnormal blood loss such as black, tarry or bloody stools, or abnormal vaginal bleeding?  12. NO - Have you ever had a blood transfusion?  13. NO - Have you or any of your relatives ever had problems with anesthesia?  14. NO - Do you have sleep apnea, excessive snoring or daytime  drowsiness?  15. NO - Do you have any prosthetic heart valves?  16. NO - Do you have prosthetic joints?  17. NO - Is there any chance that you may be pregnant?      HPI:     HPI related to upcoming procedure: Urinary retention noted after back surgery in October requiring Mcdowell catheter in place.  Complicated by sepsis.  Now plans TURP in hopes of being able to remove Mcdowell.  He perform 4 METS of physical activity without chest pain or dyspnea.       MEDICAL HISTORY:     Patient Active Problem List    Diagnosis Date Noted     History of lumbar laminectomy for spinal cord decompression 10/22/2018     Priority: Medium     Urinary retention 10/22/2018     Priority: Medium     Physical deconditioning 10/22/2018     Priority: Medium     Bacteremia 10/16/2018     Priority: Medium     Spinal stenosis 10/01/2018     Priority: Medium     PAD (peripheral artery disease) (H) 07/31/2018     Priority: Medium     Chronic pain syndrome 01/04/2018     Priority: Medium     Patient is followed by Judson Mcadams MD for ongoing prescription of pain medication.  All refills should only be approved by this provider, or covering partner.    Medication(s): Tramadol  mg po bid prn # 120 per month.   Maximum quantity per month: 120  Clinic visit frequency required: Q 3 months     Controlled substance agreement:  Encounter-Level CSA - 01/04/2018:          Controlled Substance Agreement - Scan on 1/24/2018 12:08 PM : CONTROLLED SUBSTANCE AGREEMENT (below)              Pain Clinic evaluation in the past: No    DIRE Total Score(s):  No flowsheet data found.    Last [facility] website verification: No concerns 08/21/18 [facility]         Crohn's disease of small and large intestines with complication (H) 09/15/2016     Priority: Medium     Spinal stenosis of lumbosacral region 09/15/2016     Priority: Medium     Vitamin B12 deficiency (non anemic) 09/15/2016     Priority: Medium     Atrial fibrillation (H) 01/05/2015     Priority: Medium      Cardiomyopathy, unspecified type (H)      Priority: Medium      Past Medical History:   Diagnosis Date     Atrial fibrillation (H)      Cardiomyopathy (H)      Crohn's disease of small and large intestines with complication (H)      Hyperlipidemia      Past Surgical History:   Procedure Laterality Date     APPENDECTOMY       DECOMPRESSION LUMBAR ONE LEVEL N/A 10/1/2018    Procedure: DECOMPRESSION LUMBAR ONE LEVEL;  DECOMPRESSION L2-3 WITH COFLEX DEVICE  ;  Surgeon: Bert Reynolds MD;  Location: [facility] OR     ORTHOPEDIC SURGERY  10/01/2018    Bilateral L2-3 decompression and insertion of a Coflex device     Current Outpatient Medications   Medication Sig Dispense Refill     acetaminophen (TYLENOL) 325 MG tablet Take 650 mg by mouth every 8 hours as needed for mild pain       cyanocobalamin (VITAMIN B12) 1000 MCG/ML injection Inject 1 mL (1,000 mcg) into the muscle every 30 days 3 mL 11     diphenoxylate-atropine (LOMOTIL) 2.5-0.025 MG tablet TAKE 1 TABLET BY MOUTH DAILY 90 tablet 0     ELIQUIS 5 MG tablet TAKE 1 TABLET(5 MG) BY MOUTH TWICE DAILY 30 tablet 0     metoprolol tartrate (LOPRESSOR) 50 MG tablet TAKE 1 TABLET(50 MG) BY MOUTH TWICE DAILY 180 tablet 0     opium tincture 10 MG/ML (1%) liquid Take 0.2 mLs (2 mg) by mouth every 6 hours as needed for diarrhea (4 drops) 118 mL 0     order for DME Equipment being ordered: incentive spirometer 1 Units 0     order for DME Equipment being ordered: Walker Wheels () and Walker ()  Treatment Diagnosis: DIfficulty with gait 1 each 0     tamsulosin (FLOMAX) 0.4 MG capsule Take 1 capsule (0.4 mg) by mouth daily 90 capsule 3     OTC products: None, except as noted above    No Known Allergies   Latex Allergy: NO    Social History     Tobacco Use     Smoking status: Former Smoker     Smokeless tobacco: Never Used     Tobacco comment: 40 years ago   Substance Use Topics     Alcohol use: Yes     Alcohol/week: 4.2 oz     Types: 7 Standard drinks or equivalent per week  "    Comment: 1 wine an evening     History   Drug Use No       REVIEW OF SYSTEMS:   Constitutional, neuro, ENT, endocrine, pulmonary, cardiac, gastrointestinal, genitourinary, musculoskeletal, integument and psychiatric systems are negative, except as otherwise noted.    EXAM:   /88 (BP Location: Right arm, Patient Position: Sitting, Cuff Size: Adult Regular)   Pulse 90   Temp 98.1  F (36.7  C) (Tympanic)   Ht 1.727 m (5' 8\")   Wt 69.4 kg (153 lb)   SpO2 97%   BMI 23.26 kg/m      GENERAL APPEARANCE: healthy, alert and no distress     EYES: EOMI,  PERRL     HENT: ear canals and TM's normal and nose and mouth without ulcers or lesions     NECK: no adenopathy, no asymmetry, masses, or scars and thyroid normal to palpation     RESP: lungs clear to auscultation - no rales, rhonchi or wheezes     CV: The heart has an irregularly irregular rhythm with a normal rate. No carotid bruits      ABDOMEN:  soft, nontender, no HSM or masses and bowel sounds normal     MS: extremities normal- no gross deformities noted, no evidence of inflammation in joints, FROM in all extremities.     SKIN: no suspicious lesions or rashes     NEURO: Normal strength and tone, sensory exam grossly normal, mentation intact and speech normal     PSYCH: mentation appears normal. and affect normal/bright     LYMPHATICS: No cervical adenopathy    DIAGNOSTICS:   EK/24 atrial fibrillation rate 81 no ST changes     Recent Labs   Lab Test 10/25/18 10/18/18  0906 10/17/18  1210 10/17/18  0831  18  1232  07/28/14  12/21/10  1242 11/11/10  0657   HGB 11.0*  --   --  10.3*   < > 14.4   < >  --   --   --   --      --   --  239   < > 159   < >  --   --   --   --    INR  --   --   --   --   --   --   --   --   --  0.98 2.60*   NA  --  142 141  --    < > 137   < >  --   --   --  141   POTASSIUM  --  3.5 3.6  --    < > 4.6   < >  --   --   --  4.6   CR 0.84 0.72 0.80  --    < > 0.94   < >  --   --   --   --    A1C  --   --   --   --   --  " 4.8  --  5.2   < >  --   --     < > = values in this interval not displayed.        IMPRESSION:   Reason for surgery/procedure: Urinary retention   Diagnosis/reason for consult: Preoperative evaluation     The proposed surgical procedure is considered INTERMEDIATE risk.    REVISED CARDIAC RISK INDEX  The patient has the following serious cardiovascular risks for perioperative complications such as (MI, PE, VFib and 3  AV Block):  No serious cardiac risks  INTERPRETATION: 1 risks: Class II (low risk - 0.9% complication rate)    The patient has the following additional risks for perioperative complications:  No identified additional risks      ICD-10-CM    1. Preop general physical exam Z01.818    2. Chronic atrial fibrillation (H) I48.2    3. Crohn's disease of small and large intestines with complication (H) K50.819    4. Cardiomyopathy, unspecified type (H) I42.9    5. Urinary retention R33.9      OK to proceed with planned surgery  Hold NOAC in anticipation of surgery as below   Volume statu swel compensated  Ventricular rate in atrial fibrillation well controlled     RECOMMENDATIONS:     --Consult hospital rounder / IM to assist post-op medical management    --Patient is to take all scheduled medications on the day of surgery EXCEPT for modifications listed below.    Anticoagulant or Antiplatelet Medication Use  Hold Eliquis at least 72 hours prior to procedure or as directed by urology clinic.  Restart Eliquis as soon as appropriate following surgery.          APPROVAL GIVEN to proceed with proposed procedure, without further diagnostic evaluation       Signed Electronically by: Judson Mcadams MD    Copy of this evaluation report is provided to requesting physician.    Ben Preop Guidelines    Revised Cardiac Risk Index

## 2019-01-17 NOTE — RESULT ENCOUNTER NOTE
The following letter pertains to your most recent diagnostic tests:      Lab results including the hemoglobin look okay for surgery.        Sincerely,    Dr. Mcadams

## 2019-01-24 ENCOUNTER — ANESTHESIA (OUTPATIENT)
Dept: SURGERY | Facility: CLINIC | Age: 84
End: 2019-01-24
Payer: COMMERCIAL

## 2019-01-24 ENCOUNTER — ANESTHESIA EVENT (OUTPATIENT)
Dept: SURGERY | Facility: CLINIC | Age: 84
End: 2019-01-24
Payer: COMMERCIAL

## 2019-01-24 ENCOUNTER — HOSPITAL ENCOUNTER (OUTPATIENT)
Facility: CLINIC | Age: 84
Discharge: HOME | End: 2019-01-24
Attending: UROLOGY | Admitting: UROLOGY
Payer: COMMERCIAL

## 2019-01-24 VITALS
BODY MASS INDEX: 22.94 KG/M2 | RESPIRATION RATE: 16 BRPM | HEART RATE: 70 BPM | DIASTOLIC BLOOD PRESSURE: 87 MMHG | OXYGEN SATURATION: 97 % | TEMPERATURE: 95.9 F | HEIGHT: 68 IN | WEIGHT: 151.4 LBS | SYSTOLIC BLOOD PRESSURE: 128 MMHG

## 2019-01-24 DIAGNOSIS — R33.9 URINARY RETENTION: Primary | ICD-10-CM

## 2019-01-24 PROCEDURE — 40000170 ZZH STATISTIC PRE-PROCEDURE ASSESSMENT II: Performed by: UROLOGY

## 2019-01-24 PROCEDURE — 25000128 H RX IP 250 OP 636: Performed by: ANESTHESIOLOGY

## 2019-01-24 PROCEDURE — 25000128 H RX IP 250 OP 636: Performed by: NURSE ANESTHETIST, CERTIFIED REGISTERED

## 2019-01-24 PROCEDURE — 36000073 ZZH SURGERY LEVEL 6 1ST 30 MIN: Performed by: UROLOGY

## 2019-01-24 PROCEDURE — 37000008 ZZH ANESTHESIA TECHNICAL FEE, 1ST 30 MIN: Performed by: UROLOGY

## 2019-01-24 PROCEDURE — 37000009 ZZH ANESTHESIA TECHNICAL FEE, EACH ADDTL 15 MIN: Performed by: UROLOGY

## 2019-01-24 PROCEDURE — 25000128 H RX IP 250 OP 636: Performed by: UROLOGY

## 2019-01-24 PROCEDURE — 36000075 ZZH SURGERY LEVEL 6 EA 15 ADDTL MIN: Performed by: UROLOGY

## 2019-01-24 PROCEDURE — 25000132 ZZH RX MED GY IP 250 OP 250 PS 637: Performed by: ANESTHESIOLOGY

## 2019-01-24 PROCEDURE — 25800025 ZZH RX 258: Performed by: UROLOGY

## 2019-01-24 PROCEDURE — 71000027 ZZH RECOVERY PHASE 2 EACH 15 MINS: Performed by: UROLOGY

## 2019-01-24 PROCEDURE — 25000125 ZZHC RX 250: Performed by: NURSE ANESTHETIST, CERTIFIED REGISTERED

## 2019-01-24 PROCEDURE — 25000566 ZZH SEVOFLURANE, EA 15 MIN: Performed by: UROLOGY

## 2019-01-24 PROCEDURE — 71000012 ZZH RECOVERY PHASE 1 LEVEL 1 FIRST HR: Performed by: UROLOGY

## 2019-01-24 PROCEDURE — 27210794 ZZH OR GENERAL SUPPLY STERILE: Performed by: UROLOGY

## 2019-01-24 RX ORDER — FENTANYL CITRATE 50 UG/ML
25-50 INJECTION, SOLUTION INTRAMUSCULAR; INTRAVENOUS
Status: DISCONTINUED | OUTPATIENT
Start: 2019-01-24 | End: 2019-01-24 | Stop reason: HOSPADM

## 2019-01-24 RX ORDER — ACETAMINOPHEN 325 MG/1
650 TABLET ORAL ONCE
Status: COMPLETED | OUTPATIENT
Start: 2019-01-24 | End: 2019-01-24

## 2019-01-24 RX ORDER — MEPERIDINE HYDROCHLORIDE 25 MG/ML
12.5 INJECTION INTRAMUSCULAR; INTRAVENOUS; SUBCUTANEOUS
Status: DISCONTINUED | OUTPATIENT
Start: 2019-01-24 | End: 2019-01-24 | Stop reason: HOSPADM

## 2019-01-24 RX ORDER — EPHEDRINE SULFATE 50 MG/ML
INJECTION, SOLUTION INTRAMUSCULAR; INTRAVENOUS; SUBCUTANEOUS PRN
Status: DISCONTINUED | OUTPATIENT
Start: 2019-01-24 | End: 2019-01-24

## 2019-01-24 RX ORDER — CEFAZOLIN SODIUM 2 G/100ML
2 INJECTION, SOLUTION INTRAVENOUS
Status: COMPLETED | OUTPATIENT
Start: 2019-01-24 | End: 2019-01-24

## 2019-01-24 RX ORDER — HYDROMORPHONE HYDROCHLORIDE 1 MG/ML
.3-.5 INJECTION, SOLUTION INTRAMUSCULAR; INTRAVENOUS; SUBCUTANEOUS EVERY 10 MIN PRN
Status: DISCONTINUED | OUTPATIENT
Start: 2019-01-24 | End: 2019-01-24 | Stop reason: HOSPADM

## 2019-01-24 RX ORDER — NALOXONE HYDROCHLORIDE 0.4 MG/ML
.1-.4 INJECTION, SOLUTION INTRAMUSCULAR; INTRAVENOUS; SUBCUTANEOUS
Status: DISCONTINUED | OUTPATIENT
Start: 2019-01-24 | End: 2019-01-24 | Stop reason: HOSPADM

## 2019-01-24 RX ORDER — FUROSEMIDE 10 MG/ML
INJECTION INTRAMUSCULAR; INTRAVENOUS PRN
Status: DISCONTINUED | OUTPATIENT
Start: 2019-01-24 | End: 2019-01-24

## 2019-01-24 RX ORDER — SENNOSIDES 8.6 MG
650-1300 CAPSULE ORAL 3 TIMES DAILY PRN
COMMUNITY

## 2019-01-24 RX ORDER — ONDANSETRON 2 MG/ML
4 INJECTION INTRAMUSCULAR; INTRAVENOUS EVERY 30 MIN PRN
Status: DISCONTINUED | OUTPATIENT
Start: 2019-01-24 | End: 2019-01-24 | Stop reason: HOSPADM

## 2019-01-24 RX ORDER — ONDANSETRON 4 MG/1
4 TABLET, ORALLY DISINTEGRATING ORAL EVERY 30 MIN PRN
Status: DISCONTINUED | OUTPATIENT
Start: 2019-01-24 | End: 2019-01-24 | Stop reason: HOSPADM

## 2019-01-24 RX ORDER — CIPROFLOXACIN 250 MG/1
250 TABLET, FILM COATED ORAL 2 TIMES DAILY
Qty: 14 TABLET | Refills: 0 | Status: SHIPPED | OUTPATIENT
Start: 2019-01-24 | End: 2019-04-24

## 2019-01-24 RX ORDER — LIDOCAINE HYDROCHLORIDE 20 MG/ML
INJECTION, SOLUTION INFILTRATION; PERINEURAL PRN
Status: DISCONTINUED | OUTPATIENT
Start: 2019-01-24 | End: 2019-01-24

## 2019-01-24 RX ORDER — PROPOFOL 10 MG/ML
INJECTION, EMULSION INTRAVENOUS PRN
Status: DISCONTINUED | OUTPATIENT
Start: 2019-01-24 | End: 2019-01-24

## 2019-01-24 RX ORDER — SODIUM CHLORIDE, SODIUM LACTATE, POTASSIUM CHLORIDE, CALCIUM CHLORIDE 600; 310; 30; 20 MG/100ML; MG/100ML; MG/100ML; MG/100ML
INJECTION, SOLUTION INTRAVENOUS CONTINUOUS
Status: DISCONTINUED | OUTPATIENT
Start: 2019-01-24 | End: 2019-01-24 | Stop reason: HOSPADM

## 2019-01-24 RX ADMIN — Medication 5 MG: at 08:53

## 2019-01-24 RX ADMIN — ACETAMINOPHEN 650 MG: 325 TABLET, FILM COATED ORAL at 08:30

## 2019-01-24 RX ADMIN — ONDANSETRON 4 MG: 2 INJECTION INTRAMUSCULAR; INTRAVENOUS at 09:24

## 2019-01-24 RX ADMIN — SODIUM CHLORIDE, POTASSIUM CHLORIDE, SODIUM LACTATE AND CALCIUM CHLORIDE: 600; 310; 30; 20 INJECTION, SOLUTION INTRAVENOUS at 08:32

## 2019-01-24 RX ADMIN — DEXMEDETOMIDINE HYDROCHLORIDE 4 MCG: 100 INJECTION, SOLUTION INTRAVENOUS at 09:22

## 2019-01-24 RX ADMIN — CEFAZOLIN SODIUM 2 G: 2 INJECTION, SOLUTION INTRAVENOUS at 08:44

## 2019-01-24 RX ADMIN — LIDOCAINE HYDROCHLORIDE 80 MG: 20 INJECTION, SOLUTION INFILTRATION; PERINEURAL at 08:37

## 2019-01-24 RX ADMIN — DEXMEDETOMIDINE HYDROCHLORIDE 8 MCG: 100 INJECTION, SOLUTION INTRAVENOUS at 08:47

## 2019-01-24 RX ADMIN — PROPOFOL 10 MG: 10 INJECTION, EMULSION INTRAVENOUS at 09:09

## 2019-01-24 RX ADMIN — PROPOFOL 20 MG: 10 INJECTION, EMULSION INTRAVENOUS at 08:45

## 2019-01-24 RX ADMIN — Medication 5 MG: at 08:56

## 2019-01-24 RX ADMIN — DEXMEDETOMIDINE HYDROCHLORIDE 8 MCG: 100 INJECTION, SOLUTION INTRAVENOUS at 08:37

## 2019-01-24 RX ADMIN — PROPOFOL 140 MG: 10 INJECTION, EMULSION INTRAVENOUS at 08:37

## 2019-01-24 RX ADMIN — FUROSEMIDE 10 MG: 10 INJECTION, SOLUTION INTRAVENOUS at 09:28

## 2019-01-24 RX ADMIN — PROPOFOL 20 MG: 10 INJECTION, EMULSION INTRAVENOUS at 08:48

## 2019-01-24 ASSESSMENT — ENCOUNTER SYMPTOMS
SEIZURES: 0
DYSRHYTHMIAS: 1

## 2019-01-24 ASSESSMENT — MIFFLIN-ST. JEOR: SCORE: 1331.25

## 2019-01-24 NOTE — BRIEF OP NOTE
UROLOGY BRIEF NOTE  PREOP-URINARY RETENTION, BPH  PROCEDURE - CYSTO, LASER TURP  ANES-GEN  SURGEON- KIMBERLI  [id number]  FINDINGS- TRILOBAR PROSTATE

## 2019-01-24 NOTE — ANESTHESIA PREPROCEDURE EVALUATION
Anesthesia Pre-Procedure Evaluation    Patient: Dawson Jones   MRN: [id number] : 1930          Preoperative Diagnosis: URINARY RETENTION BEGNGN PROSTATIC HYPERPLASIA    Procedure(s):  COMBINED CYSTOSCOPY, TRANSURETHRAL RESECTION (TUR) PROSTATE  LASER KTP GREEN LIGHT PHOTOSELECTIVE VAPORIZATION PROSTATE    Past Medical History:   Diagnosis Date     Atrial fibrillation (H)      Cardiomyopathy (H)      Crohn's disease of small and large intestines with complication (H)      Hyperlipidemia      Past Surgical History:   Procedure Laterality Date     APPENDECTOMY       DECOMPRESSION LUMBAR ONE LEVEL N/A 10/1/2018    Procedure: DECOMPRESSION LUMBAR ONE LEVEL;  DECOMPRESSION L2-3 WITH COFLEX DEVICE  ;  Surgeon: Bert Reynolds MD;  Location:  OR     ORTHOPEDIC SURGERY  10/01/2018    Bilateral L2-3 decompression and insertion of a Coflex device     Echo 2015  Interpretation Summary  There is no comparison study available.  Left ventricular systolic function is normal. The visual ejection fraction is   estimated at 55-60%. There is mild to moderate (1-2+) mitral regurgitation.   There is mild (1+) aortic regurgitation. No hemodynamically significant   valvular aortic stenosis. The left atrium is moderate to severely dilated.   The ascending aorta is Mildly dilated.    EKG - Afib, LAD    Anesthesia Evaluation     . Pt has had prior anesthetic.     No history of anesthetic complications          ROS/MED HX    ENT/Pulmonary:  - neg pulmonary ROS    (-) sleep apnea and recent URI   Neurologic:  - neg neurologic ROS    (-) seizures, CVA and migraines   Cardiovascular: Comment: cardiomyopathy    (+) Dyslipidemia, -Peripheral Vascular Disease---. Taking blood thinners : . . . :. dysrhythmias a-fib, .       METS/Exercise Tolerance:     Hematologic:  - neg hematologic  ROS       Musculoskeletal: Comment: Spinal stenosis  Right lower extremity radicular symptoms        GI/Hepatic:     (+) Inflammatory bowel disease  "(Crohn's disease),      (-) GERD   Renal/Genitourinary:  - ROS Renal section negative       Endo:      (-) Type II DM and thyroid disease   Psychiatric:  - neg psychiatric ROS       Infectious Disease: Comment: ESBL        Malignancy:      - no malignancy   Other: Comment: Controlled substance agreement    Back surgery 10/1/2018 - complicated by urinary retention and sepsis   (+) H/O Chronic Pain,                        Physical Exam  Normal systems: cardiovascular, pulmonary and dental    Airway   Mallampati: III  TM distance: >3 FB  Neck ROM: limited  Comment: Limited mouth opening    Dental     Cardiovascular   Rhythm and rate: regular and normal      Pulmonary    breath sounds clear to auscultation            Lab Results   Component Value Date    WBC 8.7 01/16/2019    HGB 15.6 01/16/2019    HCT 47.2 01/16/2019     01/16/2019    .0 (H) 10/17/2018     01/16/2019    POTASSIUM 4.5 01/16/2019    CHLORIDE 102 01/16/2019    CO2 27 01/16/2019    BUN 12 01/16/2019    CR 0.97 01/16/2019     (H) 01/16/2019    CHANG 9.4 01/16/2019    MAG 1.9 11/11/2010    ALBUMIN 2.5 (L) 10/18/2018    PROTTOTAL 6.0 (L) 10/18/2018    ALT 20 10/18/2018    AST 18 10/25/2018    ALKPHOS 74 10/18/2018    BILITOTAL 0.4 10/18/2018    INR 0.98 12/21/2010    TSH 1.06 03/27/2017       Preop Vitals  BP Readings from Last 3 Encounters:   01/16/19 132/88   11/09/18 112/65   10/31/18 133/83    Pulse Readings from Last 3 Encounters:   01/16/19 90   11/09/18 86   10/31/18 83      Resp Readings from Last 3 Encounters:   10/31/18 18   10/30/18 18   10/26/18 18    SpO2 Readings from Last 3 Encounters:   01/16/19 97%   11/09/18 99%   10/31/18 97%      Temp Readings from Last 1 Encounters:   01/16/19 36.7  C (98.1  F) (Tympanic)    Ht Readings from Last 1 Encounters:   01/16/19 1.727 m (5' 8\")      Wt Readings from Last 1 Encounters:   01/16/19 69.4 kg (153 lb)    Estimated body mass index is 23.26 kg/m  as calculated from the " "following:    Height as of 1/16/19: 1.727 m (5' 8\").    Weight as of 1/16/19: 69.4 kg (153 lb).       Anesthesia Plan      History & Physical Review  History and physical reviewed and following examination; no interval change.    ASA Status:  3 .    NPO Status:  > 8 hours    Plan for General and LMA with Propofol induction. Maintenance will be Balanced.    PONV prophylaxis:  Ondansetron (or other 5HT-3)       Postoperative Care  Postoperative pain management:  IV analgesics.      Consents  Anesthetic plan, risks, benefits and alternatives discussed with:  Patient..                 Hudson Gray MD  "

## 2019-01-24 NOTE — ANESTHESIA CARE TRANSFER NOTE
Patient: Dawson Jones    Procedure(s):  COMBINED CYSTOSCOPY, TRANSURETHRAL RESECTION (TUR) PROSTATE  LASER KTP GREEN LIGHT PHOTOSELECTIVE VAPORIZATION PROSTATE    Diagnosis: URINARY RETENTION BEGNGN PROSTATIC HYPERPLASIA  Diagnosis Additional Information: No value filed.    Anesthesia Type:   General, LMA     Note:  Airway :Face Mask  Patient transferred to:PACU  Comments: At end of procedure, spontaneous respirations, adequate tidal volumes, followed commands to voice, LMA removed atraumatically, oropharynx suctioned, airway patent after LMA removal. Oxygen via facemask at 6 liters per minute to PACU. Oxygen tubing connected to wall O2 in PACU, SpO2, NiBP, and EKG monitors and alarms on and functioning, Vtia Hugger warmer connected to patient gown, report on patient's clinical status given to PACU RN, RN questions answered.Handoff Report: Identifed the Patient, Identified the Reponsible Provider, Reviewed the pertinent medical history, Discussed the surgical course, Reviewed Intra-OP anesthesia mangement and issues during anesthesia, Set expectations for post-procedure period and Allowed opportunity for questions and acknowledgement of understanding      Vitals: (Last set prior to Anesthesia Care Transfer)    CRNA VITALS  1/24/2019 0911 - 1/24/2019 0946      1/24/2019             Resp Rate (set):  10        136/18-32-%-96.5f  Pt states he is comfortable        Electronically Signed By: Chanel Carter  January 24, 2019  9:46 AM

## 2019-01-24 NOTE — DISCHARGE INSTRUCTIONS
Today you were given 650 mg of Tylenol at 0830. The recommended daily maximum dose is 4000 mg.     Hold Eliquis until you see Dr Valverde on Monday.      Same Day Surgery Discharge Instructions for  Sedation and General Anesthesia       It's not unusual to feel dizzy, light-headed or faint for up to 24 hours after surgery or while taking pain medication.  If you have these symptoms: sit for a few minutes before standing and have someone assist you when you get up to walk or use the bathroom.      You should rest and relax for the next 24 hours. We recommend you make arrangements to have an adult stay with you for at least 24 hours after your discharge.  Avoid hazardous and strenuous activity.      DO NOT DRIVE any vehicle or operate mechanical equipment for 24 hours following the end of your surgery.  Even though you may feel normal, your reactions may be affected by the medication you have received.      Do not drink alcoholic beverages for 24 hours following surgery.       Slowly progress to your regular diet as you feel able. It's not unusual to feel nauseated and/or vomit after receiving anesthesia.  If you develop these symptoms, drink clear liquids (apple juice, ginger ale, broth, 7-up, etc. ) until you feel better.  If your nausea and vomiting persists for 24 hours, please notify your surgeon.        All narcotic pain medications, along with inactivity and anesthesia, can cause constipation. Drinking plenty of liquids and increasing fiber intake will help.      For any questions of a medical nature, call your surgeon.      Do not make important decisions for 24 hours.      If you had general anesthesia, you may have a sore throat for a couple of days related to the breathing tube used during surgery.  You may use Cepacol lozenges to help with this discomfort.  If it worsens or if you develop a fever, contact your surgeon.       If you feel your pain is not well managed with the pain medications prescribed by  your surgeon, please contact your surgeon's office to let them know so they can address your concerns.     Discharge Instructions for Transurethral Resection of the Prostate    Diet:    -Diet as tolerated.   -Drink at least 6 glasses of liquid a day-at least 3 glasses should be water.  Activity:   -Avoid heavy lifting or strenuous activity    -Increase activity as directed by surgeon    -Short walks and stair climbing are OK   -Showering is OK  Care after surgery:   -Do not hold urine in your bladder. Always empty your bladder when you have the urge to urinate.    -Do not strain to have a bowel movement. If you constipated, take an over-the-counter stool softener until stools become normal or soft.     -Do not drive a car or have intercourse until cleared by your doctor.   -It is not unusual to pass small clots or to have red-tinged urine. If this occurs, decrease activity, and increase your fluid intake. Generally, the urine will clear of blood within a day or two.  Call your physician if you have the following signs or symptoms:   -Painful urination or if unable to urinate   -Loss of bladder control or increased frequency of urination.   -Have chills or temperature greater than 101 F.   -Excessive blood in your urine.       You may expect to have some blood for at least 3-4 weeks, particularly at the beginning or end of urination.  If there is dark, thick blood with difficulty urinating, call your surgeon.    **If you have questions or concerns about your procedure,  call Dr. Valverde at [phone]**

## 2019-01-24 NOTE — ANESTHESIA POSTPROCEDURE EVALUATION
Patient: Dawson Jones    Procedure(s):  COMBINED CYSTOSCOPY, TRANSURETHRAL RESECTION (TUR) PROSTATE  LASER KTP GREEN LIGHT PHOTOSELECTIVE VAPORIZATION PROSTATE    Diagnosis:URINARY RETENTION BEGNGN PROSTATIC HYPERPLASIA  Diagnosis Additional Information: No value filed.    Anesthesia Type:  General, LMA    Note:  Anesthesia Post Evaluation    Patient location during evaluation: PACU  Patient participation: Able to fully participate in evaluation  Level of consciousness: awake  Pain management: adequate  Airway patency: patent  Cardiovascular status: acceptable  Respiratory status: acceptable  Hydration status: acceptable  PONV: none     Anesthetic complications: None          Last vitals:  Vitals:    01/24/19 1000 01/24/19 1015 01/24/19 1030   BP: 119/87 129/81 128/87   Pulse: 70     Resp: 16 16 16   Temp: 35.4  C (95.7  F) 35.5  C (95.9  F) 35.5  C (95.9  F)   SpO2: 97% 96% 97%         Electronically Signed By: Hudson Gray MD  January 24, 2019  1:00 PM

## 2019-01-24 NOTE — PROGRESS NOTES
Admission medication history interview status for the 1/24/2019  admission is complete. See EPIC admission navigator for prior to admission medications     Medication history source reliability:Good    Medication history interview source(s):Family (Spouse)     Medication history resources (including written lists, pill bottles, clinic record):Patient mailed in his medication list prior to surgery    Primary pharmacy.[facility] ([facility]ium filled at [facility])     Additional medication history information not noted on PTA med list :None    Time spent in this activity: 40 minutes    Prior to Admission medications    Medication Sig Last Dose Taking? Auth Provider   acetaminophen (TYLENOL 8 HOUR) 650 MG CR tablet Take 650-1,300 mg by mouth 3 times daily as needed for mild pain or fever 1/24/2019 at 0515 Yes Reported, Patient   cyanocobalamin (VITAMIN B12) 1000 MCG/ML injection Inject 1 mL (1,000 mcg) into the muscle every 30 days 12/28/2018 Yes Judson Mcadams MD   diphenoxylate-atropine (LOMOTIL) 2.5-0.025 MG tablet TAKE 1 TABLET BY MOUTH DAILY  Patient taking differently: Take 1 tablet by mouth twice daily if needed 1/23/2019 at Unknown time Yes Judson Mcadams MD   ELIQUIS 5 MG tablet TAKE 1 TABLET(5 MG) BY MOUTH TWICE DAILY 1/19/2019 at PM Yes Judson Mcadams MD   metoprolol tartrate (LOPRESSOR) 50 MG tablet TAKE 1 TABLET(50 MG) BY MOUTH TWICE DAILY  Patient taking differently: Take 50 mg by mouth 2 times daily TAKE 1 TABLET(50 MG) BY MOUTH TWICE DAILY 1/24/2019 at 0515 Yes Sugar Wise APRN CNP   opium tincture 10 MG/ML (1%) liquid Take 0.2 mLs (2 mg) by mouth every 6 hours as needed for diarrhea (4 drops) Past Week at PRN Yes Marquita Cohen MD   tamsulosin (FLOMAX) 0.4 MG capsule Take 1 capsule (0.4 mg) by mouth daily  Patient taking differently: Take 0.4 mg by mouth every evening  1/23/2019 at PM Yes Sugar Wise APRN CNP   order for DME Equipment being ordered: incentive spirometer   Pema  Judson CORREIA MD   order for DME Equipment being ordered: Walker Wheels () and Walker ()  Treatment Diagnosis: DIfficulty with gait   Bert Reynolds MD

## 2019-01-25 NOTE — OP NOTE
Procedure Date: 01/24/2019      PREOPERATIVE DIAGNOSES:   1.  Urinary retention.   2.  Benign prostatic hypertrophy.     3.  Obstructing trilobar prostate.      POSTOPERATIVE DIAGNOSES:     1.  Urinary retention.   2.  Benign prostatic hypertrophy.     3.  Obstructing trilobar prostate.      PROCEDURE:  Cystoscopy, laser-assisted transurethral resection of prostate.      ANESTHESIA:  General.      SURGEON:  Meliton Valverde MD      ESTIMATED BLOOD LOSS:  5 mL.      SUMMARY OF FINDINGS:  Trilobar prostate, well vaporized.      BRIEF HISTORY AND PHYSICAL:  An 88-year-old male with a history of postop urinary retention after back surgery 10/2018.  He had been managed with an indwelling Mcdowell catheter, has failed numerous voiding trials.  He has been on Flomax.  The patient does relate having some mild urgency, but has been unable to urinate.  His cystoscopy demonstrated evidence of an obstructing prostate.  He was given information concerning alternatives.  He has elected to undergo laser TURP.  He does understand the procedure, possible complications and realistic expectations and he has elected to proceed.      DESCRIPTION OF PROCEDURE:  Proper permits were obtained and signed.  The patient was taken to the operating room and given general anesthesia.  He was placed in the dorsal lying position and prepped and draped in a sterile fashion.  A 22[place] Storz cystoscope visually passed the urethra, which was normal.  Prostate measured approximately 4.5 cm and was obstructing secondary to trilobar hypertrophy with an intravesical portion to the prostate.  The bladder was trabeculated with multiple cellule formation.  Orifices demonstrated clear efflux.      The 23-[place] continuous flow laser scope was visually passed into the bladder.  The patient underwent GreenLight XPS laser vaporization of his prostate starting with the median lobe, extending from the bladder neck to the level just proximal to the verumontanum.   Vaporization was then continued on the right lobe, again extending from the bladder neck to proximal verumontanum, and on the left lobe in a similar fashion.  Vaporization was then completed on the roof.  At the termination, he had a very nice channel defect.  No evidence of bleeding was noted.  A 16 coude Mcdowell catheter was placed and irrigated clear.  He was given 10 mg of Lasix and taken to the recovery room in stable condition.         GEORGIANA VALVERDE MD             D: 2019   T: 2019   MT: AP      Name:     KING HILTON   MRN:      [id number]        Account:        [id number]   :      1930           Procedure Date: 2019      Document: [id number]       cc: Judson Valverde MD

## 2019-04-17 ENCOUNTER — ANCILLARY PROCEDURE (OUTPATIENT)
Dept: GENERAL RADIOLOGY | Facility: CLINIC | Age: 84
End: 2019-04-17
Attending: INTERNAL MEDICINE
Payer: COMMERCIAL

## 2019-04-17 ENCOUNTER — OFFICE VISIT (OUTPATIENT)
Dept: FAMILY MEDICINE | Facility: CLINIC | Age: 84
End: 2019-04-17
Payer: COMMERCIAL

## 2019-04-17 VITALS
HEART RATE: 81 BPM | SYSTOLIC BLOOD PRESSURE: 144 MMHG | WEIGHT: 151 LBS | BODY MASS INDEX: 22.96 KG/M2 | OXYGEN SATURATION: 97 % | DIASTOLIC BLOOD PRESSURE: 83 MMHG | TEMPERATURE: 98.3 F

## 2019-04-17 DIAGNOSIS — M16.11 PRIMARY OSTEOARTHRITIS OF RIGHT HIP: Primary | ICD-10-CM

## 2019-04-17 DIAGNOSIS — I48.20 CHRONIC ATRIAL FIBRILLATION (H): ICD-10-CM

## 2019-04-17 DIAGNOSIS — K50.819 CROHN'S DISEASE OF SMALL AND LARGE INTESTINES WITH COMPLICATION (H): ICD-10-CM

## 2019-04-17 DIAGNOSIS — M16.11 PRIMARY OSTEOARTHRITIS OF RIGHT HIP: ICD-10-CM

## 2019-04-17 PROCEDURE — 99214 OFFICE O/P EST MOD 30 MIN: CPT | Performed by: INTERNAL MEDICINE

## 2019-04-17 PROCEDURE — 73502 X-RAY EXAM HIP UNI 2-3 VIEWS: CPT

## 2019-04-17 RX ORDER — MORPHINE 10 MG/ML
0.2 TINCTURE ORAL EVERY 6 HOURS PRN
Qty: 118 ML | Refills: 0 | Status: SHIPPED | OUTPATIENT
Start: 2019-04-17 | End: 2019-09-19

## 2019-04-17 RX ORDER — DIPHENOXYLATE HCL/ATROPINE 2.5-.025MG
TABLET ORAL
Qty: 180 TABLET | Refills: 3 | Status: SHIPPED | OUTPATIENT
Start: 2019-04-17

## 2019-04-17 RX ORDER — TRAMADOL HYDROCHLORIDE 50 MG/1
50 TABLET ORAL 2 TIMES DAILY PRN
Qty: 60 TABLET | Refills: 0 | Status: SHIPPED | OUTPATIENT
Start: 2019-04-17 | End: 2019-05-24

## 2019-04-17 NOTE — PROGRESS NOTES
SUBJECTIVE:   Dawson MINOR Obrieing   health issues:        Medication Followup     Taking Medication as prescribed: yes    Side Effects:  None       Pleasant man with Crohn disease, osteoarthritis of the right hip joint, spinal stenosis, chronic pain, vitamin B12 deficiency, atrial fibrillation, peripheral artery disease.  He is a non-smoker.  He is accompanied by his wife.  He states that his lumbar spine surgery helped his symptoms for several weeks but then his symptoms return.  He did recently have a urology procedure and his voiding symptoms are now resolved.  He is using one tramadol in the morning and Tylenol later in the day to help with pain.  The pain is characterized as an aching pain localized to his right inguinal area that seems to worsen with walking.  He did have a cortisone injection in his hip joint at [facility] orthopedics in the remote past with temporary symptom relief.  His most recent hip x-ray that I can see in the epic system demonstrates mild osteoarthritis in 2015.    Additional history: as documented    Reviewed  and updated as needed this visit by clinical staff  Tobacco  Allergies  Meds         Reviewed and updated as needed this visit by Provider         Patient Active Problem List   Diagnosis     Atrial fibrillation (H)     Cardiomyopathy, unspecified type (H)     Crohn's disease of small and large intestines with complication (H)     Spinal stenosis of lumbosacral region     Vitamin B12 deficiency (non anemic)     Chronic pain syndrome     PAD (peripheral artery disease) (H)     Spinal stenosis     Bacteremia     History of lumbar laminectomy for spinal cord decompression     Urinary retention     Physical deconditioning     Past Surgical History:   Procedure Laterality Date     APPENDECTOMY       CYSTOSCOPY, TRANSURETHRAL RESECTION (TUR) PROSTATE, COMBINED N/A 1/24/2019    Procedure: COMBINED CYSTOSCOPY, TRANSURETHRAL RESECTION (TUR) PROSTATE;  Surgeon: Meliton Valverde MD;   Location: [facility] OR     DECOMPRESSION LUMBAR ONE LEVEL N/A 10/1/2018    Procedure: DECOMPRESSION LUMBAR ONE LEVEL;  DECOMPRESSION L2-3 WITH COFLEX DEVICE  ;  Surgeon: Bert Reynolds MD;  Location: [facility] OR     LASER KTP GREEN LIGHT PHOTOSELECTIVE VAPORIZATION PROSTATE N/A 1/24/2019    Procedure: LASER KTP GREEN LIGHT PHOTOSELECTIVE VAPORIZATION PROSTATE;  Surgeon: Meliton Valverde MD;  Location: [facility] OR     ORTHOPEDIC SURGERY  10/01/2018    Bilateral L2-3 decompression and insertion of a Coflex device       Social History     Tobacco Use     Smoking status: Former Smoker     Smokeless tobacco: Never Used     Tobacco comment: 40 years ago   Substance Use Topics     Alcohol use: Yes     Alcohol/week: 4.2 oz     Types: 7 Standard drinks or equivalent per week     Comment: 1 wine an evening     Family History   Problem Relation Age of Onset     Family History Negative Mother      Cardiac Sudden Death Father      Family History Negative Brother      Other - See Comments Sister         colon issues     Family History Negative Son      Family History Negative Brother      Family History Negative Son      Heart Disease No family hx of          Current Outpatient Medications   Medication Sig Dispense Refill     acetaminophen (TYLENOL 8 HOUR) 650 MG CR tablet Take 650-1,300 mg by mouth 3 times daily as needed for mild pain or fever       cephALEXin (KEFLEX) 250 MG capsule Take 1 capsule (250 mg) by mouth daily       cyanocobalamin (VITAMIN B12) 1000 MCG/ML injection Inject 1 mL (1,000 mcg) into the muscle every 30 days 3 mL 11     diphenoxylate-atropine (LOMOTIL) 2.5-0.025 MG tablet Take 1 tablet by mouth twice daily if needed 180 tablet 3     ELIQUIS 5 MG tablet TAKE 1 TABLET(5 MG) BY MOUTH TWICE DAILY 30 tablet 0     metoprolol tartrate (LOPRESSOR) 50 MG tablet TAKE 1 TABLET(50 MG) BY MOUTH TWICE DAILY (Patient taking differently: Take 50 mg by mouth 2 times daily TAKE 1 TABLET(50 MG) BY MOUTH TWICE DAILY) 180 tablet 0     opium  tincture 10 MG/ML (1%) liquid Take 0.2 mLs (2 mg) by mouth every 6 hours as needed for diarrhea (4 drops) 118 mL 0     order for DME Equipment being ordered: incentive spirometer 1 Units 0     order for DME Equipment being ordered: Walker Wheels () and Walker ()  Treatment Diagnosis: DIfficulty with gait 1 each 0     traMADol (ULTRAM) 50 MG tablet Take 1 tablet (50 mg) by mouth 2 times daily as needed for severe pain 60 tablet 0     No Known Allergies    ROS:  Constitutional, HEENT, cardiovascular, pulmonary, gi and gu systems are negative, except as otherwise noted.    OBJECTIVE:     /83 (BP Location: Right arm, Patient Position: Chair, Cuff Size: Adult Regular)   Pulse 81   Temp 98.3  F (36.8  C) (Tympanic)   Wt 68.5 kg (151 lb)   SpO2 97%   BMI 22.96 kg/m    Body mass index is 22.96 kg/m .  General: This is a well-appearing elderly man in no acute distress.  Musculoskeletal: The patient does have pain with passive external rotation of the right hip joint.  He walks with a walker.        ASSESSMENT/PLAN:       1. Primary osteoarthritis of right hip  Obtain updated x-ray of right hip joint.  Referral to Dr. Don for consideration of a ultrasound-guided right hip joint injection.  He will hold Eliquis prior to visit.  Can continue tramadol.  Side effects and risks reviewed with patient and his wife today.  Controlled substance agreement signed today.  - XR Hip Right 2-3 Views; Future  - traMADol (ULTRAM) 50 MG tablet; Take 1 tablet (50 mg) by mouth 2 times daily as needed for severe pain  Dispense: 60 tablet; Refill: 0  - SPORTS MEDICINE REFERRAL    2. Crohn's disease of small and large intestines with complication (H)  Continue Lomotil and tincture of opium for chronic diarrhea.  Prescriptions refilled.  Again, controlled substance agreement signed today.  - diphenoxylate-atropine (LOMOTIL) 2.5-0.025 MG tablet; Take 1 tablet by mouth twice daily if needed  Dispense: 180 tablet; Refill: 3  -  opium tincture 10 MG/ML (1%) liquid; Take 0.2 mLs (2 mg) by mouth every 6 hours as needed for diarrhea (4 drops)  Dispense: 118 mL; Refill: 0    Total face to face contact time was greater than 26 minutes of which more than 50% of this time was spent counseling and coordinating care regarding the above topics.      Judson Mcadams MD  [facility]

## 2019-04-17 NOTE — LETTER
[facility]  04/17/19    Patient: Dawson Jones  YOB: 1930  Medical Record Number: [id number]                                                                  Opioid / Opioid Plus Controlled Substance Agreement    I understand that my care provider has prescribed an opioid (narcotic) controlled substance to help manage my condition(s). I am taking this medicine to help me function or work. I know this is strong medicine, and that it can cause serious side effects. Opioid medicine can be sedating, addicting and may cause a dependency on the drug. They can affect my ability to drive or think, and cause depression. They need to be taken exactly as prescribed. Combining opioids with certain medicines or chemicals (such as cocaine, sedatives and tranquilizers, sleeping pills, meth) can be dangerous or even fatal. Also, if I stop opioids suddenly, I may have severe withdrawal symptoms. Last, I understand that opioids do not work for all types of pain nor for all patients. If not helpful, I may be asked to stop them.        The risks, benefits, and side effects of these medicine(s) were explained to me. I agree that:    1. I will take part in other treatments as advised by my care team. This may be psychiatry or counseling, physical therapy, behavioral therapy, group treatment or a referral to a pain clinic. I will reduce or stop my medicine when my care team tells me to do so.  2. I will take my medicines as prescribed. I will not change the dose or schedule unless my care team tells me to. There will be no refills if I  run out early.   I may be contactedwithout warning and asked to complete a urine drug test or pill count at any time.   3. I will keep all my appointments, and understand this is part of the monitoring of opioids. My care team may require an office visit for EVERY opioid/controlled substance refill. If I miss appointments or don t follow instructions, my care team may stop my  medicine.  4. I will not ask other providers to prescribe controlled substances, and I will not accept controlled substances from other people. If I need another prescribed controlled substance for a new reason, I will tell my care team within 1 business day.  5. I will use one pharmacy to fill all of my controlled substance prescriptions, and it is up to me to make sure that I do not run out of my medicines on weekends or holidays. If my care team is willing to refill my opioid prescription without a visit, I must request refills only during office hours, refills may take up to 3 days to process, and it may take up to 5 to 7 days for my medicine to be mailed and ready at my pharmacy. Prescriptions will not be mailed anywhere except my pharmacy.        [id number]  Rev 12/18         Registration to scan to EHR                             Page 1 of 2               Controlled Substance Agreement Opioid        [facility]  04/17/19  Patient: Dawson Jones  YOB: 1930  Medical Record Number: [id number]                                                                  6. I am responsible for my prescriptions. If the medicine/prescription is lost or stolen, it will not be replaced. I also agree not to share controlled substance medicines with anyone.  7. I agree to not use ANY illegal or recreational drugs. This includes marijuana, cocaine, bath salts or other drugs. I agree not to use alcohol unless my care team says I may.          I agree to give urine samples whenever asked. If I don t give a urine sample, the care team may stop my medicine.    8. If I enroll in the [place] Medical Marijuana program, I will tell my care team. I will also sign an agreement to share my medical records with my care team.   9. I will bring in my list of medicines (or my medicine bottles) each time I come to the clinic.   10. I will tell my care team right away if I become pregnant or have a new medical problem treated  outside of my regular clinic.  11. I understand that this medicine can affect my thinking and judgment. It may be unsafe for me to drive, use machinery and do dangerous tasks. I will not do any of these things until I know how the medicine affects me. If my dose changes, I will wait to see how it affects me. I will contact my care team if I have concerns about medicine side effects.    I understand that if I do not follow any of the conditions above, my prescriptions or treatment may be stopped.      I agree that my provider, clinic care team, and pharmacy may work with any city, state or federal law enforcement agency that investigates the misuse, sale, or other diversion of my controlled medicine. I will allow my provider to discuss my care with or share a copy of this agreement with any other treating provider, pharmacy or emergency room where I receive care. I agree to give up (waive) any right of privacy or confidentiality with respect to these consents.     I have read this agreement and have asked questions about anything I did not understand.      ________________________________________________________________________  Patient signature - Date/Time -  Dawson Jones                                      ________________________________________________________________________  Witness signature                                                            ________________________________________________________________________  Provider signature - Judson Mcadams MD      [id number]  Rev 12/18         Registration to scan to EHR                         Page 2 of 2                   Controlled Substance Agreement Opioid           Page 1 of 2  Opioid Pain Medicines (also known as Narcotics)  What You Need to Know    What are opioids?   Opioids are pain medicines that must be prescribed by a doctor.  They are also known as narcotics.    Examples are:     morphine (MS Contin, Aline)    oxycodone  (Oxycontin)    oxycodone and acetaminophen (Percocet)    hydrocodone and acetaminophen (Vicodin, Norco)     fentanyl patch (Duragesic)     hydromorphone (Dilaudid)     methadone     What do opioids do well?   Opioids are best for short-term pain after a surgery or injury. They also work well for cancer pain. Unlike other pain medicines, they do not cause liver or kidney failure or ulcers. They may help some people with long-lasting (chronic) pain.     What do opioids NOT do well?   Opioids never get rid of pain entirely, and they do not work well for most patients with chronic pain. Opioids do not reduce swelling, one of the causes of pain. They also don t work well for nerve pain.                           For informational purposes only.  Not to replace the advice of your care provider.  Copyright [facility]. All right reserved. [employer] 028357-Vgg 02/18.      Page 2 of 2    Risks and side effects   Talk to your doctor before you start or decide to keep taking one of these medicines. Side effects include:    Lowering your breathing rate enough to cause death    Overdose, including death, especially if taking higher than prescribed doses    Long-term opioid use    Worse depression symptoms; less pleasure in things you usually enjoy    Feeling tired or sluggish    Slower thoughts or cloudy thinking    Being more sensitive to pain over time; pain is harder to control    Trouble sleeping or restless sleep    Changes in hormone levels (for example, less testosterone)    Changes in sex drive or ability to have sex    Constipation    Unsafe driving    Itching and sweating    Feeling dizzy    Nausea, vomiting and dry mouth    What else should I know about opioids?  When someone takes opioids for too long or too often, they become dependent. This means that if you stop or reduce the medicine too quickly, you will have withdrawal symptoms.    Dependence is not the same as addiction. Addiction is when  people keep using a substance that harms their body, their mind or their relations with others. If you have a history of drug or alcohol abuse, taking opioids can cause a relapse.    Over time, opioids don t work as well. Most people will need higher and higher doses. The higher the dose, the more serious the side effects. We don t know the long-term effects of opioids.      Prescribed opioids aren't the best way to manage chronic pain    Other ways to manage pain include:      Ibuprofen or acetaminophen.  You should always try this first.      Treat health problems that may be causing pain.      acupuncture or massage, deep breathing, meditation, visual imagery, aromatherapy.      Use heat or ice at the pain site      Physical therapy and exercise      Stop smoking      See a counselor or therapist                                                  People who have used opioids for a long time may have a lower quality of life, worse depression, higher levels of pain and more visits to doctors.    Never share your opioids with others. Be sure to store opioids in a secure place, locked if possible.Young children can easily swallow them and overdose.     You can overdose on opioids.  Signs of overdose include decrease or loss of consciousness, slowed breathing, trouble waking and blue lips.  If someone is worried about overdose, they should call 911.    If you are at risk for overdose, you may get naloxone (Narcan, a medicine that reverses the effects of opioids.  If you overdose, a friend or family member can give you Narcan while waiting for the ambulance.  They need to know the signs of overdose and how to give Narcan.    While you're taking opioids:    Don't use alcohol or street drugs. Taking them together can cause death.    Don't take any of these medicines unless your doctor says its okay.  Taking these with opioids can cause death.    Benzodiazepines (such as lorazepam         or diazepam)    Muscle relaxers  (such as cyclobenzaprine)    sleeping pills    other opioids    Safe disposal of opioids  Find your area drug take-back program, your pharmacy mail-back program, buy a special disposal bag (such as Deterra) from your pharmacy or flush them down the toilet.  Use the guidelines at:  www.fda.gov/drugs/resourcesforyou

## 2019-04-17 NOTE — LETTER
[facility]  [address] [facility]  [address]  [place]  [address]  Tel: [phone]    April 18, 2019    Dawson Jones  [address] [facility] [id number]        Dear Mr. Jones,    The following letter pertains to your most recent diagnostic tests:    The x-ray still shows only mild degenerative joint disease/ostearthritis.  However, I still think it is worth a try to have another cortisone injection in the hip joint with Dr. Don  If you have any further questions or problems, please contact our office.      Sincerely,    Judson Mcadams MD/GURINDER

## 2019-04-18 NOTE — RESULT ENCOUNTER NOTE
The following letter pertains to your most recent diagnostic tests:    The x-ray still shows only mild degenerative joint disease/ostearthritis.  However, I still think it is worth a try to have another cortisone injection in the hip joint with Dr. Don.           Sincerely,    Dr. Mcadams

## 2019-04-24 ENCOUNTER — OFFICE VISIT (OUTPATIENT)
Dept: ORTHOPEDICS | Facility: CLINIC | Age: 84
End: 2019-04-24
Payer: COMMERCIAL

## 2019-04-24 VITALS
DIASTOLIC BLOOD PRESSURE: 84 MMHG | SYSTOLIC BLOOD PRESSURE: 146 MMHG | HEIGHT: 68 IN | BODY MASS INDEX: 22.88 KG/M2 | WEIGHT: 151 LBS

## 2019-04-24 DIAGNOSIS — M16.11 PRIMARY OSTEOARTHRITIS OF RIGHT HIP: Primary | ICD-10-CM

## 2019-04-24 PROCEDURE — 20611 DRAIN/INJ JOINT/BURSA W/US: CPT | Mod: RT | Performed by: FAMILY MEDICINE

## 2019-04-24 PROCEDURE — 99203 OFFICE O/P NEW LOW 30 MIN: CPT | Mod: 25 | Performed by: FAMILY MEDICINE

## 2019-04-24 RX ORDER — LIDOCAINE HYDROCHLORIDE 10 MG/ML
4 INJECTION, SOLUTION INFILTRATION; PERINEURAL
Status: DISCONTINUED | OUTPATIENT
Start: 2019-04-24 | End: 2019-07-17

## 2019-04-24 RX ORDER — TRIAMCINOLONE ACETONIDE 40 MG/ML
40 INJECTION, SUSPENSION INTRA-ARTICULAR; INTRAMUSCULAR
Status: DISCONTINUED | OUTPATIENT
Start: 2019-04-24 | End: 2019-07-17

## 2019-04-24 RX ORDER — LIDOCAINE HYDROCHLORIDE 10 MG/ML
5 INJECTION, SOLUTION INFILTRATION; PERINEURAL
Status: DISCONTINUED | OUTPATIENT
Start: 2019-04-24 | End: 2019-07-17

## 2019-04-24 RX ADMIN — LIDOCAINE HYDROCHLORIDE 5 ML: 10 INJECTION, SOLUTION INFILTRATION; PERINEURAL at 09:43

## 2019-04-24 RX ADMIN — LIDOCAINE HYDROCHLORIDE 4 ML: 10 INJECTION, SOLUTION INFILTRATION; PERINEURAL at 09:43

## 2019-04-24 RX ADMIN — TRIAMCINOLONE ACETONIDE 40 MG: 40 INJECTION, SUSPENSION INTRA-ARTICULAR; INTRAMUSCULAR at 09:43

## 2019-04-24 ASSESSMENT — ENCOUNTER SYMPTOMS
ABDOMINAL PAIN: 0
DIARRHEA: 0
MYALGIAS: 1
CHILLS: 0
FEVER: 0
BACK PAIN: 0

## 2019-04-24 ASSESSMENT — MIFFLIN-ST. JEOR: SCORE: 1329.43

## 2019-04-24 NOTE — PROGRESS NOTES
[facility]   Clinic Visit s Apr 24, 2019    PCP: Judson Mcadams      Dawson is a 88 year old male who is seen in consultation at the request of Dr. Mcadams for   Chief Complaint   Patient presents with     Right Hip - Pain       Injury: Reports insidious onset without acute precipitating event.       Location of Pain: right hip anterior  and lateral, nonradiating   Duration of Pain: 2 year(s)  Rating of Pain at worst: 8/10  Rating of Pain Currently: 1/10  Pain is better with: activity avoidance, sitting  Pain is worse with: mornings, walking and standing  Treatment so far consists of: hip injection with [facility] June 2018, rehab   Associated symptoms: no distal numbness or tingling; denies swelling or warmth  Recent imaging completed: X-rays completed 4/17/19.  Prior History of related problems: back surgery with Dr Reynolds Oct 2018    Fluoroscopic guided right hip cortisone injection summer 2018, mixed results.  Follow-up for hip by Dr. 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      Hyperlipidemia        Patient Active Problem List    Diagnosis Date Noted     History of lumbar laminectomy for spinal cord decompression 10/22/2018     Priority: Medium     Urinary retention 10/22/2018     Priority: Medium     Physical deconditioning 10/22/2018     Priority: Medium     Bacteremia 10/16/2018     Priority: Medium     Spinal stenosis 10/01/2018     Priority: Medium     PAD (peripheral artery disease) (H) 07/31/2018     Priority: Medium     Chronic pain syndrome 01/04/2018     Priority: Medium     Patient is followed by Judson Mcadams MD for ongoing prescription of pain medication.  All refills should only be approved by this  provider, or covering partner.    Medication(s): Tramadol  mg po bid prn # 120 per month.   Maximum quantity per month: 120  Clinic visit frequency required: Q 3 months     Controlled substance agreement:  Encounter-Level CSA - 01/04/2018:          Controlled Substance Agreement - Scan on 1/24/2018 12:08 PM : CONTROLLED SUBSTANCE AGREEMENT (below)              Pain Clinic evaluation in the past: No    DIRE Total Score(s):  No flowsheet data found.    Last [facility] website verification: No concerns 08/21/18 [facility]         Crohn's disease of small and large intestines with complication (H) 09/15/2016     Priority: Medium     Spinal stenosis of lumbosacral region 09/15/2016     Priority: Medium     Vitamin B12 deficiency (non anemic) 09/15/2016     Priority: Medium     Atrial fibrillation (H) 01/05/2015     Priority: Medium     Cardiomyopathy, unspecified type (H)      Priority: Medium       Family History   Problem Relation Age of Onset     Family History Negative Mother      Cardiac Sudden Death Father      Family History Negative Brother      Other - See Comments Sister         colon issues     Family History Negative Son      Family History Negative Brother      Family History Negative Son      Heart Disease No family hx of        Social History     Socioeconomic History     Marital status:      Spouse name: Not on file     Number of children: Not on file     Years of education: Not on file     Highest education level: Not on file   Occupational History     Not on file   Social Needs     Financial resource strain: Not on file     Food insecurity:     Worry: Not on file     Inability: Not on file     Transportation needs:     Medical: Not on file     Non-medical: Not on file   Tobacco Use     Smoking status: Former Smoker     Smokeless tobacco: Never Used     Tobacco comment: 40 years ago   Substance and Sexual Activity     Alcohol use: Yes     Alcohol/week: 4.2 oz     Types: 7 Standard drinks or equivalent  "per week     Comment: 1 wine an evening     Drug use: No       Past Surgical History:   Procedure Laterality Date     APPENDECTOMY       CYSTOSCOPY, TRANSURETHRAL RESECTION (TUR) PROSTATE, COMBINED N/A 1/24/2019    Procedure: COMBINED CYSTOSCOPY, TRANSURETHRAL RESECTION (TUR) PROSTATE;  Surgeon: Meliton Valverde MD;  Location: [facility] OR     DECOMPRESSION LUMBAR ONE LEVEL N/A 10/1/2018    Procedure: DECOMPRESSION LUMBAR ONE LEVEL;  DECOMPRESSION L2-3 WITH COFLEX DEVICE  ;  Surgeon: Bert Reynolds MD;  Location:  OR     LASER KTP GREEN LIGHT PHOTOSELECTIVE VAPORIZATION PROSTATE N/A 1/24/2019    Procedure: LASER KTP GREEN LIGHT PHOTOSELECTIVE VAPORIZATION PROSTATE;  Surgeon: Meliton Valverde MD;  Location:  OR     ORTHOPEDIC SURGERY  10/01/2018    Bilateral L2-3 decompression and insertion of a Coflex device             Review of Systems   Constitutional: Positive for malaise/fatigue. Negative for chills and fever.   Gastrointestinal: Negative for abdominal pain and diarrhea.   Musculoskeletal: Positive for joint pain and myalgias. Negative for back pain.   All other systems reviewed and are negative.        Physical Exam  /84   Ht 1.727 m (5' 8\")   Wt 68.5 kg (151 lb)   BMI 22.96 kg/m    Constitutional:well-developed, well-nourished, and in no distress.   Cardiovascular: Intact distal pulses.    Neurological: alert. Gait Abnormal:   Gait with shuffling steps  uses cane  Skin: Skin is warm and dry.   Psychiatric: Mood and affect normal.   Respiratory: unlabored, speaks in full sentences  Lymph: no LAD, no lymphangitis            Right Hip Exam     Tenderness   The patient is experiencing tenderness in the greater trochanter.    Range of Motion   Abduction: 30   Adduction: normal   Flexion: 120   External rotation: 40   Internal rotation: 25     Muscle Strength   Abduction: 5/5   Adduction: 5/5   Flexion: 4/5     Tests   RUDI: negative    Other   Erythema: absent  Scars: absent  Sensation: normal  Pulse: " present                       X-ray images Previously done and independently reviewed by me in the office today with the patient. X-ray shows:     Recent Results (from the past 744 hour(s))   XR Hip Right 2-3 Views    Narrative    XR RIGHT HIP TWO-THREE VIEWS  4/17/2019 10:51 AM      HISTORY: Primary osteoarthritis of right hip.    COMPARISON: None.      Impression    IMPRESSION: No acute fracture or dislocation. Mild osteoarthritis.    LIANNA QUINTEROS MD           ASSESSMENT/PLAN    ICD-10-CM    1. Primary osteoarthritis of right hip M16.11        Exam consistent with radiographic findings for mild arthritis.  There is also some mild evidence of trochanteric bursitis but to a lesser degree than the pain pattern seen with intra-articular osteoarthritis.  Unclear why the cortisone injection done at [facility] last summer was ineffective.  Offered repeat injection today patient was agreeable towards proceeding.            Large Joint Injection/Arthocentesis: R hip joint  Date/Time: 4/24/2019 9:43 AM  Performed by: Sal Don MD  Authorized by: Sal Don MD     Indications:  Pain  Needle Size:  22 G  Guidance: ultrasound    Approach:  Anterior  Location:  Hip        Site:  R hip joint    Medications:  4 mL lidocaine 1 %; 5 mL lidocaine 1 %; 40 mg triamcinolone 40 MG/ML  Outcome:  Tolerated well, no immediate complications  Procedure discussed: discussed risks, benefits, and alternatives    Consent Given by:  Patient  Timeout: timeout called immediately prior to procedure    Prep: patient was prepped and draped in usual sterile fashion     Lot: [id number] EXP: 11/2020

## 2019-04-24 NOTE — LETTER
4/24/2019         RE: Dawson Jones  [address]  [facility] [address]        Dear Colleague,    Thank you for referring your patient, Dawson Jones, to the  SPORTS MEDICINE. Please see a copy of my visit note below.    [facility]   Clinic Visit s Apr 24, 2019    PCP: Judson Mcadams      Dawson is a 88 year old male who is seen in consultation at the request of Dr. Mcadams for   Chief Complaint   Patient presents with     Right Hip - Pain       Injury: Reports insidious onset without acute precipitating event.       Location of Pain: right hip anterior  and lateral, nonradiating   Duration of Pain: 2 year(s)  Rating of Pain at worst: 8/10  Rating of Pain Currently: 1/10  Pain is better with: activity avoidance, sitting  Pain is worse with: mornings, walking and standing  Treatment so far consists of: hip injection with [facility] June 2018, rehab   Associated symptoms: no distal numbness or tingling; denies swelling or warmth  Recent imaging completed: X-rays completed 4/17/19.  Prior History of related problems: back surgery with Dr Reynolds Oct 2018    Fluoroscopic guided right hip cortisone injection summer 2018, mixed results.  Follow-up for hip by Dr. 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      Hyperlipidemia        Patient Active Problem List    Diagnosis Date Noted     History of lumbar laminectomy for spinal cord decompression 10/22/2018     Priority: Medium     Urinary retention 10/22/2018     Priority: Medium     Physical deconditioning 10/22/2018     Priority: Medium     Bacteremia 10/16/2018     Priority: Medium     Spinal stenosis 10/01/2018     Priority: Medium     PAD (peripheral artery  disease) (H) 07/31/2018     Priority: Medium     Chronic pain syndrome 01/04/2018     Priority: Medium     Patient is followed by Judson Mcadams MD for ongoing prescription of pain medication.  All refills should only be approved by this provider, or covering partner.    Medication(s): Tramadol  mg po bid prn # 120 per month.   Maximum quantity per month: 120  Clinic visit frequency required: Q 3 months     Controlled substance agreement:  Encounter-Level CSA - 01/04/2018:          Controlled Substance Agreement - Scan on 1/24/2018 12:08 PM : CONTROLLED SUBSTANCE AGREEMENT (below)              Pain Clinic evaluation in the past: No    DIRE Total Score(s):  No flowsheet data found.    Last [facility] website verification: No concerns 08/21/18 [facility]         Crohn's disease of small and large intestines with complication (H) 09/15/2016     Priority: Medium     Spinal stenosis of lumbosacral region 09/15/2016     Priority: Medium     Vitamin B12 deficiency (non anemic) 09/15/2016     Priority: Medium     Atrial fibrillation (H) 01/05/2015     Priority: Medium     Cardiomyopathy, unspecified type (H)      Priority: Medium       Family History   Problem Relation Age of Onset     Family History Negative Mother      Cardiac Sudden Death Father      Family History Negative Brother      Other - See Comments Sister         colon issues     Family History Negative Son      Family History Negative Brother      Family History Negative Son      Heart Disease No family hx of        Social History     Socioeconomic History     Marital status:      Spouse name: Not on file     Number of children: Not on file     Years of education: Not on file     Highest education level: Not on file   Occupational History     Not on file   Social Needs     Financial resource strain: Not on file     Food insecurity:     Worry: Not on file     Inability: Not on file     Transportation needs:     Medical: Not on file     Non-medical: Not on file  "  Tobacco Use     Smoking status: Former Smoker     Smokeless tobacco: Never Used     Tobacco comment: 40 years ago   Substance and Sexual Activity     Alcohol use: Yes     Alcohol/week: 4.2 oz     Types: 7 Standard drinks or equivalent per week     Comment: 1 wine an evening     Drug use: No       Past Surgical History:   Procedure Laterality Date     APPENDECTOMY       CYSTOSCOPY, TRANSURETHRAL RESECTION (TUR) PROSTATE, COMBINED N/A 1/24/2019    Procedure: COMBINED CYSTOSCOPY, TRANSURETHRAL RESECTION (TUR) PROSTATE;  Surgeon: Meliton Valverde MD;  Location: [facility] OR     DECOMPRESSION LUMBAR ONE LEVEL N/A 10/1/2018    Procedure: DECOMPRESSION LUMBAR ONE LEVEL;  DECOMPRESSION L2-3 WITH COFLEX DEVICE  ;  Surgeon: Bert Reynolds MD;  Location:  OR     LASER KTP GREEN LIGHT PHOTOSELECTIVE VAPORIZATION PROSTATE N/A 1/24/2019    Procedure: LASER KTP GREEN LIGHT PHOTOSELECTIVE VAPORIZATION PROSTATE;  Surgeon: Meliton Valverde MD;  Location:  OR     ORTHOPEDIC SURGERY  10/01/2018    Bilateral L2-3 decompression and insertion of a Coflex device             Review of Systems   Constitutional: Positive for malaise/fatigue. Negative for chills and fever.   Gastrointestinal: Negative for abdominal pain and diarrhea.   Musculoskeletal: Positive for joint pain and myalgias. Negative for back pain.   All other systems reviewed and are negative.        Physical Exam  /84   Ht 1.727 m (5' 8\")   Wt 68.5 kg (151 lb)   BMI 22.96 kg/m     Constitutional:well-developed, well-nourished, and in no distress.   Cardiovascular: Intact distal pulses.    Neurological: alert. Gait Abnormal:   Gait with shuffling steps  uses cane  Skin: Skin is warm and dry.   Psychiatric: Mood and affect normal.   Respiratory: unlabored, speaks in full sentences  Lymph: no LAD, no lymphangitis            Right Hip Exam     Tenderness   The patient is experiencing tenderness in the greater trochanter.    Range of Motion   Abduction: 30   Adduction: " normal   Flexion: 120   External rotation: 40   Internal rotation: 25     Muscle Strength   Abduction: 5/5   Adduction: 5/5   Flexion: 4/5     Tests   RUDI: negative    Other   Erythema: absent  Scars: absent  Sensation: normal  Pulse: present                       X-ray images Previously done and independently reviewed by me in the office today with the patient. X-ray shows:     Recent Results (from the past 744 hour(s))   XR Hip Right 2-3 Views    Narrative    XR RIGHT HIP TWO-THREE VIEWS  4/17/2019 10:51 AM      HISTORY: Primary osteoarthritis of right hip.    COMPARISON: None.      Impression    IMPRESSION: No acute fracture or dislocation. Mild osteoarthritis.    LIANNA QUINTEROS MD           ASSESSMENT/PLAN    ICD-10-CM    1. Primary osteoarthritis of right hip M16.11        Exam consistent with radiographic findings for mild arthritis.  There is also some mild evidence of trochanteric bursitis but to a lesser degree than the pain pattern seen with intra-articular osteoarthritis.  Unclear why the cortisone injection done at [facility] last summer was ineffective.  Offered repeat injection today patient was agreeable towards proceeding.            Large Joint Injection/Arthocentesis: R hip joint  Date/Time: 4/24/2019 9:43 AM  Performed by: Sal Don MD  Authorized by: Sal Don MD     Indications:  Pain  Needle Size:  22 G  Guidance: ultrasound    Approach:  Anterior  Location:  Hip        Site:  R hip joint    Medications:  4 mL lidocaine 1 %; 5 mL lidocaine 1 %; 40 mg triamcinolone 40 MG/ML  Outcome:  Tolerated well, no immediate complications  Procedure discussed: discussed risks, benefits, and alternatives    Consent Given by:  Patient  Timeout: timeout called immediately prior to procedure    Prep: patient was prepped and draped in usual sterile fashion     Lot: [id number] EXP: 11/2020            Again, thank you for allowing me to participate in the care of your patient.         Sincerely,        Sal Don MD

## 2019-05-24 DIAGNOSIS — M16.11 PRIMARY OSTEOARTHRITIS OF RIGHT HIP: ICD-10-CM

## 2019-05-25 NOTE — TELEPHONE ENCOUNTER
Requested Prescriptions   Pending Prescriptions Disp Refills     traMADol (ULTRAM) 50 MG tablet [Pharmacy Med Name: TRAMADOL 50MG TABLETS] 60 tablet 0     Sig: TAKE 1 TABLET BY MOUTH TWICE DAILY AS NEEDED FOR SEVERE PAIN       There is no refill protocol information for this order            Last Written Prescription Date:  4/17/19  Last Fill Quantity: 60 tablet,   # refills: 0  Last Office Visit: 4/17/2019 (Pema)  Future Office visit:    Next 5 appointments (look out 90 days)    Jul 17, 2019 10:00 AM CDT  Office Visit with Judson [facility]adams MD  [facility] [address] [facility] [address]  [phone]           Routing refill request to provider for review/approval because:  Drug not on the [facility], [facility] or [facility] refill protocol or controlled substance

## 2019-05-29 RX ORDER — TRAMADOL HYDROCHLORIDE 50 MG/1
TABLET ORAL
Qty: 60 TABLET | Refills: 0 | Status: SHIPPED | OUTPATIENT
Start: 2019-05-29 | End: 2019-07-10

## 2019-05-29 NOTE — TELEPHONE ENCOUNTER
Pt's wife, Pauly, is calling to check on the status of this medication.  Please refill as soon as possible.

## 2019-07-10 DIAGNOSIS — I48.20 CHRONIC ATRIAL FIBRILLATION (H): ICD-10-CM

## 2019-07-10 DIAGNOSIS — M16.11 PRIMARY OSTEOARTHRITIS OF RIGHT HIP: ICD-10-CM

## 2019-07-10 DIAGNOSIS — G89.4 CHRONIC PAIN SYNDROME: ICD-10-CM

## 2019-07-11 RX ORDER — TRAMADOL HYDROCHLORIDE 50 MG/1
TABLET ORAL
Qty: 60 TABLET | Refills: 0 | Status: SHIPPED | OUTPATIENT
Start: 2019-07-11 | End: 2019-09-11

## 2019-07-11 RX ORDER — APIXABAN 5 MG/1
TABLET, FILM COATED ORAL
Qty: 180 TABLET | Refills: 0 | Status: SHIPPED | OUTPATIENT
Start: 2019-07-11 | End: 2019-10-02

## 2019-07-11 NOTE — TELEPHONE ENCOUNTER
Eliquis refilled per RN protocol.  Patient has follow up with PCP next week and is due for his 3 month creatinine.    Tramadol pended for PCP review and print if agreed.  Mn  checked today.  No concerns noted.    Thank you,  Diane Tovar RN on 7/11/2019 at 11:03 AM

## 2019-07-11 NOTE — TELEPHONE ENCOUNTER
Contacted pharmacy. Medication list shows Eliquis #30 last filled in November.  However, med reconcilliation tab in Epic shows #90 filled March, 2019.    Pharmacy concurs that #180 tabs were picked up on 3/17/19.  They show no lapse/break in therapy.  He is scheduled for a follow up appt.soon.  Needs creat.at that time.    Prescription approved per [facility] Refill Protocol.  Diane Tovar RN on 7/11/2019 at 10:57 AM

## 2019-07-11 NOTE — TELEPHONE ENCOUNTER
ELIQUIS 5 MG tablet  Last Written Prescription Date:  11/21/18  Last Fill Quantity: 30 tablet,  # refills: 0   Last office visit: 4/17/2019 with prescribing provider:  Pema   Future Office Visit:     traMADol (ULTRAM) 50 MG tablet  Last Written Prescription Date:  5/29/19  Last Fill Quantity: 60 tablet,  # refills: 0   Last office visit: 4/17/2019 with prescribing provider:  Pema   Future Office Visit:   Next 5 appointments (look out 90 days)    Jul 17, 2019 10:00 AM CDT  Office Visit with Judson Mcadams MD  [facility] [address] [facility] [address]  [phone]         Routing refill request to provider for review/approval because:  Drug not on the G, P or [facility] refill protocol or controlled substance      Requested Prescriptions   Pending Prescriptions Disp Refills     ELIQUIS 5 MG tablet [Pharmacy Med Name: ELIQUIS 5MG TABLETS] 180 tablet 0     Sig: TAKE 1 TABLET(5 MG) BY MOUTH TWICE DAILY       Direct Oral Anticoagulant Agents Failed - 7/10/2019  9:06 AM        Failed - Patient is 18-79 years of age        Passed - Normal Platelets on file in past 12 months     Recent Labs   Lab Test 01/16/19  1051                  Passed - Medication is active on med list        Passed - Serum creatinine less than or equal to 1.4 on file in past 12 mos     Recent Labs   Lab Test 01/16/19  1051   CR 0.97             Passed - Weight is greater than 60 kg for the past year     Wt Readings from Last 3 Encounters:   04/24/19 68.5 kg (151 lb)   04/17/19 68.5 kg (151 lb)   01/24/19 68.7 kg (151 lb 6.4 oz)             Passed - Recent (6 mo) or future (30 days) visit within the authorizing provider's specialty        traMADol (ULTRAM) 50 MG tablet [Pharmacy Med Name: TRAMADOL 50MG TABLETS] 60 tablet 0     Sig: TAKE 1 TABLET BY MOUTH TWICE DAILY AS NEEDED FOR SEVERE PAIN       There is no refill protocol information for this order

## 2019-07-11 NOTE — TELEPHONE ENCOUNTER
Rx for Tramadol Ready- Faxed to Micha Ortega  [facility] Unit Coordinator

## 2019-07-17 ENCOUNTER — OFFICE VISIT (OUTPATIENT)
Dept: FAMILY MEDICINE | Facility: CLINIC | Age: 84
End: 2019-07-17
Payer: COMMERCIAL

## 2019-07-17 VITALS
DIASTOLIC BLOOD PRESSURE: 79 MMHG | WEIGHT: 143 LBS | HEIGHT: 68 IN | HEART RATE: 65 BPM | BODY MASS INDEX: 21.67 KG/M2 | SYSTOLIC BLOOD PRESSURE: 122 MMHG | TEMPERATURE: 97.5 F | OXYGEN SATURATION: 97 %

## 2019-07-17 DIAGNOSIS — G89.4 CHRONIC PAIN SYNDROME: Primary | ICD-10-CM

## 2019-07-17 PROBLEM — I73.9 PAD (PERIPHERAL ARTERY DISEASE) (H): Status: RESOLVED | Noted: 2018-07-31 | Resolved: 2019-07-17

## 2019-07-17 PROCEDURE — 99207 C PAF COMPLETED  NO CHARGE: CPT | Performed by: INTERNAL MEDICINE

## 2019-07-17 PROCEDURE — 99213 OFFICE O/P EST LOW 20 MIN: CPT | Performed by: INTERNAL MEDICINE

## 2019-07-17 ASSESSMENT — MIFFLIN-ST. JEOR: SCORE: 1288.14

## 2019-07-17 NOTE — PROGRESS NOTES
Subjective     Dawson Jones is a 89 year old male who presents to clinic today for the following health issues:    HPI     3 month Pain check     No changes in pain - had a R hip injection on 4/24/2019 with Dr. Don, which did not seem to help much.     89-year-old man with chronic right hip pain that has been refractory to lumbar spine surgery and several cortisone injections presents in follow-up regarding his pain management.  He is here with his wife.  He does take a tramadol tablet every morning regardless of his pain levels.  His pain seems to be worse in the morning and seems to improve as the day progresses.  He states that he can now walk longer than several 100 feet without having to stop because of pain in the right leg.  He has tried Cymbalta and gabapentin.  He does take Tylenol on a scheduled basis.  He is not having any side effects from the tramadol although his wife reports that his appetite has not been as good as it had in the past.  He has lost several pounds.  His Crohn's disease is stable.  No bloody diarrhea.  No other new symptoms.    Patient Active Problem List   Diagnosis     Atrial fibrillation (H)     Cardiomyopathy, unspecified type (H)     Crohn's disease of small and large intestines with complication (H)     Spinal stenosis of lumbosacral region     Vitamin B12 deficiency (non anemic)     Chronic pain syndrome     Spinal stenosis     Bacteremia     History of lumbar laminectomy for spinal cord decompression     Urinary retention     Physical deconditioning     Past Surgical History:   Procedure Laterality Date     APPENDECTOMY       CYSTOSCOPY, TRANSURETHRAL RESECTION (TUR) PROSTATE, COMBINED N/A 1/24/2019    Procedure: COMBINED CYSTOSCOPY, TRANSURETHRAL RESECTION (TUR) PROSTATE;  Surgeon: Meliton Valverde MD;  Location: [facility] OR     DECOMPRESSION LUMBAR ONE LEVEL N/A 10/1/2018    Procedure: DECOMPRESSION LUMBAR ONE LEVEL;  DECOMPRESSION L2-3 WITH COFLEX DEVICE  ;  Surgeon: Rodolfo  Bert Santoro MD;  Location:  OR     LASER KTP GREEN LIGHT PHOTOSELECTIVE VAPORIZATION PROSTATE N/A 1/24/2019    Procedure: LASER KTP GREEN LIGHT PHOTOSELECTIVE VAPORIZATION PROSTATE;  Surgeon: Meliton Valverde MD;  Location:  OR     ORTHOPEDIC SURGERY  10/01/2018    Bilateral L2-3 decompression and insertion of a Coflex device       Social History     Tobacco Use     Smoking status: Former Smoker     Smokeless tobacco: Never Used     Tobacco comment: 40 years ago   Substance Use Topics     Alcohol use: Yes     Alcohol/week: 4.2 oz     Types: 7 Standard drinks or equivalent per week     Comment: 1 wine an evening     Family History   Problem Relation Age of Onset     Family History Negative Mother      Cardiac Sudden Death Father      Family History Negative Brother      Other - See Comments Sister         colon issues     Family History Negative Son      Family History Negative Brother      Family History Negative Son      Heart Disease No family hx of          Current Outpatient Medications   Medication Sig Dispense Refill     acetaminophen (TYLENOL 8 HOUR) 650 MG CR tablet Take 650-1,300 mg by mouth 3 times daily as needed for mild pain or fever       cyanocobalamin (VITAMIN B12) 1000 MCG/ML injection Inject 1 mL (1,000 mcg) into the muscle every 30 days 3 mL 11     diphenoxylate-atropine (LOMOTIL) 2.5-0.025 MG tablet Take 1 tablet by mouth twice daily if needed 180 tablet 3     ELIQUIS 5 MG tablet TAKE 1 TABLET(5 MG) BY MOUTH TWICE DAILY 180 tablet 0     metoprolol tartrate (LOPRESSOR) 50 MG tablet TAKE 1 TABLET(50 MG) BY MOUTH TWICE DAILY (Patient taking differently: Take 50 mg by mouth 2 times daily TAKE 1 TABLET(50 MG) BY MOUTH TWICE DAILY) 180 tablet 0     opium tincture 10 MG/ML (1%) liquid Take 0.2 mLs (2 mg) by mouth every 6 hours as needed for diarrhea (4 drops) 118 mL 0     order for DME Equipment being ordered: incentive spirometer 1 Units 0     order for DME Equipment being ordered: Walker Wheels  "() and Walker ()  Treatment Diagnosis: DIfficulty with gait 1 each 0     traMADol (ULTRAM) 50 MG tablet TAKE 1 TABLET BY MOUTH TWICE DAILY AS NEEDED FOR SEVERE PAIN 60 tablet 0     No Known Allergies    Reviewed and updated as needed this visit by Provider         Review of Systems   ROS COMP: Constitutional, HEENT, cardiovascular, pulmonary, gi and gu systems are negative, except as otherwise noted.      Objective    /79 (BP Location: Right arm, Cuff Size: Adult Small)   Pulse 65   Temp 97.5  F (36.4  C) (Tympanic)   Ht 1.727 m (5' 8\")   Wt 64.9 kg (143 lb)   SpO2 97%   BMI 21.74 kg/m    Body mass index is 21.74 kg/m .  Physical Exam   This is a well-appearing, comfortable appearing man in no acute distress who is well-groomed and speaks in full sentences and does not appear toxic and has appropriate mood and affect.            Assessment & Plan     1. Chronic pain syndrome  Pleasant man with long-standing lower back and right greater than left hip/upper leg pain that has been evaluated extensively and is thought to be possibly related to spinal stenosis or osteoarthritis of the right hip joint.  Unfortunately, surgical interventions have not been successful in addressing pain.  We did spend 20 minutes today in face-to-face contact with the patient and his wife.  The majority that time (more than 50% of that time) was spent counseling the patient on proper use of tramadol.  My concern is that if he uses the tramadol on a regular basis on a scheduled basis that he may develop tolerance and the need for escalating doses.  My suggestion was to use the Tylenol on a scheduled basis and to use the tramadol only if and when the pain is refractory to Tylenol to avoid the possibility of tolerance and escalating doses.  After this discussion, the patient is motivated to reduce his dose.  Also, it is possible that the regular use of tramadol is contributing to appetite suppression.  This is another " reason to try to reduce the dose.             Return in about 6 months (around 1/17/2020) for Pain Check.    Judson Mcadams MD  [facility]

## 2019-08-27 ENCOUNTER — TRANSFERRED RECORDS (OUTPATIENT)
Dept: HEALTH INFORMATION MANAGEMENT | Facility: CLINIC | Age: 84
End: 2019-08-27

## 2019-08-29 ENCOUNTER — TRANSFERRED RECORDS (OUTPATIENT)
Dept: HEALTH INFORMATION MANAGEMENT | Facility: CLINIC | Age: 84
End: 2019-08-29

## 2019-09-11 DIAGNOSIS — M16.11 PRIMARY OSTEOARTHRITIS OF RIGHT HIP: ICD-10-CM

## 2019-09-12 RX ORDER — TRAMADOL HYDROCHLORIDE 50 MG/1
TABLET ORAL
Qty: 60 TABLET | Refills: 0 | Status: SHIPPED | OUTPATIENT
Start: 2019-09-12 | End: 2019-11-29

## 2019-09-12 NOTE — TELEPHONE ENCOUNTER
traMADol (ULTRAM) 50 MG tablet  Last Written Prescription Date:  7/11/19  Last Fill Quantity: 60,   # refills: 0  Last Office Visit: 7/17/19  Future Office visit:       Routing refill request to provider for review/approval because:  Drug not on the [facility], P or [facility] refill protocol or controlled substance    Patient is followed by Judson Mcadams MD for ongoing prescription of pain medication.  All refills should only be approved by this provider, or covering partner.    Medication(s): Tramadol  mg po bid prn # 120 per month.   Maximum quantity per month: 120  Clinic visit frequency required: Q 3 months     Controlled substance agreement:  Encounter-Level CSA - 01/04/2018:          Controlled Substance Agreement - Scan on 1/24/2018 12:08 PM : CONTROLLED SUBSTANCE AGREEMENT (below)              Pain Clinic evaluation in the past: No    DIRE Total Score(s):  No flowsheet data found.    Last [facility] website verification: No concerns 07/11/19 JAN

## 2019-09-18 DIAGNOSIS — K50.819 CROHN'S DISEASE OF SMALL AND LARGE INTESTINES WITH COMPLICATION (H): ICD-10-CM

## 2019-09-18 NOTE — TELEPHONE ENCOUNTER
Reason for Call:  Medication or medication refill:    Do you use a [facility]?  Name of the pharmacy and phone number for the current request:     [facility]Y #[id number] [place] [id number] [facility]    Name of the medication requested:   opium tincture 10 MG/ML (1%) liquid    Can we leave a detailed message on this number? YES    Phone number patient can be reached at: Home number on file [phone] (home)    Best Time: Any    Call taken on 9/18/2019 at 11:35 AM by Suzy Campos

## 2019-09-19 RX ORDER — MORPHINE 10 MG/ML
0.2 TINCTURE ORAL EVERY 6 HOURS PRN
Qty: 118 ML | Refills: 0 | Status: SHIPPED | OUTPATIENT
Start: 2019-09-19 | End: 2020-01-01

## 2019-09-19 NOTE — TELEPHONE ENCOUNTER
Last Rx 4/17/19 Opium Tincture 10mg/mL 1% liquid - 118mL with 0 refills     Last OV 7/17/19    Routing refill request to provider for review/approval because:  Drug not on the [facility] refill protocol     Christina BARRIOS RN

## 2019-10-02 ENCOUNTER — HEALTH MAINTENANCE LETTER (OUTPATIENT)
Age: 84
End: 2019-10-02

## 2019-10-02 DIAGNOSIS — I48.20 CHRONIC ATRIAL FIBRILLATION (H): ICD-10-CM

## 2019-10-02 DIAGNOSIS — I10 BENIGN ESSENTIAL HYPERTENSION: ICD-10-CM

## 2019-10-02 RX ORDER — METOPROLOL TARTRATE 50 MG
TABLET ORAL
Qty: 180 TABLET | Refills: 3 | Status: SHIPPED | OUTPATIENT
Start: 2019-10-02 | End: 2020-01-01

## 2019-10-02 NOTE — TELEPHONE ENCOUNTER
ELIQUIS 5 MG tablet 180 tablet 0 7/11/2019     Last Written Prescription Date:  7/11/2019  Last Fill Quantity: 180,  # refills: 0   Last office visit: 7/17/2019 with prescribing provider: SHAMA Mcadams    Future Office Visit: Unknown    Requested Prescriptions   Pending Prescriptions Disp Refills     ELIQUIS ANTICOAGULANT 5 MG tablet [Pharmacy Med Name: ELIQUIS 5MG TABLETS] 180 tablet 0     Sig: TAKE 1 TABLET(5 MG) BY MOUTH TWICE DAILY       Direct Oral Anticoagulant Agents Failed - 10/2/2019  5:07 AM        Failed - Patient is 18-79 years of age        Passed - Normal Platelets on file in past 12 months     Recent Labs   Lab Test 01/16/19  1051                  Passed - Medication is active on med list        Passed - Serum creatinine less than or equal to 1.4 on file in past 12 mos     Recent Labs   Lab Test 01/16/19  1051   CR 0.97             Passed - Weight is greater than 60 kg for the past year     Wt Readings from Last 3 Encounters:   07/17/19 64.9 kg (143 lb)   04/24/19 68.5 kg (151 lb)   04/17/19 68.5 kg (151 lb)             Passed - Recent (6 mo) or future (30 days) visit within the authorizing provider's specialty

## 2019-10-03 RX ORDER — APIXABAN 5 MG/1
TABLET, FILM COATED ORAL
Qty: 180 TABLET | Refills: 3 | Status: SHIPPED | OUTPATIENT
Start: 2019-10-03 | End: 2020-01-01

## 2019-10-03 NOTE — TELEPHONE ENCOUNTER
Routing refill request to provider for review/approval because:  Drug not on the [facility] refill protocol for age.  Please authorize if appropriate.  Thanks,  Sun Lind RN

## 2019-10-30 ENCOUNTER — HEALTH MAINTENANCE LETTER (OUTPATIENT)
Age: 84
End: 2019-10-30

## 2019-11-29 DIAGNOSIS — F11.90 CHRONIC, CONTINUOUS USE OF OPIOIDS: ICD-10-CM

## 2019-11-29 DIAGNOSIS — M16.11 PRIMARY OSTEOARTHRITIS OF RIGHT HIP: ICD-10-CM

## 2019-11-29 RX ORDER — TRAMADOL HYDROCHLORIDE 50 MG/1
TABLET ORAL
Qty: 60 TABLET | Refills: 0 | Status: SHIPPED | OUTPATIENT
Start: 2019-11-29 | End: 2020-01-01

## 2019-11-29 NOTE — TELEPHONE ENCOUNTER
traMADol (ULTRAM) 50 MG tablet      Last Written Prescription Date:  9/12/2019  Last Fill Quantity: 60,   # refills: 0  Last Office Visit: 7/17/2019  Future Office visit:    Next 5 appointments (look out 90 days)    Jan 13, 2020 10:00 AM CST  [facility] [address] [facility] [address]  [phone]           Routing refill request to provider for review/approval because:  Drug not on the [facility], [facility] or [facility] refill protocol or controlled substance

## 2019-12-16 ENCOUNTER — HEALTH MAINTENANCE LETTER (OUTPATIENT)
Age: 84
End: 2019-12-16

## 2019-12-31 NOTE — TELEPHONE ENCOUNTER
"Syringes not on current med list       cyanocobalamin (VITAMIN B12) 1000 MCG/ML injection 3 mL 11 10/15/2018  No   Sig - Route: Inject 1 mL (1,000 mcg) into the muscle every 30 days     Last Written Prescription Date:  10/15/2018  Last Fill Quantity: 3 ml,  # refills: 11   Last office visit: 7/17/2019 with prescribing provider:     Future Office Visit:   Next 5 appointments (look out 90 days)    Jan 13, 2020 10:00 AM CST  Office Visit with Judson [facility] MD  [facility] [address] [facility] [address]  [phone]         Requested Prescriptions   Pending Prescriptions Disp Refills     cyanocobalamin (CYANOCOBALAMIN) 1000 MCG/ML injection [Pharmacy Med Name: CYANOCOBALAMIN 1000MCG/ML INJ, 1ML] 3 mL 11     Sig: INJECT 1 ML IN THE MUSCLE EVERY 30 DAYS.       Vitamin Supplements (Adult) Protocol Passed - 12/31/2019  8:35 AM        Passed - High dose Vitamin D not ordered        Passed - Recent (12 mo) or future (30 days) visit within the authorizing provider's specialty     Patient has had an office visit with the authorizing provider or a provider within the authorizing providers department within the previous 12 mos or has a future within next 30 days. See \"Patient Info\" tab in inbasket, or \"Choose Columns\" in Meds & Orders section of the refill encounter.              Passed - Medication is active on med list        BD INTEGRA SYRINGE 25G X 1\" 3 ML MISC [Pharmacy Med Name: B-D #[id number] SYR/NDL 3ML 25GX1 INTEGRA]       Sig: USE WITH B-12 INJECTIONS       There is no refill protocol information for this order          "

## 2020-01-01 ENCOUNTER — APPOINTMENT (OUTPATIENT)
Dept: PHYSICAL THERAPY | Facility: CLINIC | Age: 85
DRG: 196 | End: 2020-01-01
Attending: HOSPITALIST
Payer: COMMERCIAL

## 2020-01-01 ENCOUNTER — APPOINTMENT (OUTPATIENT)
Dept: PHYSICAL THERAPY | Facility: CLINIC | Age: 85
DRG: 196 | End: 2020-01-01
Payer: COMMERCIAL

## 2020-01-01 ENCOUNTER — NURSE TRIAGE (OUTPATIENT)
Dept: NURSING | Facility: CLINIC | Age: 85
End: 2020-01-01

## 2020-01-01 ENCOUNTER — HOSPITAL ENCOUNTER (INPATIENT)
Facility: CLINIC | Age: 85
LOS: 8 days | Discharge: HOME HEALTH | DRG: 196 | End: 2020-12-19
Attending: EMERGENCY MEDICINE | Admitting: HOSPITALIST
Payer: COMMERCIAL

## 2020-01-01 ENCOUNTER — VIRTUAL VISIT (OUTPATIENT)
Dept: FAMILY MEDICINE | Facility: CLINIC | Age: 85
End: 2020-01-01
Payer: COMMERCIAL

## 2020-01-01 ENCOUNTER — OFFICE VISIT (OUTPATIENT)
Dept: FAMILY MEDICINE | Facility: CLINIC | Age: 85
End: 2020-01-01
Payer: COMMERCIAL

## 2020-01-01 ENCOUNTER — PATIENT OUTREACH (OUTPATIENT)
Dept: NURSING | Facility: CLINIC | Age: 85
End: 2020-01-01
Payer: COMMERCIAL

## 2020-01-01 ENCOUNTER — TELEPHONE (OUTPATIENT)
Dept: CARDIOLOGY | Facility: CLINIC | Age: 85
End: 2020-01-01

## 2020-01-01 ENCOUNTER — TELEPHONE (OUTPATIENT)
Dept: FAMILY MEDICINE | Facility: CLINIC | Age: 85
End: 2020-01-01

## 2020-01-01 ENCOUNTER — APPOINTMENT (OUTPATIENT)
Dept: CARDIOLOGY | Facility: CLINIC | Age: 85
DRG: 196 | End: 2020-01-01
Attending: HOSPITALIST
Payer: COMMERCIAL

## 2020-01-01 ENCOUNTER — DOCUMENTATION ONLY (OUTPATIENT)
Dept: CARE COORDINATION | Facility: CLINIC | Age: 85
End: 2020-01-01

## 2020-01-01 ENCOUNTER — APPOINTMENT (OUTPATIENT)
Dept: GENERAL RADIOLOGY | Facility: CLINIC | Age: 85
DRG: 196 | End: 2020-01-01
Attending: HOSPITALIST
Payer: COMMERCIAL

## 2020-01-01 ENCOUNTER — DOCUMENTATION ONLY (OUTPATIENT)
Dept: CARDIOLOGY | Facility: CLINIC | Age: 85
End: 2020-01-01

## 2020-01-01 ENCOUNTER — ANCILLARY PROCEDURE (OUTPATIENT)
Dept: GENERAL RADIOLOGY | Facility: CLINIC | Age: 85
End: 2020-01-01
Attending: FAMILY MEDICINE
Payer: COMMERCIAL

## 2020-01-01 ENCOUNTER — VIRTUAL VISIT (OUTPATIENT)
Dept: FAMILY MEDICINE | Facility: OTHER | Age: 85
End: 2020-01-01
Payer: COMMERCIAL

## 2020-01-01 ENCOUNTER — APPOINTMENT (OUTPATIENT)
Dept: CT IMAGING | Facility: CLINIC | Age: 85
DRG: 196 | End: 2020-01-01
Attending: HOSPITALIST
Payer: COMMERCIAL

## 2020-01-01 ENCOUNTER — OFFICE VISIT (OUTPATIENT)
Dept: URGENT CARE | Facility: URGENT CARE | Age: 85
End: 2020-01-01
Payer: COMMERCIAL

## 2020-01-01 ENCOUNTER — APPOINTMENT (OUTPATIENT)
Dept: SPEECH THERAPY | Facility: CLINIC | Age: 85
DRG: 196 | End: 2020-01-01
Attending: INTERNAL MEDICINE
Payer: COMMERCIAL

## 2020-01-01 VITALS
SYSTOLIC BLOOD PRESSURE: 145 MMHG | HEART RATE: 87 BPM | HEIGHT: 68 IN | DIASTOLIC BLOOD PRESSURE: 88 MMHG | OXYGEN SATURATION: 100 % | TEMPERATURE: 97.5 F | BODY MASS INDEX: 22.13 KG/M2 | WEIGHT: 146 LBS

## 2020-01-01 VITALS
HEIGHT: 66 IN | OXYGEN SATURATION: 95 % | TEMPERATURE: 97.4 F | HEART RATE: 83 BPM | WEIGHT: 126 LBS | BODY MASS INDEX: 20.25 KG/M2 | DIASTOLIC BLOOD PRESSURE: 78 MMHG | SYSTOLIC BLOOD PRESSURE: 105 MMHG | RESPIRATION RATE: 16 BRPM

## 2020-01-01 VITALS
BODY MASS INDEX: 21.29 KG/M2 | DIASTOLIC BLOOD PRESSURE: 87 MMHG | WEIGHT: 140 LBS | SYSTOLIC BLOOD PRESSURE: 130 MMHG | HEART RATE: 103 BPM | TEMPERATURE: 97.4 F | OXYGEN SATURATION: 93 %

## 2020-01-01 VITALS
HEIGHT: 68 IN | HEART RATE: 76 BPM | TEMPERATURE: 96.8 F | OXYGEN SATURATION: 100 % | WEIGHT: 145 LBS | SYSTOLIC BLOOD PRESSURE: 132 MMHG | DIASTOLIC BLOOD PRESSURE: 82 MMHG | BODY MASS INDEX: 21.98 KG/M2

## 2020-01-01 DIAGNOSIS — L03.119 CELLULITIS OF LOWER EXTREMITY, UNSPECIFIED LATERALITY: ICD-10-CM

## 2020-01-01 DIAGNOSIS — I48.91 ATRIAL FIBRILLATION, UNSPECIFIED TYPE (H): Primary | ICD-10-CM

## 2020-01-01 DIAGNOSIS — H61.23 BILATERAL IMPACTED CERUMEN: Primary | ICD-10-CM

## 2020-01-01 DIAGNOSIS — J96.21 ACUTE AND CHRONIC RESPIRATORY FAILURE WITH HYPOXIA (H): Primary | ICD-10-CM

## 2020-01-01 DIAGNOSIS — R06.02 SOB (SHORTNESS OF BREATH): Primary | ICD-10-CM

## 2020-01-01 DIAGNOSIS — R21 RASH: ICD-10-CM

## 2020-01-01 DIAGNOSIS — K50.819 CROHN'S DISEASE OF SMALL AND LARGE INTESTINES WITH COMPLICATION (H): ICD-10-CM

## 2020-01-01 DIAGNOSIS — R09.89 RUNNY NOSE: ICD-10-CM
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
## 2020-01-01 DIAGNOSIS — M48.07 SPINAL STENOSIS OF LUMBOSACRAL REGION: ICD-10-CM

## 2020-01-01 DIAGNOSIS — I48.91 ATRIAL FIBRILLATION, UNSPECIFIED TYPE (H): ICD-10-CM

## 2020-01-01 DIAGNOSIS — Z20.822 SUSPECTED COVID-19 VIRUS INFECTION: ICD-10-CM

## 2020-01-01 DIAGNOSIS — M16.11 PRIMARY OSTEOARTHRITIS OF RIGHT HIP: ICD-10-CM

## 2020-01-01 DIAGNOSIS — Z20.822 SUSPECTED COVID-19 VIRUS INFECTION: Primary | ICD-10-CM

## 2020-01-01 DIAGNOSIS — L03.119 CELLULITIS OF LOWER EXTREMITY, UNSPECIFIED LATERALITY: Primary | ICD-10-CM

## 2020-01-01 DIAGNOSIS — F11.90 CHRONIC, CONTINUOUS USE OF OPIOIDS: Primary | ICD-10-CM

## 2020-01-01 DIAGNOSIS — I48.91 ATRIAL FIBRILLATION WITH RAPID VENTRICULAR RESPONSE (H): ICD-10-CM

## 2020-01-01 DIAGNOSIS — I42.9 CARDIOMYOPATHY, UNSPECIFIED TYPE (H): ICD-10-CM

## 2020-01-01 DIAGNOSIS — Z71.89 OTHER SPECIFIED COUNSELING: ICD-10-CM

## 2020-01-01 DIAGNOSIS — F11.90 CHRONIC, CONTINUOUS USE OF OPIOIDS: ICD-10-CM
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
## 2020-01-01 DIAGNOSIS — R53.81 PHYSICAL DECONDITIONING: Primary | ICD-10-CM

## 2020-01-01 DIAGNOSIS — Z23 NEED FOR PROPHYLACTIC VACCINATION AND INOCULATION AGAINST INFLUENZA: ICD-10-CM

## 2020-01-01 DIAGNOSIS — R06.02 SOB (SHORTNESS OF BREATH): ICD-10-CM

## 2020-01-01 DIAGNOSIS — M48.07 SPINAL STENOSIS OF LUMBOSACRAL REGION: Primary | ICD-10-CM

## 2020-01-01 DIAGNOSIS — R09.02 HYPOXIA: ICD-10-CM

## 2020-01-01 DIAGNOSIS — R27.0 ATAXIA: ICD-10-CM

## 2020-01-01 DIAGNOSIS — J84.9 ILD (INTERSTITIAL LUNG DISEASE) (H): ICD-10-CM

## 2020-01-01 DIAGNOSIS — N39.0 URINARY TRACT INFECTION WITHOUT HEMATURIA, SITE UNSPECIFIED: ICD-10-CM

## 2020-01-01 LAB
ALBUMIN SERPL-MCNC: 2.9 G/DL (ref 3.4–5)
ALBUMIN UR-MCNC: NEGATIVE MG/DL
ALP SERPL-CCNC: 125 U/L (ref 40–150)
ALT SERPL W P-5'-P-CCNC: 14 U/L (ref 0–70)
ANION GAP SERPL CALCULATED.3IONS-SCNC: 5 MMOL/L (ref 3–14)
ANION GAP SERPL CALCULATED.3IONS-SCNC: 6 MMOL/L (ref 3–14)
ANION GAP SERPL CALCULATED.3IONS-SCNC: 7 MMOL/L (ref 3–14)
ANION GAP SERPL CALCULATED.3IONS-SCNC: 7 MMOL/L (ref 3–14)
ANION GAP SERPL CALCULATED.3IONS-SCNC: 8 MMOL/L (ref 3–14)
ANION GAP SERPL CALCULATED.3IONS-SCNC: 9 MMOL/L (ref 3–14)
APPEARANCE UR: CLEAR
AST SERPL W P-5'-P-CCNC: 18 U/L (ref 0–45)
BACTERIA #/AREA URNS HPF: ABNORMAL /HPF
BACTERIA SPEC CULT: ABNORMAL
BACTERIA SPEC CULT: NO GROWTH
BACTERIA SPEC CULT: NO GROWTH
BASE EXCESS BLDV CALC-SCNC: 5.7 MMOL/L
BASOPHILS # BLD AUTO: 0 10E9/L (ref 0–0.2)
BASOPHILS NFR BLD AUTO: 0.1 %
BASOPHILS NFR BLD AUTO: 0.2 %
BASOPHILS NFR BLD AUTO: 0.3 %
BILIRUB SERPL-MCNC: 1.1 MG/DL (ref 0.2–1.3)
BILIRUB UR QL STRIP: NEGATIVE
BUN SERPL-MCNC: 26 MG/DL (ref 7–30)
BUN SERPL-MCNC: 28 MG/DL (ref 7–30)
BUN SERPL-MCNC: 29 MG/DL (ref 7–30)
BUN SERPL-MCNC: 36 MG/DL (ref 7–30)
BUN SERPL-MCNC: 42 MG/DL (ref 7–30)
BUN SERPL-MCNC: 42 MG/DL (ref 7–30)
BUN SERPL-MCNC: 55 MG/DL (ref 7–30)
BUN SERPL-MCNC: 59 MG/DL (ref 7–30)
BUN SERPL-MCNC: 61 MG/DL (ref 7–30)
BUN SERPL-MCNC: 62 MG/DL (ref 7–30)
CALCIUM SERPL-MCNC: 8.3 MG/DL (ref 8.5–10.1)
CALCIUM SERPL-MCNC: 8.4 MG/DL (ref 8.5–10.1)
CALCIUM SERPL-MCNC: 8.5 MG/DL (ref 8.5–10.1)
CALCIUM SERPL-MCNC: 8.5 MG/DL (ref 8.5–10.1)
CALCIUM SERPL-MCNC: 8.7 MG/DL (ref 8.5–10.1)
CALCIUM SERPL-MCNC: 8.7 MG/DL (ref 8.5–10.1)
CALCIUM SERPL-MCNC: 8.8 MG/DL (ref 8.5–10.1)
CALCIUM SERPL-MCNC: 8.8 MG/DL (ref 8.5–10.1)
CALCIUM SERPL-MCNC: 9 MG/DL (ref 8.5–10.1)
CALCIUM SERPL-MCNC: 9 MG/DL (ref 8.5–10.1)
CHLORIDE SERPL-SCNC: 101 MMOL/L (ref 94–109)
CHLORIDE SERPL-SCNC: 101 MMOL/L (ref 94–109)
CHLORIDE SERPL-SCNC: 103 MMOL/L (ref 94–109)
CHLORIDE SERPL-SCNC: 105 MMOL/L (ref 94–109)
CHLORIDE SERPL-SCNC: 106 MMOL/L (ref 94–109)
CHLORIDE SERPL-SCNC: 106 MMOL/L (ref 94–109)
CHLORIDE SERPL-SCNC: 107 MMOL/L (ref 94–109)
CO2 SERPL-SCNC: 25 MMOL/L (ref 20–32)
CO2 SERPL-SCNC: 26 MMOL/L (ref 20–32)
CO2 SERPL-SCNC: 26 MMOL/L (ref 20–32)
CO2 SERPL-SCNC: 29 MMOL/L (ref 20–32)
CO2 SERPL-SCNC: 30 MMOL/L (ref 20–32)
CO2 SERPL-SCNC: 31 MMOL/L (ref 20–32)
COLOR UR AUTO: YELLOW
CREAT SERPL-MCNC: 0.86 MG/DL (ref 0.66–1.25)
CREAT SERPL-MCNC: 0.89 MG/DL (ref 0.66–1.25)
CREAT SERPL-MCNC: 0.96 MG/DL (ref 0.66–1.25)
CREAT SERPL-MCNC: 1.02 MG/DL (ref 0.66–1.25)
CREAT SERPL-MCNC: 1.05 MG/DL (ref 0.66–1.25)
CREAT SERPL-MCNC: 1.1 MG/DL (ref 0.66–1.25)
CREAT SERPL-MCNC: 1.3 MG/DL (ref 0.66–1.25)
CREAT SERPL-MCNC: 1.42 MG/DL (ref 0.66–1.25)
CREAT SERPL-MCNC: 1.51 MG/DL (ref 0.66–1.25)
CREAT SERPL-MCNC: 1.67 MG/DL (ref 0.66–1.25)
CRP SERPL-MCNC: 109 MG/L (ref 0–8)
CRP SERPL-MCNC: 128 MG/L (ref 0–8)
CRP SERPL-MCNC: 146 MG/L (ref 0–8)
CRP SERPL-MCNC: 27.1 MG/L (ref 0–8)
CRP SERPL-MCNC: 34.5 MG/L (ref 0–8)
CRP SERPL-MCNC: 73.7 MG/L (ref 0–8)
D DIMER PPP FEU-MCNC: 0.8 UG/ML FEU (ref 0–0.5)
D DIMER PPP FEU-MCNC: 1.4 UG/ML FEU (ref 0–0.5)
D DIMER PPP FEU-MCNC: 2 UG/ML FEU (ref 0–0.5)
DIFFERENTIAL METHOD BLD: ABNORMAL
EOSINOPHIL # BLD AUTO: 0 10E9/L (ref 0–0.7)
EOSINOPHIL # BLD AUTO: 0.2 10E9/L (ref 0–0.7)
EOSINOPHIL # BLD AUTO: 0.6 10E9/L (ref 0–0.7)
EOSINOPHIL NFR BLD AUTO: 0 %
EOSINOPHIL NFR BLD AUTO: 0.1 %
EOSINOPHIL NFR BLD AUTO: 0.2 %
EOSINOPHIL NFR BLD AUTO: 1.5 %
EOSINOPHIL NFR BLD AUTO: 3.3 %
ERYTHROCYTE [DISTWIDTH] IN BLOOD BY AUTOMATED COUNT: 11.8 % (ref 10–15)
ERYTHROCYTE [DISTWIDTH] IN BLOOD BY AUTOMATED COUNT: 11.9 % (ref 10–15)
ERYTHROCYTE [DISTWIDTH] IN BLOOD BY AUTOMATED COUNT: 12 % (ref 10–15)
ERYTHROCYTE [DISTWIDTH] IN BLOOD BY AUTOMATED COUNT: 12 % (ref 10–15)
ERYTHROCYTE [SEDIMENTATION RATE] IN BLOOD BY WESTERGREN METHOD: 38 MM/H (ref 0–20)
FLUAV+FLUBV RNA SPEC QL NAA+PROBE: NEGATIVE
FLUAV+FLUBV RNA SPEC QL NAA+PROBE: NEGATIVE
GFR SERPL CREATININE-BSD FRML MDRD: 35 ML/MIN/{1.73_M2}
GFR SERPL CREATININE-BSD FRML MDRD: 40 ML/MIN/{1.73_M2}
GFR SERPL CREATININE-BSD FRML MDRD: 43 ML/MIN/{1.73_M2}
GFR SERPL CREATININE-BSD FRML MDRD: 48 ML/MIN/{1.73_M2}
GFR SERPL CREATININE-BSD FRML MDRD: 59 ML/MIN/{1.73_M2}
GFR SERPL CREATININE-BSD FRML MDRD: 62 ML/MIN/{1.73_M2}
GFR SERPL CREATININE-BSD FRML MDRD: 64 ML/MIN/{1.73_M2}
GFR SERPL CREATININE-BSD FRML MDRD: 69 ML/MIN/{1.73_M2}
GFR SERPL CREATININE-BSD FRML MDRD: 75 ML/MIN/{1.73_M2}
GFR SERPL CREATININE-BSD FRML MDRD: 76 ML/MIN/{1.73_M2}
GLUCOSE SERPL-MCNC: 100 MG/DL (ref 70–99)
GLUCOSE SERPL-MCNC: 111 MG/DL (ref 70–99)
GLUCOSE SERPL-MCNC: 111 MG/DL (ref 70–99)
GLUCOSE SERPL-MCNC: 113 MG/DL (ref 70–99)
GLUCOSE SERPL-MCNC: 118 MG/DL (ref 70–99)
GLUCOSE SERPL-MCNC: 120 MG/DL (ref 70–99)
GLUCOSE SERPL-MCNC: 127 MG/DL (ref 70–99)
GLUCOSE SERPL-MCNC: 136 MG/DL (ref 70–99)
GLUCOSE SERPL-MCNC: 161 MG/DL (ref 70–99)
GLUCOSE SERPL-MCNC: 92 MG/DL (ref 70–99)
GLUCOSE UR STRIP-MCNC: NEGATIVE MG/DL
HCO3 BLDV-SCNC: 30 MMOL/L (ref 21–28)
HCT VFR BLD AUTO: 38.3 % (ref 40–53)
HCT VFR BLD AUTO: 38.4 % (ref 40–53)
HCT VFR BLD AUTO: 38.5 % (ref 40–53)
HCT VFR BLD AUTO: 38.6 % (ref 40–53)
HCT VFR BLD AUTO: 39.2 % (ref 40–53)
HCT VFR BLD AUTO: 39.5 % (ref 40–53)
HCT VFR BLD AUTO: 40.7 % (ref 40–53)
HCT VFR BLD AUTO: 41.1 % (ref 40–53)
HCT VFR BLD AUTO: 41.2 % (ref 40–53)
HGB BLD-MCNC: 12.6 G/DL (ref 13.3–17.7)
HGB BLD-MCNC: 12.7 G/DL (ref 13.3–17.7)
HGB BLD-MCNC: 12.9 G/DL (ref 13.3–17.7)
HGB BLD-MCNC: 13 G/DL (ref 13.3–17.7)
HGB BLD-MCNC: 13.2 G/DL (ref 13.3–17.7)
HGB BLD-MCNC: 13.2 G/DL (ref 13.3–17.7)
HGB BLD-MCNC: 13.4 G/DL (ref 13.3–17.7)
HGB BLD-MCNC: 13.4 G/DL (ref 13.3–17.7)
HGB BLD-MCNC: 13.6 G/DL (ref 13.3–17.7)
HGB UR QL STRIP: NEGATIVE
IMM GRANULOCYTES # BLD: 0 10E9/L (ref 0–0.4)
IMM GRANULOCYTES # BLD: 0.1 10E9/L (ref 0–0.4)
IMM GRANULOCYTES NFR BLD: 0.3 %
IMM GRANULOCYTES NFR BLD: 0.4 %
IMM GRANULOCYTES NFR BLD: 0.5 %
IMM GRANULOCYTES NFR BLD: 0.6 %
IMM GRANULOCYTES NFR BLD: 0.6 %
INSECT SPEC: ABNORMAL
INTERPRETATION ECG - MUSE: NORMAL
KETONES UR STRIP-MCNC: NEGATIVE MG/DL
L PNEUMO1 AG UR QL IA: NORMAL
LABORATORY COMMENT REPORT: NORMAL
LABORATORY COMMENT REPORT: NORMAL
LACTATE BLD-SCNC: 1.2 MMOL/L (ref 0.7–2)
LACTATE BLD-SCNC: 2.2 MMOL/L (ref 0.7–2)
LACTATE BLD-SCNC: 2.7 MMOL/L (ref 0.7–2)
LEUKOCYTE ESTERASE UR QL STRIP: ABNORMAL
LYMPHOCYTES # BLD AUTO: 0.9 10E9/L (ref 0.8–5.3)
LYMPHOCYTES # BLD AUTO: 1.8 10E9/L (ref 0.8–5.3)
LYMPHOCYTES # BLD AUTO: 1.9 10E9/L (ref 0.8–5.3)
LYMPHOCYTES # BLD AUTO: 2.1 10E9/L (ref 0.8–5.3)
LYMPHOCYTES # BLD AUTO: 2.1 10E9/L (ref 0.8–5.3)
LYMPHOCYTES NFR BLD AUTO: 10.9 %
LYMPHOCYTES NFR BLD AUTO: 12.1 %
LYMPHOCYTES NFR BLD AUTO: 12.2 %
LYMPHOCYTES NFR BLD AUTO: 12.5 %
LYMPHOCYTES NFR BLD AUTO: 13.4 %
LYMPHOCYTES NFR BLD AUTO: 16.1 %
LYMPHOCYTES NFR BLD AUTO: 9.6 %
Lab: ABNORMAL
MAGNESIUM SERPL-MCNC: 2 MG/DL (ref 1.6–2.3)
MAGNESIUM SERPL-MCNC: 2 MG/DL (ref 1.6–2.3)
MCH RBC QN AUTO: 32.4 PG (ref 26.5–33)
MCH RBC QN AUTO: 32.6 PG (ref 26.5–33)
MCH RBC QN AUTO: 32.7 PG (ref 26.5–33)
MCH RBC QN AUTO: 32.8 PG (ref 26.5–33)
MCH RBC QN AUTO: 33 PG (ref 26.5–33)
MCH RBC QN AUTO: 33.1 PG (ref 26.5–33)
MCH RBC QN AUTO: 33.1 PG (ref 26.5–33)
MCH RBC QN AUTO: 33.3 PG (ref 26.5–33)
MCH RBC QN AUTO: 33.9 PG (ref 26.5–33)
MCHC RBC AUTO-ENTMCNC: 32.1 G/DL (ref 31.5–36.5)
MCHC RBC AUTO-ENTMCNC: 32.5 G/DL (ref 31.5–36.5)
MCHC RBC AUTO-ENTMCNC: 32.9 G/DL (ref 31.5–36.5)
MCHC RBC AUTO-ENTMCNC: 33 G/DL (ref 31.5–36.5)
MCHC RBC AUTO-ENTMCNC: 33.1 G/DL (ref 31.5–36.5)
MCHC RBC AUTO-ENTMCNC: 33.4 G/DL (ref 31.5–36.5)
MCHC RBC AUTO-ENTMCNC: 33.7 G/DL (ref 31.5–36.5)
MCHC RBC AUTO-ENTMCNC: 33.7 G/DL (ref 31.5–36.5)
MCHC RBC AUTO-ENTMCNC: 34.4 G/DL (ref 31.5–36.5)
MCV RBC AUTO: 100 FL (ref 78–100)
MCV RBC AUTO: 100 FL (ref 78–100)
MCV RBC AUTO: 101 FL (ref 78–100)
MCV RBC AUTO: 101 FL (ref 78–100)
MCV RBC AUTO: 98 FL (ref 78–100)
MCV RBC AUTO: 98 FL (ref 78–100)
MCV RBC AUTO: 99 FL (ref 78–100)
MONOCYTES # BLD AUTO: 0.1 10E9/L (ref 0–1.3)
MONOCYTES # BLD AUTO: 0.9 10E9/L (ref 0–1.3)
MONOCYTES # BLD AUTO: 0.9 10E9/L (ref 0–1.3)
MONOCYTES # BLD AUTO: 1 10E9/L (ref 0–1.3)
MONOCYTES # BLD AUTO: 1.2 10E9/L (ref 0–1.3)
MONOCYTES # BLD AUTO: 1.2 10E9/L (ref 0–1.3)
MONOCYTES # BLD AUTO: 1.4 10E9/L (ref 0–1.3)
MONOCYTES NFR BLD AUTO: 2 %
MONOCYTES NFR BLD AUTO: 4.9 %
MONOCYTES NFR BLD AUTO: 5 %
MONOCYTES NFR BLD AUTO: 5.7 %
MONOCYTES NFR BLD AUTO: 7.7 %
MONOCYTES NFR BLD AUTO: 8.8 %
MONOCYTES NFR BLD AUTO: 9.3 %
MUCOUS THREADS #/AREA URNS LPF: PRESENT /LPF
NEUTROPHILS # BLD AUTO: 12 10E9/L (ref 1.6–8.3)
NEUTROPHILS # BLD AUTO: 12.5 10E9/L (ref 1.6–8.3)
NEUTROPHILS # BLD AUTO: 13.9 10E9/L (ref 1.6–8.3)
NEUTROPHILS # BLD AUTO: 14 10E9/L (ref 1.6–8.3)
NEUTROPHILS # BLD AUTO: 15.5 10E9/L (ref 1.6–8.3)
NEUTROPHILS # BLD AUTO: 5.8 10E9/L (ref 1.6–8.3)
NEUTROPHILS # BLD AUTO: 9.5 10E9/L (ref 1.6–8.3)
NEUTROPHILS NFR BLD AUTO: 72.8 %
NEUTROPHILS NFR BLD AUTO: 77.6 %
NEUTROPHILS NFR BLD AUTO: 79.4 %
NEUTROPHILS NFR BLD AUTO: 80 %
NEUTROPHILS NFR BLD AUTO: 81.4 %
NEUTROPHILS NFR BLD AUTO: 84.2 %
NEUTROPHILS NFR BLD AUTO: 84.7 %
NITRATE UR QL: POSITIVE
NRBC # BLD AUTO: 0 10*3/UL
NRBC BLD AUTO-RTO: 0 /100
NT-PROBNP SERPL-MCNC: 3584 PG/ML (ref 0–1800)
NT-PROBNP SERPL-MCNC: ABNORMAL PG/ML (ref 0–1800)
OXYHGB MFR BLDV: 84 %
PCO2 BLDV: 40 MM HG (ref 40–50)
PH BLDV: 7.48 PH (ref 7.32–7.43)
PH UR STRIP: 5 PH (ref 5–7)
PLATELET # BLD AUTO: 283 10E9/L (ref 150–450)
PLATELET # BLD AUTO: 284 10E9/L (ref 150–450)
PLATELET # BLD AUTO: 296 10E9/L (ref 150–450)
PLATELET # BLD AUTO: 302 10E9/L (ref 150–450)
PLATELET # BLD AUTO: 326 10E9/L (ref 150–450)
PLATELET # BLD AUTO: 342 10E9/L (ref 150–450)
PLATELET # BLD AUTO: 351 10E9/L (ref 150–450)
PLATELET # BLD AUTO: 355 10E9/L (ref 150–450)
PLATELET # BLD AUTO: 361 10E9/L (ref 150–450)
PO2 BLDV: 49 MM HG (ref 25–47)
POTASSIUM SERPL-SCNC: 3.4 MMOL/L (ref 3.4–5.3)
POTASSIUM SERPL-SCNC: 3.5 MMOL/L (ref 3.4–5.3)
POTASSIUM SERPL-SCNC: 3.6 MMOL/L (ref 3.4–5.3)
POTASSIUM SERPL-SCNC: 3.7 MMOL/L (ref 3.4–5.3)
POTASSIUM SERPL-SCNC: 3.8 MMOL/L (ref 3.4–5.3)
POTASSIUM SERPL-SCNC: 3.8 MMOL/L (ref 3.4–5.3)
POTASSIUM SERPL-SCNC: 4 MMOL/L (ref 3.4–5.3)
POTASSIUM SERPL-SCNC: 4.4 MMOL/L (ref 3.4–5.3)
POTASSIUM SERPL-SCNC: 4.5 MMOL/L (ref 3.4–5.3)
PROCALCITONIN SERPL-MCNC: 0.05 NG/ML
PROCALCITONIN SERPL-MCNC: 0.06 NG/ML
PROCALCITONIN SERPL-MCNC: 0.1 NG/ML
PROT SERPL-MCNC: 7.9 G/DL (ref 6.8–8.8)
RBC # BLD AUTO: 3.87 10E12/L (ref 4.4–5.9)
RBC # BLD AUTO: 3.87 10E12/L (ref 4.4–5.9)
RBC # BLD AUTO: 3.89 10E12/L (ref 4.4–5.9)
RBC # BLD AUTO: 3.91 10E12/L (ref 4.4–5.9)
RBC # BLD AUTO: 3.93 10E12/L (ref 4.4–5.9)
RBC # BLD AUTO: 3.99 10E12/L (ref 4.4–5.9)
RBC # BLD AUTO: 4.08 10E12/L (ref 4.4–5.9)
RBC # BLD AUTO: 4.1 10E12/L (ref 4.4–5.9)
RBC # BLD AUTO: 4.14 10E12/L (ref 4.4–5.9)
RBC #/AREA URNS AUTO: 2 /HPF (ref 0–2)
RSV RNA SPEC QL NAA+PROBE: NEGATIVE
SARS-COV-2 RNA SPEC QL NAA+PROBE: NEGATIVE
SARS-COV-2 RNA SPEC QL NAA+PROBE: NEGATIVE
SARS-COV-2 RNA SPEC QL NAA+PROBE: NORMAL
SARS-COV-2 RNA SPEC QL NAA+PROBE: NOT DETECTED
SODIUM SERPL-SCNC: 132 MMOL/L (ref 133–144)
SODIUM SERPL-SCNC: 136 MMOL/L (ref 133–144)
SODIUM SERPL-SCNC: 136 MMOL/L (ref 133–144)
SODIUM SERPL-SCNC: 140 MMOL/L (ref 133–144)
SODIUM SERPL-SCNC: 140 MMOL/L (ref 133–144)
SODIUM SERPL-SCNC: 141 MMOL/L (ref 133–144)
SODIUM SERPL-SCNC: 141 MMOL/L (ref 133–144)
SODIUM SERPL-SCNC: 142 MMOL/L (ref 133–144)
SODIUM SERPL-SCNC: 142 MMOL/L (ref 133–144)
SODIUM SERPL-SCNC: 145 MMOL/L (ref 133–144)
SOURCE: ABNORMAL
SP GR UR STRIP: 1.01 (ref 1–1.03)
SPECIMEN SOURCE: ABNORMAL
SPECIMEN SOURCE: ABNORMAL
SPECIMEN SOURCE: NORMAL
TROPONIN I SERPL-MCNC: <0.015 UG/L (ref 0–0.04)
TROPONIN I SERPL-MCNC: <0.015 UG/L (ref 0–0.04)
UROBILINOGEN UR STRIP-MCNC: NORMAL MG/DL (ref 0–2)
WBC # BLD AUTO: 13 10E9/L (ref 4–11)
WBC # BLD AUTO: 13.7 10E9/L (ref 4–11)
WBC # BLD AUTO: 15.2 10E9/L (ref 4–11)
WBC # BLD AUTO: 15.4 10E9/L (ref 4–11)
WBC # BLD AUTO: 15.7 10E9/L (ref 4–11)
WBC # BLD AUTO: 17.1 10E9/L (ref 4–11)
WBC # BLD AUTO: 17.5 10E9/L (ref 4–11)
WBC # BLD AUTO: 18.3 10E9/L (ref 4–11)
WBC # BLD AUTO: 6.9 10E9/L (ref 4–11)
WBC #/AREA URNS AUTO: 53 /HPF (ref 0–5)

## 2020-01-01 PROCEDURE — 87086 URINE CULTURE/COLONY COUNT: CPT | Performed by: EMERGENCY MEDICINE

## 2020-01-01 PROCEDURE — 250N000011 HC RX IP 250 OP 636: Performed by: HOSPITALIST

## 2020-01-01 PROCEDURE — 96365 THER/PROPH/DIAG IV INF INIT: CPT

## 2020-01-01 PROCEDURE — 85027 COMPLETE CBC AUTOMATED: CPT | Performed by: PHYSICIAN ASSISTANT

## 2020-01-01 PROCEDURE — 97110 THERAPEUTIC EXERCISES: CPT | Mod: GP

## 2020-01-01 PROCEDURE — 83880 ASSAY OF NATRIURETIC PEPTIDE: CPT | Performed by: HOSPITALIST

## 2020-01-01 PROCEDURE — 97530 THERAPEUTIC ACTIVITIES: CPT | Mod: GP

## 2020-01-01 PROCEDURE — 250N000013 HC RX MED GY IP 250 OP 250 PS 637: Performed by: HOSPITALIST

## 2020-01-01 PROCEDURE — 93306 TTE W/DOPPLER COMPLETE: CPT

## 2020-01-01 PROCEDURE — 97161 PT EVAL LOW COMPLEX 20 MIN: CPT | Mod: GP

## 2020-01-01 PROCEDURE — 83880 ASSAY OF NATRIURETIC PEPTIDE: CPT | Performed by: EMERGENCY MEDICINE

## 2020-01-01 PROCEDURE — 80048 BASIC METABOLIC PNL TOTAL CA: CPT | Performed by: HOSPITALIST

## 2020-01-01 PROCEDURE — 97116 GAIT TRAINING THERAPY: CPT | Mod: GP

## 2020-01-01 PROCEDURE — 96375 TX/PRO/DX INJ NEW DRUG ADDON: CPT

## 2020-01-01 PROCEDURE — 99239 HOSP IP/OBS DSCHRG MGMT >30: CPT | Performed by: HOSPITALIST

## 2020-01-01 PROCEDURE — 99207 PR APP CREDIT; MD BILLING SHARED VISIT: CPT | Performed by: PHYSICIAN ASSISTANT

## 2020-01-01 PROCEDURE — 80048 BASIC METABOLIC PNL TOTAL CA: CPT | Performed by: PHYSICIAN ASSISTANT

## 2020-01-01 PROCEDURE — 86140 C-REACTIVE PROTEIN: CPT | Performed by: PHYSICIAN ASSISTANT

## 2020-01-01 PROCEDURE — 99233 SBSQ HOSP IP/OBS HIGH 50: CPT | Performed by: HOSPITALIST

## 2020-01-01 PROCEDURE — 86140 C-REACTIVE PROTEIN: CPT | Performed by: EMERGENCY MEDICINE

## 2020-01-01 PROCEDURE — 99214 OFFICE O/P EST MOD 30 MIN: CPT | Mod: 95 | Performed by: INTERNAL MEDICINE

## 2020-01-01 PROCEDURE — 210N000002 HC R&B HEART CARE

## 2020-01-01 PROCEDURE — 84132 ASSAY OF SERUM POTASSIUM: CPT | Performed by: HOSPITALIST

## 2020-01-01 PROCEDURE — 250N000009 HC RX 250: Performed by: HOSPITALIST

## 2020-01-01 PROCEDURE — 250N000009 HC RX 250: Performed by: EMERGENCY MEDICINE

## 2020-01-01 PROCEDURE — 99214 OFFICE O/P EST MOD 30 MIN: CPT | Performed by: INTERNAL MEDICINE

## 2020-01-01 PROCEDURE — 83605 ASSAY OF LACTIC ACID: CPT | Performed by: HOSPITALIST

## 2020-01-01 PROCEDURE — 99222 1ST HOSP IP/OBS MODERATE 55: CPT | Performed by: INTERNAL MEDICINE

## 2020-01-01 PROCEDURE — 83605 ASSAY OF LACTIC ACID: CPT | Performed by: EMERGENCY MEDICINE

## 2020-01-01 PROCEDURE — U0003 INFECTIOUS AGENT DETECTION BY NUCLEIC ACID (DNA OR RNA); SEVERE ACUTE RESPIRATORY SYNDROME CORONAVIRUS 2 (SARS-COV-2) (CORONAVIRUS DISEASE [COVID-19]), AMPLIFIED PROBE TECHNIQUE, MAKING USE OF HIGH THROUGHPUT TECHNOLOGIES AS DESCRIBED BY CMS-2020-01-R: HCPCS | Performed by: HOSPITALIST

## 2020-01-01 PROCEDURE — 87040 BLOOD CULTURE FOR BACTERIA: CPT | Performed by: EMERGENCY MEDICINE

## 2020-01-01 PROCEDURE — 71250 CT THORAX DX C-: CPT

## 2020-01-01 PROCEDURE — 85027 COMPLETE CBC AUTOMATED: CPT | Performed by: HOSPITALIST

## 2020-01-01 PROCEDURE — 250N000013 HC RX MED GY IP 250 OP 250 PS 637: Performed by: INTERNAL MEDICINE

## 2020-01-01 PROCEDURE — 86140 C-REACTIVE PROTEIN: CPT | Performed by: HOSPITALIST

## 2020-01-01 PROCEDURE — 85025 COMPLETE CBC W/AUTO DIFF WBC: CPT | Performed by: HOSPITALIST

## 2020-01-01 PROCEDURE — U0003 INFECTIOUS AGENT DETECTION BY NUCLEIC ACID (DNA OR RNA); SEVERE ACUTE RESPIRATORY SYNDROME CORONAVIRUS 2 (SARS-COV-2) (CORONAVIRUS DISEASE [COVID-19]), AMPLIFIED PROBE TECHNIQUE, MAKING USE OF HIGH THROUGHPUT TECHNOLOGIES AS DESCRIBED BY CMS-2020-01-R: HCPCS | Performed by: FAMILY MEDICINE

## 2020-01-01 PROCEDURE — 99285 EMERGENCY DEPT VISIT HI MDM: CPT | Mod: 25

## 2020-01-01 PROCEDURE — 83735 ASSAY OF MAGNESIUM: CPT | Performed by: HOSPITALIST

## 2020-01-01 PROCEDURE — 250N000011 HC RX IP 250 OP 636: Performed by: EMERGENCY MEDICINE

## 2020-01-01 PROCEDURE — 36415 COLL VENOUS BLD VENIPUNCTURE: CPT | Performed by: HOSPITALIST

## 2020-01-01 PROCEDURE — 87168 MACROSCOPIC EXAM ARTHROPOD: CPT | Performed by: INTERNAL MEDICINE

## 2020-01-01 PROCEDURE — 250N000013 HC RX MED GY IP 250 OP 250 PS 637: Performed by: PHYSICIAN ASSISTANT

## 2020-01-01 PROCEDURE — 69210 REMOVE IMPACTED EAR WAX UNI: CPT | Performed by: INTERNAL MEDICINE

## 2020-01-01 PROCEDURE — 87636 SARSCOV2 & INF A&B AMP PRB: CPT | Performed by: EMERGENCY MEDICINE

## 2020-01-01 PROCEDURE — 82805 BLOOD GASES W/O2 SATURATION: CPT | Performed by: HOSPITALIST

## 2020-01-01 PROCEDURE — 258N000003 HC RX IP 258 OP 636: Performed by: HOSPITALIST

## 2020-01-01 PROCEDURE — 93005 ELECTROCARDIOGRAM TRACING: CPT

## 2020-01-01 PROCEDURE — 84145 PROCALCITONIN (PCT): CPT | Performed by: PHYSICIAN ASSISTANT

## 2020-01-01 PROCEDURE — 84484 ASSAY OF TROPONIN QUANT: CPT | Performed by: HOSPITALIST

## 2020-01-01 PROCEDURE — 99496 TRANSJ CARE MGMT HIGH F2F 7D: CPT | Mod: 95 | Performed by: INTERNAL MEDICINE

## 2020-01-01 PROCEDURE — 92610 EVALUATE SWALLOWING FUNCTION: CPT | Mod: GN | Performed by: SPEECH-LANGUAGE PATHOLOGIST

## 2020-01-01 PROCEDURE — 85025 COMPLETE CBC W/AUTO DIFF WBC: CPT | Performed by: EMERGENCY MEDICINE

## 2020-01-01 PROCEDURE — 80048 BASIC METABOLIC PNL TOTAL CA: CPT | Performed by: INTERNAL MEDICINE

## 2020-01-01 PROCEDURE — 36415 COLL VENOUS BLD VENIPUNCTURE: CPT | Performed by: PHYSICIAN ASSISTANT

## 2020-01-01 PROCEDURE — 99232 SBSQ HOSP IP/OBS MODERATE 35: CPT | Performed by: INTERNAL MEDICINE

## 2020-01-01 PROCEDURE — 99214 OFFICE O/P EST MOD 30 MIN: CPT | Mod: 25 | Performed by: INTERNAL MEDICINE

## 2020-01-01 PROCEDURE — 80053 COMPREHEN METABOLIC PANEL: CPT | Performed by: EMERGENCY MEDICINE

## 2020-01-01 PROCEDURE — 85379 FIBRIN DEGRADATION QUANT: CPT | Performed by: EMERGENCY MEDICINE

## 2020-01-01 PROCEDURE — 81001 URINALYSIS AUTO W/SCOPE: CPT | Performed by: EMERGENCY MEDICINE

## 2020-01-01 PROCEDURE — 90662 IIV NO PRSV INCREASED AG IM: CPT | Performed by: INTERNAL MEDICINE

## 2020-01-01 PROCEDURE — 84145 PROCALCITONIN (PCT): CPT | Performed by: HOSPITALIST

## 2020-01-01 PROCEDURE — 99223 1ST HOSP IP/OBS HIGH 75: CPT | Mod: AI | Performed by: HOSPITALIST

## 2020-01-01 PROCEDURE — G2012 BRIEF CHECK IN BY MD/QHP: HCPCS | Performed by: INTERNAL MEDICINE

## 2020-01-01 PROCEDURE — 84484 ASSAY OF TROPONIN QUANT: CPT | Performed by: EMERGENCY MEDICINE

## 2020-01-01 PROCEDURE — 85379 FIBRIN DEGRADATION QUANT: CPT | Performed by: HOSPITALIST

## 2020-01-01 PROCEDURE — 85652 RBC SED RATE AUTOMATED: CPT | Performed by: EMERGENCY MEDICINE

## 2020-01-01 PROCEDURE — 87088 URINE BACTERIA CULTURE: CPT | Performed by: EMERGENCY MEDICINE

## 2020-01-01 PROCEDURE — 83880 ASSAY OF NATRIURETIC PEPTIDE: CPT | Performed by: PHYSICIAN ASSISTANT

## 2020-01-01 PROCEDURE — 99421 OL DIG E/M SVC 5-10 MIN: CPT | Performed by: PHYSICIAN ASSISTANT

## 2020-01-01 PROCEDURE — 93306 TTE W/DOPPLER COMPLETE: CPT | Mod: 26 | Performed by: INTERNAL MEDICINE

## 2020-01-01 PROCEDURE — 99213 OFFICE O/P EST LOW 20 MIN: CPT | Mod: 95 | Performed by: INTERNAL MEDICINE

## 2020-01-01 PROCEDURE — C9803 HOPD COVID-19 SPEC COLLECT: HCPCS

## 2020-01-01 PROCEDURE — 92526 ORAL FUNCTION THERAPY: CPT | Mod: GN | Performed by: SPEECH-LANGUAGE PATHOLOGIST

## 2020-01-01 PROCEDURE — 99214 OFFICE O/P EST MOD 30 MIN: CPT | Performed by: FAMILY MEDICINE

## 2020-01-01 PROCEDURE — 71046 X-RAY EXAM CHEST 2 VIEWS: CPT | Performed by: RADIOLOGY

## 2020-01-01 PROCEDURE — 87186 SC STD MICRODIL/AGAR DIL: CPT | Performed by: EMERGENCY MEDICINE

## 2020-01-01 PROCEDURE — 85025 COMPLETE CBC W/AUTO DIFF WBC: CPT | Performed by: INTERNAL MEDICINE

## 2020-01-01 PROCEDURE — 99213 OFFICE O/P EST LOW 20 MIN: CPT | Performed by: INTERNAL MEDICINE

## 2020-01-01 PROCEDURE — 83735 ASSAY OF MAGNESIUM: CPT | Performed by: PHYSICIAN ASSISTANT

## 2020-01-01 PROCEDURE — 99233 SBSQ HOSP IP/OBS HIGH 50: CPT | Performed by: INTERNAL MEDICINE

## 2020-01-01 PROCEDURE — 71045 X-RAY EXAM CHEST 1 VIEW: CPT

## 2020-01-01 PROCEDURE — 87899 AGENT NOS ASSAY W/OPTIC: CPT | Performed by: HOSPITALIST

## 2020-01-01 PROCEDURE — G0008 ADMIN INFLUENZA VIRUS VAC: HCPCS | Performed by: INTERNAL MEDICINE

## 2020-01-01 PROCEDURE — 99233 SBSQ HOSP IP/OBS HIGH 50: CPT | Performed by: PHYSICIAN ASSISTANT

## 2020-01-01 RX ORDER — DIPHENOXYLATE HCL/ATROPINE 2.5-.025MG
1 TABLET ORAL 2 TIMES DAILY PRN
Status: DISCONTINUED | OUTPATIENT
Start: 2020-01-01 | End: 2020-01-01 | Stop reason: HOSPADM

## 2020-01-01 RX ORDER — AMOXICILLIN 250 MG
2 CAPSULE ORAL 2 TIMES DAILY
Status: DISCONTINUED | OUTPATIENT
Start: 2020-01-01 | End: 2020-01-01

## 2020-01-01 RX ORDER — DOXYCYCLINE 100 MG/1
100 TABLET ORAL 2 TIMES DAILY
Qty: 42 TABLET | Refills: 0 | Status: SHIPPED | OUTPATIENT
Start: 2020-01-01 | End: 2020-01-01

## 2020-01-01 RX ORDER — FUROSEMIDE 40 MG
40 TABLET ORAL DAILY
Status: DISCONTINUED | OUTPATIENT
Start: 2020-01-01 | End: 2020-01-01 | Stop reason: HOSPADM

## 2020-01-01 RX ORDER — IPRATROPIUM BROMIDE 42 UG/1
2 SPRAY, METERED NASAL 4 TIMES DAILY
Status: DISCONTINUED | OUTPATIENT
Start: 2020-01-01 | End: 2020-01-01 | Stop reason: HOSPADM

## 2020-01-01 RX ORDER — DILTIAZEM HYDROCHLORIDE 120 MG/1
120 CAPSULE, COATED, EXTENDED RELEASE ORAL DAILY
Status: DISCONTINUED | OUTPATIENT
Start: 2020-01-01 | End: 2020-01-01 | Stop reason: HOSPADM

## 2020-01-01 RX ORDER — TRIAMCINOLONE ACETONIDE 1 MG/G
CREAM TOPICAL 2 TIMES DAILY
Qty: 60 G | Refills: 1 | Status: SHIPPED | OUTPATIENT
Start: 2020-01-01 | End: 2020-01-01

## 2020-01-01 RX ORDER — AZITHROMYCIN 250 MG/1
250 TABLET, FILM COATED ORAL DAILY
Status: DISCONTINUED | OUTPATIENT
Start: 2020-01-01 | End: 2020-01-01

## 2020-01-01 RX ORDER — METOPROLOL SUCCINATE 100 MG/1
100 TABLET, EXTENDED RELEASE ORAL DAILY
Qty: 30 TABLET | Refills: 3 | Status: SHIPPED | OUTPATIENT
Start: 2020-01-01 | End: 2020-01-01

## 2020-01-01 RX ORDER — LEVOFLOXACIN 750 MG/1
750 TABLET, FILM COATED ORAL DAILY
Qty: 1 TABLET | Refills: 0 | Status: SHIPPED | OUTPATIENT
Start: 2020-01-01 | End: 2020-01-01

## 2020-01-01 RX ORDER — PIPERACILLIN SODIUM, TAZOBACTAM SODIUM 3; .375 G/15ML; G/15ML
3.38 INJECTION, POWDER, LYOPHILIZED, FOR SOLUTION INTRAVENOUS EVERY 6 HOURS
Status: DISCONTINUED | OUTPATIENT
Start: 2020-01-01 | End: 2020-01-01

## 2020-01-01 RX ORDER — CYANOCOBALAMIN 1000 UG/ML
INJECTION, SOLUTION INTRAMUSCULAR; SUBCUTANEOUS
Qty: 3 ML | Refills: 11 | Status: SHIPPED | OUTPATIENT
Start: 2020-01-01

## 2020-01-01 RX ORDER — CEFTRIAXONE 2 G/1
2 INJECTION, POWDER, FOR SOLUTION INTRAMUSCULAR; INTRAVENOUS EVERY 24 HOURS
Status: DISCONTINUED | OUTPATIENT
Start: 2020-01-01 | End: 2020-01-01

## 2020-01-01 RX ORDER — CEPHALEXIN 500 MG/1
500 CAPSULE ORAL 2 TIMES DAILY
Qty: 10 CAPSULE | Refills: 0 | Status: SHIPPED | OUTPATIENT
Start: 2020-01-01 | End: 2020-01-01

## 2020-01-01 RX ORDER — POTASSIUM CHLORIDE 20MEQ/15ML
20 LIQUID (ML) ORAL ONCE
Status: COMPLETED | OUTPATIENT
Start: 2020-01-01 | End: 2020-01-01

## 2020-01-01 RX ORDER — TRAMADOL HYDROCHLORIDE 50 MG/1
TABLET ORAL
Qty: 60 TABLET | Refills: 0 | Status: SHIPPED | OUTPATIENT
Start: 2020-01-01 | End: 2020-01-01

## 2020-01-01 RX ORDER — LEVOFLOXACIN 5 MG/ML
750 INJECTION, SOLUTION INTRAVENOUS EVERY 24 HOURS
Status: DISCONTINUED | OUTPATIENT
Start: 2020-01-01 | End: 2020-01-01

## 2020-01-01 RX ORDER — LIDOCAINE 40 MG/G
CREAM TOPICAL
Status: DISCONTINUED | OUTPATIENT
Start: 2020-01-01 | End: 2020-01-01 | Stop reason: HOSPADM

## 2020-01-01 RX ORDER — GUAIFENESIN/DEXTROMETHORPHAN 100-10MG/5
10 SYRUP ORAL EVERY 6 HOURS PRN
Status: DISCONTINUED | OUTPATIENT
Start: 2020-01-01 | End: 2020-01-01 | Stop reason: HOSPADM

## 2020-01-01 RX ORDER — FUROSEMIDE 10 MG/ML
40 INJECTION INTRAMUSCULAR; INTRAVENOUS 2 TIMES DAILY
Status: DISCONTINUED | OUTPATIENT
Start: 2020-01-01 | End: 2020-01-01

## 2020-01-01 RX ORDER — DILTIAZEM HYDROCHLORIDE 5 MG/ML
20 INJECTION INTRAVENOUS ONCE
Status: COMPLETED | OUTPATIENT
Start: 2020-01-01 | End: 2020-01-01

## 2020-01-01 RX ORDER — METOPROLOL SUCCINATE 100 MG/1
100 TABLET, EXTENDED RELEASE ORAL DAILY
Qty: 30 TABLET | Refills: 3 | Status: SHIPPED | OUTPATIENT
Start: 2020-01-01

## 2020-01-01 RX ORDER — METOPROLOL SUCCINATE 100 MG/1
100 TABLET, EXTENDED RELEASE ORAL DAILY
Status: DISCONTINUED | OUTPATIENT
Start: 2020-01-01 | End: 2020-01-01 | Stop reason: HOSPADM

## 2020-01-01 RX ORDER — NEEDLES, FILTER 19GX1 1/2"
NEEDLE, DISPOSABLE MISCELLANEOUS
Qty: 36 EACH | Refills: 11 | Status: SHIPPED | OUTPATIENT
Start: 2020-01-01

## 2020-01-01 RX ORDER — DOXYCYCLINE 100 MG/1
TABLET ORAL
Qty: 2 TABLET | Refills: 0 | Status: SHIPPED | OUTPATIENT
Start: 2020-01-01 | End: 2020-01-01

## 2020-01-01 RX ORDER — PIPERACILLIN SODIUM, TAZOBACTAM SODIUM 4; .5 G/20ML; G/20ML
4.5 INJECTION, POWDER, LYOPHILIZED, FOR SOLUTION INTRAVENOUS ONCE
Status: COMPLETED | OUTPATIENT
Start: 2020-01-01 | End: 2020-01-01

## 2020-01-01 RX ORDER — FUROSEMIDE 10 MG/ML
40 INJECTION INTRAMUSCULAR; INTRAVENOUS ONCE
Status: COMPLETED | OUTPATIENT
Start: 2020-01-01 | End: 2020-01-01

## 2020-01-01 RX ORDER — METOPROLOL TARTRATE 1 MG/ML
5 INJECTION, SOLUTION INTRAVENOUS EVERY 4 HOURS PRN
Status: DISCONTINUED | OUTPATIENT
Start: 2020-01-01 | End: 2020-01-01 | Stop reason: HOSPADM

## 2020-01-01 RX ORDER — DEXAMETHASONE SODIUM PHOSPHATE 4 MG/ML
6 INJECTION, SOLUTION INTRA-ARTICULAR; INTRALESIONAL; INTRAMUSCULAR; INTRAVENOUS; SOFT TISSUE DAILY
Status: DISCONTINUED | OUTPATIENT
Start: 2020-01-01 | End: 2020-01-01

## 2020-01-01 RX ORDER — DILTIAZEM HYDROCHLORIDE 120 MG/1
120 CAPSULE, COATED, EXTENDED RELEASE ORAL DAILY
Qty: 30 CAPSULE | Refills: 3 | Status: SHIPPED | OUTPATIENT
Start: 2020-01-01 | End: 2020-01-01

## 2020-01-01 RX ORDER — AMOXICILLIN 250 MG
1 CAPSULE ORAL 2 TIMES DAILY
Status: DISCONTINUED | OUTPATIENT
Start: 2020-01-01 | End: 2020-01-01

## 2020-01-01 RX ORDER — METOPROLOL TARTRATE 50 MG
50 TABLET ORAL 2 TIMES DAILY
Status: DISCONTINUED | OUTPATIENT
Start: 2020-01-01 | End: 2020-01-01

## 2020-01-01 RX ORDER — ONDANSETRON 4 MG/1
4 TABLET, ORALLY DISINTEGRATING ORAL EVERY 6 HOURS PRN
Status: DISCONTINUED | OUTPATIENT
Start: 2020-01-01 | End: 2020-01-01 | Stop reason: HOSPADM

## 2020-01-01 RX ORDER — ONDANSETRON 2 MG/ML
4 INJECTION INTRAMUSCULAR; INTRAVENOUS EVERY 6 HOURS PRN
Status: DISCONTINUED | OUTPATIENT
Start: 2020-01-01 | End: 2020-01-01 | Stop reason: HOSPADM

## 2020-01-01 RX ORDER — FUROSEMIDE 10 MG/ML
40 INJECTION INTRAMUSCULAR; INTRAVENOUS DAILY
Status: DISCONTINUED | OUTPATIENT
Start: 2020-01-01 | End: 2020-01-01

## 2020-01-01 RX ORDER — POLYETHYLENE GLYCOL 3350 17 G/17G
17 POWDER, FOR SOLUTION ORAL DAILY
Status: DISCONTINUED | OUTPATIENT
Start: 2020-01-01 | End: 2020-01-01

## 2020-01-01 RX ORDER — DEXAMETHASONE SODIUM PHOSPHATE 10 MG/ML
6 INJECTION, SOLUTION INTRAMUSCULAR; INTRAVENOUS ONCE
Status: COMPLETED | OUTPATIENT
Start: 2020-01-01 | End: 2020-01-01

## 2020-01-01 RX ORDER — IPRATROPIUM BROMIDE 42 UG/1
2 SPRAY, METERED NASAL 4 TIMES DAILY
Qty: 15 ML | Refills: 11 | Status: SHIPPED | OUTPATIENT
Start: 2020-01-01

## 2020-01-01 RX ORDER — MORPHINE 10 MG/ML
0.2 TINCTURE ORAL EVERY 6 HOURS PRN
Qty: 118 ML | Refills: 0 | Status: SHIPPED | OUTPATIENT
Start: 2020-01-01

## 2020-01-01 RX ORDER — DILTIAZEM HYDROCHLORIDE 120 MG/1
120 CAPSULE, COATED, EXTENDED RELEASE ORAL DAILY
Qty: 30 CAPSULE | Refills: 3 | Status: SHIPPED | OUTPATIENT
Start: 2020-01-01

## 2020-01-01 RX ORDER — LEVOFLOXACIN 5 MG/ML
750 INJECTION, SOLUTION INTRAVENOUS
Status: DISCONTINUED | OUTPATIENT
Start: 2020-01-01 | End: 2020-01-01 | Stop reason: HOSPADM

## 2020-01-01 RX ORDER — MORPHINE 10 MG/ML
0.2 TINCTURE ORAL EVERY 6 HOURS PRN
Qty: 118 ML | Refills: 0 | Status: SHIPPED | OUTPATIENT
Start: 2020-01-01 | End: 2020-01-01

## 2020-01-01 RX ADMIN — APIXABAN 2.5 MG: 2.5 TABLET, FILM COATED ORAL at 20:43

## 2020-01-01 RX ADMIN — APIXABAN 5 MG: 5 TABLET, FILM COATED ORAL at 23:57

## 2020-01-01 RX ADMIN — LEVOFLOXACIN 750 MG: 5 INJECTION, SOLUTION INTRAVENOUS at 03:35

## 2020-01-01 RX ADMIN — CEFTRIAXONE SODIUM 2 G: 2 INJECTION, POWDER, FOR SOLUTION INTRAMUSCULAR; INTRAVENOUS at 18:06

## 2020-01-01 RX ADMIN — AZITHROMYCIN MONOHYDRATE 250 MG: 250 TABLET ORAL at 08:20

## 2020-01-01 RX ADMIN — METOPROLOL TARTRATE 50 MG: 50 TABLET, FILM COATED ORAL at 08:20

## 2020-01-01 RX ADMIN — PIPERACILLIN SODIUM AND TAZOBACTAM SODIUM 3.38 G: 3; .375 INJECTION, POWDER, LYOPHILIZED, FOR SOLUTION INTRAVENOUS at 01:21

## 2020-01-01 RX ADMIN — FUROSEMIDE 40 MG: 10 INJECTION, SOLUTION INTRAVENOUS at 08:20

## 2020-01-01 RX ADMIN — DOCUSATE SODIUM 50 MG AND SENNOSIDES 8.6 MG 1 TABLET: 8.6; 5 TABLET, FILM COATED ORAL at 23:57

## 2020-01-01 RX ADMIN — DEXAMETHASONE SODIUM PHOSPHATE 6 MG: 4 INJECTION, SOLUTION INTRAMUSCULAR; INTRAVENOUS at 08:38

## 2020-01-01 RX ADMIN — APIXABAN 2.5 MG: 2.5 TABLET, FILM COATED ORAL at 10:50

## 2020-01-01 RX ADMIN — METOPROLOL SUCCINATE 100 MG: 100 TABLET, EXTENDED RELEASE ORAL at 10:49

## 2020-01-01 RX ADMIN — DEXAMETHASONE SODIUM PHOSPHATE 6 MG: 4 INJECTION, SOLUTION INTRAMUSCULAR; INTRAVENOUS at 08:21

## 2020-01-01 RX ADMIN — APIXABAN 5 MG: 5 TABLET, FILM COATED ORAL at 21:33

## 2020-01-01 RX ADMIN — AZITHROMYCIN MONOHYDRATE 500 MG: 500 INJECTION, POWDER, LYOPHILIZED, FOR SOLUTION INTRAVENOUS at 19:05

## 2020-01-01 RX ADMIN — IPRATROPIUM BROMIDE 2 SPRAY: 42 SPRAY NASAL at 08:38

## 2020-01-01 RX ADMIN — POTASSIUM CHLORIDE 20 MEQ: 20 SOLUTION ORAL at 11:09

## 2020-01-01 RX ADMIN — FUROSEMIDE 40 MG: 10 INJECTION, SOLUTION INTRAVENOUS at 09:53

## 2020-01-01 RX ADMIN — PIPERACILLIN SODIUM AND TAZOBACTAM SODIUM 4.5 G: 4; .5 INJECTION, POWDER, LYOPHILIZED, FOR SOLUTION INTRAVENOUS at 19:20

## 2020-01-01 RX ADMIN — METOPROLOL SUCCINATE 100 MG: 100 TABLET, EXTENDED RELEASE ORAL at 07:46

## 2020-01-01 RX ADMIN — APIXABAN 2.5 MG: 2.5 TABLET, FILM COATED ORAL at 21:36

## 2020-01-01 RX ADMIN — PIPERACILLIN SODIUM AND TAZOBACTAM SODIUM 3.38 G: 3; .375 INJECTION, POWDER, LYOPHILIZED, FOR SOLUTION INTRAVENOUS at 09:50

## 2020-01-01 RX ADMIN — LEVOFLOXACIN 750 MG: 5 INJECTION, SOLUTION INTRAVENOUS at 03:53

## 2020-01-01 RX ADMIN — FUROSEMIDE 40 MG: 10 INJECTION, SOLUTION INTRAVENOUS at 19:08

## 2020-01-01 RX ADMIN — METOPROLOL TARTRATE 50 MG: 50 TABLET, FILM COATED ORAL at 09:49

## 2020-01-01 RX ADMIN — DEXAMETHASONE SODIUM PHOSPHATE 6 MG: 4 INJECTION, SOLUTION INTRAMUSCULAR; INTRAVENOUS at 08:20

## 2020-01-01 RX ADMIN — METOPROLOL SUCCINATE 100 MG: 100 TABLET, EXTENDED RELEASE ORAL at 08:50

## 2020-01-01 RX ADMIN — FUROSEMIDE 40 MG: 10 INJECTION, SOLUTION INTRAVENOUS at 09:48

## 2020-01-01 RX ADMIN — DILTIAZEM HYDROCHLORIDE 20 MG: 5 INJECTION INTRAVENOUS at 19:08

## 2020-01-01 RX ADMIN — APIXABAN 5 MG: 5 TABLET, FILM COATED ORAL at 08:19

## 2020-01-01 RX ADMIN — APIXABAN 5 MG: 5 TABLET, FILM COATED ORAL at 08:20

## 2020-01-01 RX ADMIN — APIXABAN 5 MG: 5 TABLET, FILM COATED ORAL at 09:49

## 2020-01-01 RX ADMIN — DILTIAZEM HYDROCHLORIDE 120 MG: 120 CAPSULE, COATED, EXTENDED RELEASE ORAL at 09:34

## 2020-01-01 RX ADMIN — IPRATROPIUM BROMIDE 2 SPRAY: 42 SPRAY NASAL at 08:20

## 2020-01-01 RX ADMIN — DIPHENOXYLATE HYDROCHLORIDE AND ATROPINE SULFATE 1 TABLET: 2.5; .025 TABLET ORAL at 01:00

## 2020-01-01 RX ADMIN — APIXABAN 5 MG: 5 TABLET, FILM COATED ORAL at 19:47

## 2020-01-01 RX ADMIN — METOPROLOL TARTRATE 50 MG: 50 TABLET, FILM COATED ORAL at 20:05

## 2020-01-01 RX ADMIN — APIXABAN 5 MG: 5 TABLET, FILM COATED ORAL at 21:21

## 2020-01-01 RX ADMIN — METOPROLOL TARTRATE 50 MG: 50 TABLET, FILM COATED ORAL at 23:57

## 2020-01-01 RX ADMIN — METOPROLOL TARTRATE 75 MG: 50 TABLET, FILM COATED ORAL at 21:34

## 2020-01-01 RX ADMIN — METOPROLOL TARTRATE 75 MG: 50 TABLET, FILM COATED ORAL at 08:19

## 2020-01-01 RX ADMIN — APIXABAN 5 MG: 5 TABLET, FILM COATED ORAL at 07:46

## 2020-01-01 RX ADMIN — DIPHENOXYLATE HYDROCHLORIDE AND ATROPINE SULFATE 1 TABLET: 2.5; .025 TABLET ORAL at 21:36

## 2020-01-01 RX ADMIN — LEVOFLOXACIN 750 MG: 5 INJECTION, SOLUTION INTRAVENOUS at 06:46

## 2020-01-01 RX ADMIN — IPRATROPIUM BROMIDE 2 SPRAY: 42 SPRAY NASAL at 21:41

## 2020-01-01 RX ADMIN — DEXAMETHASONE SODIUM PHOSPHATE 6 MG: 4 INJECTION, SOLUTION INTRAMUSCULAR; INTRAVENOUS at 13:20

## 2020-01-01 RX ADMIN — APIXABAN 2.5 MG: 2.5 TABLET, FILM COATED ORAL at 09:34

## 2020-01-01 RX ADMIN — DIPHENOXYLATE HYDROCHLORIDE AND ATROPINE SULFATE 1 TABLET: 2.5; .025 TABLET ORAL at 19:47

## 2020-01-01 RX ADMIN — DOCUSATE SODIUM 50 MG AND SENNOSIDES 8.6 MG 1 TABLET: 8.6; 5 TABLET, FILM COATED ORAL at 08:19

## 2020-01-01 RX ADMIN — IPRATROPIUM BROMIDE 2 SPRAY: 42 SPRAY NASAL at 18:12

## 2020-01-01 RX ADMIN — DILTIAZEM HYDROCHLORIDE 120 MG: 120 CAPSULE, COATED, EXTENDED RELEASE ORAL at 07:46

## 2020-01-01 RX ADMIN — APIXABAN 5 MG: 5 TABLET, FILM COATED ORAL at 08:40

## 2020-01-01 RX ADMIN — FUROSEMIDE 40 MG: 10 INJECTION, SOLUTION INTRAVENOUS at 08:39

## 2020-01-01 RX ADMIN — FUROSEMIDE 40 MG: 10 INJECTION, SOLUTION INTRAVENOUS at 15:21

## 2020-01-01 RX ADMIN — FUROSEMIDE 40 MG: 10 INJECTION, SOLUTION INTRAVENOUS at 07:45

## 2020-01-01 RX ADMIN — IPRATROPIUM BROMIDE 2 SPRAY: 42 SPRAY NASAL at 08:35

## 2020-01-01 RX ADMIN — APIXABAN 2.5 MG: 2.5 TABLET, FILM COATED ORAL at 08:35

## 2020-01-01 RX ADMIN — IPRATROPIUM BROMIDE 2 SPRAY: 42 SPRAY NASAL at 15:19

## 2020-01-01 RX ADMIN — METOPROLOL SUCCINATE 100 MG: 100 TABLET, EXTENDED RELEASE ORAL at 09:34

## 2020-01-01 RX ADMIN — DILTIAZEM HYDROCHLORIDE 120 MG: 120 CAPSULE, COATED, EXTENDED RELEASE ORAL at 16:29

## 2020-01-01 RX ADMIN — METOPROLOL TARTRATE 5 MG: 5 INJECTION INTRAVENOUS at 18:15

## 2020-01-01 RX ADMIN — FUROSEMIDE 40 MG: 10 INJECTION, SOLUTION INTRAVENOUS at 13:30

## 2020-01-01 RX ADMIN — CEFTRIAXONE SODIUM 2 G: 2 INJECTION, POWDER, FOR SOLUTION INTRAMUSCULAR; INTRAVENOUS at 17:16

## 2020-01-01 RX ADMIN — LEVOFLOXACIN 750 MG: 5 INJECTION, SOLUTION INTRAVENOUS at 05:38

## 2020-01-01 RX ADMIN — IPRATROPIUM BROMIDE 2 SPRAY: 42 SPRAY NASAL at 13:30

## 2020-01-01 RX ADMIN — DEXAMETHASONE SODIUM PHOSPHATE 6 MG: 10 INJECTION, SOLUTION INTRAMUSCULAR; INTRAVENOUS at 19:35

## 2020-01-01 RX ADMIN — METOPROLOL TARTRATE 5 MG: 5 INJECTION INTRAVENOUS at 19:36

## 2020-01-01 RX ADMIN — APIXABAN 5 MG: 5 TABLET, FILM COATED ORAL at 20:05

## 2020-01-01 RX ADMIN — APIXABAN 2.5 MG: 2.5 TABLET, FILM COATED ORAL at 20:37

## 2020-01-01 RX ADMIN — DILTIAZEM HYDROCHLORIDE 120 MG: 120 CAPSULE, COATED, EXTENDED RELEASE ORAL at 10:49

## 2020-01-01 ASSESSMENT — ACTIVITIES OF DAILY LIVING (ADL)
ADLS_ACUITY_SCORE: 23
ADLS_ACUITY_SCORE: 19
ADLS_ACUITY_SCORE: 15
ADLS_ACUITY_SCORE: 23
ADLS_ACUITY_SCORE: 16
ADLS_ACUITY_SCORE: 20
ADLS_ACUITY_SCORE: 23
ADLS_ACUITY_SCORE: 17
ADLS_ACUITY_SCORE: 23
ADLS_ACUITY_SCORE: 20
ADLS_ACUITY_SCORE: 20
ADLS_ACUITY_SCORE: 24
ADLS_ACUITY_SCORE: 24
ADLS_ACUITY_SCORE: 20
ADLS_ACUITY_SCORE: 18
ADLS_ACUITY_SCORE: 19
ADLS_ACUITY_SCORE: 23
ADLS_ACUITY_SCORE: 17
ADLS_ACUITY_SCORE: 16
ADLS_ACUITY_SCORE: 23
ADLS_ACUITY_SCORE: 20
ADLS_ACUITY_SCORE: 20
ADLS_ACUITY_SCORE: 16
ADLS_ACUITY_SCORE: 17
ADLS_ACUITY_SCORE: 17
ADLS_ACUITY_SCORE: 19
ADLS_ACUITY_SCORE: 17
ADLS_ACUITY_SCORE: 19
ADLS_ACUITY_SCORE: 19
ADLS_ACUITY_SCORE: 18
ADLS_ACUITY_SCORE: 16
ADLS_ACUITY_SCORE: 23
ADLS_ACUITY_SCORE: 18
ADLS_ACUITY_SCORE: 24
ADLS_ACUITY_SCORE: 24
ADLS_ACUITY_SCORE: 19
ADLS_ACUITY_SCORE: 23
ADLS_ACUITY_SCORE: 17
ADLS_ACUITY_SCORE: 17
ADLS_ACUITY_SCORE: 16
ADLS_ACUITY_SCORE: 17
ADLS_ACUITY_SCORE: 17
ADLS_ACUITY_SCORE: 23
ADLS_ACUITY_SCORE: 23

## 2020-01-01 ASSESSMENT — ENCOUNTER SYMPTOMS
FEVER: 0
MYALGIAS: 0
SHORTNESS OF BREATH: 1
FATIGUE: 1

## 2020-01-01 ASSESSMENT — PATIENT HEALTH QUESTIONNAIRE - PHQ9
SUM OF ALL RESPONSES TO PHQ QUESTIONS 1-9: 3
5. POOR APPETITE OR OVEREATING: NOT AT ALL

## 2020-01-01 ASSESSMENT — ANXIETY QUESTIONNAIRES
5. BEING SO RESTLESS THAT IT IS HARD TO SIT STILL: NOT AT ALL
IF YOU CHECKED OFF ANY PROBLEMS ON THIS QUESTIONNAIRE, HOW DIFFICULT HAVE THESE PROBLEMS MADE IT FOR YOU TO DO YOUR WORK, TAKE CARE OF THINGS AT HOME, OR GET ALONG WITH OTHER PEOPLE: NOT DIFFICULT AT ALL
1. FEELING NERVOUS, ANXIOUS, OR ON EDGE: NOT AT ALL
6. BECOMING EASILY ANNOYED OR IRRITABLE: NOT AT ALL
7. FEELING AFRAID AS IF SOMETHING AWFUL MIGHT HAPPEN: NOT AT ALL
GAD7 TOTAL SCORE: 0
GAD7 TOTAL SCORE: 0
3. WORRYING TOO MUCH ABOUT DIFFERENT THINGS: NOT AT ALL
2. NOT BEING ABLE TO STOP OR CONTROL WORRYING: NOT AT ALL

## 2020-01-01 ASSESSMENT — MIFFLIN-ST. JEOR
SCORE: 1207.85
SCORE: 1253.67
SCORE: 1181.09
SCORE: 1225.09
SCORE: 1183.81
SCORE: 1292.22
SCORE: 1186.08
SCORE: 1174.28
SCORE: 1237.79
SCORE: 1301.75
SCORE: 1216.47

## 2020-01-02 NOTE — TELEPHONE ENCOUNTER
Routing refill request to provider for review/approval because:  A break in medication- last rx would have been completed 11/15/19 per med list.  Pt scheduled with PCP 1/13/20  Please authorize if appropriate.  Thanks,  Sun Lind RN

## 2020-01-06 NOTE — PROGRESS NOTES
"Dawson Jones is a 89 year old male who is being evaluated via a telephone visit.      The patient has been notified of following (by ANGIE Gutierrez MA on 1/6/2020 at 3:25 PM      \"We have found that certain health care needs can be provided without the need for a physical exam.  This service lets us provide the care you need with a short phone conversation.  If a prescription is necessary we can send it directly to your pharmacy.  If lab work is needed we can place an order for that and you can then stop by our lab to have the test done at a later time.    This telephone visit will be conducted via 3 way call with the you (the patient) , the physician/provider, and a me all on the line at the same time.  This allows your physician/provider to have the phone conversation with you while I will be taking notes for your medical record.  We will have full access to your [facility] medical record during this entire phone call.    Since this is like an office visit,  will bill your insurance company for this service.  Please check with your medical insurance if this type of telephone/virtual is covered . You may be responsible for the cost of this service if insurance coverage is denied.  The typical cost is $30 (10min), $59(11-20min) and $85 (21-30min)     If during the course of the call the physician/provider feels a telephone visit is not appropriate, you will not be charged for this service\"    Consent has been obtained for this service by care team member: yes.  See the scanned image in the medical record.    S: The history as provided by the patient to the provider during this 3 way call include persistent back pain with radiculopathy refractory to multiple drugs and interventions    He was offered a spine stimulatory, but would have to hold eliquis for 11 days to safely put it in    Pertinent parts of the the patient's medical history reviewed and confirmed by the provider included : history of atrial " fibrillation with elevated stroke risk      Total time of call between patient and provider was 9 minutes     Sincerely,    Judson Mcadams MD  (MD signature)  ===================================================    I have reviewed the note as documented above.  This accurately captures the substance of my conversation with the patient,    Additional provider notes:    Assessment/Plan:      (I48.91) Atrial fibrillation, unspecified type (H)  (primary encounter diagnosis)  Comment: discussed risks of holding eliquis including the risk of embolic stroke  Plan: after discussion, he chose not to proceed with spine intervention

## 2020-02-03 NOTE — PROGRESS NOTES
Subjective     Dawson Jones is a 89 year old male who presents to clinic today for the following health issues:    HPI   F/up pain   Here to follow up chronic right leg pain  Orthopedics thinks his pain is from lumbar radiculitis  Pain clinic offered nerve stimulator, but after discussion patient declined  He takes one tramadol per day and one APAP per day   He finds heat helpful   Diarrhea from Crohn is well controlled using opium tincture   Leg swelling has been manageable using compression hosiery         Patient Active Problem List   Diagnosis     Atrial fibrillation (H)     Cardiomyopathy, unspecified type (H)     Crohn's disease of small and large intestines with complication (H)     Spinal stenosis of lumbosacral region     Vitamin B12 deficiency (non anemic)     Chronic, continuous use of opioids     Spinal stenosis     Bacteremia     History of lumbar laminectomy for spinal cord decompression     Urinary retention     Physical deconditioning     Past Surgical History:   Procedure Laterality Date     APPENDECTOMY       CYSTOSCOPY, TRANSURETHRAL RESECTION (TUR) PROSTATE, COMBINED N/A 1/24/2019    Procedure: COMBINED CYSTOSCOPY, TRANSURETHRAL RESECTION (TUR) PROSTATE;  Surgeon: Meliton Valverde MD;  Location:  OR     DECOMPRESSION LUMBAR ONE LEVEL N/A 10/1/2018    Procedure: DECOMPRESSION LUMBAR ONE LEVEL;  DECOMPRESSION L2-3 WITH COFLEX DEVICE  ;  Surgeon: Bert Reynolds MD;  Location:  OR     LASER KTP GREEN LIGHT PHOTOSELECTIVE VAPORIZATION PROSTATE N/A 1/24/2019    Procedure: LASER KTP GREEN LIGHT PHOTOSELECTIVE VAPORIZATION PROSTATE;  Surgeon: Meliton Valverde MD;  Location:  OR     ORTHOPEDIC SURGERY  10/01/2018    Bilateral L2-3 decompression and insertion of a Coflex device       Social History     Tobacco Use     Smoking status: Former Smoker     Smokeless tobacco: Never Used     Tobacco comment: 40 years ago   Substance Use Topics     Alcohol use: Yes     Alcohol/week: 7.0 standard drinks  "    Types: 7 Standard drinks or equivalent per week     Comment: 1 wine an evening     Family History   Problem Relation Age of Onset     Family History Negative Mother      Cardiac Sudden Death Father      Family History Negative Brother      Other - See Comments Sister         colon issues     Family History Negative Son      Family History Negative Brother      Family History Negative Son      Heart Disease No family hx of          Current Outpatient Medications   Medication Sig Dispense Refill     acetaminophen (TYLENOL 8 HOUR) 650 MG CR tablet Take 650-1,300 mg by mouth 3 times daily as needed for mild pain or fever       BD INTEGRA SYRINGE 25G X 1\" 3 ML MISC USE WITH B-12 INJECTIONS 36 each 11     cyanocobalamin (CYANOCOBALAMIN) 1000 MCG/ML injection INJECT 1 ML IN THE MUSCLE EVERY 30 DAYS. 3 mL 11     diphenoxylate-atropine (LOMOTIL) 2.5-0.025 MG tablet Take 1 tablet by mouth twice daily if needed 180 tablet 3     ELIQUIS ANTICOAGULANT 5 MG tablet TAKE 1 TABLET(5 MG) BY MOUTH TWICE DAILY 180 tablet 3     metoprolol tartrate (LOPRESSOR) 50 MG tablet TAKE 1 TABLET(50 MG) BY MOUTH TWICE DAILY 180 tablet 3     opium tincture 10 MG/ML (1%) liquid Take 0.2 mLs (2 mg) by mouth every 6 hours as needed for diarrhea (4 drops) 118 mL 0     order for DME Equipment being ordered: Walker Wheels () and Walker ()  Treatment Diagnosis: DIfficulty with gait 1 each 0     traMADol (ULTRAM) 50 MG tablet TAKE 1 TABLET BY MOUTH TWICE DAILY AS NEEDED FOR SEVERE PAIN 60 tablet 0     order for DME Equipment being ordered: incentive spirometer (Patient not taking: Reported on 2/3/2020) 1 Units 0     No Known Allergies      Reviewed and updated as needed this visit by Provider         Review of Systems   ROS COMP: Constitutional, HEENT, cardiovascular, pulmonary, gi and gu systems are negative, except as otherwise noted.      Objective    BP (!) 145/88 (BP Location: Right arm, Cuff Size: Adult Large)   Pulse 87   Temp 97.5 " " F (36.4  C) (Oral)   Ht 1.727 m (5' 8\")   Wt 66.2 kg (146 lb)   SpO2 100%   BMI 22.20 kg/m    Body mass index is 22.2 kg/m .  Physical Exam   GENERAL: healthy, alert and no distress            Assessment & Plan       ICD-10-CM    1. Chronic, continuous use of opioids F11.90    2. Spinal stenosis of lumbosacral region M48.07    3. Atrial fibrillation, unspecified type (H) I48.91    4. Crohn's disease of small and large intestines with complication (H) K50.819    5. Cardiomyopathy, unspecified type (H) I42.9    6. Ataxia R27.0 Walker Order for DME - ONLY FOR DME      continue current medications for chronic pain  Heart rate controlled, on DOAC for stroke prophylaxis  Crohn symptoms well controlled  Volume status well compensated  Needs a new walker     Return in about 3 months (around 5/3/2020) for Pain Check.    Judson Mcadams MD  [facility]    Total face to face contact time was greater than 20 minutes of which more than 50% of this time was spent counseling and coordinating care regarding the above topics.    "

## 2020-02-24 NOTE — TELEPHONE ENCOUNTER
Requested Prescriptions   Pending Prescriptions Disp Refills     traMADol (ULTRAM) 50 MG tablet [Pharmacy Med Name: TRAMADOL 50MG TABLETS] 60 tablet      Sig: TAKE 1 TABLET BY MOUTH TWICE DAILY AS NEEDED FOR SEVERE PAIN   Last Written Prescription Date:  11/29/2019  Last Fill Quantity: 60,  # refills: 0   Last office visit: 2/3/2020 with prescribing provider:  Pema   Future Office Visit:   Next 5 appointments (look out 90 days)    May 18, 2020 10:30 AM CDT  Office Visit with Judson Mcadams MD  [facility] [address] [address]  [phone]           There is no refill protocol information for this order        Routing refill request to provider for review/approval because:  Drug not on the [facility] refill protocol     NATHAN Arce, RN  [employer] [facility]

## 2020-04-28 NOTE — TELEPHONE ENCOUNTER
Reason for Call:  Medication or medication refill:    Do you use a [facility]?  Name of the pharmacy and phone number for the current request:       [facility]ACY #[id number] [place] [id number] [facility]      Name of the medication requested:   opium tincture 10 MG/ML (1%) liquid  118 mL      Other request: almost out    Can we leave a detailed message on this number? YES    Phone number patient can be reached at: Home number on file [phone] (home)    Best Time: any    Call taken on 4/28/2020 at 1:47 PM by Gaviota Brian

## 2020-04-28 NOTE — TELEPHONE ENCOUNTER
Routing refill request to provider for review/approval because:   checked, no concerns.   Estela Herndon RN

## 2020-04-29 NOTE — TELEPHONE ENCOUNTER
Prior Authorization Retail Medication Request    Medication/Dose: Opium Tincture 10 mg/ml 1% liquid  ICD code (if different than what is on RX):  K50.819]  Previously Tried and Failed:  diphenoxylate-atropine (LOMOTIL) 2.5-0.025 MG tablet   Rationale:  Crohn's disease of small and large intestines with complication     Insurance Name:  [facility]  Insurance ID:  [id number]      Pharmacy Information (if different than what is on RX)  [facility]  Phone:  [phone]

## 2020-04-29 NOTE — TELEPHONE ENCOUNTER
Fax from pharmacy    Opium Tincture is not covered by patient's insurance.    Seeking PA or alternative medication.     Appears patient has been on this medication for a while, has it been covered in past, had previous PA or patient been paying OOP?    Jessie Landis, RT (R)

## 2020-04-29 NOTE — TELEPHONE ENCOUNTER
Prior Authorization Approval    Authorization Effective Date: 3/30/2020  Authorization Expiration Date: 4/29/2021  Medication: opium tincture 10 MG/ML (1%) liquid   Approved Dose/Quantity:    Reference #:     Insurance Company: [employer] - Phone [phone] Fax [phone]  Expected CoPay:       CoPay Card Available:      Foundation Assistance Needed:    Which Pharmacy is filling the prescription (Not needed for infusion/clinic administered): [facility]/PHARMACY #[id number] - [address]  Pharmacy Notified: Yes  Patient Notified: Yes  **Instructed pharmacy to notify patient when script is ready to /ship.**

## 2020-04-29 NOTE — TELEPHONE ENCOUNTER
Central Prior Authorization Team   Phone: [phone]      PA Initiation    Medication: opium tincture 10 MG/ML (1%) liquid   Insurance Company: [employer] - Phone [phone] Fax [phone]  Pharmacy Filling the Rx: [id number] - [place], [place] - [address]  Filling Pharmacy Phone: [phone]  Filling Pharmacy Fax:    Start Date: 4/29/2020

## 2020-04-30 NOTE — TELEPHONE ENCOUNTER
Prior authorization already approved for patient until 2021. Please see separate telephone encounter regarding approval of medication.    Yovany Ellison CMA on 4/30/2020 at 8:02 AM

## 2020-05-13 NOTE — TELEPHONE ENCOUNTER
Controlled Substance Refill Request for     traMADol (ULTRAM) 50 MG tablet  60 tablet  0  2/24/2020   No    Sig: TAKE 1 TABLET BY MOUTH TWICE DAILY AS NEEDED FOR SEVERE PAIN      Last Written Prescription Date:  02/24/2020  Last Fill Quantity: 60,  # refills: 0   Last office visit: 2/3/2020 with prescribing provider:     Future Office Visit:         Problem List Complete:  Yes    Chronic, continuous use of opioids   Change Dx Resolve       Details  Code: F11.90  Sort Priority: Medium  Noted: 1/4/2018  Share w/ Pt: []       Overview  Edited:  Christina Ambrocio RN  11/29/2019  Patient is followed by Judson Mcadams MD for ongoing prescription of pain medication. All refills should only be approved by this provider, or covering partner.     Medication(s): Tramadol  mg po bid prn # 120 per month.   Maximum quantity per month: 120  Clinic visit frequency required: Q 3 months      Controlled substance agreement:  Encounter-Level CSA - 01/04/2018:           Controlled Substance Agreement - Scan on 1/24/2018 12:08 PM : CONTROLLED SUBSTANCE AGREEMENT (below)                   Pain Clinic evaluation in the past: No     DIRE Total Score(s):  No flowsheet data found.     Last [facility] website verification: No concerns 11/29/19 [place]                        checked in past 3 months?  No, route to RN

## 2020-05-14 NOTE — TELEPHONE ENCOUNTER
: Please schedule patient for Virtual Visit. Route back and document if does not have enough medication to get to this appointment.   Thank you,  Ivette GONZALEZ RN,BSN

## 2020-05-19 NOTE — TELEPHONE ENCOUNTER
Patient said he is going to pass on a Virtual Visit until he can actually come into the clinic  Explained that he may or may not get another refill until we can discuss his pain and he said that over the phone does nothing for him.

## 2020-05-22 NOTE — PROGRESS NOTES
"Dawson Jones is a 89 year old male who is being evaluated via a billable telephone visit.      The patient has been notified of following:     \"This telephone visit will be conducted via a call between you and your physician/provider. We have found that certain health care needs can be provided without the need for a physical exam.  This service lets us provide the care you need with a short phone conversation.  If a prescription is necessary we can send it directly to your pharmacy.  If lab work is needed we can place an order for that and you can then stop by our lab to have the test done at a later time.    Telephone visits are billed at different rates depending on your insurance coverage. During this emergency period, for some insurers they may be billed the same as an in-person visit.  Please reach out to your insurance provider with any questions.     If during the course of the call the physician/provider feels a telephone visit is not appropriate, you will not be charged for this service.\"    Patient has given verbal consent for Telephone visit?  Yes    What phone number would you like to be contacted at? [phone]    How would you like to obtain your AVS? Julia Iyer     Dawson Jonse is a 89 year old male who presents via phone visit today for the following health issues:    HPI         CC:  Follow up chronic back pain with radiculopathy, Crohn disease, atrial fibrillation     He is taking 1 tramadol per day and 650 of APAP per day  He still has pain  He is considering another epidural steroid injection, but would prefer to wait until post COVID for that   Diarrhea is under good control   No chest pains, palpitations or dyspnea     Patient Active Problem List   Diagnosis     Atrial fibrillation (H)     Cardiomyopathy, unspecified type (H)     Crohn's disease of small and large intestines with complication (H)     Spinal stenosis of lumbosacral region     Vitamin B12 deficiency (non " "anemic)     Chronic, continuous use of opioids     Spinal stenosis     Bacteremia     History of lumbar laminectomy for spinal cord decompression     Urinary retention     Physical deconditioning     Past Surgical History:   Procedure Laterality Date     APPENDECTOMY       CYSTOSCOPY, TRANSURETHRAL RESECTION (TUR) PROSTATE, COMBINED N/A 1/24/2019    Procedure: COMBINED CYSTOSCOPY, TRANSURETHRAL RESECTION (TUR) PROSTATE;  Surgeon: Meliton Valverde MD;  Location: [facility] OR     DECOMPRESSION LUMBAR ONE LEVEL N/A 10/1/2018    Procedure: DECOMPRESSION LUMBAR ONE LEVEL;  DECOMPRESSION L2-3 WITH COFLEX DEVICE  ;  Surgeon: Bert Reynolds MD;  Location:  OR     LASER KTP GREEN LIGHT PHOTOSELECTIVE VAPORIZATION PROSTATE N/A 1/24/2019    Procedure: LASER KTP GREEN LIGHT PHOTOSELECTIVE VAPORIZATION PROSTATE;  Surgeon: Meliton Valverde MD;  Location:  OR     ORTHOPEDIC SURGERY  10/01/2018    Bilateral L2-3 decompression and insertion of a Coflex device       Social History     Tobacco Use     Smoking status: Former Smoker     Smokeless tobacco: Never Used     Tobacco comment: 40 years ago   Substance Use Topics     Alcohol use: Yes     Alcohol/week: 7.0 standard drinks     Types: 7 Standard drinks or equivalent per week     Comment: 1-2 wine an evening     Family History   Problem Relation Age of Onset     Family History Negative Mother      Cardiac Sudden Death Father      Family History Negative Brother      Other - See Comments Sister         colon issues     Family History Negative Son      Family History Negative Brother      Family History Negative Son      Heart Disease No family hx of          Current Outpatient Medications   Medication Sig Dispense Refill     acetaminophen (TYLENOL 8 HOUR) 650 MG CR tablet Take 650-1,300 mg by mouth 3 times daily as needed for mild pain or fever       BD INTEGRA SYRINGE 25G X 1\" 3 ML MISC USE WITH B-12 INJECTIONS 36 each 11     cyanocobalamin (CYANOCOBALAMIN) 1000 MCG/ML injection " INJECT 1 ML IN THE MUSCLE EVERY 30 DAYS. 3 mL 11     diphenoxylate-atropine (LOMOTIL) 2.5-0.025 MG tablet Take 1 tablet by mouth twice daily if needed 180 tablet 3     ELIQUIS ANTICOAGULANT 5 MG tablet TAKE 1 TABLET(5 MG) BY MOUTH TWICE DAILY 180 tablet 3     metoprolol tartrate (LOPRESSOR) 50 MG tablet TAKE 1 TABLET(50 MG) BY MOUTH TWICE DAILY 180 tablet 3     opium tincture 10 MG/ML (1%) liquid Take 0.2 mLs (2 mg) by mouth every 6 hours as needed for diarrhea (4 drops) 118 mL 0     order for DME Equipment being ordered: Walker Wheels () and Walker ()  Treatment Diagnosis: DIfficulty with gait 1 each 0     traMADol (ULTRAM) 50 MG tablet TAKE 1 TABLET BY MOUTH TWICE DAILY AS NEEDED FOR SEVERE PAIN 60 tablet 0     order for DME Equipment being ordered: incentive spirometer (Patient not taking: Reported on 2/3/2020) 1 Units 0     No Known Allergies    Reviewed and updated as needed this visit by Provider         Review of Systems   Constitutional, HEENT, cardiovascular, pulmonary, gi and gu systems are negative, except as otherwise noted.       Objective   Reported vitals:  There were no vitals taken for this visit.     PSYCH: Alert and oriented times 3, hard of hearing and mild memory deficits unchanged   RESP: No cough, no audible wheezing, able to talk in full sentences  Remainder of exam unable to be completed due to telephone visits            Assessment/Plan:  1. Spinal stenosis of lumbosacral region  Can increase APAP to 1000 TID PRN  Discussed with patient and wife     2. Crohn's disease of small and large intestines with complication (H)  Stable     3. Atrial fibrillation, unspecified type (H)  Stable     4. Chronic, continuous use of opioids  Benefits of ultram outweigh risks which were discussed again today  Continue cautious use       Return in about 15 weeks (around 9/4/2020) for Pain Check.  Patient instructed to return to clinic or contact us sooner if symptoms worsen or new symptoms develop.        Phone call duration:  10 minutes    Judson Mcadams MD

## 2020-06-30 NOTE — RESULT ENCOUNTER NOTE
Tasha Osman,    This is to inform you regarding your test result.    I spoke to your wife  on phone but sending you a result note also.  The tick was wood tick or dog tick.  It was not deer tick  So no need to do lyme test   But if he develops any unusual symptoms then seek medical attention.    Sincerely,      Dr.Nasima Harper MD,FACP

## 2020-06-30 NOTE — PATIENT INSTRUCTIONS
Take doxycycline 100 mg 2 tablets now  Bring tick for evaluation.  Watch for symptoms of lyme disease   See the not below  Ok to use triamcinolone cream for itching at the site of tick bite for no more that 7 days  Watch for sign and symptoms of infection.  Follow up with Dr. arreola in 2-4 weeks  Seek sooner medical attention if there is any worsening of symptoms or problems.      Patient Education     Tick Bite, Antibiotic Treatment    Ticks are small arachnids that feed on the blood of rodents, rabbits, birds, deer, dogs and humans. The bite may cause a local reaction like that of a spider, with a small amount of local redness, itching and slight swelling. Sometimes there is no local reaction.  Most tick bites are harmless. But some ticks carry diseases, such as Lyme disease or [place] spotted fever. These can be passed to people at the time of the bite. Lyme disease is of greatest concern. Right now you have no symptoms of Lyme disease or other serious reaction to the bite. It is important to watch for the warning signs, which could appear weeks to months after the tick bite.  Home care  The following guidelines can help you care for your bite at home:    If itching is a problem, avoid tight clothing and anything that heats up your skin. This includes hot showers or baths and direct sunlight. This often makes the itching worse.    An ice pack will reduce local areas of redness and itching. Make your own ice pack by putting ice cubes in a zip-top plastic bag and wrapping it in a thin towel. Over-the-counter creams containing benzocaine may help with itching.    You can use an antihistamine with diphenhydramine if your doctor did not give you another antihistamine. This medicine may be used to reduce itching if large areas of the skin are involved. It is available at drugstores and grocery stores. If symptoms continue, talk with your doctor or pharmacist about other over-the counter medicines that may be  helpful.    Your doctor may prescribe antibiotics to reduce your risk of getting Lyme disease. It is very important that you take them exactly as directed until they are completely finished.  Follow-up care  Follow up with your healthcare provider, or as advised.  Call 911  Call 911 if any of these occur:    Irregular or rapid heartbeat    Numbness, tingling, or weakness in the arms or legs  When to seek medical advice  Call your healthcare provider right away if any of these occur:  Signs of local infection. Watch for these during the next few days:    Increasing redness around the bite site    Increased pain or swelling    Fever over 100.4 F (38 C), or as directed by your healthcare provider    Fluid draining from the bite area  Signs of tick-related disease. Watch for these over the next few weeks to months:    Circular, red, ring-like rash appears at the bite area within 1 to 3 weeks    Tiredness, fever or chills, nausea or vomiting    Neck pain or stiffness, headache, or confusion    Muscle or bone aches    Joint pain or swelling, especially in the knee    Weakness on one side of the face  Date Last Reviewed: 10/1/2016    4574-4951 [employer]. [address] [place] [address]. All rights reserved. This information is not intended as a substitute for professional medical care. Always follow your healthcare professional's instructions.

## 2020-06-30 NOTE — Clinical Note
I spoke to them and per lab tick was not deer tick  He is developing mild cellulitis and local skin reaction   So prescribed keflex and triamcinolone  No need for lyme test

## 2020-06-30 NOTE — PROGRESS NOTES
"Dawson Jones is a 89 year old male who is being evaluated via a billable video visit.      The patient has been notified of following:     \"This video visit will be conducted via a call between you and your physician/provider. We have found that certain health care needs can be provided without the need for an in-person physical exam.  This service lets us provide the care you need with a video conversation.  If a prescription is necessary we can send it directly to your pharmacy.  If lab work is needed we can place an order for that and you can then stop by our lab to have the test done at a later time.    Video visits are billed at different rates depending on your insurance coverage.  Please reach out to your insurance provider with any questions.    If during the course of the call the physician/provider feels a video visit is not appropriate, you will not be charged for this service.\"    Patient has given verbal consent for Video visit? Yes  How would you like to obtain your AVS? Julia  Patient would like the video invitation sent by: Text to cell phone: [phone]       Will anyone else be joining your video visit? Wife was with patient     Subjective     Dawson Jones is a 89 year old male who presents today via video visit for the following health issues:    [facility]         Video Start Time: 8:11 AM  Patient and his wife were present during the visit  Patient had tick bite yesterday but did not mention to his wife until today  His wife was able to pull the tick from his groin today  It was in his groin.  Redness at the site of tick bite and some itching.  She has triamcinolone cream at home which she is applying to his groin.  No other complaints          Reviewed and updated as needed this visit by Provider         Review of Systems   Constitutional, HEENT, cardiovascular, pulmonary, gi and gu systems are negative, except as otherwise noted.      Objective             Physical Exam     GENERAL: " Healthy, alert and no distress  EYES: Eyes grossly normal to inspection.  No discharge or erythema, or obvious scleral/conjunctival abnormalities.  RESP: No audible wheeze, cough, or visible cyanosis.  No visible retractions or increased work of breathing.    SKIN: Visible skin clear. No significant rash, abnormal pigmentation or lesions.  Could not see rash properly as picture was not clear  PSYCH: Mentation appears normal, affect normal/bright, judgement and insight intact, normal speech and appearance well-groomed.              Assessment & Plan     Dawson was seen today for insect bites.    Diagnoses and all orders for this visit:    Tick bite, initial encounter  Comments:  groin  Orders:  -     doxycycline monohydrate (ADOXA) 100 MG tablet; Take 2 tablest now due to tick bite  -     Lyme  Total Ab Test/Reflex ([facility]); Future  -     Arthropod ID macroscopic; Future    .  The only concern they wanted to discuss.  Wife will bring the tick for identification to the clinic.  They will watch for signs and symptoms of Lyme's disease  To be on the safe side I gave them doxycycline 200 mg as a single dose to prevent Lyme's as the tick was pulled today and was with him for more than 24 hours. watch for signs and symptoms of infection    Addendum: called them back in evening    I spoke to both of them ( patient and wife)  The tach was not clear take it was worked orthoptic  Per wife he is developing local infection and rash and it is getting bigger  Told them to send picture via my chart she sent to my phone as they could not do it on my chart and they wanted me to see  The area as it is getting bigger compared to morning and is itchy but wife states there is infection also  According to the picture there was a red area and there was some bruising at the site of tick bite it was few centimeter  I discussed about empiric antibiotic and they are in agreement  I gave short course of cephalexin  Also triamcinolone for  itching and dermatitis at the site of tick bite  He is having some reaction of the tick bite  Due to his age I do not want to take any chances    Cellulitis of lower extremity, unspecified laterality  Comments:  at the site of tiick bite   Orders:  -     cephALEXin (KEFLEX) 500 MG capsule; Take 1 capsule (500 mg) by mouth 2 times daily    Rash  Comments:  at the site of tick bite in groin  Orders:  -     triamcinolone (KENALOG) 0.1 % external cream; Apply topically 2 times daily              Watch for signs and symptoms of Lyme's disease or any tick related disease if you develop fever, body aches facial palsy, palpitations ,or anything which is out of ordinary then seek attention      Disclaimer: This note consists of symbols derived from keyboarding, dictation and/or voice recognition software. As a result, there may be errors in the script that have gone undetected. Please consider this when interpreting information found in this chart.    See Patient Instructions  Patient Instructions   Take doxycycline 100 mg 2 tablets now  Bring tick for evaluation.  Watch for symptoms of lyme disease   See the not below  Ok to use triamcinolone cream for itching at the site of tick bite for no more that 7 days  Watch for sign and symptoms of infection.  Follow up with Dr. arreola in 2-4 weeks  Seek sooner medical attention if there is any worsening of symptoms or problems.      Patient Education     Tick Bite, Antibiotic Treatment    Ticks are small arachnids that feed on the blood of rodents, rabbits, birds, deer, dogs and humans. The bite may cause a local reaction like that of a spider, with a small amount of local redness, itching and slight swelling. Sometimes there is no local reaction.  Most tick bites are harmless. But some ticks carry diseases, such as Lyme disease or [place] spotted fever. These can be passed to people at the time of the bite. Lyme disease is of greatest concern. Right now you have no symptoms  of Lyme disease or other serious reaction to the bite. It is important to watch for the warning signs, which could appear weeks to months after the tick bite.  Home care  The following guidelines can help you care for your bite at home:    If itching is a problem, avoid tight clothing and anything that heats up your skin. This includes hot showers or baths and direct sunlight. This often makes the itching worse.    An ice pack will reduce local areas of redness and itching. Make your own ice pack by putting ice cubes in a zip-top plastic bag and wrapping it in a thin towel. Over-the-counter creams containing benzocaine may help with itching.    You can use an antihistamine with diphenhydramine if your doctor did not give you another antihistamine. This medicine may be used to reduce itching if large areas of the skin are involved. It is available at drugstores and grocery stores. If symptoms continue, talk with your doctor or pharmacist about other over-the counter medicines that may be helpful.    Your doctor may prescribe antibiotics to reduce your risk of getting Lyme disease. It is very important that you take them exactly as directed until they are completely finished.  Follow-up care  Follow up with your healthcare provider, or as advised.  Call 911  Call 911 if any of these occur:    Irregular or rapid heartbeat    Numbness, tingling, or weakness in the arms or legs  When to seek medical advice  Call your healthcare provider right away if any of these occur:  Signs of local infection. Watch for these during the next few days:    Increasing redness around the bite site    Increased pain or swelling    Fever over 100.4 F (38 C), or as directed by your healthcare provider    Fluid draining from the bite area  Signs of tick-related disease. Watch for these over the next few weeks to months:    Circular, red, ring-like rash appears at the bite area within 1 to 3 weeks    Tiredness, fever or chills, nausea or  vomiting    Neck pain or stiffness, headache, or confusion    Muscle or bone aches    Joint pain or swelling, especially in the knee    Weakness on one side of the face  Date Last Reviewed: 10/1/2016    9751-2979 [employer]. [address] [place] [address], [place] [address]. All rights reserved. This information is not intended as a substitute for professional medical care. Always follow your healthcare professional's instructions.               Return in about 10 weeks (around 9/8/2020).    Shawnee Saleem MD  [facility]      Video-Visit Details    Type of service:  Video Visit    Video End Time:8:19 AM    Originating Location (pt. Location): Home    Distant Location (provider location):  [facility]     Platform used for Video Visit: Doximity    Return in about 10 weeks (around 9/8/2020).       Shawnee Saleem MD

## 2020-07-03 NOTE — PROGRESS NOTES
"Dawson Jones is a 89 year old male who is being evaluated via a billable video visit.      The patient has been notified of following:     \"This video visit will be conducted via a call between you and your physician/provider. We have found that certain health care needs can be provided without the need for an in-person physical exam.  This service lets us provide the care you need with a video conversation.  If a prescription is necessary we can send it directly to your pharmacy.  If lab work is needed we can place an order for that and you can then stop by our lab to have the test done at a later time.    Video visits are billed at different rates depending on your insurance coverage.  Please reach out to your insurance provider with any questions.    If during the course of the call the physician/provider feels a video visit is not appropriate, you will not be charged for this service.\"    Patient has given verbal consent for Video visit? Yes  How would you like to obtain your AVS? AraceliWapiti  Patient would like the video invitation sent by: Text to cell phone: VOLODYMYR, Wife, Patricia, Cell# [phone]  Will anyone else be joining your video visit? Yes, wife Patricia    Subjective     Dawson Jones is a 89 year old male who presents today via video visit for the following health issues:    HPI  F/up tick bite       Video Start Time: 1447    Tick bite in [place]  Treated with doxy 200 mg X 1 dose   Brought in tick  Tick was thought to not be ixodes scapularis and so Lyme risk was thought to be low  However, patient had extending erythema from Tick bite site so he was treated with Keflex for suspected cellulitis  However, rash failed to improve with Keflex and extended further down leg despite Keflex  Oscar feels fine  He denies pain associated with the rash  He denies fevers, chills, headaches, joint aches or fatigue  The rash is moderately itchy, but that symptoms improves with steroid cream  He is on Eliquis for stroke " prophylaxis in atrial fibrillation      Patient Active Problem List   Diagnosis     Atrial fibrillation (H)     Cardiomyopathy, unspecified type (H)     Crohn's disease of small and large intestines with complication (H)     Spinal stenosis of lumbosacral region     Vitamin B12 deficiency (non anemic)     Chronic, continuous use of opioids     Spinal stenosis     Bacteremia     History of lumbar laminectomy for spinal cord decompression     Urinary retention     Physical deconditioning     Past Surgical History:   Procedure Laterality Date     APPENDECTOMY       CYSTOSCOPY, TRANSURETHRAL RESECTION (TUR) PROSTATE, COMBINED N/A 1/24/2019    Procedure: COMBINED CYSTOSCOPY, TRANSURETHRAL RESECTION (TUR) PROSTATE;  Surgeon: Meliton Valverde MD;  Location:  OR     DECOMPRESSION LUMBAR ONE LEVEL N/A 10/1/2018    Procedure: DECOMPRESSION LUMBAR ONE LEVEL;  DECOMPRESSION L2-3 WITH COFLEX DEVICE  ;  Surgeon: Bert Reynolds MD;  Location:  OR     LASER KTP GREEN LIGHT PHOTOSELECTIVE VAPORIZATION PROSTATE N/A 1/24/2019    Procedure: LASER KTP GREEN LIGHT PHOTOSELECTIVE VAPORIZATION PROSTATE;  Surgeon: Meliton Valverde MD;  Location:  OR     ORTHOPEDIC SURGERY  10/01/2018    Bilateral L2-3 decompression and insertion of a Coflex device       Social History     Tobacco Use     Smoking status: Former Smoker     Smokeless tobacco: Never Used     Tobacco comment: 40 years ago   Substance Use Topics     Alcohol use: Yes     Alcohol/week: 7.0 standard drinks     Types: 7 Standard drinks or equivalent per week     Comment: 1-2 wine an evening     Family History   Problem Relation Age of Onset     Family History Negative Mother      Cardiac Sudden Death Father      Family History Negative Brother      Other - See Comments Sister         colon issues     Family History Negative Son      Family History Negative Brother      Family History Negative Son      Heart Disease No family hx of          Current Outpatient Medications  "  Medication Sig Dispense Refill     acetaminophen (TYLENOL 8 HOUR) 650 MG CR tablet Take 650-1,300 mg by mouth 3 times daily as needed for mild pain or fever       BD INTEGRA SYRINGE 25G X 1\" 3 ML MISC USE WITH B-12 INJECTIONS 36 each 11     cyanocobalamin (CYANOCOBALAMIN) 1000 MCG/ML injection INJECT 1 ML IN THE MUSCLE EVERY 30 DAYS. 3 mL 11     diphenoxylate-atropine (LOMOTIL) 2.5-0.025 MG tablet Take 1 tablet by mouth twice daily if needed 180 tablet 3     doxycycline monohydrate (ADOXA) 100 MG tablet Take 1 tablet (100 mg) by mouth 2 times daily 42 tablet 0     ELIQUIS ANTICOAGULANT 5 MG tablet TAKE 1 TABLET(5 MG) BY MOUTH TWICE DAILY 180 tablet 3     metoprolol tartrate (LOPRESSOR) 50 MG tablet TAKE 1 TABLET(50 MG) BY MOUTH TWICE DAILY 180 tablet 3     opium tincture 10 MG/ML (1%) liquid Take 0.2 mLs (2 mg) by mouth every 6 hours as needed for diarrhea (4 drops) 118 mL 0     order for DME Equipment being ordered: Walker Wheels () and Walker ()  Treatment Diagnosis: DIfficulty with gait 1 each 0     traMADol (ULTRAM) 50 MG tablet TAKE 1 TABLET BY MOUTH TWICE DAILY AS NEEDED FOR SEVERE PAIN 60 tablet 0     triamcinolone (KENALOG) 0.1 % external cream Apply topically 2 times daily 60 g 1     order for DME Equipment being ordered: incentive spirometer (Patient not taking: Reported on 7/3/2020) 1 Units 0     Allergies   Allergen Reactions     No Known Allergies        Reviewed and updated as needed this visit by Provider         Review of Systems   Pertinent +/-'s in HPI       Objective             Physical Exam     GENERAL: Healthy, alert and no distress  EYES: Eyes grossly normal to inspection.  No discharge or erythema, or obvious scleral/conjunctival abnormalities.  RESP: No audible wheeze, cough, or visible cyanosis.  No visible retractions or increased work of breathing.    SKIN: Well demarcated purplish skin discoloration extending inferiorly from an obvious puncture wound from insect bite  No " obvious bullseye formation to suggest erythema migrans  I am suspicious that the skin discoloration may actually just be an extending ecchymosis from the bite and the fact that he is on Eliquis   NEURO: Cranial nerves grossly intact.  Mentation and speech appropriate for age.  PSYCH: Mentation appears normal, affect normal/bright, judgement and insight intact, normal speech and appearance well-groomed.              Assessment & Plan       ICD-10-CM    1. Cellulitis of lower extremity, unspecified laterality  L03.119 doxycycline monohydrate (ADOXA) 100 MG tablet   2. Tick bite, initial encounter  W57.XXXA    3. Atrial fibrillation, unspecified type (H)  I48.91         Skin changes are not typical for erythema migrans, but we cannot rule it out  If this is bacterial cellulitis it is not responding to keflex, so would need to add some coverage for potential resistant Staph  The most likely scenario in my opinion is that the skin changes are not related to infection at all, but that they are due to extending ecchymosis from the bite in the setting anticoagulation as stated above  Since the evaluation is limited by the video visit, I will treat with doxycycline, the would provide some coverage for resistant Staph and cover for Lyme disease in case this is an atypical erythema migrans rash  I carefully discussed the potential risks and side effects of doxycycline and recommended follow up next week to make sure things are improving      Return in about 6 days (around 7/9/2020) for video follow up with Dr. Mcadams.    Judson Mcadams MD  [facility]      Video-Visit Details    Type of service:  Video Visit    Video End Time:3:09 PM    Originating Location (pt. Location): Home    Distant Location (provider location):  [facility]     Platform used for Video Visit: Doximity    Return in about 6 days (around 7/9/2020) for video follow up with Dr. Mcadams.   Patient instructed to return to clinic or  contact us sooner if symptoms worsen or new symptoms develop.       Judson Mcadams MD

## 2020-07-03 NOTE — TELEPHONE ENCOUNTER
Called and spoke with patient and wife   They had sent text/photos to Dr. Saleem on her personal phone  Provider advised virtual visit follow up today     Scheduled pt with PCP for this     Christina BARRIOS RN

## 2020-07-03 NOTE — TELEPHONE ENCOUNTER
Wife Pauly is calling and states that Dawson has a tick bite.  Dawson had a virtual visit with MD Saleem.  Today left leg is red.  Denies fever cough and shortness of breath.  Dawson is taking Keflex and doxycycline.  Pauly is requesting to speak with MD Saleem as she is concerned that redness is spreading.  Please phone Pauly.      Reason for Disposition    Nursing judgment or information in reference    Additional Information    Negative: Nursing judgment, per information in Reference    Negative: Information only call about a Well Adult (no illness or injury)    Negative: Nursing judgment or information in reference    Negative: Nursing judgment or information in reference    Negative: Nursing judgment or information in reference    Negative: Nursing judgment or information in reference    Negative: Nursing judgment or information in reference    Negative: Nursing judgment or information in reference    Negative: Nursing judgment or information in reference    Negative: Nursing judgment or information in reference    Protocols used: NO GUIDELINE [id number]

## 2020-07-09 NOTE — PROGRESS NOTES
"Dawson Jones is a 89 year old male who is being evaluated via a billable video visit.      The patient has been notified of following:     \"This video visit will be conducted via a call between you and your physician/provider. We have found that certain health care needs can be provided without the need for an in-person physical exam.  This service lets us provide the care you need with a video conversation.  If a prescription is necessary we can send it directly to your pharmacy.  If lab work is needed we can place an order for that and you can then stop by our lab to have the test done at a later time.    Video visits are billed at different rates depending on your insurance coverage.  Please reach out to your insurance provider with any questions.    If during the course of the call the physician/provider feels a video visit is not appropriate, you will not be charged for this service.\"    Patient has given verbal consent for Video visit? Yes  How would you like to obtain your AVS? [place]  Patient would like the video invitation sent by: Text to cell phone: DOX, wife, Pauly's cell [phone]  Will anyone else be joining your video visit?   Subjective     Dawson Jones is a 89 year old male who presents today via video visit for the following health issues:    HPI  F/up tick bite       Video Start Time: 1336    Follow up tick bite, cellulitis  Symptoms have improved  Rash is resolving  No fevers or chills  No joint pains or headaches or fatigue   Tolerating doxycycline without issues'    Patient Active Problem List   Diagnosis     Atrial fibrillation (H)     Cardiomyopathy, unspecified type (H)     Crohn's disease of small and large intestines with complication (H)     Spinal stenosis of lumbosacral region     Vitamin B12 deficiency (non anemic)     Chronic, continuous use of opioids     Spinal stenosis     Bacteremia     History of lumbar laminectomy for spinal cord decompression     Urinary retention " "    Physical deconditioning     Past Surgical History:   Procedure Laterality Date     APPENDECTOMY       CYSTOSCOPY, TRANSURETHRAL RESECTION (TUR) PROSTATE, COMBINED N/A 1/24/2019    Procedure: COMBINED CYSTOSCOPY, TRANSURETHRAL RESECTION (TUR) PROSTATE;  Surgeon: Meliton Valverde MD;  Location: [facility] OR     DECOMPRESSION LUMBAR ONE LEVEL N/A 10/1/2018    Procedure: DECOMPRESSION LUMBAR ONE LEVEL;  DECOMPRESSION L2-3 WITH COFLEX DEVICE  ;  Surgeon: Bert Reynolds MD;  Location: [facility] OR     LASER KTP GREEN LIGHT PHOTOSELECTIVE VAPORIZATION PROSTATE N/A 1/24/2019    Procedure: LASER KTP GREEN LIGHT PHOTOSELECTIVE VAPORIZATION PROSTATE;  Surgeon: Meliton Valverde MD;  Location:  OR     ORTHOPEDIC SURGERY  10/01/2018    Bilateral L2-3 decompression and insertion of a Coflex device       Social History     Tobacco Use     Smoking status: Former Smoker     Smokeless tobacco: Never Used     Tobacco comment: 40 years ago   Substance Use Topics     Alcohol use: Yes     Alcohol/week: 7.0 standard drinks     Types: 7 Standard drinks or equivalent per week     Comment: 1-2 wine an evening     Family History   Problem Relation Age of Onset     Family History Negative Mother      Cardiac Sudden Death Father      Family History Negative Brother      Other - See Comments Sister         colon issues     Family History Negative Son      Family History Negative Brother      Family History Negative Son      Heart Disease No family hx of          Current Outpatient Medications   Medication Sig Dispense Refill     acetaminophen (TYLENOL 8 HOUR) 650 MG CR tablet Take 650-1,300 mg by mouth 3 times daily as needed for mild pain or fever       BD INTEGRA SYRINGE 25G X 1\" 3 ML MISC USE WITH B-12 INJECTIONS 36 each 11     cyanocobalamin (CYANOCOBALAMIN) 1000 MCG/ML injection INJECT 1 ML IN THE MUSCLE EVERY 30 DAYS. 3 mL 11     diphenoxylate-atropine (LOMOTIL) 2.5-0.025 MG tablet Take 1 tablet by mouth twice daily if needed 180 tablet 3     " doxycycline monohydrate (ADOXA) 100 MG tablet Take 1 tablet (100 mg) by mouth 2 times daily 42 tablet 0     ELIQUIS ANTICOAGULANT 5 MG tablet TAKE 1 TABLET(5 MG) BY MOUTH TWICE DAILY 180 tablet 3     metoprolol tartrate (LOPRESSOR) 50 MG tablet TAKE 1 TABLET(50 MG) BY MOUTH TWICE DAILY 180 tablet 3     opium tincture 10 MG/ML (1%) liquid Take 0.2 mLs (2 mg) by mouth every 6 hours as needed for diarrhea (4 drops) 118 mL 0     order for DME Equipment being ordered: incentive spirometer 1 Units 0     order for DME Equipment being ordered: Walker Wheels () and Walker ()  Treatment Diagnosis: DIfficulty with gait 1 each 0     traMADol (ULTRAM) 50 MG tablet TAKE 1 TABLET BY MOUTH TWICE DAILY AS NEEDED FOR SEVERE PAIN 60 tablet 0     triamcinolone (KENALOG) 0.1 % external cream Apply topically 2 times daily 60 g 1     Allergies   Allergen Reactions     No Known Allergies        Reviewed and updated as needed this visit by Provider         Review of Systems   Pertinent +/-'s in HPI       Objective             Physical Exam     GENERAL: Healthy, alert and no distress  EYES: Eyes grossly normal to inspection.  No discharge or erythema, or obvious scleral/conjunctival abnormalities.  RESP: No audible wheeze, cough, or visible cyanosis.  No visible retractions or increased work of breathing.    SKIN: Near resolution of skin erythema noted on previous visit   NEURO: Cranial nerves grossly intact.  Mentation and speech appropriate for age.  PSYCH: Mentation appears normal, affect normal/bright, judgement and insight intact, normal speech and appearance well-groomed.              Assessment & Plan     1. Cellulitis of lower extremity, unspecified laterality  Responding to doxycycline, complete course as directed    2. Tick bite, initial encounter  Antimicrobial coverage for Lyme disease spirochete incase this represented an atypical presentation of erythema migrans, complete 21 day course of doxycycline as previously  prescribed              Return in about 2 months (around 9/11/2020) for Preventive Visit.    Judson Mcadams MD  [facility]      Video-Visit Details    Type of service:  Video Visit    Video End Time:1:47 PM    Originating Location (pt. Location): Home    Distant Location (provider location):  [facility]     Platform used for Video Visit: Doximity    Return in about 2 months (around 9/11/2020) for Preventive Visit.   Patient instructed to return to clinic or contact us sooner if symptoms worsen or new symptoms develop.  Patient instructed to return to clinic or contact us sooner if symptoms worsen or new symptoms develop.       Judson Mcadams MD

## 2020-07-16 NOTE — TELEPHONE ENCOUNTER
Reason for call:  Patient reporting a symptom    Symptom or request: Red Face    Duration (how long have symptoms been present):   Since Monday 07/13    Have you been treated for this before? No    Additional comments: Patient is on doxycycline monohydrate (ADOXA) 100 MG tablet started medication on 07/03. He has developed Red face like a sunburn and is concerned that it could possibly be from the medication, or blood thinner that he is on.    Phone Number patient can be reached at:  Home number on file [phone] (home)    Best Time:  Any    Can we leave a detailed message on this number:  YES    Call taken on 7/16/2020 at 10:30 AM by Tasha Beavers      Transfer to Triage Nurse

## 2020-07-16 NOTE — TELEPHONE ENCOUNTER
"Very red face on forehead; somewhat on nose. Minimal on cheeks. Not raise; no itching or discomfort. \"looks like a sunburn\".    Started Doxycycline (3 week course) on 7/3/20 for a \"tick bite\" on his groin area. Completely resolved; zero swelling or redness of leg per wife's report.    ONE WEEK remaining of Doxycycline.    Asking if okay to stop antibiotic since leg looks \"normal\"/healed.      Skin redness is a potential side effect of Doxycycline.  He has NO other side effects of the medication, so will let PCP decide if he should finish the course or stop.  Incidentally, he sits on his deck at 5 pm \"in the shade\" every evening.  She is absolutely certain this is not a sunburn, however, Doxy does make the skin more \"sun-sensitive\".    They are willing to finish the course of abx if the benefits outweigh the risks.    Please advise; thank you,  Diane Tovar RN on 7/16/2020 at 11:17 AM                "

## 2020-07-16 NOTE — TELEPHONE ENCOUNTER
As long as he has taken 14 days, then it is ok to stop doxycycline.  14 day should treat Lyme disease in the unlikely case that this was Lyme disease.

## 2020-09-11 NOTE — PROGRESS NOTES
Prior to immunization administration, verified patients identity using patient s name and date of birth. Please see Immunization Activity for additional information.     Screening Questionnaire for Adult Immunization    Are you sick today?   No   Do you have allergies to medications, food, a vaccine component or latex?   No   Have you ever had a serious reaction after receiving a vaccination?   No   Do you have a long-term health problem with heart, lung, kidney, or metabolic disease (e.g., diabetes), asthma, a blood disorder, no spleen, complement component deficiency, a cochlear implant, or a spinal fluid leak?  Are you on long-term aspirin therapy?   No   Do you have cancer, leukemia, HIV/AIDS, or any other immune system problem?   No   Do you have a parent, brother, or sister with an immune system problem?   No   In the past 3 months, have you taken medications that affect  your immune system, such as prednisone, other steroids, or anticancer drugs; drugs for the treatment of rheumatoid arthritis, Crohn s disease, or psoriasis; or have you had radiation treatments?   No   Have you had a seizure, or a brain or other nervous system problem?   No   During the past year, have you received a transfusion of blood or blood    products, or been given immune (gamma) globulin or antiviral drug?   No   For women: Are you pregnant or is there a chance you could become       pregnant during the next month?   No   Have you received any vaccinations in the past 4 weeks?   No     Immunization questionnaire answers were all negative.        Per orders of Dr. Mcadams, injection of Flu vaccine given by Matilde Medina MA. Patient instructed to remain in clinic for 15 minutes afterwards, and to report any adverse reaction to me immediately.       Screening performed by Matilde Medina MA on 9/11/2020 at 12:21 PM.     67 year old Non-Hodgkin's Lymphoma tx with chemotherapy in 2004, DM2 with CKD stage 3, HTN, CHF EF 35% on cardiac cath 12/ 2016, pleural effusion s/p left pleuracentesis in 10/2016, cardiomyopathy, gout and HLD presents to the ED sent in from cardiologists office because of AMS    # Respi failure/apnea/hypoxia-  sp Extubated  CxR-unchanged rt loculated small effusion, mild interstitial edema    # CHB sp micra ppm implant 5/10    #Acute encephalopathy. 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   appreciate onco input :::  pt had ct of the abdomen and pelvis and chest and the nodes in the chest appear inconsequential. The spleen is 15.9 cm minimally enlarged and liver is read and slightly enlarged with normal liver function studies. Considering the history I do not think these findings warrant a search for a lymphoma.  neuro reconsult- assist w dc plan  Psych reconsult: for assisting in safe dc plan    # Fevers- afebrile  UA neg, CXR neg, bld cx ngtd  observe off abx       # acute on chronic  systolic heart failure.    - TTE EF 45%, likely severe MR, moderately enlarged RV , severe pulmonary hypertension. Estimated PASP = 75 mmHg.  - diuresis per HF team ..monitor Cr , HF team called  - f/u with surgery , cont lasix 80iv- ?change to po        # Non-Hodgkin lymphoma of lymph nodes of neck, unspecified non-Hodgkin lymphoma type.  Plan: - treated with chemotherapy in 2014.   Anemia-monitor h/h  as above     # Essential hypertension.  Plan: - bp stable  - off  arb and beta blocker.     # Type 2 diabetes mellitus with chronic kidney disease, without long-term current use of insulin, unspecified CKD stage. Plan: - fs achs  - fs controlled  - start sliding scale insulin      # Chronic gout without tophus, unspecified cause, unspecified site.   - c/w allopurinol 300 mg daily.   \    STEPHEN :  f/.u with renal ... off ARB

## 2020-09-11 NOTE — PROGRESS NOTES
"SUBJECTIVE:   Dawson Jones is a 89 year old male who presents for Preventive Visit.    Are you in the first 12 months of your Medicare coverage?  { :274209::\"No\"}    HPI  Do you feel safe in your environment? { :396549}    Have you ever done Advance Care Planning? (For example, a Health Directive, POLST, or a discussion with a medical provider or your loved ones about your wishes): { :026201}    {Hearing Test Done (Optional):532671}  Fall risk  { :620679}  {If any of the above assessments are answered yes, consider ordering appropriate referrals (Optional):201298::\"click delete button to remove this line now\"}  Cognitive Screening { :886427}    {Do you have sleep apnea, excessive snoring or daytime drowsiness? (Optional):070163}    Reviewed and updated as needed this visit by clinical staff  Tobacco         Reviewed and updated as needed this visit by Provider        Social History     Tobacco Use     Smoking status: Former Smoker     Smokeless tobacco: Never Used     Tobacco comment: 40 years ago   Substance Use Topics     Alcohol use: Yes     Alcohol/week: 7.0 standard drinks     Types: 7 Standard drinks or equivalent per week     Comment: 1-2 wine an evening     {Rooming Staff- Complete this question if Prescreen response is not shown below for today's visit. If you drink alcohol do you typically have >3 drinks per day or >7 drinks per week? (Optional):477899}    No flowsheet data found.{add AUDIT responses (Optional) (A score of 7 for adult men is an indication of hazardous drinking; a score of 8 or more is an indication of an alcohol use disorder.  A score of 7 or more for adult women is an indication of hazardous drinking or an alchohol use disorder):483548}    {Outside tests to abstract? :695784}    {additional problems to add (Optional):583176}    Current providers sharing in care for this patient include: {Rooming staff:  Please update Care Team in Rooming Activity, refresh this note and then delete " "this statement}  Patient Care Team:  Judson Mcadams MD as PCP - General (Internal Medicine)  Judson Mcadams MD as Assigned PCP    The following health maintenance items are reviewed in Epic and correct as of today:  Health Maintenance   Topic Date Due     URINE DRUG SCREEN  1930     TREATMENT AGREEMENT FOR CHRONIC PAIN MANAGEMENT  1930     MEDICARE ANNUAL WELLNESS VISIT  1995     INFLUENZA VACCINE (1) 2020     POORNIMA ASSESSMENT  2021     PHQ-9  2021     FALL RISK ASSESSMENT  2021     DTAP/TDAP/TD IMMUNIZATION (2 - Td) 2022     ADVANCE CARE PLANNING  10/17/2023     PHQ-2  Completed     PNEUMOCOCCAL IMMUNIZATION 65+ LOW/MEDIUM RISK  Completed     ZOSTER IMMUNIZATION  Completed     IPV IMMUNIZATION  Aged Out     MENINGITIS IMMUNIZATION  Aged Out     HEPATITIS B IMMUNIZATION  Aged Out     {Chronicprobdata (optional):299264}  {Decision Support (Optional):033652}    Review of Systems  {ROS COMP (Optional):579313}    OBJECTIVE:   Ht 1.727 m (5' 8\")   BMI 22.20 kg/m   Estimated body mass index is 22.2 kg/m  as calculated from the following:    Height as of this encounter: 1.727 m (5' 8\").    Weight as of 2/3/20: 66.2 kg (146 lb).  Physical Exam  {Exam (Optional) :827161}    {Diagnostic Test Results (Optional):936208::\"Diagnostic Test Results:\",\"Labs reviewed in Epic\"}    ASSESSMENT / PLAN:   {Dia Picklist:017262}    COUNSELING:  {Medicare Counselin}    Estimated body mass index is 22.2 kg/m  as calculated from the following:    Height as of this encounter: 1.727 m (5' 8\").    Weight as of 2/3/20: 66.2 kg (146 lb).    {Weight Management Plan (ACO) Complete if BMI is abnormal-  Ages 18-64  BMI >24.9.  Age 65+ with BMI <23 or >30 (Optional):957496}    He reports that he has quit smoking. He has never used smokeless tobacco.      Appropriate preventive services were discussed with this patient, including applicable screening as appropriate for cardiovascular disease, " diabetes, osteopenia/osteoporosis, and glaucoma.  As appropriate for age/gender, discussed screening for colorectal cancer, prostate cancer, breast cancer, and cervical cancer. Checklist reviewing preventive services available has been given to the patient.    Reviewed patients plan of care and provided an AVS. The {CarePlan:632679} for Dawson meets the Care Plan requirement. This Care Plan has been established and reviewed with the {PATIENT, FAMILY MEMBER, CAREGIVER:326681}.    Counseling Resources:  ATP IV Guidelines  Pooled Cohorts Equation Calculator  Breast Cancer Risk Calculator  Breast Cancer: Medication to Reduce Risk  FRAX Risk Assessment  ICSI Preventive Guidelines  Dietary Guidelines for Americans, 2010  USDA's MyPlate  ASA Prophylaxis  Lung CA Screening    Judson Mcadams MD  [facility]    Identified Health Risks:

## 2020-09-11 NOTE — PROGRESS NOTES
"Subjective     Dawson Jones is a 89 year old male who presents to clinic today for the following health issues:    HPI   Chief Complaint   Patient presents with     RECHECK     2 month F/U     Left ear feels plugged for several weeks  Back and hip pain is manageable  Denies orthopnea, PND, edema, dyspnea  Severe runny nose for one year constant   Diarrhea under control with tincture of opium that he has used for 35 years with success     Review of Systems   Constitutional, HEENT, cardiovascular, pulmonary, gi and gu systems are negative, except as otherwise noted.      Objective    /82 (BP Location: Right arm, Patient Position: Sitting, Cuff Size: Adult Regular)   Pulse 76   Temp 96.8  F (36  C) (Skin)   Ht 1.727 m (5' 8\")   Wt 65.8 kg (145 lb)   SpO2 100%   BMI 22.05 kg/m    Body mass index is 22.05 kg/m .  Physical Exam   GENERAL: healthy, alert and no distress  EYES: Eyes grossly normal to inspection, PERRL and conjunctivae and sclerae normal  HENT: ear canals and TM's normal after cerumen removal; nose and mouth exam deferred due to COVID  NECK: no adenopathy, no asymmetry, masses, or scars and thyroid normal to palpation  RESP: lungs clear to auscultation - no rales, rhonchi or wheezes  CV: The heart has an irregularly irregular rhythm with a normal rate.   ABDOMEN: soft, nontender, no hepatosplenomegaly, no masses and bowel sounds normal  MS: no gross musculoskeletal defects noted, no edema  SKIN: two scaly skin lesion on left forearm concerning for possible SCC; recommended derm follow up to excise these lesions to wife   NEURO: memory problems present, walk with cane  PSYCH: Appropriate mood and affect, well groomed             Assessment & Plan     Bilateral impacted cerumen  Symptoms resolved after Dr. Mcadams removed bilateral cerumen impactions using a curette     Atrial fibrillation, unspecified type (H)  Rate controlled, on NOAC for prophylaxis    Crohn's disease of small and large " intestines with complication (H)  Stable symptoms     Cardiomyopathy, unspecified type (H)  Stably symptoms, well compensated volume status     Runny nose  Trial of below for suspected vasomotor rhinitis, return if that is not adequate to address symptoms   - ipratropium (ATROVENT) 0.06 % nasal spray; Spray 2 sprays into both nostrils 4 times daily    Need for prophylactic vaccination and inoculation against influenza    - FLUZONE HIGH DOSE 65+  [89042]  - ADMIN INFLUENZA (For MEDICARE Patients ONLY) []           Return in about 6 months (around 3/11/2021) for Preventive Visit.    Judson Mcadams MD  [facility]

## 2020-11-30 NOTE — TELEPHONE ENCOUNTER
Medication Question or Refill    Who is calling: Pauly- wife    What medication are you calling about (include dose and sig)?: opium tincture 10 MG/ML (1%) liquid [[id number]] (Order [id number])    Controlled Substance Agreement on file: Yes    Who prescribed the medication?: Pema    Do you need a refill? Yes: [facility] told the patient he needs to bring in a hand written RX to hand over in order for them to refill. Pauly will come by the clinic to  hard copy of RX    When did you use the medication last? 11/29- morning    Patient offered an appointment? No    Do you have any questions or concerns?  Yes: Can a hand write the prescription for the patient so that he can get a refill. He has enough for the next couple of days    Requested Pharmacy: [facility]/pharmacy #[id number] [facility] [address]    Okay to leave a detailed message?: Yes at Other phone number:  [phone]

## 2020-12-01 NOTE — TELEPHONE ENCOUNTER
Opium Tincture 10mg/mL 1% liquid         Last Written Prescription Date:  4/28/2020  Last Fill Quantity: 118mL,   # refills: 0  Last Office Visit: 9/11/2020  Future Office visit:    Next 5 appointments (look out 90 days)    Feb 15, 2021 11:30 AM  Office Visit with Judson Mcadams MD  [facility] ([facility] [address] [id number] [phone]       Last [facility] website verification: No concerns 12/1/2020 [place]       Routing refill request to provider for review/approval because:  Drug not on the [facility], [facility] or [place] [facility] refill protocol or controlled substance    Christina BARRIOS RN

## 2020-12-03 NOTE — TELEPHONE ENCOUNTER
Reason for Call:  Medication or medication refill:    Do you use a [facility]?  Name of the pharmacy and phone number for the current request:       [facility]/PHARMACY #[id number] - [place], [place] - [address]      Name of the medication requested:     opium tincture 10 MG/ML (1%) liquid      Other request: Patient prescription was sent to the wrong pharmacy. That pharmacy doesn't carry this Rx. It will need to be filled at [facility].  [facility] told the patient he needs to bring in a hand written RX  in order for them to refill. His wife Pauly will come by the clinic to  hard copy of RX. Please call her when script is ready.    Can we leave a detailed message on this number? YES    Phone number patient can be reached at: Home number on file [phone] (home)    Best Time: Any    Call taken on 12/3/2020 at 2:34 PM by Tasha Beavers

## 2020-12-03 NOTE — TELEPHONE ENCOUNTER
PCP see below Opium was sent to:   [id number] - [place], [place] - [address] AT [facility] [address]       But per below they don't have this Rx /can't transfer due to controlled     Requesting Rx be sent to pended pharmacy instead     Christina BARRIOS RN

## 2020-12-06 NOTE — PROGRESS NOTES
"Date: 2020 08:45:23  Clinician: Joyce Ge  Clinician NPI: [id number]  Patient: Dawson Galvin  Patient : 1930  Patient Address: [facility] [place] [address]  Patient Phone: [phone]  Visit Protocol: URI  Patient Summary:  Dawson is a 89 year old ( : 1930 ) male who initiated a [facility] Visit for COVID-19 (Coronavirus) evaluation and screening. When asked the question \"Please sign me up to receive news, health information and promotions from [facility].\", Dawson responded \"No\".    Dawson states his symptoms started gradually 3-4 days ago.   His symptoms consist of wheezing, a cough, nasal congestion, rhinitis, myalgia, chills, malaise, and diarrhea. He is experiencing mild difficulty breathing with activities but can speak normally in full sentences.   Symptom details     Nasal secretions: The color of his mucus is clear.    Cough: Dawson coughs a few times an hour and his cough is not more bothersome at night. Phlegm comes into his throat when he coughs. He does not believe his cough is caused by post-nasal drip. The color of the phlegm is yellow.     Wheezing: Additional wheezing details as reported by the patient (free text): Difficulty Breathing        Dawson denies having ear pain, headache, fever, nausea, vomiting, facial pain or pressure, sore throat, teeth pain, ageusia, and anosmia. He also denies taking antibiotic medication in the past month, having recent facial or sinus surgery in the past 60 days, and double sickening (worsening symptoms after initial improvement).   Precipitating events  He has not recently been exposed to someone with influenza. Dawson has been in close contact with the following high risk individuals: adults 65 or older.   Pertinent COVID-19 (Coronavirus) information  Dawson does not work or volunteer as healthcare worker or a . In the past 14 days, Dawson has not worked or volunteered at a healthcare facility or group " living setting.   In the past 14 days, he also has not lived in a congregate living setting.   Dawson has not had a close contact with a laboratory-confirmed COVID-19 patient within 14 days of symptom onset.    Since December 2019, Dawson has not been tested for COVID-19 and has not had upper respiratory infection or influenza-like illness.   Pertinent medical history  He has not been told by his provider to avoid NSAIDs.   Dawson does not have diabetes. He denies having immunosuppressive conditions (e.g., chemotherapy, HIV, organ transplant, long-term use of steroids or other immunosuppressive medications, splenectomy). He does not have severe COPD and congestive heart failure. He does not have asthma.   Dawson does not need a return to work/school note.   Weight: 140 lbs   Dawson does not smoke or use smokeless tobacco.   Additional information as reported by the patient (free text): 89- years old, a-fib, trouble breathing and weezing   Weight: 140 lbs    MEDICATIONS: metoprolol tartrate-hydrochlorothiazide oral, Eliquis oral, opium tincture oral, ALLERGIES: NKDA  Clinician Response:  Dear Dawson,   Your symptoms show that you may have coronavirus (COVID-19). This illness can cause fever, cough and trouble breathing. Many people get a mild case and get better on their own. Some people can get very sick.  Based on the symptoms you have shared, I would like you to be re-checked in 2 to 3 days. Please call your family clinic to set up a video or phone visit.  Will I be tested for COVID-19?  We would like to test you for this virus.   Please call [phone] to schedule your visit. Explain that you were referred by [facility] to have a COVID-19 test. Be ready to share your [facility] visit ID number.   * If you need to schedule in [place] or [employer] please call [phone] or for [employer] employees please call [phone].    The following will serve as your written order for this COVID Test, ordered by me,  "for the indication of suspected COVID [Z20.828]: The test will be ordered in [facility], our electronic health record, after you are scheduled. It will show as ordered and authorized by Terrance Cosby MD.  Order: COVID-19 (Coronavirus) PCR for SY[facility]TOMATIC testing from [facility].   1.When it's time for your COVID test:   Stay at least 6 feet away from others. (If someone will drive you to your test, stay in the backseat, as far away from the  as you can.)   Cover your mouth and nose with a mask, tissue or washcloth.  Go straight to the testing site. Don't make any stops on the way there or back.      2.Starting now: Stay home and away from others (self-isolate) until:   You've had no fever---and no medicine that reduces fever---for one full day (24 hours). And...   Your other symptoms have gotten better. For example, your cough or breathing has improved. And...   At least 10 days have passed since your symptoms started.       During this time, don't leave the house except for testing or medical care.   Stay in your own room, even for meals. Use your own bathroom if you can.   Stay away from others in your home. No hugging, kissing or shaking hands. No visitors.  Don't go to work, school or anywhere else.    Clean \"high touch\" surfaces often (doorknobs, counters, handles, etc.). Use a household cleaning spray or wipes. You'll find a full list of  on the EPA website: www.epa.gov/pesticide-registration/list-n-disinfectants-use-against-sars-cov-2.   Cover your mouth and nose with a mask, tissue or washcloth to avoid spreading germs.  Wash your hands and face often. Use soap and water.  Caregivers in these groups are at risk for severe illness due to COVID-19:  o People 65 years and older  o People who live in a nursing home or long-term care facility  o People with chronic disease (lung, heart, cancer, diabetes, kidney, liver, immunologic)   o People who have a weakened immune system, including those who:   Are in " cancer treatment  Take medicine that weakens the immune system, such as corticosteroids  Had a bone marrow or organ transplant  Have an immune deficiency  Have poorly controlled HIV or AIDS  Are obese (body mass index of 40 or higher)  Smoke regularly   o Caregivers should wear gloves while washing dishes, handling laundry and cleaning bedrooms and bathrooms.  o Use caution when washing and drying laundry: Don't shake dirty laundry, and use the warmest water setting that you can.  o For more tips, go to www.cdc.gov/coronavirus/2019-ncov/downloads/10Things.pdf.      How can I take care of myself?   Get lots of rest. Drink extra fluids (unless a doctor has told you not to)   Take Tylenol (acetaminophen) for fever or pain. If you have liver or kidney problems, ask your family doctor if it's okay to take Tylenol.   Adults can take either:    650 mg (two 325 mg pills) every 4 to 6 hours, or...   1,000 mg (two 500 mg pills) every 8 hours as needed.    Note: Don't take more than 3,000 mg in one day. Acetaminophen is found in many medicines (both prescribed and over-the-counter medicines). Read all labels to be sure you don't take too much.   For children, check the Tylenol bottle for the right dose. The dose is based on the child's age or weight.    If you have other health problems (like cancer, heart failure, an organ transplant or severe kidney disease): Call your specialty clinic if you don't feel better in the next 2 days.       Know when to call 911. Emergency warning signs include:    Trouble breathing or shortness of breath Pain or pressure in the chest that doesn't go away Feeling confused like you haven't felt before, or not being able to wake up Bluish-colored lips or face  Where can I get more information?    [facility] [facility] -- About COVID-19: [email or web address]   CDC -- What to Do If You're Sick: www.cdc.gov/coronavirus/2019-ncov/about/steps-when-sick.html   CDC -- Ending Home Isolation:  www.cdc.gov/coronavirus/2019-ncov/hcp/disposition-in-home-patients.html   [facility] -- Caring for Someone: www.cdc.gov/coronavirus/2019-ncov/if-you-are-sick/care-for-someone.html   [facility] -- Interim Guidance for Hospital Discharge to Home: www.[facility].[place]/diseases/coronavirus/hcp/hospdischarge.pdf   [facility] clinical trials (COVID-19 research studies): clinicalaffairs.[facility]-clinical-trials    Below are the COVID-19 hotlines at the [place] Department of Health ([facility]). Interpreters are available.    For health questions: Call 606-535-6738 or 1-915.253.8688 (7 a.m. to 7 p.m.) For questions about schools and childcare: Call 745-767-5305 or 1-558.603.7944 (7 a.m. to 7 p.m.)       Diagnosis: Contact with and (suspected) exposure to other viral communicable diseases  Diagnosis ICD: Z20.828

## 2020-12-11 PROBLEM — R09.02 HYPOXIA: Status: ACTIVE | Noted: 2020-01-01

## 2020-12-11 PROBLEM — I48.91 ATRIAL FIBRILLATION WITH RAPID VENTRICULAR RESPONSE (H): Status: ACTIVE | Noted: 2020-01-01

## 2020-12-11 PROBLEM — Z20.822 SUSPECTED COVID-19 VIRUS INFECTION: Status: ACTIVE | Noted: 2020-01-01

## 2020-12-11 NOTE — PROGRESS NOTES
Subjective     Dawson Jones is a 89 year old male who presents to clinic today for the following health issues:    HPI         Concern -   Chief Complaint   Patient presents with     Urgent Care     Breathing Problem     Short of breath getting worse,congested,cough,phlem since last friday,did have covid test last sunday,was negative. Has Afib.     Onset: 1 week   Description: sob, heavy breathing, cough clear colored phlegm,   Intensity: moderate  Progression of Symptoms:  Fluctuating   Accompanying Signs & Symptoms: no fever, chills, chest pain, palpitation,   Previous history of similar problem: COVID-19 test was negative last Sunday   Therapies tried and outcome: cough drops        Review of Systems   Constitutional, HEENT, cardiovascular, pulmonary, GI, , musculoskeletal, neuro, skin, endocrine and psych systems are negative, except as otherwise noted.      Objective    /87   Pulse 103   Temp 97.4  F (36.3  C) (Tympanic)   Wt 63.5 kg (140 lb)   SpO2 93%   BMI 21.29 kg/m    Body mass index is 21.29 kg/m .  Physical Exam   GENERAL: alert, no distress, frail and elderly  NECK: no adenopathy, no asymmetry, masses, or scars and thyroid normal to palpation  RESP: Bibasilar crackles, no wheeze auscultated, dyspneic on minimal exertion  CV: irregularly irregular rhythm, normal S1 S2, no S3 or S4, no murmur, click or rub, peripheral pulses strong and no peripheral edema  ABDOMEN: soft, nontender, no hepatosplenomegaly, no masses and bowel sounds normal  MS: no gross musculoskeletal defects noted, no edema  SKIN: no suspicious lesions or rashes  NEURO: Grossly intact    Results for orders placed or performed in visit on 12/11/20   XR Chest 2 Views     Status: None (Preliminary result)    Narrative    XR CHEST TWO VIEWS   12/11/2020 5:54 PM     HISTORY: SOB (shortness of breath).    COMPARISON: Chest x-ray on 10/16/2018      Impression    IMPRESSION: PA and lateral views of the chest were obtained.  Stable  cardiomediastinal silhouette. Diffuse hazy airspace opacities,  worrisome for infection. No significant pleural effusion or  pneumothorax.       Assessment & Plan     SOB (shortness of breath)  89-year-old male presented with shortness of breath, productive cough which he has been experiencing for about 1 week or so, COVID-19 test earlier was negative.  Chest X findings consistent with hazy opacity bilaterally.  Physical examination remarkable for mild hypoxia along with tachycardia and irregularly irregular rhythm.  Sister to go ER for further evaluation and management considering risk factors.  Patient/wife understood and in agreement with above plan.  ER in charge nurse informed.  All questions answered.  - XR Chest 2 Views; Future          CS Urgent Care Provider  [facility] [place]

## 2020-12-11 NOTE — TELEPHONE ENCOUNTER

## 2020-12-11 NOTE — TELEPHONE ENCOUNTER
Triage call:   Patients son is calling to review COVID results- no consent on file and son is not with his dad. Advised that at this time writer would not be able to give health information out over the phone without patients consent.     Patient son indicates that he has a general question:   Patients son wanted to check on policy around calling with COVID results. Advised that positive results would be called to patient and if there was no answer that they would leave a message. Patient's son concerned that he would have missed the call but he did not have a voicemail and neither has patient.     Advised that he could have his father call back to review results today. Son indicates that he will do so.     Bella Ramirez RN BSN 12/11/2020 10:38 AM       Additional Information    Negative: [1] Caller is not with the adult (patient) AND [2] reporting urgent symptoms    Negative: Lab result questions    Negative: Medication questions    Negative: Caller can't be reached by phone    Negative: Caller has already spoken to PCP or another triager    Negative: RN needs further essential information from caller in order to complete triage    Negative: Requesting regular office appointment    Negative: [1] Caller requesting NON-URGENT health information AND [2] PCP's office is the best resource    Negative: Health Information question, no triage required and triager able to answer question    Negative: General information question, no triage required and triager able to answer question    Negative: Question about upcoming scheduled test, no triage required and triager able to answer question    [1] Caller is not with the adult (patient) AND [2] probable NON-URGENT symptoms     No symptoms just requesting results from COVID test- no consent on file    Protocols used: INFORMATION ONLY CALL-A-

## 2020-12-12 NOTE — ED PROVIDER NOTES
"  History     Chief Complaint:  Shortness of breath     HPI   Dawson Atkins is a 89 year old male with a history of hyperlipidemia, cardiomyopathy and A fib, who presents with shortness of breath. Patient has weakness and shortness of breath that is worse with exertion. It has been getting worse in the last week. Patient had a negative covid test done on 12/6. He was seen at  today and had a remarkable chest x-ray so was sent here. He denies any body aches or fever but does have a loss of taste and smell.  He denies any palpitations or any other complaints other than feeling somewhat weak.      XR chest two views 12/11 at :  PA and lateral views of the chest were obtained. Stable  cardiomediastinal silhouette. Diffuse hazy airspace opacities,  worrisome for infection. No significant pleural effusion or  pneumothorax. Reading per radiology.       Allergies:  Review of patient's allergies indicates no known allergies.      Medications:    Cyanocobalamin  Lomotil  Eliquis  Lopressor     Past Medical History:    A fib  Cardiomyopathy  Crohn's disease  ESBL  Hyperlipidemia  Spinal stenosis    Past Surgical History:    Appendectomy  Cystoscopy  Bilateral L2-3 decompression and insertion of a coflex device    Family History:    Cardiac disease    Social History:  Tobacco: former smoker  Alcohol: yes  Drugs: no  Marital Status:   [2]     Review of Systems   Constitutional: Positive for fatigue. Negative for fever.   Respiratory: Positive for shortness of breath.    Musculoskeletal: Negative for myalgias.   All other systems reviewed and are negative.        Physical Exam     Patient Vitals for the past 24 hrs:   BP Temp Temp src Pulse Resp SpO2 Height Weight   12/11/20 2001 -- -- -- -- -- 95 % -- --   12/11/20 1959 -- -- -- 111 28 (!) 84 % -- --   12/11/20 1934 106/65 -- -- 88 19 92 % 1.702 m (5' 7\") 63.5 kg (140 lb)   12/11/20 1828 (!) 154/120 97.9  F (36.6  C) Oral 135 18 (!) 79 % -- --       Physical " Exam  Nursing note and vitals reviewed.  Constitutional:  Oriented to person, place, and time. Cooperative.   HENT:   Nose:    Nose normal.   Mouth/Throat:   Mucous membranes are normal.   Eyes:    Conjunctivae normal and EOM are normal.      Pupils are equal, round, and reactive to light.   Neck:    Trachea normal.   Cardiovascular:  Tachycardic rate, irregularly irregular rhythm, normal heart sounds and normal pulses. No murmur heard.  Pulmonary/Chest:  Effort normal and breath sounds normal.   Abdominal:   Soft. Normal appearance and bowel sounds are normal.      There is no tenderness.      There is no rebound and no CVA tenderness.   Musculoskeletal:  1-2+ bilateral lower extremity pitting edema present.  Extremities atraumatic x 4.   Lymphadenopathy:  No cervical adenopathy.   Neurological:   Alert and oriented to person, place, and time. Normal strength.      No cranial nerve deficit or sensory deficit. GCS eye subscore is 4. GCS verbal subscore is 5. GCS motor subscore is 6.   Skin:    Skin is intact. No rash noted.   Psychiatric:   Normal mood and affect.      Emergency Department Course     ECG:  ECG taken at 1827, ECG read at 1830  Atrial fibrilation with rapid ventricular response. Nonspecific ST abnormality.  Abnormal ECG  Rate 135 bpm. DE interval * ms. QRS duration 76 ms. QT/QTc 300/450 ms. P-R-T axes * -22 90.     Laboratory:  Laboratory findings were communicated with the patient who voiced understanding of the findings.    CBC: WBC: 13.0 (H), HGB: 13.6, PLT: 342  CMP: Glucose 127 (H), Sodium: 132 (low), Albumin: 2.9, o/w WNL (Creatinine: 0.96)  Troponin (1842): <0.015  Lactic acid (1842): 2.7 (H)  BNP: 43460 (H)  CRP inflammation: 146.0 (H)  Erythrocyte sedimentation rate auto: 38 (H)  Blood Cultures x2: Pending  Symptomatic COVID-19 PCR: Pending  UA: nitrite positive (!), leukocyte esterase large (!), WBC/HPF 53 (H), bacteria many (!) mucous urine present (!) o/w WNL    Interventions:   1908  Cardizem 20 mg IV  1908 lasix 40 mg IV  1920 Zosyn 4.5g IV  1935 Decadron 6 mg IV     Emergency Department Course:  Nursing notes and vitals reviewed.    EKG obtained as noted above.     1835 I performed an exam of the patient as documented above.     IV was inserted and blood was drawn for laboratory testing, results above.    2001 I consulted with Dr. Fajardo of the hospitalist services. They are in agreement to accept the patient for admission.    Findings and plan explained to the Patient who consents to admission. Discussed the patient with Dr. Fajardo, who will admit the patient to a Internal medicine bed for further monitoring, evaluation, and treatment.    Impression & Plan   Covid-19  Dawson Jones was evaluated during a global COVID-19 pandemic, which necessitated consideration that the patient might be at risk for infection with the SARS-CoV-2 virus that causes COVID-19.   Applicable protocols for evaluation were followed during the patient's care.   COVID-19 was considered as part of the patient's evaluation. The plan for testing is:  a test was obtained during this visit.    Medical Decision Making:  This is a 89-year-old male who was sent here from urgent care due to rapid atrial fibrillation and shortness of breath.  He had a chest x-ray obtained there that showed findings concerning for infection.  Certainly COVID-19 is a likely possibility, however he also could have a bacterial pneumonia and/or pulmonary edema.  He was quite hypoxic when he walked into the room and specifically about 79% on room air.  This improved significantly with rest and oxygen therapy via nasal cannula.  I felt it was reasonable to treat him for both a possible bacterial pneumonia as well as CHF, and therefore he was provided IV Zosyn for possible sepsis as well as possible pneumonia.  He was also provided IV diltiazem to slow down his heart rate, as well as an IV dose of Lasix.  His urine also shows signs of an infection, and  hopefully the Zosyn will cover that as well.  I subsequently spoke with Dr. Fajardo, who will be admitting him.    Diagnosis:    ICD-10-CM    1. Atrial fibrillation with rapid ventricular response (H)  I48.91 UA with Microscopic     Blood culture     Blood culture     Symptomatic Influenza A/B & SARS-CoV2 (COVID-19) Virus PCR Multiplex   2. Suspected COVID-19 virus infection  Z20.828    3. Hypoxia  R09.02    4. Urinary tract infection without hematuria, site unspecified  N39.0      Disposition:  Admitted to IM bed.    Scribe Disclosure:  I, Mak Reid, am serving as a scribe at 6:38 PM on 12/11/2020 to document services personally performed by Jani Johnson MD based on my observations and the provider's statements to me.  12/11/2020   [facility] [place] EMERGENCY DEPT       Jani Johnson MD  12/11/20 [id number]

## 2020-12-12 NOTE — PLAN OF CARE
A+O x 4, forgetful. A. Fib CVR on Eliquis. Stand by assist, within arms reach, with walker. Regular diet. Tested negative for covid, but keeping on precautions until recheck in 72 hours from 12/11 test. Urine is red tinged, MD aware, monitoring for now. Denies pain or dizziness, has some PETERS. Was on 4 L NC now on 2 L NC. RA was 86%.

## 2020-12-12 NOTE — PROVIDER NOTIFICATION
MD Notification    Notified Person: MD    Notified Person Name:  Tana    Notification Date/Time: 12/12/20 1215pm    Notification Interaction:paged    Purpose of Notification:Pt has voided twice, pink and now more red urine. Per pt new. No pain, is on Eliquis. Post void scan is 135cc.    Orders Received: monitor    Comments:

## 2020-12-12 NOTE — PROGRESS NOTES
[facility]    Hospitalist Progress Note    Date of Admission:  12/11/2020    Assessment & Plan     Dawson Jones is a 89 year old male with a history of paroxysmal atrial fibrillation on Eliquis, cardiomyopathy, Crohn's disease, spinal stenosis who presented to the ED with worsening shortness of breath and cough concerning for COVID-19 infection.     COVID-19 PUI  Acute hypoxic respiratory failure likely secondary to COVID-19 pneumonia versus bacterial pneumonia versus pulmonary edema  Lactic acidosis  Patient has been symptomatic with shortness of breath, cough, loss of taste and smell, weakness and occasional lightheadedness over the last 1 week.  Tested negative for Covid on 12/6/2020.  At presentation, chest x-ray showing bilateral patchy infiltrates concerning for viral/atypical pneumonia versus fluid.  He was hypoxic to 79% on room air, desats with minimal activity.  Leukocytosis to 13.  Elevated CRP at 146.  Elevated BNP at 10,411.  Elevated lactate of 2.7.  IV Lasix, IV Zosyn, IV diltiazem and IV dexamethasone.  -Admit as inpatient  -Continue oxygen supplementation.  Wean oxygen as able.  -procalcitonin was low at 0.1.    Was started on Zosyn at admission.  UA appears infected.  Blood and urine cultures pending.  Will continue on ceftriaxone and azithromycin at this time to treat for possible bacterial pneumonia and/or UTI pending culture data.  -Continue IV dexamethasone 6 mg daily.  COVID-19 testing returned negative at admission however given elevated CRP at 128 with chest x-ray appearance of bilateral patchy opacities, he remains high suspicion for COVID-19.  Hence we will continue to maintain on precautions and retest on 12/13/2020.  -Anticoagulated with Eliquis already.     Hematuria, possible UTI  UA appears infected at admission.  He also developed some pink-tinged urine.  He is anticoagulated with Eliquis.  He denies any dysuria.  Urine cultures pending.  -Continue on  ceftriaxone and azithromycin as above.    Atrial fibrillation with RVR  HFpEF with acute exacerbation likely due to A. fib RVR  Pulmonary edema  Hyponatremia  Appears volume overloaded on exam.  BNP elevated at [address].  Last echo on file was in 2015 that showed normal EF, dilated left atrium.  Received 40 mg IV Lasix in the ED and 20 mg IV diltiazem bolus as well.  -Currently rates are controlled.  Continue PTA 50 mg metoprolol twice daily with hold parameters as well as Eliquis twice daily.  Consider diltiazem drip if rates increase again and BP allows.  -TTE obtained shows borderline low EF at 50 to 55%, diastolic function not assessed due to A. fib.  Pulmonary hypertension noted.  Mildly decreased RV systolic function.  -Repeat Lasix dose 40 mg IV on 12/12/2020.  -Monitor BMP, intake and output.     Crohn's disease  -Stable, not on any medications at this time.     Spinal stenosis, chronic back pain  -Stable.  Continue as needed Tylenol.  May order PTA tramadol if having increased pain.     DVT Prophylaxis: On Eliquis  Code Status: Full Code  Expected discharge: 2 to 3 days, pending clinical progress, therapy evaluation.          Christina Fajardo MD  Text Page (7am - 6pm, M-F)    Interval History   Patient has remained stable overnight.  He is feeling better today with his breathing.  Remains on 3 to 4 L oxygen by nasal cannula.  Denies any chest pain or palpitations.    -Data reviewed today: I reviewed all new labs and imaging results over the last 24 hours. I personally reviewed EKG with result as noted above and CXR with result as noted above    Physical Exam   Temp: 97.7  F (36.5  C) Temp src: Oral BP: 120/83 Pulse: 95   Resp: 18 SpO2: 94 % O2 Device: Nasal cannula Oxygen Delivery: 2 LPM  Vitals:    12/11/20 1934 12/12/20 0009   Weight: 63.5 kg (140 lb) 63.5 kg (140 lb)     Vital Signs with Ranges  Temp:  [97.4  F (36.3  C)-97.9  F (36.6  C)] 97.7  F (36.5  C)  Pulse:  [] 95  Resp:  [18-28] 18  BP:  ()/() 120/83  SpO2:  [79 %-97 %] 94 %  I/O last 3 completed shifts:  In: 300 [P.O.:300]  Out: 525 [Urine:525]    Constitutional: Alert, appears comfortable, in no acute distress  Respiratory: Non labored breathing  Cardiovascular: Heart sounds IR IR, no murmurs, trace leg edema  GI: Abdomen is soft, non distended, non tender. Normal BS  Skin/Integumen: no rashes, no pressure sores  Neuro: alert, converses appropriately, moving all extremities, fluent speech, no facial asymmetry  Psych: mood and affect appropriate      Medications     - MEDICATION INSTRUCTIONS -         apixaban ANTICOAGULANT  5 mg Oral BID     dexamethasone  6 mg Intravenous Daily     ipratropium  2 spray Both Nostrils 4x Daily     metoprolol tartrate  50 mg Oral BID     polyethylene glycol  17 g Oral Daily     senna-docusate  1 tablet Oral BID    Or     senna-docusate  2 tablet Oral BID     sodium chloride (PF)  3 mL Intracatheter Q8H       Data   Recent Labs   Lab 12/12/20  0552 12/11/20  1842   WBC 6.9 13.0*   HGB 12.9* 13.6   MCV 98 101*    342    132*   POTASSIUM 4.4 4.5   CHLORIDE 101 101   CO2 26 26   BUN 26 28   CR 0.89 0.96   ANIONGAP 9 5   CHANG 9.0 8.8   * 127*   ALBUMIN  --  2.9*   PROTTOTAL  --  7.9   BILITOTAL  --  1.1   ALKPHOS  --  125   ALT  --  14   AST  --  18   TROPI <0.015 <0.015       Imaging  Recent Results (from the past 24 hour(s))   XR Chest 2 Views    Narrative    XR CHEST TWO VIEWS   12/11/2020 5:54 PM     HISTORY: SOB (shortness of breath).    COMPARISON: Chest x-ray on 10/16/2018      Impression    IMPRESSION: PA and lateral views of the chest were obtained. Stable  cardiomediastinal silhouette. Diffuse hazy airspace opacities,  worrisome for infection. No significant pleural effusion or  pneumothorax.    LAI ANDRADE MD   Echocardiogram Complete    Narrative    [id number]  [id number]  [id number]  [id number]^OVIAN^DIMITRIOS^[facility]  Echocardiography  Laboratory  [address] [place] [address]        Name: KING HILTON  MRN: [id number]  : 1930  Study Date: 2020 10:43 AM  Age: 89 yrs  Gender: Male  Patient Location: [facility]  Reason For Study: CHF  Ordering Physician: DIMITRIOS JOHN  Referring Physician: Judson Mcadams  Performed By: Lorna Cline     BSA: 1.7 m2  Height: 66 in  Weight: 140 lb  HR: 94  BP: 132/86 mmHg  _____________________________________________________________________________  __        Procedure  Complete Portable Echo Adult.  _____________________________________________________________________________  __        Interpretation Summary     Left ventricular systolic function is low normal.  The visual ejection fraction is estimated at 50-55%.  The right ventricle is mild to moderately dilated.  Mildly decreased right ventricular systolic function  Right ventricular systolic pressure is elevated, consistent with moderate  pulmonary hypertension.  IVC diameter >2.1 cm collapsing <50% with sniff suggests a high RA pressure  estimated at 15 mmHg or greater.  The study was technically limited.  _____________________________________________________________________________  __        Left Ventricle  The left ventricle is normal in size. Diastolic function not assessed due to  atrial fibrillation. Left ventricular systolic function is low normal. The  visual ejection fraction is estimated at 50-55%. Septal motion is consistent  with conduction abnormality.     Right Ventricle  The right ventricle is mild to moderately dilated. Mildly decreased right  ventricular systolic function.     Atria  There is severe biatrial enlargement.     Mitral Valve  The mitral valve leaflets are mildly thickened. Mild mitral valve prolapse,  posterior leaflet. There is mild (1+) mitral regurgitation.        Tricuspid Valve  There is moderate (2+) tricuspid regurgitation. The right ventricular systolic  pressure is approximated at 39.9 mmHg plus the  right atrial pressure. IVC  diameter >2.1 cm collapsing <50% with sniff suggests a high RA pressure  estimated at 15 mmHg or greater. Right ventricular systolic pressure is  elevated, consistent with moderate pulmonary hypertension.     Aortic Valve  The aortic valve is trileaflet. There is trace to mild aortic regurgitation.  Mild valvular aortic stenosis. The mean AoV pressure gradient is 8.5 mmHg.  Gradients may be underestimated due to low flow state, stroke volume 34ml/m2.     Pulmonic Valve  The pulmonic valve is not well visualized.     Vessels  Borderline aortic root dilatation. The ascending aorta is Mildly dilated.  (4.4cm).     Pericardium  Trivial pericardial effusion.        Rhythm  The rhythm was atrial fibrillation.  _____________________________________________________________________________  __  MMode/2D Measurements & Calculations  asc Aorta Diam: 4.4 cm     LVOT diam: 2.1 cm  LVOT area: 3.5 cm2        Doppler Measurements & Calculations  MV E max koffi: 82.2 cm/sec  MV dec time: 0.20 sec  Ao V2 max: 201.5 cm/sec  Ao max P.0 mmHg  Ao V2 mean: 133.9 cm/sec  Ao mean P.5 mmHg  Ao V2 VTI: 38.4 cm  TOMAS(I,D): 1.5 cm2  TOMAS(V,D): 1.6 cm2  AI P1/2t: 230.6 msec  LV V1 max PG: 3.6 mmHg  LV V1 max: 94.3 cm/sec  LV V1 VTI: 16.8 cm  SV(LVOT): 58.8 ml  SI(LVOT): 34.2 ml/m2  TR max koffi: 315.2 cm/sec  TR max P.9 mmHg  AV Koffi Ratio (DI): 0.47  TOMAS Index (cm2/m2): 0.89              _____________________________________________________________________________  __        Report approved by: Jayla Mustafa 2020 12:28 PM

## 2020-12-12 NOTE — PHARMACY-ADMISSION MEDICATION HISTORY
"Pharmacy Medication History  Admission medication history interview status for the 12/11/2020  admission is complete. See EPIC admission navigator for prior to admission medications       Medication history sources: Patient's family/friend (spouse)  Location of interview: {Phone,   Adherence Assessment: Good    Significant changes made to the medication list:      Additional medication history information:       Medication reconciliation completed by provider prior to medication history? Yes    Time spent in this activity: 15 min      Prior to Admission medications    Medication Sig Last Dose Taking? Auth Provider   acetaminophen (TYLENOL 8 HOUR) 650 MG CR tablet Take 650-1,300 mg by mouth 3 times daily as needed for mild pain or fever 12/11/2020 at am Yes Reported, Patient   cyanocobalamin (CYANOCOBALAMIN) 1000 MCG/ML injection INJECT 1 ML IN THE MUSCLE EVERY 30 DAYS.  Yes Judson Mcadams MD   diphenoxylate-atropine (LOMOTIL) 2.5-0.025 MG tablet Take 1 tablet by mouth twice daily if needed prn Yes Judson Mcadams MD   ELIQUIS ANTICOAGULANT 5 MG tablet TAKE 1 TABLET(5 MG) BY MOUTH TWICE DAILY 12/11/2020 at am Yes Judson Mcadams MD   ipratropium (ATROVENT) 0.06 % nasal spray Spray 2 sprays into both nostrils 4 times daily 12/11/2020 at am Yes Judson Mcadams MD   metoprolol tartrate (LOPRESSOR) 50 MG tablet TAKE 1 TABLET BY MOUTH TWICE DAILY 12/11/2020 at am Yes Judson Mcadams MD   opium tincture 10 MG/ML (1%) liquid Take 0.2 mLs (2 mg) by mouth every 6 hours as needed for diarrhea (4 drops) 12/11/2020 at am Yes Judson Mcadams MD   BD INTEGRA SYRINGE 25G X 1\" 3 ML MISC USE WITH B-12 INJECTIONS   Judson Mcadams MD       The information provided in this note is only as accurate as the sources available at the time of the update(s).     "

## 2020-12-12 NOTE — H&P
[facility]    History and Physical  Hospitalist       Date of Admission:  12/11/2020    Assessment & Plan   Dawson Jones is a 89 year old male with a history of paroxysmal atrial fibrillation on Eliquis, cardiomyopathy, Crohn's disease, spinal stenosis who presented to the ED with worsening shortness of breath and cough concerning for COVID-19 infection.    COVID-19 PUI  Acute hypoxic respiratory failure likely secondary to COVID-19 pneumonia versus bacterial pneumonia versus pulmonary edema  Lactic acidosis  Patient has been symptomatic with shortness of breath, cough, loss of taste and smell, weakness and occasional lightheadedness over the last 1 week.  Tested negative for Covid on 12/6/2020.  At presentation, chest x-ray showing bilateral patchy infiltrates concerning for viral/atypical pneumonia versus fluid.  He was hypoxic to 79% on room air, desats with minimal activity.  Leukocytosis to 13.  Elevated CRP at 146.  Elevated BNP at 93260.  Elevated lactate of 2.7.  IV Lasix, IV Zosyn, IV diltiazem and IV dexamethasone.  -Admit as inpatient  -Continue oxygen supplementation.  Wean oxygen as able.  -Check procalcitonin.  Continue IV Zosyn at this time.  If procalcitonin is no, antibiotics can be discontinued.  Blood cultures pending.  -Continue IV dexamethasone 6 mg daily.  -If COVID-19 testing returns positive, can start on IV remdesivir.  He is high suspicion PUI.  Continue on special precautions and consider retesting in 72 hours if testing returns negative.  -Anticoagulated with Eliquis already.    Atrial fibrillation with RVR  CHF exacerbation likely due to A. fib RVR  Pulmonary edema  Hyponatremia  Appears volume overloaded on exam.  BNP elevated at [address].  Last echo on file was in 2015 that showed normal EF, dilated left atrium.  Received 40 mg IV Lasix in the ED and 20 mg IV diltiazem bolus as well.  -Currently rates are controlled.  Continue PTA 50 mg metoprolol twice daily  with hold parameters as well as Eliquis twice daily.  Consider diltiazem drip if rates increase again and BP allows.  -Obtain TTE tomorrow.  -Consider repeat Lasix dosing tomorrow.  -Monitor BMP.    Crohn's disease  -Stable, not on any medications at this time.    Spinal stenosis, chronic back pain  -Stable.  Continue as needed Tylenol.  May order PTA tramadol if having increased pain.    DVT Prophylaxis: On Eliquis  Code Status: Full Code  Expected discharge: 2 to 3 days, pending clinical progress, therapy evaluation.    Christina Fajardo MD    Primary Care Physician   Judson Mcadams    Chief Complaint   Shortness of breath and cough    History is obtained from the patient, ED provider and chart review    History of Present Illness   Dawson Jones is a 89 year old male with a history of paroxysmal atrial fibrillation on Eliquis, cardiomyopathy, Crohn's disease, spinal stenosis who presented to the ED with worsening shortness of breath and cough concerning for COVID-19 infection.    Patient lives at home with his wife.  He was in normal state of health till about a week ago when he started having shortness of breath that has progressively worsened.  Also endorses cough that is occasionally productive of sputum.  Denies any fevers, chills, sweats, chest pain, nausea, vomiting, headache, skin rash, abdominal pain, diarrhea.  Occasionally feels lightheaded and weak.  Does have chronic leg swelling that he feels is actually slightly improved now.  Also endorses chronic back pain that is unchanged.  On 12/6/2020 he had a Covid test that was negative.  Today he was seen at urgent care with worsening shortness of breath.  He had a chest x-ray done that showed bilateral patchy opacities concerning for viral/atypical infection.  Sent to the ED for further evaluation.    In the ED, he was noted to be tachycardic, in atrial fibrillation with RVR with rates in the 130s, O2 saturation at 79% after ambulating from triage to the  ED room, /120, afebrile.  He was placed on couple liters of oxygen which brought his O2 saturation up to mid 90s.  He desats with minimal exertion.  He was given IV diltiazem bolus which improved his heart rates but also lower his blood pressure to the low 100s and hence IV diltiazem drip was not started.  Labs significant for sodium 132, BNP 73962, , lactate 2.7, WBC 13.  He was given a dose of IV Zosyn with concern for bacterial pneumonia.  Was also given 40 mg IV Lasix for possible pulmonary edema secondary to A. fib RVR.  Patient also endorsed loss of taste and smell and his clinical picture appeared suspicious for COVID-19 as well.  He was thereby given a dose of IV dexamethasone.  He is admitted for further management.    He denies smoking.  Drinks wine with his wife.  Does drive occasionally.  Ambulates with walker.    Past Medical History    I have reviewed this patient's medical history and updated it with pertinent information if needed.   Past Medical History:   Diagnosis Date     Atrial fibrillation (H)      Cardiomyopathy (H)      Crohn's disease of small and large intestines with complication (H)      ESBL (extended spectrum beta-lactamase) producing bacteria infection      Hyperlipidemia        Past Surgical History   I have reviewed this patient's surgical history and updated it with pertinent information if needed.  Past Surgical History:   Procedure Laterality Date     APPENDECTOMY       CYSTOSCOPY, TRANSURETHRAL RESECTION (TUR) PROSTATE, COMBINED N/A 1/24/2019    Procedure: COMBINED CYSTOSCOPY, TRANSURETHRAL RESECTION (TUR) PROSTATE;  Surgeon: Meliton Valverde MD;  Location:  OR     DECOMPRESSION LUMBAR ONE LEVEL N/A 10/1/2018    Procedure: DECOMPRESSION LUMBAR ONE LEVEL;  DECOMPRESSION L2-3 WITH COFLEX DEVICE  ;  Surgeon: Bert Reynolds MD;  Location:  OR     LASER KTP GREEN LIGHT PHOTOSELECTIVE VAPORIZATION PROSTATE N/A 1/24/2019    Procedure: LASER KTP GREEN LIGHT  "PHOTOSELECTIVE VAPORIZATION PROSTATE;  Surgeon: Meliton Valverde MD;  Location:  OR     ORTHOPEDIC SURGERY  10/01/2018    Bilateral L2-3 decompression and insertion of a Coflex device       Prior to Admission Medications   Prior to Admission Medications   Prescriptions Last Dose Informant Patient Reported? Taking?   BD INTEGRA SYRINGE 25G X 1\" 3 ML MISC   No No   Sig: USE WITH B-12 INJECTIONS   ELIQUIS ANTICOAGULANT 5 MG tablet   No No   Sig: TAKE 1 TABLET(5 MG) BY MOUTH TWICE DAILY   acetaminophen (TYLENOL 8 HOUR) 650 MG CR tablet  Spouse/Significant Other Yes No   Sig: Take 650-1,300 mg by mouth 3 times daily as needed for mild pain or fever   cyanocobalamin (CYANOCOBALAMIN) 1000 MCG/ML injection   No No   Sig: INJECT 1 ML IN THE MUSCLE EVERY 30 DAYS.   diphenoxylate-atropine (LOMOTIL) 2.5-0.025 MG tablet   No No   Sig: Take 1 tablet by mouth twice daily if needed   ipratropium (ATROVENT) 0.06 % nasal spray   No No   Sig: Spray 2 sprays into both nostrils 4 times daily   metoprolol tartrate (LOPRESSOR) 50 MG tablet   No No   Sig: TAKE 1 TABLET BY MOUTH TWICE DAILY   opium tincture 10 MG/ML (1%) liquid   No No   Sig: Take 0.2 mLs (2 mg) by mouth every 6 hours as needed for diarrhea (4 drops)   traMADol (ULTRAM) 50 MG tablet   No No   Sig: TAKE 1 TABLET BY MOUTH TWICE DAILY AS NEEDED FOR SEVERE PAIN   Patient taking differently: Pt takes occasionally   triamcinolone (KENALOG) 0.1 % external cream   No No   Sig: Apply topically 2 times daily      Facility-Administered Medications: None     Allergies   Allergies   Allergen Reactions     No Known Allergies        Social History   I have reviewed this patient's social history and updated it with pertinent information if needed. Dawson Jones  reports that he has quit smoking. He has never used smokeless tobacco. He reports current alcohol use of about 7.0 standard drinks of alcohol per week. He reports that he does not use drugs.    Family History   I have reviewed " this patient's family history and updated it with pertinent information if needed.   Family History   Problem Relation Age of Onset     Family History Negative Mother      Cardiac Sudden Death Father      Family History Negative Brother      Other - See Comments Sister         colon issues     Family History Negative Son      Family History Negative Brother      Family History Negative Son      Heart Disease No family hx of        Review of Systems   The 10 point Review of Systems is negative other than noted in the HPI or here.     Physical Exam   Temp: 97.9  F (36.6  C) Temp src: Oral BP: 106/65 Pulse: 88   Resp: 19 SpO2: 92 % O2 Device: Nasal cannula Oxygen Delivery: 1 LPM  Vital Signs with Ranges  Temp:  [97.4  F (36.3  C)-97.9  F (36.6  C)] 97.9  F (36.6  C)  Pulse:  [] 88  Resp:  [18-19] 19  BP: (106-154)/() 106/65  SpO2:  [79 %-93 %] 92 %  140 lbs 0 oz    ED providers note for detailed physical exam.  Full physical exam was not done as patient is COVID-19 PUI in an effort to limit exposure and preserve PPE.  Full physical exam had just been completed by ED provider prior to this encounter.    Constitutional: Awake, alert, cooperative, no apparent distress.  Eyes: no icterus, EOMs intact  HEENT: Moist mucous membranes  Respiratory: Nonlabored breathing, can converse in full sentences easily, wearing nasal cannula oxygen  Cardiovascular:   GI: Nondistended  Skin: 1+ bilateral leg edema  Musculoskeletal: Moving all extremities  Neurologic: Alert, oriented and engages in appropriate conversation, no facial asymmetry, moving all extremities, fluent speech  Psychiatric: Calm and pleasant, no obvious anxiety or depression.     Data   Data reviewed today:  I personally reviewed CXR with result as noted above  Recent Labs   Lab 12/11/20  1842   WBC 13.0*   HGB 13.6   *      *   POTASSIUM 4.5   CHLORIDE 101   CO2 26   BUN 28   CR 0.96   ANIONGAP 5   CHANG 8.8   *   ALBUMIN 2.9*    PROTTOTAL 7.9   BILITOTAL 1.1   ALKPHOS 125   ALT 14   AST 18   TROPI <0.015       Recent Results (from the past 24 hour(s))   XR Chest 2 Views    Narrative    XR CHEST TWO VIEWS   12/11/2020 5:54 PM     HISTORY: SOB (shortness of breath).    COMPARISON: Chest x-ray on 10/16/2018      Impression    IMPRESSION: PA and lateral views of the chest were obtained. Stable  cardiomediastinal silhouette. Diffuse hazy airspace opacities,  worrisome for infection. No significant pleural effusion or  pneumothorax.    LAI ANDRADE MD

## 2020-12-12 NOTE — PLAN OF CARE
A+O x 4, forgetful. A. Fib on tele. STBY assist, within arms reach, with walker. Regular diet. Tested negative for covid, but keeping on precautions until recheck in 72 hours from 12/11 test. Zosyn being given for UTI. Full code.

## 2020-12-12 NOTE — ED NOTES
[facility]  ED Nurse Handoff Report    ED Chief complaint: Shortness of Breath (Pt complains of SOB that is worse with activity for the last week.)      ED Diagnosis:   Final diagnoses:   Atrial fibrillation with rapid ventricular response (H)   Suspected COVID-19 virus infection   Hypoxia       Code Status: Admitting provider to address    Allergies:   Allergies   Allergen Reactions     No Known Allergies        Patient Story: Pt is a  89 year old male  who presents with shortness of breath. Patient has weakness and shortness of breath that is worse with exertion. It has been getting worse in the last week. Patient had a negative covid test done on 12/6. He was seen at  today and had a remarkable x-ray so was sent here. He denies any body aches or fever but does have a loss of taste and smell.     Focused Assessment:  Pt is awake, alert and orientated X 4. Pt. able to sit up in bed and stand without assistance from this writer but appears unsteady on feet at this time. Pt. SpO2  Drops down to at 84% upon exertion, O2 increased to 4LPM nasal cannula.     Treatments and/or interventions provided: EKG, Labs, Covid and influenza swabs sent\    Patient's response to treatments and/or interventions: Tolerating well.     To be done/followed up on inpatient unit:      Does this patient have any cognitive concerns?: AOX4    Activity level - Baseline/Home:  Stand with Assist  Activity Level - Current:   Stand with Assist and Walker    Patient's Preferred language: English   Needed?: No    Isolation: COVID r/o and special precautions  Infection: COVID r/o and special precautions  Patient tested for COVID 19 prior to admission: YES  Bariatric?: No    Vital Signs:   Vitals:    12/11/20 1828 12/11/20 1934 12/11/20 1959 12/11/20 2001   BP: (!) 154/120 106/65     Pulse: 135 88 111    Resp: 18 19 28    Temp: 97.9  F (36.6  C)      TempSrc: Oral      SpO2: (!) 79% 92% (!) 84% 95%   Weight:  63.5 kg (140  "lb)     Height:  1.702 m (5' 7\")         Cardiac Rhythm:Cardiac Rhythm: Atrial fibrillation    Was the PSS-3 completed:   Yes  What interventions are required if any?               Family Comments: Pt lives at homewith his wife    OBS brochure/video discussed/provided to patient/family: Yes              Name of person given brochure if not patient:               Relationship to patient:     For the majority of the shift this patient's behavior was Green.   Behavioral interventions performed were .    ED NURSE PHONE NUMBER: *[phone]         "

## 2020-12-12 NOTE — ED NOTES
Pt. assisted to commode by this writer. UA obtained and sent. Pt. ambulates with assistance of walker at home. Pt. able to sit up in bed and stand without assistance from this writer but appears unsteady on feet at this time. Pt. SpO2 at 84% upon exertion on 2LPM nasal cannula.

## 2020-12-13 NOTE — PROGRESS NOTES
[facility]    Hospitalist Progress Note    Date of Admission:  12/11/2020    Assessment & Plan     Dawson Jones is a 89 year old male with a history of paroxysmal atrial fibrillation on Eliquis, cardiomyopathy, Crohn's disease, spinal stenosis who presented to the ED with worsening shortness of breath and cough concerning for COVID-19 infection.     COVID-19 PUI [negative at admission]  Acute hypoxic respiratory failure likely secondary to COVID-19 pneumonia versus bacterial pneumonia versus pulmonary edema  Lactic acidosis  Leukocytosis likely due to dexamethasone  Patient has been symptomatic with shortness of breath, cough, loss of taste and smell, weakness and occasional lightheadedness over the last 1 week.  Tested negative for Covid on 12/6/2020 and at admission.  At presentation, chest x-ray showing bilateral patchy infiltrates concerning for viral/atypical pneumonia versus fluid.  He was hypoxic to 79% on room air, desats with minimal activity.  Leukocytosis to 13.  Elevated CRP at 146.  Elevated BNP at 10,411.  Elevated lactate of 2.7.  IV Lasix, IV Zosyn, IV diltiazem and IV dexamethasone.  -Admit as inpatient  -Continue oxygen supplementation.  Wean oxygen as able.  Currently on 2 L nasal cannula oxygen.  -procalcitonin was low at 0.1.    Was started on Zosyn at admission.  UA appears infected.  Blood cultures with NGTD and urine cultures with 10-50,000 colonies of E. coli.  Will continue on ceftriaxone and azithromycin at this time to treat for possible bacterial pneumonia and/or UTI pending culture data.  -Continue IV dexamethasone 6 mg daily.  COVID-19 testing returned negative at admission however given elevated CRP at 128 with chest x-ray appearance of bilateral patchy opacities, he remains high suspicion for COVID-19.  Hence we will continue to maintain on precautions and retest on 12/14/2020.  CRP is trending down and is at 73 on 12/13.  -Anticoagulated with Eliquis  already.     Hematuria, E. coli UTI  UA appears infected at admission.  He also developed some pink-tinged urine.  He is anticoagulated with Eliquis.  He denies any dysuria.  Urine cultures with 10-50,000 colonies of E. coli  -Continue on ceftriaxone and azithromycin as above.    Atrial fibrillation with RVR  HFpEF with acute exacerbation likely due to A. fib RVR  Pulmonary edema  Hyponatremia  Appears volume overloaded on exam.  BNP elevated at 15362, increased to 24978 the next day.  Last echo on file was in 2015 that showed normal EF, dilated left atrium.  Received 40 mg IV Lasix in the ED and 20 mg IV diltiazem bolus as well.  -Currently rates are somewhat controlled.  Continue PTA metoprolol , will increase dose to 75 mg twice daily, with hold parameters as well for more optimal rate control.  Also continue Eliquis twice daily.  Consider diltiazem drip if rates increase again and BP allows.  -TTE obtained shows borderline low EF at 50 to 55%, diastolic function not assessed due to A. fib.  Pulmonary hypertension noted.  Mildly decreased RV systolic function.  -Receiving Lasix 40 mg IV daily  -Monitor BMP, intake and output.     Crohn's disease  -Stable, not on any medications at this time.     Spinal stenosis, chronic back pain  -Stable.  Continue as needed Tylenol.  May order PTA tramadol if having increased pain.     DVT Prophylaxis: On Eliquis  Code Status: Full Code  Expected discharge: 2 to 3 days, pending clinical progress, therapy evaluation.          Christina Fajardo MD  Text Page (7am - 6pm, M-F)    Interval History   Patient has remained stable overnight.  He is feeling better today with his breathing.  Down to 2 L oxygen by nasal cannula.  Denies any chest pain or palpitations.  No fevers.    -Data reviewed today: I reviewed all new labs and imaging results over the last 24 hours. I personally reviewed EKG with result as noted above and CXR with result as noted above    Physical Exam   Temp: 97.3  F  (36.3  C) Temp src: Oral BP: (!) 137/97 Pulse: 98   Resp: 16 SpO2: 91 % O2 Device: Nasal cannula Oxygen Delivery: 1.5 LPM  Vitals:    12/11/20 1934 12/12/20 0009 12/13/20 0258   Weight: 63.5 kg (140 lb) 63.5 kg (140 lb) 62.2 kg (137 lb 3.2 oz)     Vital Signs with Ranges  Temp:  [97.3  F (36.3  C)-98.5  F (36.9  C)] 97.3  F (36.3  C)  Pulse:  [] 98  Resp:  [16-20] 16  BP: (119-137)/(79-97) 137/97  SpO2:  [88 %-97 %] 91 %  I/O last 3 completed shifts:  In: 300 [P.O.:300]  Out: 1095 [Urine:1095]    Constitutional: Alert, appears comfortable, in no acute distress  Respiratory: Non labored breathing  Cardiovascular: Heart sounds IR IR, no murmurs, no leg edema  GI: Abdomen is soft, non distended, non tender. Normal BS  Skin/Integumen: no rashes, no pressure sores  Neuro: alert, converses appropriately, moving all extremities, fluent speech, no facial asymmetry  Psych: mood and affect appropriate      Medications     - MEDICATION INSTRUCTIONS -         apixaban ANTICOAGULANT  5 mg Oral BID     azithromycin  250 mg Oral Daily     cefTRIAXone  2 g Intravenous Q24H     dexamethasone  6 mg Intravenous Daily     ipratropium  2 spray Both Nostrils 4x Daily     metoprolol tartrate  50 mg Oral BID     polyethylene glycol  17 g Oral Daily     senna-docusate  1 tablet Oral BID    Or     senna-docusate  2 tablet Oral BID     sodium chloride (PF)  3 mL Intracatheter Q8H       Data   Recent Labs   Lab 12/13/20  0653 12/12/20  0552 12/11/20  1842   WBC 18.3* 6.9 13.0*   HGB 13.0* 12.9* 13.6   MCV 98 98 101*    284 342    136 132*   POTASSIUM 4.0 4.4 4.5   CHLORIDE 103 101 101   CO2 25 26 26   BUN 29 26 28   CR 0.86 0.89 0.96   ANIONGAP 8 9 5   CHANG 8.5 9.0 8.8   * 161* 127*   ALBUMIN  --   --  2.9*   PROTTOTAL  --   --  7.9   BILITOTAL  --   --  1.1   ALKPHOS  --   --  125   ALT  --   --  14   AST  --   --  18   TROPI  --  <0.015 <0.015       Imaging  No results found for this or any previous visit (from the  past 24 hour(s)).

## 2020-12-13 NOTE — PLAN OF CARE
A+O x 4, forgetful. A. Fib CVR on Eliquis. Stand by assist, within arms reach, with walker. Regular diet- poor appetite. Trying boost. Tested negative for covid, but keeping on precautions until recheck 12/14. Urine is now yellow.. Denies pain or dizziness, has some PETERS, crackles in bases. Was on 2 L NC now on 1.5 L NC. RA was 88. Sat in chair x2.

## 2020-12-13 NOTE — PLAN OF CARE
"A&Ox4, forgetful. /86 (BP Location: Left arm)   Pulse 117   Temp 97.6  F (36.4  C) (Oral)   Resp 18   Ht 1.676 m (5' 6\")   Wt 62.2 kg (137 lb 3.2 oz)   SpO2 93%   BMI 22.14 kg/m   Pt on 2L NC. Denies pain. Tele afib 's. Up with SBA and walker. Steady gait. ABX for UTI/PNA. Reddened rash blanchable to back. Mepilex in place. Continue to monitor.   "

## 2020-12-13 NOTE — CONSULTS
"CLINICAL NUTRITION SERVICES  -  ASSESSMENT NOTE      Recommendations Ordered by Registered Dietitian (RD):   % Weight Loss:  > 2% in 1 week (severe malnutrition) - Suspect  % Intake:  <75% for > 7 days (non-severe malnutrition) - Suspect  Subcutaneous Fat Loss:  Unable to determine  Muscle Loss:  Unable to determine  Fluid Retention:  Mild     Malnutrition Diagnosis: Non-Severe malnutrition  In Context of:  Acute illness or injury   Malnutrition:   Vanilla Boost Plus with meals        REASON FOR ASSESSMENT  Dawson Jones is a 89 year old male seen by Registered Dietitian for Admission Nutrition Risk Screen for positive reduced intake and weight loss      NUTRITION HISTORY  - Unable to obtain nutrition history due to distancing precautions with COVID-19 and pt not available via phone visit attempt this pm.  Information obtained from Epic records.  Pt has had loss of taste and smell, shortness of breath, and weakness which has worsened over the past week.  No food allergies/intolerances noted.    CURRENT NUTRITION ORDERS  Diet Order:     Regular     Current Intake/Tolerance:  Oral intake has been poor-fair per nursing flow sheets (25-50-75%).    He took 360 mL oral yesterday and 240 mL thus far today.      NUTRITION FOCUSED PHYSICAL ASSESSMENT FOR DIAGNOSING MALNUTRITION)  No:  COVID-19 precautions               Observed:    N/A    Obtained from Chart/Interdisciplinary Team:  Edema 1+ BLE    ANTHROPOMETRICS  Height: 5' 6\"  Weight:  62.2 kg (137 lbs 3.2 oz)  Body mass index is 22.14 kg/m .  Weight Status:  Normal BMI  IBW:  64.5 kg  % IBW:  96%  Weight History:  Weight loss 3.6 kg (5%) over the past 3 months    Wt Readings from Last 20 Encounters:   12/13/20 62.2 kg (137 lb 3.2 oz)   12/11/20 63.5 kg (140 lb)   09/11/20 65.8 kg (145 lb)   02/03/20 66.2 kg (146 lb)   07/17/19 64.9 kg (143 lb)   04/24/19 68.5 kg (151 lb)   04/17/19 68.5 kg (151 lb)   01/24/19 68.7 kg (151 lb 6.4 oz)   01/16/19 69.4 kg (153 lb) "   11/09/18 72.4 kg (159 lb 11.2 oz)   10/31/18 71.8 kg (158 lb 3.2 oz)   10/30/18 71.8 kg (158 lb 3.2 oz)   10/26/18 71.8 kg (158 lb 3.2 oz)   10/22/18 73.4 kg (161 lb 12.8 oz)   10/22/18 73.4 kg (161 lb 12.8 oz)   10/16/18 68.5 kg (151 lb 0.2 oz)   10/15/18 70.1 kg (154 lb 9.6 oz)   10/01/18 69.6 kg (153 lb 8 oz)   09/24/18 69.9 kg (154 lb)   08/20/18 68.8 kg (151 lb 11.2 oz)     LABS  Labs reviewed  CRP 73.7 (H)    MEDICATIONS  Medications reviewed  Decadron      ASSESSED NUTRITION NEEDS PER APPROVED PRACTICE GUIDELINES:    Dosing Weight:  62.2 kg (current wt)  Estimated Energy Needs:  7237-7597 kcals (30-35 Kcal/Kg)  Justification: repletion  Estimated Protein Needs:  75-93 grams protein (1.2-1.5 g pro/Kg)  Justification: hypercatabolism with acute illness  Estimated Fluid Needs:  5870-4980 mL (1 mL/Kcal)  Justification: maintenance    MALNUTRITION:  % Weight Loss:  > 2% in 1 week (severe malnutrition) - Suspect  % Intake:  <75% for > 7 days (non-severe malnutrition) - Suspect  Subcutaneous Fat Loss:  Unable to determine  Muscle Loss:  Unable to determine  Fluid Retention:  Mild     Malnutrition Diagnosis: Non-Severe malnutrition  In Context of:  Acute illness or injury    NUTRITION DIAGNOSIS:  Inadequate oral intake related to decreased changes in taste and smell, decreased appetite as evidenced by poor oral intake since admission and suspected PTA as well.      NUTRITION INTERVENTIONS  Recommendations / Nutrition Prescription  Vanilla Boost Plus with meals    Implementation  Nutrition education: Per Provider order if indicated   Medical Food Supplement - Ordered above in EPIC    Nutrition Goals  Pt will consume > 75% meals and > 50% supplements in 3-5 days    MONITORING AND EVALUATION:  Progress towards goals will be monitored and evaluated per protocol and Practice Guidelines    Doreen Celeste, RD, LD

## 2020-12-13 NOTE — PROVIDER NOTIFICATION
MD Notification    Notified Person: MD    Notified Person Name: Dr Fajardo    Notification Date/Time:12/13/20 0915    Notification Interaction:paged    Purpose of Notification:lactic acid 2.2    Orders Received:    Comments:

## 2020-12-13 NOTE — PROGRESS NOTES
12/13/20 1531   Quick Adds   Type of Visit Initial PT Evaluation   Living Environment   People in home spouse   Current Living Arrangements house   Home Accessibility no concerns   Transportation Anticipated family or friend will provide   Living Environment Comments Pt reports he lives with his spouse, first floor set up   Self-Care   Usual Activity Tolerance good   Current Activity Tolerance fair   Regular Exercise No   Equipment Currently Used at Home walker, rolling   Activity/Exercise/Self-Care Comment Pt states he is normally independent, spouse assists at times, uses FWW or cane depending on how he is feeling   Disability/Function   Fall history within last six months no   General Information   Onset of Illness/Injury or Date of Surgery 12/11/20   Referring Physician Christina Fajardo MD   Patient/Family Therapy Goals Statement (PT) To go home   Pertinent History of Current Problem (include personal factors and/or comorbidities that impact the POC) Pt is 89 year old male adm on 12/11/2020 with c/o weakness and SOB, currently being ruled out for possible COVID infection.   Existing Precautions/Restrictions fall   Cognition   Orientation Status (Cognition) oriented x 3   Affect/Mental Status (Cognition) WFL   Follows Commands (Cognition) WFL   Cognitive Status Comments Pt is hard of hearing but follows commands appropriately   Pain Assessment   Patient Currently in Pain No   Posture    Posture Forward head position;Protracted shoulders   Range of Motion (ROM)   ROM Comment B LE ROM WFL   Strength   Strength Comments Pt generally deconditioned, no focal weakness noted   Bed Mobility   Comment (Bed Mobility) SBA   Transfers   Transfer Safety Comments SBA with FWW   Gait/Stairs (Locomotion)   Comment (Gait/Stairs) Unable to assess at this time   Balance   Balance Comments Good sitting balance   Clinical Impression   Criteria for Skilled Therapeutic Intervention yes, treatment indicated   PT Diagnosis (PT)  Impaired mobility   Influenced by the following impairments Decreased strength, decreased activity tolerance, decreased balance   Functional limitations due to impairments Decreased ability to participate in daily tasks   Clinical Presentation Stable/Uncomplicated   Clinical Presentation Rationale Current presentation, [facility]   Clinical Decision Making (Complexity) low complexity   Therapy Frequency (PT) 5x/week   Predicted Duration of Therapy Intervention (days/wks) 3 days   Planned Therapy Interventions (PT) balance training;bed mobility training;gait training;home exercise program;patient/family education;stair training;strengthening;transfer training   Risk & Benefits of therapy have been explained evaluation/treatment results reviewed;care plan/treatment goals reviewed;risks/benefits reviewed;current/potential barriers reviewed;participants voiced agreement with care plan;participants included;patient   PT Discharge Planning    PT Discharge Recommendation (DC Rec) home with assist   PT Rationale for DC Rec PT session limited due to bloody nose but pt able to complete mobility tasks with SBA, does demonstrate decreased O2 sats. Anticipate with further medical management and PT intervention during hospital stay, pt will achieve mobility level necessary for return to home with assist for household tasks. If pt does not progress as anticipated or does not have assistance at home, then may benefit from TCU. Will continue to follow.   Total Evaluation Time   Total Evaluation Time (Minutes) 10

## 2020-12-14 NOTE — CONSULTS
Care Management Initial Consult    General Information  Assessment completed with: Patient, Children, son on phone  Type of CM/SW Visit: Offer D/C Planning    Primary Care Provider verified and updated as needed: Yes   Readmission within the last 30 days:        Reason for Consult: care coordination/care conference  Advance Care Planning:            Communication Assessment  Patient's communication style: spoken language (English or Bilingual)    Hearing Difficulty or Deaf: no   Wear Glasses or Blind: no    Cognitive  Cognitive/Neuro/Behavioral: .WDL except        Orientation: oriented x 4             Living Environment:   People in home: spouse     Current living Arrangements: house      Able to return to prior arrangements: yes  Living Arrangement Comments: sons in area and very helpful    Family/Social Support:  Care provided by: spouse/significant other  Provides care for:    Marital Status:              Description of Support System: Supportive, Involved         Current Resources:   Skilled Home Care Services:    Community Resources: None  Equipment currently used at home: walker, rolling  Supplies currently used at home:      Employment/Financial:  Employment Status: retired        Financial Concerns: No concerns identified           Lifestyle & Psychosocial Needs:        Socioeconomic History     Marital status:      Spouse name: Not on file     Number of children: Not on file     Years of education: Not on file     Highest education level: Not on file     Tobacco Use     Smoking status: Former Smoker     Smokeless tobacco: Never Used     Tobacco comment: 40 years ago   Substance and Sexual Activity     Alcohol use: Yes     Alcohol/week: 7.0 standard drinks     Types: 7 Standard drinks or equivalent per week     Comment: 1-2 wine an evening     Drug use: No     Sexual activity: Never     Partners: Female       Functional Status:  Prior to admission patient needed assistance:              Mental  Health Status:  Mental Health Status: No Current Concerns       Chemical Dependency Status:  Chemical Dependency Status: No Current Concerns             Values/Beliefs:  Spiritual, Cultural Beliefs, Mandaen Practices, Values that affect care: no               Additional Information:  Tried to call into patient room but he had trouble hearing me. Bedside nurse suggested I talk with son, Jaun.  CC spoke with Jaun and explained role in discharge plan.   Family is involved and supportive. They would like patient home. Would like Home RN    Pt/family was provided with the Medicare Compare list for Home Care.  Discussed associated Medicare star ratings to assist with choice for referrals/discharge planning Yes    Education was given to pt/family that star ratings are updated/maintained by Medicare and can be reviewed by visiting www.medicare.gov Yes    Noted patient has discharge orders/plans for home care including Home (RN)  Patient was given choice in selecting homecare and chose [facility]  Referral sent (12/14) and confirmed.  Please add to AVS when confirmed.    Family would like Follow-up made with PCP. Prefer phone visit.   Dec 21, 2020  9:00 AM   Telephone Visit with Judson Mcadams MD   [facility] ([facility])    CC to watch for Home o2 (family feels they can monitor)  Watch for Home care orders, add to AVS  MD to write for Home RN with F2F  Son can  at discharge.     Sofy Murray, RN

## 2020-12-14 NOTE — TELEPHONE ENCOUNTER
Pt son Jaun LM stating the pt was in the hospital for A Fib and wanting to ask about consulting with Cardiology.  LM for Jaun that the clinic can not do anything from a Consult standpoint this would all have to come within the hospital.  Left call back number if wanting to call back. Navin

## 2020-12-14 NOTE — PLAN OF CARE
VSS--still requiring O2 but much less--now on 3L NC, no c/o pain, rhythm remains afib--RVR at times-IV metoprolol IV given x1, oriented x4 throughout the day-however as shift continues pt seems more confused,continues on antibiotics and steriods, very poor appetite and poor PO intake, cards to see tomorrow, PT recommending home with assist or TCU at discharge, continue to monitor.

## 2020-12-14 NOTE — PROGRESS NOTES
Chart reviewed. Echo and labs reviewed. D/w Dr Fajardo. Agree with likely diagnosis of CHF. Continue IV lasix. There is also strong suspicion of covid despite prior negative tests. Repeat swab to test for covid has been obtained. Will see the patient once repeat test resulst are available. D.w Dr Fajardo and she is agreeable.

## 2020-12-14 NOTE — PROGRESS NOTES
[facility]    Hospitalist Progress Note    Date of Admission:  12/11/2020    Assessment & Plan     Dawson Jones is a 89 year old male with a history of paroxysmal atrial fibrillation on Eliquis, cardiomyopathy, Crohn's disease, spinal stenosis who presented to the ED with worsening shortness of breath and cough concerning for COVID-19 infection.     COVID-19 PUI [negative at admission]  Acute hypoxic respiratory failure likely secondary to pneumonia [viral versus bacterial] versus pulmonary edema, more likely the latter  Lactic acidosis  Leukocytosis likely due to dexamethasone  Patient has been symptomatic with shortness of breath, cough, loss of taste and smell, weakness and occasional lightheadedness over the last 1 week.  Tested negative for Covid on 12/6/2020 and at admission.  At presentation, chest x-ray showing bilateral patchy infiltrates concerning for viral/atypical pneumonia versus fluid.  He was hypoxic to 79% on room air, desats with minimal activity.  Leukocytosis to 13.  Elevated CRP at 146.  Elevated BNP at 10,411.  Elevated lactate of 2.7.  IV Lasix, IV Zosyn, IV diltiazem and IV dexamethasone.  -Admit as inpatient  -Continue oxygen supplementation.  Wean oxygen as able.  Currently on 2-3 L nasal cannula oxygen.  -procalcitonin was low at 0.1.    Was started on Zosyn at admission.  UA appears infected.  Blood cultures with NGTD and urine cultures with 10-50,000 colonies of E. coli.  Zosyn was switched to ceftriaxone and azithromycin which was subsequently switched to levofloxacin for treatment of possible bacterial pneumonia.  However procalcitonin recheck was decreased further to 0.05 making significant bacterial infection unlikely.  We will plan to finish a total course of 7 days of antibiotics for pneumonia.  -Continue IV dexamethasone 6 mg daily.  COVID-19 testing returned negative at admission however given elevated CRP at 128 with chest x-ray appearance of  bilateral patchy opacities, he remains high suspicion for COVID-19.  Hence we will continue to maintain on precautions and retest on 12/14/2020.  CRP is trending down and is at 34.5 on 12/14.  If Covid test returns negative, will discontinue special precautions.  -Anticoagulated with Eliquis already.     Hematuria, ESBL E. coli in urine  UA appears infected at admission.  He also developed some pink-tinged urine.  He is anticoagulated with Eliquis.  He denies any dysuria.  Urine cultures with 10-50,000 colonies of E. coli, ESBL  -No fevers, procalcitonin is very low, does not endorse any urinary symptoms.  Has had ESBL E. coli in urine cultures multiple times in the past.  Suspect colonization.  This does not look like true UTI.  Hence will not treat.    Atrial fibrillation with RVR  HFpEF with acute exacerbation likely due to A. fib RVR  Pulmonary edema  Hyponatremia  Appeared volume overloaded on exam initially.  [place] [address], increased [place] 78800 the next day.  Last echo on file was in 2015 that showed normal EF, dilated left atrium.  Received 40 mg IV Lasix in the ED and 20 mg IV diltiazem bolus as well.  -Currently rates are somewhat controlled.  Continue PTA metoprolol , will increase dose to 75 mg twice daily, with hold parameters as well for more optimal rate control.  Also continue Eliquis twice daily.  Consider diltiazem drip if rates increase again and BP allows.  -TTE obtained shows borderline low EF at 50 to 55%, diastolic function not assessed due to A. fib.  Pulmonary hypertension noted.  Mildly decreased RV systolic function.  -Increase Lasix to 40 mg IV twice daily.  We will also request cardiology input regarding CHF exacerbation and A. fib RVR.  -Monitor BMP, intake and output.    Confusion, suspect hospital  Delirium  ?  Underlying mild dementia  Patient appeared more confused on 12/14/2020.  Nurses report that he is quite forgetful.  Needs frequent reorientation.  Keeps getting up from  chair/bed in his room and setting off alarms.  -Continue to monitor  -Minimize sedating medication/narcotics  -Maintain sleep/wake cycle, minimize tethers as able    Crohn's disease  -Stable, not on any medications at this time.     Spinal stenosis, chronic back pain  -Stable.  Continue as needed Tylenol.  May order PTA tramadol if having increased pain.     DVT Prophylaxis: On Eliquis  Code Status: Full Code  Expected discharge: 3-4 days, pending clinical progress, therapy evaluation.          Christina Fajardo MD  Text Page (7am - 6pm, M-F)    Interval History   Patient overnight had increased oxygen requirements and was briefly on 8 L via oxygen mask.  This morning he is back down to 3 L.  Chest x-ray was repeated that showed that perhaps some of the opacities in upper right lung may have mildly increased.  Noted possible cardiomegaly.  Antibiotics was changed to levofloxacin.  When seen this morning, he had gotten off the chair and alarm was going off.  He appeared quite confused and did not appear to know what he was doing.  He is also hard of hearing and it is difficult to thereby communicate clearly with him.    -Data reviewed today: I reviewed all new labs and imaging results over the last 24 hours. I personally reviewed EKG with result as noted above and CXR with result as noted above    Physical Exam   Temp: 97.4  F (36.3  C) Temp src: Oral BP: (!) 123/95 Pulse: 113   Resp: 16 SpO2: 97 % O2 Device: Nasal cannula Oxygen Delivery: 2 LPM  Vitals:    12/12/20 0009 12/13/20 0258 12/14/20 0203   Weight: 63.5 kg (140 lb) 62.2 kg (137 lb 3.2 oz) 61.4 kg (135 lb 4.8 oz)     Vital Signs with Ranges  Temp:  [97.4  F (36.3  C)-98  F (36.7  C)] 97.4  F (36.3  C)  Pulse:  [] 113  Resp:  [16-20] 16  BP: ()/(60-95) 123/95  SpO2:  [84 %-97 %] 97 %  I/O last 3 completed shifts:  In: 300 [P.O.:300]  Out: 575 [Urine:575]    Constitutional: Alert, confused, forgetful  Respiratory: Non labored  breathing  Cardiovascular: Heart sounds IR IR, no murmurs, no leg edema  GI: Abdomen is soft, non distended, non tender. Normal BS  Skin/Integumen: no rashes, no pressure sores  Neuro: alert, confused, forgetful, moving all extremities, fluent speech, no facial asymmetry  Psych: Somewhat restless    Medications     - MEDICATION INSTRUCTIONS -         apixaban ANTICOAGULANT  5 mg Oral BID     dexamethasone  6 mg Intravenous Daily     furosemide  40 mg Intravenous BID     ipratropium  2 spray Both Nostrils 4x Daily     levofloxacin  750 mg Intravenous Q24H     metoprolol tartrate  75 mg Oral BID     polyethylene glycol  17 g Oral Daily     senna-docusate  1 tablet Oral BID    Or     senna-docusate  2 tablet Oral BID     sodium chloride (PF)  3 mL Intracatheter Q8H       Data   Recent Labs   Lab 12/14/20  0623 12/14/20  0154 12/13/20  0653 12/12/20  0552 12/11/20  1842   WBC 15.7* 17.1* 18.3* 6.9 13.0*   HGB 12.7* 13.2* 13.0* 12.9* 13.6    99 98 98 101*    361 351 284 342     --  136 136 132*   POTASSIUM 3.6  --  4.0 4.4 4.5   CHLORIDE 106  --  103 101 101   CO2 29  --  25 26 26   BUN 36*  --  29 26 28   CR 1.02  --  0.86 0.89 0.96   ANIONGAP 7  --  8 9 5   CHANG 8.4*  --  8.5 9.0 8.8   *  --  136* 161* 127*   ALBUMIN  --   --   --   --  2.9*   PROTTOTAL  --   --   --   --  7.9   BILITOTAL  --   --   --   --  1.1   ALKPHOS  --   --   --   --  125   ALT  --   --   --   --  14   AST  --   --   --   --  18   TROPI  --   --   --  <0.015 <0.015       Imaging  Recent Results (from the past 24 hour(s))   XR Chest Port 1 View    Narrative    EXAM: CHEST SINGLE VIEW PORTABLE  LOCATION: [facility]  DATE/TIME: 12/14/2020 1:40 AM    INDICATION: Worsening hypoxia.  COMPARISON: 12/11/2020.      Impression    IMPRESSION:   1. Patchy opacities scattered within both lungs, most prominent in the lateral aspect of the upper right lung. These are nonspecific, but most likely infectious or  inflammatory in etiology. The opacities in the upper right lung may have mildly increased   since the comparison study. There has been no other convincing interval change since the recent comparison study.  2. Possible cardiomegaly again noted.

## 2020-12-14 NOTE — PROVIDER NOTIFICATION
Pt requiring more oxygen. Was on 1.5L NC and now requiring 8L Oxymask. No change in lung sounds and pt not in distress. Dr. Simin chance. MD ordered labs, CXR, ABGs and urine sample.

## 2020-12-14 NOTE — PLAN OF CARE
"Pt intermittently disoriented to time, place and situation. /84 (BP Location: Right arm)   Pulse 100   Temp 98  F (36.7  C) (Axillary)   Resp 16   Ht 1.676 m (5' 6\")   Wt 61.4 kg (135 lb 4.8 oz)   SpO2 94%   BMI 21.84 kg/m   Pt required more O2 overnight. Attempted to wean down to 4 L Oxymask but pt SpO2 only 88-89%. Now on 6 L Oxymask. Tele afib. RVR at times. Required PRN Metoprolol IVP x1. Unable to give scheduled PO due to BP in the 90's systolic. Denies pain. Up with SBA and walker. Need urine sample. Poor fluid intake and output. ABX switched to Levoquin. Plan to recheck COVID today. Continue to monitor.     Heart Failure Care Map  GOALS TO BE MET BEFORE DISCHARGE:    1. Decrease congestion and/or edema with diuretic therapy to achieve near optimal volume status.     Dyspnea improved: NO   Edema improved: Yes, satisfactory for discharge.        Net I/O and Weights since admission:   11/14 0700 - 12/14 0659  In: 660 [P.O.:660]  Out: 2095 [Urine:2095]  Net: -1435     Vitals:    12/11/20 1934 12/12/20 0009 12/13/20 0258 12/14/20 0203   Weight: 63.5 kg (140 lb) 63.5 kg (140 lb) 62.2 kg (137 lb 3.2 oz) 61.4 kg (135 lb 4.8 oz)       2.  O2 sats > 90% on room air, or at prior home O2 therapy level.      Able to wean O2 this shift to keep sats above 90%?: No, further care required to meet this goal. Please explain Requiring 6 L Oxymask   Does patient use Home O2? No          Current oxygenation status:   SpO2: 94 %     O2 Device: Oxymask, Oxygen Delivery: 6 LPM    3.  Tolerates ambulation and mobility near baseline.     Ambulation: No, further care required to meet this goal. Please explain PTEERS   Times patient ambulated with staff this shift: 2 to and from bathroom.     Please review the Heart Failure Care Map for additional HF goal outcomes.    CATHLEEN MATA RN  12/14/2020     "

## 2020-12-15 NOTE — PLAN OF CARE
VSS, on 4 L NC sats well at rest, decreases quickly with activity. HR around 100-120, toprol increased and added dilt po. Covid negative x3, precautions stopped per Dr Fajardo. Diuresing with IV lasix. Confusion better during the day, increasingly impulsive this afternoon/evening. Chair/bed alarms. Poor appetite. K protocol.

## 2020-12-15 NOTE — PROGRESS NOTES
[facility]    Hospitalist Progress Note    Date of Admission:  12/11/2020    Assessment & Plan     Dawson Jones is a 89 year old male with a history of paroxysmal atrial fibrillation on Eliquis, cardiomyopathy, Crohn's disease, spinal stenosis who presented to the ED with worsening shortness of breath and cough.     COVID-19 negative x3  Acute hypoxic respiratory failure likely secondary to pneumonia versus pulmonary edema, more likely the latter  Lactic acidosis  Leukocytosis likely due to dexamethasone  Patient has been symptomatic with shortness of breath, cough, loss of taste and smell, weakness and occasional lightheadedness over the last 1 week.  Tested negative for Covid on 12/6/2020, at admission and on 12/14/2020.  At presentation, chest x-ray showing bilateral patchy infiltrates concerning for viral/atypical pneumonia versus fluid.  He was hypoxic to 79% on room air, desats with minimal activity.  Leukocytosis to 13.  Elevated CRP at 146.  Elevated BNP at 10,411.  Elevated lactate of 2.7.  IV Lasix, IV Zosyn, IV diltiazem and IV dexamethasone given in the ED.  -Admit as inpatient  -Continue oxygen supplementation.  Wean oxygen as able.  Currently on 2-4 L nasal cannula oxygen.  -procalcitonin was low at 0.1.    Was started on Zosyn at admission.  UA appears infected.  Blood cultures with NGTD and urine cultures with 10-50,000 colonies of E. coli.  Zosyn was switched to ceftriaxone and azithromycin which was subsequently switched to levofloxacin for treatment of possible bacterial pneumonia.  However procalcitonin recheck had decreased further to 0.05 making ongoing significant bacterial infection unlikely.  We will plan to finish a total course of 7 days of antibiotics for possible bacterial pneumonia.  -Received IV dexamethasone 6 mg daily since admission.  This was discontinued on 12/15/2020 as COVID-19 testing returned negative for a third time on 12/14/2020.  Discontinued  special precautions.  -Getting twice daily IV Lasix for likely heart failure exacerbation as noted below     Hematuria, ESBL E. coli in urine  UA appears infected at admission.  He also developed some pink-tinged urine.  He is anticoagulated with Eliquis.  He denies any dysuria.  Urine cultures with 10-50,000 colonies of E. coli, ESBL  -No fevers, procalcitonin is very low, does not endorse any urinary symptoms.  Has had ESBL E. coli in urine cultures multiple times in the past.  Suspect colonization.  This does not look like true UTI.  Hence will not treat.  Hematuria has cleared up.    Atrial fibrillation with RVR  HFpEF with acute exacerbation likely due to A. fib RVR  Pulmonary edema  Hyponatremia  Appeared volume overloaded on exam initially.  BNP elevated at 11599, increased to 93007 the next day.  Last echo on file was in 2015 that showed normal EF, dilated left atrium.  Received 40 mg IV Lasix in the ED and 20 mg IV diltiazem bolus as well.  -Currently rates are somewhat controlled.  Continue PTA metoprolol , increased dose to 75 mg twice daily, with hold parameters as well for more optimal rate control.  Also continue Eliquis twice daily.  Switch to Toprol- mg daily on 12/15/2020.  Titrate dose as needed.  -TTE obtained shows borderline low EF at 50 to 55%, diastolic function not assessed due to A. fib.  Pulmonary hypertension noted.  Mildly decreased RV systolic function.  -Continue Lasix 40 mg IV twice daily.  Cardiology consult requested and pending.  -Monitor BMP, intake and output.    Confusion, suspect hospital  Delirium  ?  Underlying mild dementia  Patient appeared more confused on 12/14/2020.  Nurses report that he is quite forgetful.  Needs frequent reorientation.  Keeps getting up from chair/bed in his room and setting off alarms.  -Continue to monitor  -Minimize sedating medication/narcotics  -Maintain sleep/wake cycle, minimize tethers as able    Crohn's disease  -Stable, not on any  medications at this time.     Spinal stenosis, chronic back pain  -Stable.  Continue as needed Tylenol.  May order PTA tramadol if having increased pain.     Non severe malnutrition  Nutrition following.     DVT Prophylaxis: On Eliquis  Code Status: Full Code  Expected discharge: 3-4 days, pending clinical progress, therapy evaluation.      Christina Fajardo MD  Text Page (7am - 6pm, M-F)    Interval History   Patient overnight again had increased oxygen requirements as he had taken off his oxygen and took a long time to normalize his oxygen saturation after nasal cannula was replaced.  This morning he is on 4 to 5 L oxygen via nasal cannula.  He was seated in his chair.  Appeared somewhat less confused and restless than yesterday.  He is quite hard of hearing and hence difficult to communicate with him.  He denied any pain and stated that his breathing felt okay.  He was wanting to go to the bathroom.    -Data reviewed today: I reviewed all new labs and imaging results over the last 24 hours. I personally reviewed EKG with result as noted above and CXR with result as noted above    Physical Exam   Temp: 97.5  F (36.4  C) Temp src: Oral BP: (!) 127/98 Pulse: 114   Resp: 20 SpO2: 93 % O2 Device: Nasal cannula Oxygen Delivery: 5 LPM  Vitals:    12/13/20 0258 12/14/20 0203 12/15/20 0455   Weight: 62.2 kg (137 lb 3.2 oz) 61.4 kg (135 lb 4.8 oz) 60.5 kg (133 lb 6.4 oz)     Vital Signs with Ranges  Temp:  [97.4  F (36.3  C)-98.7  F (37.1  C)] 97.5  F (36.4  C)  Pulse:  [] 114  Resp:  [16-20] 20  BP: (116-127)/(82-98) 127/98  SpO2:  [75 %-97 %] 93 %  I/O last 3 completed shifts:  In: 650 [P.O.:650]  Out: 975 [Urine:975]    Constitutional: Alert, confused, forgetful, quite hard of hearing  Respiratory: Non labored breathing  Cardiovascular: Heart sounds IR IR, no murmurs, no leg edema  GI: Abdomen is soft, non distended, non tender. Normal BS  Skin/Integumen: no rashes, no pressure sores  Neuro: alert, confused,  forgetful, moving all extremities, fluent speech, no facial asymmetry  Psych: Calm    Medications     - MEDICATION INSTRUCTIONS -         apixaban ANTICOAGULANT  5 mg Oral BID     dexamethasone  6 mg Intravenous Daily     furosemide  40 mg Intravenous BID     ipratropium  2 spray Both Nostrils 4x Daily     levofloxacin  750 mg Intravenous Q24H     metoprolol tartrate  75 mg Oral BID     polyethylene glycol  17 g Oral Daily     senna-docusate  1 tablet Oral BID    Or     senna-docusate  2 tablet Oral BID     sodium chloride (PF)  3 mL Intracatheter Q8H       Data   Recent Labs   Lab 12/15/20  0538 12/14/20  0623 12/14/20  0154 12/13/20  0653 12/12/20  0552 12/11/20  1842   WBC 15.4* 15.7* 17.1* 18.3* 6.9 13.0*   HGB 12.6* 12.7* 13.2* 13.0* 12.9* 13.6   * 100 99 98 98 101*    355 361 351 284 342    142  --  136 136 132*   POTASSIUM 3.4 3.6  --  4.0 4.4 4.5   CHLORIDE 106 106  --  103 101 101   CO2 29 29  --  25 26 26   BUN 42* 36*  --  29 26 28   CR 1.10 1.02  --  0.86 0.89 0.96   ANIONGAP 6 7  --  8 9 5   CHANG 8.7 8.4*  --  8.5 9.0 8.8   * 118*  --  136* 161* 127*   ALBUMIN  --   --   --   --   --  2.9*   PROTTOTAL  --   --   --   --   --  7.9   BILITOTAL  --   --   --   --   --  1.1   ALKPHOS  --   --   --   --   --  125   ALT  --   --   --   --   --  14   AST  --   --   --   --   --  18   TROPI  --   --   --   --  <0.015 <0.015       Imaging  No results found for this or any previous visit (from the past 24 hour(s)).

## 2020-12-15 NOTE — CONSULTS
[facility]    Cardiology Consultation     Date of Admission:  12/11/2020    Assessment & Plan   aDwson Jones is a 89 year old male who was admitted on 12/11/2020.    1.  Acute respiratory failure/heart failure with preserved ejection fraction  Patient was admitted with shortness of breath, hypoxia and patchy opacities on chest x-ray suspicious for pneumonia.  CRP was elevated.  However Covid test repeatedly has come back negative.  He did receive some empiric steroids for potential COVID-19 infection.  I suspect he also has an element of congestive heart failure with preserved ejection fraction and RV dysfunction.  He is responding well to IV Lasix and has lost about 7 pounds.  Likely switch to p.o. Lasix today or tomorrow.    2.  Persistent atrial fibrillation now with suboptimal rate control    He has had chronic atrial fibrillation but is ventricular rate was elevated on admission.  Rate control may be suboptimal because of ongoing infection.  It is improved a bit but still heart rates in the low 100s.  He is on apixaban.  I will continue long-acting metoprolol.  We will add some diltiazem long-acting for better rate control.    3.  Possible ESBL/E. coli UTI, on antibiotics.  Is also suspicion of bacterial pneumonia.  Per hospitalist.  We are planning to contact pleat a 7-day course of antibiotics      Melecio Dempsey MD    Primary Care Physician   Judson Mcadams    Reason for Consult   Reason for consult: I was asked by hospitalist to evaluate this patient for CHF.    History of Present Illness   Dawson Jones is a 89 year old male with a history of paroxysmal atrial fibrillation on Eliquis, cardiomyopathy, Crohn's disease, spinal stenosis who presented to the ED with worsening shortness of breath and cough concerning for COVID-19 infection.     Patient lives at home with his wife.  He was in normal state of health till about a week ago when he started having shortness of  breath that has progressively worsened.  Also endorses cough that is occasionally productive of sputum.  He also claims he had some fever and was weak and lightheaded. Denies any fevers, chills, sweats, chest pain, nausea, vomiting, headache, skin rash, abdominal pain, diarrhea.    Also had some leg edema which has improved. Also endorses chronic back pain that is unchanged.  On 12/6/2020 he had a Covid test that was negative.  Today he was seen at urgent care with worsening shortness of breath.  He had a chest x-ray done that showed bilateral patchy opacities concerning for viral/atypical infection.  Sent to the ED for further evaluation.  Patient also complained of some loss of taste and smell and therefore clinical picture was highly suspicious of COVID-19 and therefore test was repeated last evening and again it came back negative.  He has been on IV diuresis for possible congestive heart failure and diltiazem for atrial fibrillation.  He has diuresed with IV Lasix and his weight has decreased from 140 on admission 233 now.  Echocardiogram revealed low normal ejection fraction.  There is mild to moderate RV dilatation with mild to moderate RV dysfunction.     He denies smoking.  Drinks wine with his wife.  Does drive occasionally.  Ambulates with walker.    He has previously seen my partner Dr. Mota for persistent atrial fibrillation and mild nonischemic cardiomyopathy.  He has not been seen in our clinic since 2017.      Patient Active Problem List   Diagnosis     Atrial fibrillation (H)     Cardiomyopathy, unspecified type (H)     Crohn's disease of small and large intestines with complication (H)     Spinal stenosis of lumbosacral region     Vitamin B12 deficiency (non anemic)     Chronic, continuous use of opioids     Spinal stenosis     Bacteremia     History of lumbar laminectomy for spinal cord decompression     Urinary retention     Physical deconditioning     Hypoxia     Atrial fibrillation with rapid  "ventricular response (H)     Suspected COVID-19 virus infection       Past Medical History   I have reviewed this patient's medical history and updated it with pertinent information if needed.   Past Medical History:   Diagnosis Date     Atrial fibrillation (H)      Cardiomyopathy (H)      Crohn's disease of small and large intestines with complication (H)      ESBL (extended spectrum beta-lactamase) producing bacteria infection      Hyperlipidemia        Past Surgical History   I have reviewed this patient's surgical history and updated it with pertinent information if needed.  Past Surgical History:   Procedure Laterality Date     APPENDECTOMY       CYSTOSCOPY, TRANSURETHRAL RESECTION (TUR) PROSTATE, COMBINED N/A 1/24/2019    Procedure: COMBINED CYSTOSCOPY, TRANSURETHRAL RESECTION (TUR) PROSTATE;  Surgeon: Meliton Valverde MD;  Location:  OR     DECOMPRESSION LUMBAR ONE LEVEL N/A 10/1/2018    Procedure: DECOMPRESSION LUMBAR ONE LEVEL;  DECOMPRESSION L2-3 WITH COFLEX DEVICE  ;  Surgeon: Bert Reynolds MD;  Location:  OR     LASER KTP GREEN LIGHT PHOTOSELECTIVE VAPORIZATION PROSTATE N/A 1/24/2019    Procedure: LASER KTP GREEN LIGHT PHOTOSELECTIVE VAPORIZATION PROSTATE;  Surgeon: Meliton Valverde MD;  Location:  OR     ORTHOPEDIC SURGERY  10/01/2018    Bilateral L2-3 decompression and insertion of a Coflex device       Prior to Admission Medications   Prior to Admission Medications   Prescriptions Last Dose Informant Patient Reported? Taking?   BD INTEGRA SYRINGE 25G X 1\" 3 ML MISC   No No   Sig: USE WITH B-12 INJECTIONS   ELIQUIS ANTICOAGULANT 5 MG tablet 12/11/2020 at am  No Yes   Sig: TAKE 1 TABLET(5 MG) BY MOUTH TWICE DAILY   acetaminophen (TYLENOL 8 HOUR) 650 MG CR tablet 12/11/2020 at am Spouse/Significant Other Yes Yes   Sig: Take 650-1,300 mg by mouth 3 times daily as needed for mild pain or fever   cyanocobalamin (CYANOCOBALAMIN) 1000 MCG/ML injection   No Yes   Sig: INJECT 1 ML IN THE MUSCLE EVERY " 30 DAYS.   diphenoxylate-atropine (LOMOTIL) 2.5-0.025 MG tablet prn  No Yes   Sig: Take 1 tablet by mouth twice daily if needed   ipratropium (ATROVENT) 0.06 % nasal spray 12/11/2020 at am  No Yes   Sig: Spray 2 sprays into both nostrils 4 times daily   metoprolol tartrate (LOPRESSOR) 50 MG tablet 12/11/2020 at am  No Yes   Sig: TAKE 1 TABLET BY MOUTH TWICE DAILY   opium tincture 10 MG/ML (1%) liquid 12/11/2020 at am  No Yes   Sig: Take 0.2 mLs (2 mg) by mouth every 6 hours as needed for diarrhea (4 drops)      Facility-Administered Medications: None     Current Facility-Administered Medications   Medication Dose Route Frequency     apixaban ANTICOAGULANT  5 mg Oral BID     dexamethasone  6 mg Intravenous Daily     furosemide  40 mg Intravenous BID     ipratropium  2 spray Both Nostrils 4x Daily     levofloxacin  750 mg Intravenous Q24H     metoprolol succinate ER  100 mg Oral Daily     polyethylene glycol  17 g Oral Daily     senna-docusate  1 tablet Oral BID    Or     senna-docusate  2 tablet Oral BID     sodium chloride (PF)  3 mL Intracatheter Q8H     Current Facility-Administered Medications   Medication Last Rate     - MEDICATION INSTRUCTIONS -       Allergies   Allergies   Allergen Reactions     No Known Allergies        Social History    reports that he has quit smoking. He has never used smokeless tobacco. He reports current alcohol use of about 7.0 standard drinks of alcohol per week. He reports that he does not use drugs.    Family History   Family History   Problem Relation Age of Onset     Family History Negative Mother      Cardiac Sudden Death Father      Family History Negative Brother      Other - See Comments Sister         colon issues     Family History Negative Son      Family History Negative Brother      Family History Negative Son      Heart Disease No family hx of        Review of Systems   The comprehensive 10 point Review of Systems is negative other than noted in the HPI or here.  "    Physical Exam   Vital Signs with Ranges  Temp:  [97.4  F (36.3  C)-98.7  F (37.1  C)] 97.5  F (36.4  C)  Pulse:  [] 114  Resp:  [16-20] 20  BP: (114-127)/(79-98) 114/79  SpO2:  [75 %-97 %] 93 %  Wt Readings from Last 4 Encounters:   12/15/20 60.5 kg (133 lb 6.4 oz)   12/11/20 63.5 kg (140 lb)   09/11/20 65.8 kg (145 lb)   02/03/20 66.2 kg (146 lb)     I/O last 3 completed shifts:  In: 650 [P.O.:650]  Out: 975 [Urine:975]      Vitals: /79   Pulse 114   Temp 97.5  F (36.4  C) (Oral)   Resp 20   Ht 1.676 m (5' 6\")   Wt 60.5 kg (133 lb 6.4 oz)   SpO2 93%   BMI 21.53 kg/m      Constitutional:   alert   Eyes:   extra-ocular muscles intact   ENT:   atramatic   Neck:   8-10     Back:   symmetric   Lungs:   Decreased AE at bases, scatterred rales   Cardiovascular:   irregularly irregular rhythm, trace ankle edema   Abdomen:   non-distended and non-tender     Neurologic:   Motor Exam:  moves all extremities well and symmetrically   Neuropsychiatric:   Orientation: oriented to self, place, time and situation   Skin:   bruises       Recent Labs   Lab 12/12/20  0552 12/11/20  1842   TROPI <0.015 <0.015       Recent Labs   Lab 12/15/20  0538 12/14/20  0623 12/14/20  0154 12/13/20  0653 12/12/20  0552 12/11/20  1842   WBC 15.4* 15.7* 17.1* 18.3* 6.9 13.0*   HGB 12.6* 12.7* 13.2* 13.0* 12.9* 13.6   * 100 99 98 98 101*    355 361 351 284 342    142  --  136 136 132*   POTASSIUM 3.4 3.6  --  4.0 4.4 4.5   CHLORIDE 106 106  --  103 101 101   CO2 29 29  --  25 26 26   BUN 42* 36*  --  29 26 28   CR 1.10 1.02  --  0.86 0.89 0.96   GFRESTIMATED 59* 64  --  76 75 69   GFRESTBLACK 68 74  --  88 87 80   ANIONGAP 6 7  --  8 9 5   CHANG 8.7 8.4*  --  8.5 9.0 8.8   * 118*  --  136* 161* 127*   ALBUMIN  --   --   --   --   --  2.9*   PROTTOTAL  --   --   --   --   --  7.9   BILITOTAL  --   --   --   --   --  1.1   ALKPHOS  --   --   --   --   --  125   ALT  --   --   --   --   --  14   AST  --   " --   --   --   --  18   TROPI  --   --   --   --  <0.015 <0.015     Recent Labs   Lab Test 03/27/17  1136 07/28/14   CHOL 171 177  177   HDL 72 62  62   LDL 84 100  100*   TRIG 74 75  75   CHOLHDLRATIO  --  2.9     Recent Labs   Lab 12/15/20  0538 12/14/20  0623 12/14/20  0154   WBC 15.4* 15.7* 17.1*   HGB 12.6* 12.7* 13.2*   HCT 39.2* 38.5* 38.4*   * 100 99    355 361     Recent Labs   Lab 12/14/20  0325   PHV 7.48*   PO2V 49*   PCO2V 40   HCO3V 30*     Recent Labs   Lab 12/15/20  0538 12/13/20  0653 12/11/20  1842   NTBNPI 10,047* 10,823* 10,411*     Recent Labs   Lab 12/14/20  0154   DD 0.8*     Recent Labs   Lab 12/15/20  0538 12/14/20  0623 12/13/20  0653 12/11/20  1842 12/11/20  1842   SED  --   --   --   --  38*   CRP 27.1* 34.5* 73.7*   < > 146.0*    < > = values in this interval not displayed.     Recent Labs   Lab 12/15/20  0538 12/14/20  0623 12/14/20  0154    355 361     No results for input(s): TSH in the last 168 hours.  Recent Labs   Lab 12/11/20 2001   COLOR Yellow   APPEARANCE Clear   URINEGLC Negative   URINEBILI Negative   URINEKETONE Negative   SG 1.010   UBLD Negative   URINEPH 5.0   PROTEIN Negative   NITRITE Positive*   LEUKEST Large*   RBCU 2   WBCU 53*       Imaging:  Recent Results (from the past 48 hour(s))   XR Chest Port 1 View    Narrative    EXAM: CHEST SINGLE VIEW PORTABLE  LOCATION: [facility]  DATE/TIME: 12/14/2020 1:40 AM    INDICATION: Worsening hypoxia.  COMPARISON: 12/11/2020.      Impression    IMPRESSION:   1. Patchy opacities scattered within both lungs, most prominent in the lateral aspect of the upper right lung. These are nonspecific, but most likely infectious or inflammatory in etiology. The opacities in the upper right lung may have mildly increased   since the comparison study. There has been no other convincing interval change since the recent comparison study.  2. Possible cardiomegaly again noted.       Echo:  No results found  for this or any previous visit (from the past 4320 hour(s)).

## 2020-12-15 NOTE — PLAN OF CARE
"/85 (BP Location: Left arm)   Pulse 105   Temp 98.7  F (37.1  C) (Axillary)   Resp 20   Ht 1.676 m (5' 6\")   Wt 61.4 kg (135 lb 4.8 oz)   SpO2 91%   BMI 21.84 kg/m      Tele A fib, rates 's. Oxygen saturation had been sating ok on 2 L NC but at 0320 patient desat to 75% after having taken off his oxygen. Took a long time to recover on 10L NC. After 20 minutes was able to wean back down to 4L NC. Patient A&Ox self only. States he is in [place]. Bed and chair alarm active. Impulsive. Frequently disconnects oxygen, pulse ox, tele and removed IV. Mitts attempted, but patient also removed. This AM is leaving line and tele in place. New IV replaced for intermittent antibiotics. PO intake encouraged. Poor intake noted. Mepilex to coccyx for blanchable redness. Ambulates with one assist, gait belt and walker. Contingent of urine. No BM this shift. Plan for cardiology consult today. Therapy recommends TCU for discharge, family would like patient to go home with home RN.   "

## 2020-12-16 NOTE — PROGRESS NOTES
[facility]    Hospitalist Progress Note    Date of Admission:  12/11/2020    Assessment & Plan     Dawson Jones is a 89 year old male with a history of paroxysmal atrial fibrillation on Eliquis, cardiomyopathy, Crohn's disease, spinal stenosis who presented to the ED with worsening shortness of breath and cough.     COVID-19 negative x3  Acute hypoxic respiratory failure likely secondary to pneumonia versus pulmonary edema, more likely the latter  Lactic acidosis  Leukocytosis likely due to dexamethasone  Patient has been symptomatic with shortness of breath, cough, loss of taste and smell, weakness and occasional lightheadedness over the last 1 week.  Tested negative for Covid on 12/6/2020, at admission and on 12/14/2020.  At presentation, chest x-ray showing bilateral patchy infiltrates concerning for viral/atypical pneumonia versus fluid.  He was hypoxic to 79% on room air, desats with minimal activity.  Leukocytosis to 13.  Elevated CRP at 146.  Elevated BNP at 10,411.  Elevated lactate of 2.7.  IV Lasix, IV Zosyn, IV diltiazem and IV dexamethasone given in the ED.  -Admit as inpatient  -Continue oxygen supplementation.  Wean oxygen as able.  Currently on 2-4 L nasal cannula oxygen.  -procalcitonin was low at 0.1.    Was started on Zosyn at admission.  UA appears infected.  Blood cultures with NGTD and urine cultures with 10-50,000 colonies of E. coli.  Zosyn was switched to ceftriaxone and azithromycin which was subsequently switched to levofloxacin for treatment of possible bacterial pneumonia.  However procalcitonin recheck had decreased further to 0.05 making ongoing significant bacterial infection unlikely.  We will plan to finish a total course of 7 days of antibiotics for possible bacterial pneumonia.  -Received IV dexamethasone 6 mg daily since admission.  This was discontinued on 12/15/2020 as COVID-19 testing returned negative for a third time on 12/14/2020.  Discontinued  special precautions.  -Getting twice daily IV Lasix for likely heart failure exacerbation as noted below     Hematuria, ESBL E. coli in urine  UA appears infected at admission.  He also developed some pink-tinged urine.  He is anticoagulated with Eliquis.  He denies any dysuria.  Urine cultures with 10-50,000 colonies of E. coli, ESBL  -No fevers, procalcitonin is very low, does not endorse any urinary symptoms.  Has had ESBL E. coli in urine cultures multiple times in the past.  Suspect colonization.  This does not look like true UTI.  Hence will not treat.  Hematuria has cleared up.    Atrial fibrillation with RVR  HFpEF with acute exacerbation likely due to A. fib RVR  Pulmonary edema  Hyponatremia  Appeared volume overloaded on exam initially.  BNP elevated at 42363, increased [place] 14378 the next day.  Last echo on file was in 2015 that showed normal EF, dilated left atrium.  Received 40 mg IV Lasix in the ED and 20 mg IV diltiazem bolus as well.  -Cardiology consulted.  -Continued PTA metoprolol , increased dose to 75 mg twice daily, subsequently switched to Toprol- mg daily on 12/15/2020.  Also continue Eliquis twice daily.  Started on diltiazem 120 mg daily on 12/15/2020 per cardiology to optimize rate control.  -TTE obtained shows borderline low EF at 50 to 55%, diastolic function not assessed due to A. fib.  Pulmonary hypertension noted.  Mildly decreased RV systolic function.  -Continued Lasix 40 mg IV twice daily.   Switched to p.o. Lasix 40 mg daily starting from 12/17/2020 per cardiology.  -Monitor BMP, intake and output.    Confusion, suspect hospital  Delirium  ?  Underlying mild dementia  Patient appeared more confused on 12/14/2020.  Nurses report that he is quite forgetful.  Needs frequent reorientation.  Keeps getting up from chair/bed in his room and setting off alarms.  -Continue to monitor  -Minimize sedating medication/narcotics  -Maintain sleep/wake cycle, minimize tethers as  able  -Mentation appears much better on 12/16/2020    Crohn's disease  -Stable, not on any medications at this time.     Spinal stenosis, chronic back pain  -Stable.  Continue as needed Tylenol.  May order PTA tramadol if having increased pain.     Non severe malnutrition  Nutrition following.     DVT Prophylaxis: On Eliquis  Code Status: Full Code  Expected discharge:  Therapy is recommending TCU.  However family wants to take patient home.  Anticipate discharge to home with home cares and home oxygen on 12/17/2020.    Total time spent in patient encounter: 35 minutes  Greater than 50% time spent in patient and family counseling regarding current diagnoses, discharge planning and care coordination.    Christina Fajardo MD  Text Page (7am - 6pm, M-F)    Interval History   Patient had a better night overnight.  Mentation appears better today.  He himself admits to feeling somewhat confused at times still.  Breathing is stable.  Still on oxygen.  No chest pain.  No leg swelling.  No fevers.    -Data reviewed today: I reviewed all new labs and imaging results over the last 24 hours. I personally reviewed EKG with result as noted above and CXR with result as noted above    Physical Exam   Temp: 97.8  F (36.6  C) Temp src: Oral BP: 105/85 Pulse: 105   Resp: 18 SpO2: 94 % O2 Device: None (Room air) Oxygen Delivery: 3 LPM  Vitals:    12/14/20 0203 12/15/20 0455 12/16/20 0657   Weight: 61.4 kg (135 lb 4.8 oz) 60.5 kg (133 lb 6.4 oz) 55.1 kg (121 lb 8 oz)     Vital Signs with Ranges  Temp:  [97.4  F (36.3  C)-98  F (36.7  C)] 97.8  F (36.6  C)  Pulse:  [] 105  Resp:  [18-20] 18  BP: (105-123)/(77-94) 105/85  SpO2:  [88 %-98 %] 94 %  I/O last 3 completed shifts:  In: 423 [P.O.:270; I.V.:153]  Out: 1850 [Urine:1850]    Constitutional: Alert, appears more oriented today, comfortable, no distress  Respiratory: Non labored breathing, crackles at bases  Cardiovascular: Heart sounds IR IR, no murmurs, no leg edema  GI: Abdomen  is soft, non distended, non tender. Normal BS  Skin/Integumen: no rashes, no pressure sores  Neuro: alert, confused, forgetful, moving all extremities, fluent speech, no facial asymmetry  Psych: Calm    Medications     - MEDICATION INSTRUCTIONS -         apixaban ANTICOAGULANT  2.5 mg Oral BID     diltiazem ER COATED BEADS  120 mg Oral Daily     [START ON 12/17/2020] furosemide  40 mg Oral Daily     ipratropium  2 spray Both Nostrils 4x Daily     [START ON 12/18/2020] levofloxacin  750 mg Intravenous Q48H     metoprolol succinate ER  100 mg Oral Daily     polyethylene glycol  17 g Oral Daily     senna-docusate  1 tablet Oral BID    Or     senna-docusate  2 tablet Oral BID     sodium chloride (PF)  3 mL Intracatheter Q8H       Data   Recent Labs   Lab 12/16/20  0557 12/15/20  1442 12/15/20  0538 12/14/20  0623 12/14/20  0154 12/12/20  0552 12/12/20  0552 12/11/20  1842   WBC  --   --  15.4* 15.7* 17.1*   < > 6.9 13.0*   HGB  --   --  12.6* 12.7* 13.2*   < > 12.9* 13.6   MCV  --   --  101* 100 99   < > 98 101*   PLT  --   --  326 355 361   < > 284 342   *  --  141 142  --    < > 136 132*   POTASSIUM 3.5 3.7 3.4 3.6  --    < > 4.4 4.5   CHLORIDE 107  --  106 106  --    < > 101 101   CO2 31  --  29 29  --    < > 26 26   BUN 42*  --  42* 36*  --    < > 26 28   CR 1.05  --  1.10 1.02  --    < > 0.89 0.96   ANIONGAP 7  --  6 7  --    < > 9 5   CHANG 9.0  --  8.7 8.4*  --    < > 9.0 8.8   *  --  111* 118*  --    < > 161* 127*   ALBUMIN  --   --   --   --   --   --   --  2.9*   PROTTOTAL  --   --   --   --   --   --   --  7.9   BILITOTAL  --   --   --   --   --   --   --  1.1   ALKPHOS  --   --   --   --   --   --   --  125   ALT  --   --   --   --   --   --   --  14   AST  --   --   --   --   --   --   --  18   TROPI  --   --   --   --   --   --  <0.015 <0.015    < > = values in this interval not displayed.       Imaging  No results found for this or any previous visit (from the past 24 hour(s)).

## 2020-12-16 NOTE — PROGRESS NOTES
SPIRITUAL HEALTH SERVICES Progress Note  [facility]    Referral Source: Length of Stay    PT not in room on attempt x3.    Follow-up with PT in the next 1-2 days.      Camron Bustamante  Chaplain Resident

## 2020-12-16 NOTE — PROGRESS NOTES
"Cardiology Progress Note  Melecio Dempsey MD         Assessment and Plan:      1.  Acute respiratory failure/heart failure with preserved ejection fraction  Patient was admitted with shortness of breath, hypoxia and patchy opacities on chest x-ray suspicious for pneumonia.  CRP was elevated.  However Covid test repeatedly has come back negative.  He did receive some empiric steroids for potential COVID-19 infection.  I suspect he also has an element of congestive heart failure with preserved ejection fraction and RV dysfunction.  Responded very well with the diuretics.  Now having some hyponatremia.  Will stop IV Lasix and switch to Lasix 40 mg daily.    2.  Persistent atrial fibrillation now with suboptimal rate control   He has had chronic atrial fibrillation but his ventricular rate was elevated on admission.  Rate control may be suboptimal because of ongoing infection.  It is improved a bit but still heart rates in the low 100s.  He is on apixaban.  I will continue long-acting metoprolol.    With addition of long-acting diltiazem, rates are better controlled.  Given that his weight is now below 60 kg, he needs apixaban at a lower dose.      3.  Possible ESBL/E. coli UTI, on antibiotics.  Is also suspicion of bacterial pneumonia.  Per hospitalist.  We are planning to contact pleat a 7-day course of antibiotics     Patient is doing well from cardiac perspective.  He can follow-up with cardiology with [facility] in 2 weeks.  He should also follow with his PMD within a week.  We will sign off.  Recall if questions     Melecio Dempsey MD                   Interval History:   Feeling better.          Review of Systems:   The 5 point Review of Systems is negative other than noted in the HPI          Physical Exam:        Blood pressure 105/85, pulse 105, temperature 97.8  F (36.6  C), temperature source Oral, resp. rate 18, height 1.676 m (5' 6\"), weight 55.1 kg (121 lb 8 oz), SpO2 94 %.  Vitals:    12/14/20 0203 " 12/15/20 0455 12/16/20 0657   Weight: 61.4 kg (135 lb 4.8 oz) 60.5 kg (133 lb 6.4 oz) 55.1 kg (121 lb 8 oz)     Weights since admission  Vitals:    12/11/20 1934 12/12/20 0009 12/13/20 0258 12/14/20 0203   Weight: 63.5 kg (140 lb) 63.5 kg (140 lb) 62.2 kg (137 lb 3.2 oz) 61.4 kg (135 lb 4.8 oz)    12/15/20 0455 12/16/20 0657   Weight: 60.5 kg (133 lb 6.4 oz) 55.1 kg (121 lb 8 oz)     Vital Signs with Ranges  Temp:  [97.4  F (36.3  C)-98  F (36.7  C)] 97.8  F (36.6  C)  Pulse:  [] 105  Resp:  [18-20] 18  BP: (105-123)/(77-94) 105/85  SpO2:  [88 %-98 %] 94 %  I/O's Last 24 hours  I/O last 3 completed shifts:  In: 423 [P.O.:270; I.V.:153]  Out: 1850 [Urine:1850]  Net I/O since admission  12/11 0700 - 12/16 0659  In: 1733 [P.O.:1580; I.V.:153]  Out: 4920 [Urine:4920]  Net: -3187    EXAM:    Frail, edema improved.  JVP not raised.  Chest auscultation decreased air entry at the bases.  Regular S1-S2 without significant murmurs.         Medications:          apixaban ANTICOAGULANT  2.5 mg Oral BID     diltiazem ER COATED BEADS  120 mg Oral Daily     [START ON 12/17/2020] furosemide  40 mg Oral Daily     ipratropium  2 spray Both Nostrils 4x Daily     [START ON 12/18/2020] levofloxacin  750 mg Intravenous Q48H     metoprolol succinate ER  100 mg Oral Daily     polyethylene glycol  17 g Oral Daily     senna-docusate  1 tablet Oral BID    Or     senna-docusate  2 tablet Oral BID     sodium chloride (PF)  3 mL Intracatheter Q8H            Data:      All new lab and imaging data was reviewed.   Recent Labs   Lab Test 12/15/20  0538 12/14/20  0623 12/14/20  0154   WBC 15.4* 15.7* 17.1*   HGB 12.6* 12.7* 13.2*   * 100 99    355 361      Recent Labs   Lab Test 12/16/20  0557 12/15/20  1442 12/15/20  0538 12/14/20  0623   *  --  141 142   POTASSIUM 3.5 3.7 3.4 3.6   CHLORIDE 107  --  106 106   CO2 31  --  29 29   BUN 42*  --  42* 36*   CR 1.05  --  1.10 1.02   ANIONGAP 7  --  6 7   CHANG 9.0  --  8.7 8.4*    *  --  111* 118*     Recent Labs   Lab Test 12/12/20  0552 12/11/20  1842   TROPI <0.015 <0.015              Imaging:   No results found for this or any previous visit (from the past 24 hour(s)).

## 2020-12-16 NOTE — PLAN OF CARE
VSS, 3 L NC. Difficult to get accurate 02 sats, spot check. Pt and family hopeful to discharge tomorrow. Changed to PO lasix. Not impulsive today, monitor overnight. No diarrhea today after lomotil given yesterday. RN updated son pat. 8 beat run of VT at 1600, asymptomatic.

## 2020-12-16 NOTE — PLAN OF CARE
[id number]: A&Ox4, frequently forgetful. Impusive. [place]. VSS on 4-6L NC. Tele afib. Denies CP. PETERS. LS fine crackles throughout. Poor appetite. ESBL, contact precautions. Up with 1 assist with walker. Had diarrhea during day, given prn lomotil x1.

## 2020-12-17 NOTE — PLAN OF CARE
[id number]: A&O to person and time. Forgetful to place and situation at bedtime. [place]. VSS on 3-4L. Denies CP. PETERS. LS fine crackles in bases. Tele afib CVR/RVR 90-110s. Poor appetite, encouraged to eat. ESBL precautions. Up with 1 assist with walker. Having small amts of diarrhea, hx of crohns. Up to bathroom frequently during night.

## 2020-12-17 NOTE — PLAN OF CARE
HR stable, held dilt and metop this am for low BP. HR  at rest, up to 130's with activity. DEBBI, lasix held as well. Infrequent runs of VT over the last few days. None today. Appetite remains poor. Forgetful, states confused but he is oriented to self, place, situation -just not time. His confusion does seem to worsen at night. Pt hopes to discharge tomorrow with [facility] if creat is moving in the right direction.

## 2020-12-17 NOTE — PROGRESS NOTES
[facility]    Medicine Progress Note - Hospitalist Service       Date of Admission:  12/11/2020  Assessment & Plan   Dawson Jones is a 89 year old male with a history of paroxysmal atrial fibrillation on Eliquis, cardiomyopathy, Crohn's disease, spinal stenosis who presented to the ED with worsening shortness of breath and cough.     COVID-19 negative x3  Acute hypoxic respiratory failure likely secondary to pneumonia versus pulmonary edema, more likely the latter  Lactic acidosis, resolved  Leukocytosis likely due to dexamethasone  Symptomatic with SOB, cough, loss of taste and smell, weakness and occasional lightheadedness over x1 wk PTA. Tested negative for Covid 12/6, 12/11, 12/14. On admit, CXR with bilateral patchy infiltrates concerning for viral/atypical pneumonia versus fluid. Hypoxic to 79% on room air, desats with minimal activity. Leukocytosis 13. Elevated CRP at 146.  Elevated BNP 10k. Elevated lactate 2.7. IV Lasix, IV Zosyn, IV diltiazem and IV dexamethasone given in the ED.  - Continue oxygen supplementation.  Wean oxygen as able.  Currently on 2-4 L nasal cannula oxygen.  - procalcitonin was low at 0.1. Started Zosyn at admission. UA appears infected. Blood cultures with NGTD and urine cultures with 10-50k colonies of E. coli.  Zosyn switched to ceftriaxone and azithromycin which was subsequently switched to levofloxacin for treatment of possible bacterial pneumonia.  However procalcitonin recheck had decreased further to 0.05 making ongoing significant bacterial infection unlikely. Planning to finish a total course of 7 days of antibiotics for possible bacterial pneumonia.  - Received IV dexamethasone 6 mg daily which was discontinued 12/15 as COVID-19 testing returned negative for a third time on 12/14/2020. Discontinued special precautions.  - Getting twice daily IV Lasix for likely heart failure exacerbation as noted below which was switched to PO 12/16 and held on  12/17 due to DEBBI    Acute kidney injury 12/17  Cr bump from 0.86-->1.05-->1.67. Diuresis has been titrated. Transient bout of hypotension overnight and AM 12/17. Decreased Eliquis dosing to 2.5mg BID. Most med changes done x2 days ago. IV Lasix 40mg/d --> 40mg BID --> and changed to PO for 12/17 but has not yet received dose. ? Over diuresis in combination with ongoing diarrhea from Crohns. No diuretics PTA.   - Recheck in AM, holding Lasix for today  - Hold parameters on antihypertensives    Results for KING HILTON (MRN [id number]) as of 12/17/2020 07:40   12/13/2020 06:53 12/14/2020 06:23 12/15/2020 05:38 12/16/2020 05:57 12/17/2020 06:14   Creatinine 0.86 1.02 1.10 1.05 1.67 (H)        Hematuria  ESBL E. coli in urine  UA appears infected at admission.  He also developed some pink-tinged urine.  He is anticoagulated with Eliquis.  He denies any dysuria.  Urine cultures with 10-50,000 colonies of E. coli, ESBL  -No fevers, procalcitonin is very low, does not endorse any urinary symptoms.  Has had ESBL E. coli in urine cultures multiple times in the past.  Suspect colonization.  This does not look like true UTI.  Hence will not treat.  Hematuria has cleared up.     Atrial fibrillation with RVR  HFpEF with acute exacerbation likely due to A. fib RVR  Pulmonary edema  Hyponatremia  Appeared volume overloaded on exam initially.  BNP elevated at 01321, increased to 41717 the next day.  Last echo on file was in 2015 that showed normal EF, dilated left atrium.  Received 40 mg IV Lasix in the ED and 20 mg IV diltiazem bolus as well.  - Cardiology on board  - Continued PTA metoprolol , increased dose to 75 mg twice daily, subsequently switched to Toprol- mg daily 12/15, hold parameters in place  - Continue PTA Eliquis twice daily.   - Started on diltiazem 120 mg daily 12/15 per cardiology to optimize rate control.  - TTE obtained shows borderline low EF at 50 to 55%, diastolic function not assessed due to A.  fib.  Pulmonary hypertension noted.  Mildly decreased RV systolic function.  - Continued Lasix 40 mg IV twice daily.   Switched to p.o. Lasix 40 mg daily starting from 12/17/2020 per cardiology.  - Monitor BMP, intake and output    Intake/Output Summary (Last 24 hours) at 12/17/2020 0738  Last data filed at 12/17/2020 0732  Gross per 24 hour   Intake 283 ml   Output 900 ml   Net -617 ml     Wt Readings from Last 4 Encounters:   12/17/20 58.1 kg (128 lb 1.6 oz)   12/11/20 63.5 kg (140 lb)   09/11/20 65.8 kg (145 lb)   02/03/20 66.2 kg (146 lb)        Confusion, suspect hospital delirium  ?  Underlying mild dementia  Patient appeared more confused on 12/14/2020.  Nurses report that he is quite forgetful.  Needs frequent reorientation.  Keeps getting up from chair/bed in his room and setting off alarms.  -Continue to monitor  -Minimize sedating medication/narcotics  -Maintain sleep/wake cycle, minimize tethers as able  -Mentation appears much better on 12/16 and 12/17     Crohn's disease  -Stable, not on any medications at this time.     Spinal stenosis, chronic back pain  -Stable.  Continue as needed Tylenol.  May order PTA tramadol if having increased pain.     Non severe malnutrition  Nutrition following.     Diet: Combination Diet Regular Diet Adult  Snacks/Supplements Adult: Boost Plus; With Meals  Snacks/Supplements Adult: Boost Shake; Between Meals    DVT Prophylaxis: DOAC  Mcdowell Catheter: in place, indication:    Code Status: Full Code      Disposition Plan   Expected discharge: 1-2 days, recommended to prior living arrangement once renal function improved and diuretic plan in place.  Entered: Raven Encarnacion PA-C 12/17/2020, 10:35 AM       The patient's care was discussed with the Attending Physician, Dr. Reynolds, Bedside Nurse, Patient and Patient's Family.    Raven Raines)JOSELUIS  Hospitalist Service  [facility]  "Hospital    ______________________________________________________________________    Interval History   Seen and examined. Still requiring 3L NC. Mild bout hypotension overnight and this AM improving with holding of diuretics and antihypertensives, new DEBBI. No lightheadedness, dizziness, SOB, CP, or heart palpitations. No abd pain. Feeling \"fine\", ate Bfast. Very disappointed we are holding discharge today, asked if he can leave anyway. Unless goals of care change this would be AMA, he would like to stay in that case and continue with cares. Called and updated wife who is very understanding. No confusion noted today,     Data reviewed today: I reviewed all medications, new labs and imaging results over the last 24 hours. I personally reviewed no images or EKG's today.    Physical Exam   Vital Signs: Temp: 98.4  F (36.9  C) Temp src: Axillary BP: (!) 86/53 Pulse: 110   Resp: 18 SpO2: 91 % O2 Device: Nasal cannula with humidification Oxygen Delivery: 3 LPM  Weight: 128 lbs 1.6 oz    General: Awake, alert, [place] elderly thin male who appears stated age. Looks comfortable sitting up in chair. No acute distress.  HEENT: Normocephalic, atraumatic. Extraocular movements intact.   Respiratory: Clear to auscultation bilaterally, no rales, wheezing, or rhonchi, dim to bases. 3L NC in place, no increased WOB.  Cardiovascular: Regular rate and rhythm, +S1 and S2, no murmur auscultated. No peripheral edema.   Gastrointestinal: Soft, non-tender, non-distended. Bowel sounds present.  Skin: Warm, dry. No obvious rashes or lesions on exposed skin. Dorsalis pedis pulses palpable bilaterally.  Musculoskeletal: No joint swelling, erythema or tenderness. Moves all extremities equally.  Neurologic: AAO x2-3. Cranial nerves 2-12 grossly intact, normal strength and sensation.  Psychiatric: Appropriate mood and affect. No obvious anxiety or depression. Very [place], lots of repeat information due to this. Does not appear confused today.    Data "   Recent Labs   Lab 12/17/20  0614 12/16/20  0557 12/15/20  1442 12/15/20  0538 12/14/20  0623 12/12/20  0552 12/12/20  0552 12/11/20  1842   WBC 15.2*  --   --  15.4* 15.7*   < > 6.9 13.0*   HGB 13.2*  --   --  12.6* 12.7*   < > 12.9* 13.6   MCV 99  --   --  101* 100   < > 98 101*     --   --  326 355   < > 284 342    145*  --  141 142   < > 136 132*   POTASSIUM 3.7 3.5 3.7 3.4 3.6   < > 4.4 4.5   CHLORIDE 105 107  --  106 106   < > 101 101   CO2 31 31  --  29 29   < > 26 26   BUN 59* 42*  --  42* 36*   < > 26 28   CR 1.67* 1.05  --  1.10 1.02   < > 0.89 0.96   ANIONGAP 6 7  --  6 7   < > 9 5   CHANG 8.5 9.0  --  8.7 8.4*   < > 9.0 8.8   * 113*  --  111* 118*   < > 161* 127*   ALBUMIN  --   --   --   --   --   --   --  2.9*   PROTTOTAL  --   --   --   --   --   --   --  7.9   BILITOTAL  --   --   --   --   --   --   --  1.1   ALKPHOS  --   --   --   --   --   --   --  125   ALT  --   --   --   --   --   --   --  14   AST  --   --   --   --   --   --   --  18   TROPI  --   --   --   --   --   --  <0.015 <0.015    < > = values in this interval not displayed.     No results found for this or any previous visit (from the past 24 hour(s)).  Medications     - MEDICATION INSTRUCTIONS -         apixaban ANTICOAGULANT  2.5 mg Oral BID     diltiazem ER COATED BEADS  120 mg Oral Daily     [Held by provider] furosemide  40 mg Oral Daily     ipratropium  2 spray Both Nostrils 4x Daily     [START ON 12/18/2020] levofloxacin  750 mg Intravenous Q48H     metoprolol succinate ER  100 mg Oral Daily     polyethylene glycol  17 g Oral Daily     senna-docusate  1 tablet Oral BID    Or     senna-docusate  2 tablet Oral BID     sodium chloride (PF)  3 mL Intracatheter Q8H

## 2020-12-17 NOTE — PROGRESS NOTES
[facility]    Cardiology Progress Note    Date of Service: 12/17/2020     Assessment & Plan   Dawson Jones is a 89 year old male with past medical history of paroxsymal atrial fibrillation, CM, Crohn's disease, spinal stenosis. This patient was admitted on 12/11/2020 with symptoms of SOB and cough      1.  Acute respiratory failure/heart failure with preserved ejection fraction  EF 50-55%; RV moderately reduced/moderate pulmonary hypertension/ moderate TR  Patient was admitted with shortness of breath, hypoxia and patchy opacities on chest x-ray suspicious for pneumonia.  CRP was elevated.  However Covid test repeatedly has come back negative.  He did receive some empiric steroids for potential COVID-19 infection.  -suspect he also has an element of congestive heart failure with preserved ejection fraction and RV dysfunction.    -Responded very well with the diuretics  -weight down 12 pounds  -hyponatremia improved.    -oral lasix 40mg daily held this am-would not give today  -creatinine up to 1.6 from 1.02; BUN 59  -RN has called IM, may need fluid bolus        2.  Persistent atrial fibrillation now with suboptimal rate control    He has had chronic atrial fibrillation but his ventricular rate was elevated on admission.    -Rate control may be suboptimal because of ongoing infection.     Continue apixaban 2.5mg bid,  long-acting metoprolol and long-acting diltiazem  Given that his weight is now below 60 kg and age 89,  he needs apixaban at a lower dose which he is on    BP 86 this am, likely dry    3. Runs of NSVT last evening and this am; asymptomatic  Potassium low normal  Check magnesium--2.0   nuke 2010 fixed apical to basal inferior/inferolateral defect likely attenuation  No ischemia    4.  Possible ESBL/E. coli UTI, on antibiotics.  Is also suspicion of bacterial pneumonia.  Per hospitalist; planning to contact pleat a 7-day course of antibiotics     Melecio Dempsey  MD Roma Fox, MSN, APRN, CNP  [facility]    Interval History   Feels good; denies shortness of breath; no chest pain or palpitations  Denies lightheadedness  Review of Systems:  The Review of Systems is negative other than noted in the HPI    Physical Exam   Temp: 98.4  F (36.9  C) Temp src: Axillary BP: (!) 86/53 Pulse: 110   Resp: 18 SpO2: 91 % O2 Device: Nasal cannula with humidification Oxygen Delivery: 3 LPM  Vitals:    12/15/20 0455 12/16/20 0657 12/17/20 0620   Weight: 60.5 kg (133 lb 6.4 oz) 57.8 kg (127 lb 8 oz) 58.1 kg (128 lb 1.6 oz)     Vital Signs with Ranges  Temp:  [97.3  F (36.3  C)-98.4  F (36.9  C)] 98.4  F (36.9  C)  Pulse:  [] 110  Resp:  [18-22] 18  BP: ()/(53-74) 86/53  SpO2:  [91 %-95 %] 91 %  I/O last 3 completed shifts:  In: 283 [P.O.:280; I.V.:3]  Out: 800 [Urine:800]    Constitutional     alert and oriented, in no acute distress.[place]; looks frail     Skin     warm and dry to touch    ENT     no pallor or cyanosis    Neck    Supple, JVP normal, no carotid bruit    Chest     no tenderness to palpation     Lungs  Decreased breath sounds at bases    Cardiac  Irregularly-irregular rhythm, S1 normal, S2 normal, No S3 or S4, no murmurs, no rubs    Abdomen     abdomen soft, bowel sounds normoactive, no hepatosplenomegaly    Extremities and Back     no clubbing, cyanosis. No edema observed.        Neurological     no gross motor deficits noted, affect appropriate, oriented to time, person and place.        Medications     - MEDICATION INSTRUCTIONS -         apixaban ANTICOAGULANT  2.5 mg Oral BID     diltiazem ER COATED BEADS  120 mg Oral Daily     [Held by provider] furosemide  40 mg Oral Daily     ipratropium  2 spray Both Nostrils 4x Daily     [START ON 12/18/2020] levofloxacin  750 mg Intravenous Q48H     metoprolol succinate ER  100 mg Oral Daily     polyethylene glycol  17 g Oral Daily     senna-docusate  1 tablet Oral BID    Or     senna-docusate  2 tablet Oral  BID     sodium chloride (PF)  3 mL Intracatheter Q8H          Data:     ROUTINE IP LABS (Last four results)  BMP  Recent Labs   Lab 12/17/20  0614 12/16/20  0557 12/15/20  1442 12/15/20  0538 12/14/20  0623    145*  --  141 142   POTASSIUM 3.7 3.5 3.7 3.4 3.6   CHLORIDE 105 107  --  106 106   CHANG 8.5 9.0  --  8.7 8.4*   CO2 31 31  --  29 29   BUN 59* 42*  --  42* 36*   CR 1.67* 1.05  --  1.10 1.02   * 113*  --  111* 118*     CHOLESTEROL/HEPATIC  Recent Labs   Lab 12/11/20  1842   ALT 14   AST 18     CBC  Recent Labs   Lab 12/17/20  0614 12/15/20  0538 12/14/20  0623 12/14/20  0154   WBC 15.2* 15.4* 15.7* 17.1*   RBC 3.99* 3.87* 3.87* 3.89*   HGB 13.2* 12.6* 12.7* 13.2*   HCT 39.5* 39.2* 38.5* 38.4*   MCV 99 101* 100 99   MCH 33.1* 32.6 32.8 33.9*   MCHC 33.4 32.1 33.0 34.4   RDW 11.9 11.8 11.9 11.9    326 355 361     TROP:   Recent Labs   Lab 12/12/20  0552 12/11/20  1842   TROPI <0.015 <0.015      BNP:    Recent Labs   Lab 12/15/20  0538 12/13/20  0653 12/11/20  1842   NTBNPI 10,047* 10,823* 10,411*     INRNo lab results found in last 7 days.  TSH   Date Value Ref Range Status   03/27/2017 1.06 0.40 - 4.00 mU/L Final       EKG results:  Reviewed if available     Imaging:  No results found for this or any previous visit (from the past 24 hour(s)).  Telemetry:

## 2020-12-18 PROBLEM — N17.9 ACUTE KIDNEY FAILURE, UNSPECIFIED (H): Status: ACTIVE | Noted: 2020-01-01

## 2020-12-18 NOTE — PROGRESS NOTES
Care Management Follow Up    Length of Stay (days): 7    Expected Discharge Date: 12/18/20(-12/19)     Concerns to be Addressed:     Discharge planning, homecare on dishcarge  Patient plan of care discussed at interdisciplinary rounds: Yes    Anticipated Discharge Disposition:  Home with homecare     Anticipated Discharge Services:  homecare RN/PT   Anticipated Discharge DME:  Likely new home oxygen    Patient/family educated on Medicare website which has current facility and service quality ratings:  yes  Education Provided on the Discharge Plan:  yes  Patient/Family in Agreement with the Plan:  yes    Referrals Placed by CM/SW:  [facility]  Private pay costs discussed: Not applicable    Additional Information:  Email referral sent to [facility] to update them on patient's likely discharge tomorrow.    Will continue to follow.      AVANI More, LICSW  Lead   [phone]  [facility]      MARY Rios

## 2020-12-18 NOTE — PLAN OF CARE
Neuro- A/O x2, disoriented to situation and time   Most Recent Vitals- Temp: 98.3  F (36.8  C) Temp src: Axillary BP: 112/82 Pulse: 110   Resp: 20 SpO2: 94 % O2 Device: Nasal cannula Oxygen Delivery: 3 LPM  Tele/Cardiac- A-Fib CVR/ RVR w/ activity HR in 120's -130's when UOOB.  Resp- 3L NC   Activity- SBA, GB, walker   Pain- denies pain, no CP, but PETERS  Drips- IV SL   Skin- bruised, pale but warm. Jose to BUE.   GI/- voiding adequately but can be incontinent.   Aggression Color- Green  [facility]- will continue to monitor    BERNADETTE PRADHAN RN

## 2020-12-18 NOTE — PROGRESS NOTES
Patient has been assessed for Home Oxygen needs. Oxygen readings:    *Pulse oximetry (SpO2) = 86% on room air at rest while awake.    *SpO2 improved to 90% on 3 liters/minute at rest.    *SpO2 = 73% on room air during activity/with exercise. (Walking to the bathroom)     *SpO2 improved to 82 % on 6   liters/minute during activity/with exercise.

## 2020-12-18 NOTE — PROGRESS NOTES
[facility]    Medicine Progress Note - Hospitalist Service       Date of Admission:  12/11/2020  Assessment & Plan   Dawson Jones is a 89 year old male with a history of paroxysmal atrial fibrillation on Eliquis, cardiomyopathy, Crohn's disease, spinal stenosis who presented to the ED with worsening shortness of breath and cough.    **Preliminary discharge orders in place on 12/18 including home care and O2 orders**     COVID-19 negative x3  Acute hypoxic respiratory failure likely secondary to pneumonia versus pulmonary edema, more likely the latter  Lactic acidosis, resolved  Leukocytosis likely due to dexamethasone, improving  Symptomatic with SOB, cough, loss of taste and smell, weakness and occasional lightheadedness over x1 wk PTA. Tested negative for Covid 12/6, 12/11, 12/14. On admit, CXR with bilateral patchy infiltrates concerning for viral/atypical pneumonia versus fluid. Hypoxic to 79% on room air, desats with minimal activity. Leukocytosis 13. Elevated CRP at 146.  Elevated BNP 10k. Elevated lactate 2.7. IV Lasix, IV Zosyn, IV diltiazem and IV dexamethasone given in the ED.  - Continue oxygen supplementation.  Wean oxygen as able.  Currently on 2-4 L nasal cannula oxygen.  - procalcitonin was low at 0.1. Started Zosyn at admission. UA appears infected. Blood cultures with NGTD and urine cultures with 10-50k colonies of E. coli.  Zosyn switched to ceftriaxone and azithromycin which was subsequently switched to levofloxacin for treatment of possible bacterial pneumonia.  However procalcitonin recheck had decreased further to 0.05 making ongoing significant bacterial infection unlikely. Planning to finish a total course of 7 days of antibiotics for possible bacterial pneumonia.  Received IV Lasix 40mg BID for likely heart failure exacerbation as noted below which was switched to PO 12/16 and discontinued 12/17 due to DEBBI and over diuresis  Received IV dexamethasone 6 mg daily  which was discontinued 12/15 as COVID-19 testing returned negative for a third time on 12/14/2020 and discontinued special precautions.  - SW aware, will need to discharge home with home O2 3L and home RN/PT/OT, orders placed 12/17  - Ambulatory O2 study with RN 12/17  - Set up for PCP appt with  Dr. Mcadams 12/21 at 0900  - Cardiology appt being set up by Care coordinator for 1-2 weeks after discharge     Acute kidney injury 12/17, improving  Cr bump from 0.86-->1.05-->1.67-->1.51. Diuresis has been titrated. Transient bout of hypotension overnight and AM 12/17. Decreased Eliquis dosing to 2.5mg BID. Most med changes done x2 days ago. IV Lasix 40mg/d --> 40mg BID --> and changed to PO for 12/17 but has not yet received dose. ? Over diuresis in combination with ongoing diarrhea from Crohns. No diuretics PTA.   - Recheck BMP in 3-5 days after discharge  - Hold parameters on antihypertensives, held CCB/BB 12/17 due to low BPs and over diuresis/DEBBI, resumed on 12/18  - If Cr function stable in AM after resuming BB/CCB then should be ok to discharge to home     Results for HILTON KING MILY (MRN [id number]) as of 12/18/2020 07:23   Ref. Range 12/15/2020 05:38 12/16/2020 05:57 12/17/2020 06:14 12/18/2020 06:21   Creatinine Latest Ref Range: 0.66 - 1.25 mg/dL 1.10 1.05 1.67 (H) 1.51 (H)       Atrial fibrillation with RVR  HFpEF with acute exacerbation likely due to A. fib RVR  Pulmonary edema  Pulmonary hypertension  Hyponatremia, resolved  Appeared volume overloaded on exam initially.  BNP elevated at 43066, increased to 66665 the next day.  Last echo on file was in 2015 that showed normal EF, dilated left atrium.  Received 40 mg IV Lasix in the ED and 20 mg IV diltiazem bolus as well.  TTE: borderline low EF at 50 to 55%, diastolic function not assessed due to A. fib.  Pulmonary hypertension noted.  Mildly decreased RV systolic function.  - Cardiology signed off 12/17, close follow up in cardiology clinic in 1-2 weeks  after discharge  - Continued PTA metoprolol , increased dose to 75 mg twice daily, subsequently switched to Toprol- mg daily 12/15, hold parameters in place  - Continue PTA Eliquis twice daily, dose reduced from 5mg BID to 2.5mg BID   - Started on diltiazem 120 mg daily 12/15 per cardiology to optimize rate control.  - Started Lasix 40mg BID --> PO --> discontinued 12/17 due to DEBBI and overdiuresis. Would montior OFF Lasix on discharge and close f/u with Cardiology in 1-2 weeks  - Monitor BMP, intake and output    Intake/Output Summary (Last 24 hours) at 12/18/2020 1116  Last data filed at 12/17/2020 2133  Gross per 24 hour   Intake 220 ml   Output 325 ml   Net -105 ml     .       Wt Readings from Last 4 Encounters:   12/17/20 58.1 kg (128 lb 1.6 oz)   12/11/20 63.5 kg (140 lb)   09/11/20 65.8 kg (145 lb)   02/03/20 66.2 kg (146 lb)      Vitals:    12/14/20 0203 12/15/20 0455 12/16/20 0657 12/17/20 0620   Weight: 61.4 kg (135 lb 4.8 oz) 60.5 kg (133 lb 6.4 oz) 57.8 kg (127 lb 8 oz) 58.1 kg (128 lb 1.6 oz)    12/18/20 0144   Weight: 58.3 kg (128 lb 9.6 oz)        Confusion, suspect hospital delirium, improving  ?  Underlying mild dementia  Patient appeared more confused on 12/14/2020.  Nurses report that he is quite forgetful.  Needs frequent reorientation.  Keeps getting up from chair/bed in his room and setting off alarms.  - Continue to monitor  - Minimize sedating medication/narcotics  - Maintain sleep/wake cycle, minimize tethers as able  - Mentation appears much better on 12/16 and on, suspect will get better when he returns home     Hematuria, resolved  ESBL E. coli in urine  UA appears infected at admission.  He also developed some pink-tinged urine.  He is anticoagulated with Eliquis.  He denies any dysuria.  Urine cultures with 10-50,000 colonies of E. coli, ESBL. No fevers, procalcitonin is very low, does not endorse any urinary symptoms. Has had ESBL E. coli in urine cultures multiple times in the  past.  Suspect colonization.  This does not look like true UTI.  Hence will not treat.  Hematuria has cleared up. Hgb stable    Recent Labs   Lab 12/18/20  0621 12/17/20  0614 12/15/20  0538 12/14/20  0623 12/14/20  0154   HGB 13.4 13.2* 12.6* 12.7* 13.2*       Crohn's disease  - Stable, not on any medications at this time  - Discontinue bowel regimen     Spinal stenosis, chronic back pain  - Stable.  Continue as needed Tylenol.  May order PTA tramadol if having increased pain.     Severe malnutrition in the context of acute illness or injury: Nutrition following.       Diet: Combination Diet Regular Diet Adult  Snacks/Supplements Adult: Boost Plus; With Meals  Diet    DVT Prophylaxis: Eliquis  Mcdowell Catheter: in place, indication:    Code Status: Full Code      Disposition Plan   Expected discharge: Tomorrow, recommended to prior living arrangement once renal function improved and VSS.  Entered: Raven Encarnacion PA-C 12/18/2020, 11:58 AM       The patient's care was discussed with the Attending Physician, Dr. Reynolds, Bedside Nurse, Care Coordinator/, Patient and Patient's Family.    Raven Raines)JOSELUIS  Hospitalist Service  [facility]    ______________________________________________________________________    Interval History   Seen and examined. Patient feeling well, no CP, palpitations, lightheadedness, dizziness. No BLE edema. Very disappointed he is not discharging today. He would like to go home today which I explained to patient and wife is not ideal while resuming medications and monitoring renal function.    Data reviewed today: I reviewed all medications, new labs and imaging results over the last 24 hours. I personally reviewed no images or EKG's today.    Physical Exam   Vital Signs: Temp: 97.9  F (36.6  C) Temp src: Oral BP: 114/89 Pulse: 111   Resp: 22 SpO2: 97 % O2 Device: Nasal cannula with humidification Oxygen Delivery: 3 LPM  Weight: 128  lbs 9.6 oz    General: Awake, alert, elderly man who appears stated age. Looks comfortable sitting up in bed. No acute distress.  HEENT: Normocephalic, atraumatic. Extraocular movements intact.   Respiratory: Clear to auscultation bilaterally, no rales, wheezing, or rhonchi. Dim to bases. 3L NC without decreased WOB.  Cardiovascular: Irregular rate and rhythm, +S1 and S2, no murmur auscultated. No peripheral edema.   Gastrointestinal: Soft, non-tender, non-distended. Bowel sounds present.  Skin: Warm, dry. No obvious rashes or lesions on exposed skin. Dorsalis pedis pulses palpable bilaterally.  Musculoskeletal: No joint swelling, erythema or tenderness. Moves all extremities equally.  Neurologic: AAO x3. Cranial nerves 2-12 grossly intact, normal strength and sensation.  Psychiatric: Appropriate mood and affect. No obvious anxiety or depression.    Data   Recent Labs   Lab 12/18/20  0621 12/17/20  0614 12/16/20  0557 12/15/20  0538 12/15/20  0538 12/12/20  0552 12/12/20  0552 12/11/20  1842   WBC 13.7* 15.2*  --   --  15.4*   < > 6.9 13.0*   HGB 13.4 13.2*  --   --  12.6*   < > 12.9* 13.6    99  --   --  101*   < > 98 101*    283  --   --  326   < > 284 342    142 145*  --  141   < > 136 132*   POTASSIUM 3.8 3.7 3.5   < > 3.4   < > 4.4 4.5   CHLORIDE 105 105 107  --  106   < > 101 101   CO2 31 31 31  --  29   < > 26 26   BUN 61* 59* 42*  --  42*   < > 26 28   CR 1.51* 1.67* 1.05  --  1.10   < > 0.89 0.96   ANIONGAP 5 6 7  --  6   < > 9 5   CHANG 8.7 8.5 9.0  --  8.7   < > 9.0 8.8   * 111* 113*  --  111*   < > 161* 127*   ALBUMIN  --   --   --   --   --   --   --  2.9*   PROTTOTAL  --   --   --   --   --   --   --  7.9   BILITOTAL  --   --   --   --   --   --   --  1.1   ALKPHOS  --   --   --   --   --   --   --  125   ALT  --   --   --   --   --   --   --  14   AST  --   --   --   --   --   --   --  18   TROPI  --   --   --   --   --   --  <0.015 <0.015    < > = values in this interval not  displayed.     No results found for this or any previous visit (from the past 24 hour(s)).  Medications     - MEDICATION INSTRUCTIONS -         apixaban ANTICOAGULANT  2.5 mg Oral BID     diltiazem ER COATED BEADS  120 mg Oral Daily     [Held by provider] furosemide  40 mg Oral Daily     ipratropium  2 spray Both Nostrils 4x Daily     levofloxacin  750 mg Intravenous Q48H     metoprolol succinate ER  100 mg Oral Daily     sodium chloride (PF)  3 mL Intracatheter Q8H

## 2020-12-18 NOTE — PROGRESS NOTES
Received oxygen intake at 3:25pm. Reviewed the chart, looks liek patient is going home tomorrow, but I looked at the documents and order indicates 3 LPM cont, but walk test shows she needs 6 (maybre more) with activity.   3:33pm- Called and left Sridevi a message asking for a return call as I had concerns about the documentation  3:35pm- Sapna called back and reviewed the chart with me. She understands my concerns and was going to go talk to the provider and call me back.   4:17pm- Still had not heard back from Sridevi.  SO called again and she was still waiting to hear back from the provider. I told her I am off at 4:30, so she can still call me back, but I also told her they need to call it back in if the patient is leaving tomorrow. She understood and said she would make sure that happens.   4:26pm- Sridevi called back and said they are going to recheck the SATs tomorrow and will call it back in as a new intake. Told her I would let on-call staff know about it, but also that she doesn't have to worry about it until she sees an intake. She understood.

## 2020-12-18 NOTE — PROGRESS NOTES
CLINICAL NUTRITION SERVICES - REASSESSMENT NOTE    Recommendations Ordered by Registered Dietitian (RD):   - Continue diet per MD  - Continue supplements TID w/ meals    Malnutrition:   % Weight Loss:  > 2% in 1 week (severe malnutrition)  % Intake:</= 50% for >/= 5 days (severe malnutrition)  Subcutaneous Fat Loss:  Deferred  Muscle Loss:  Deferred  Fluid Retention:  None noted    Malnutrition Diagnosis: Severe malnutrition  In Context of:  Acute illness or injury     EVALUATION OF PROGRESS TOWARD GOALS   Diet: Regular diet   Boost Plus TID w/ meals   Intake/Tolerance:   - Recorded intakes range from 25-50% over recent days, improvement from early in admission when taking only 0-25%.     - Last BM x2 on 12/17. Stooling daily 1-3x  - Weight has drastically decreasing over admission: up to 9# in the past 7 days (6.6%). On admission RD noted a wt loss of up to 5% in the past 3 months. Pt has been diuresing over admission.   Date/Time Weight   12/18/20 0144 58.3 kg (128 lb 9.6 oz)   12/17/20 0620 58.1 kg (128 lb 1.6 oz)   12/16/20 0657 57.8 kg (127 lb 8 oz)   12/15/20 0455 60.5 kg (133 lb 6.4 oz)   12/14/20 0203 61.4 kg (135 lb 4.8 oz)   12/13/20 0258 62.2 kg (137 lb 3.2 oz)   12/12/20 0009 63.5 kg (140 lb)   12/11/20 1934 63.5 kg (140 lb)     Wt Readings from Last 5 Encounters:   12/18/20 58.3 kg (128 lb 9.6 oz)   12/11/20 63.5 kg (140 lb)   09/11/20 65.8 kg (145 lb)   02/03/20 66.2 kg (146 lb)   07/17/19 64.9 kg (143 lb)       ASSESSED NUTRITION NEEDS:  Dosing Weight:  62.2 kg (current wt)  Estimated Energy Needs:  4057-0496 kcals (30-35 Kcal/Kg)  Justification: repletion  Estimated Protein Needs:  75-93 grams protein (1.2-1.5 g pro/Kg)  Justification: hypercatabolism with acute illness  Estimated Fluid Needs:  2944-7483 mL (1 mL/Kcal)  Justification: maintenance    NEW FINDINGS:   - Discharge planning in progress, possibly today. Hoping to go home w/ family assitance and HHC.     Previous Goals:   Pt will consume >  75% meals and > 50% supplements in 3-5 days  Evaluation: Not met    Previous Nutrition Diagnosis:   Inadequate oral intake related to decreased changes in taste and smell, decreased appetite as evidenced by poor oral intake since admission and suspected PTA as well.  Evaluation: No change      MALNUTRITION  % Weight Loss:  > 2% in 1 week (severe malnutrition)  % Intake:</= 50% for >/= 5 days (severe malnutrition)  Subcutaneous Fat Loss:  Deferred  Muscle Loss:  Deferred  Fluid Retention:  None noted    Malnutrition Diagnosis: Severe malnutrition  In Context of:  Acute illness or injury      CURRENT NUTRITION DIAGNOSIS  Inadequate oral intake related to decreased appetite as evidenced by intakes ranging 0-50% of meals over admission, total wt loss of up to 17# since September 2020.     INTERVENTIONS  Recommendations / Nutrition Prescription  Continue diet as ordered (regular)  Continue supplements TID w/ meals     Implementation  None new today    Goals  Intake of >/=50% meals TID.       MONITORING AND EVALUATION:  Progress towards goals will be monitored and evaluated per protocol and Practice Guidelines    Kelly Thakur RD, LD  [facility] [place], 66, 55, [facility]   Pager: [phone]  Weekend Pager: [phone]

## 2020-12-19 NOTE — PROGRESS NOTES
12/19/20 0923   General Information   Onset of Illness/Injury or Date of Surgery 12/11/20   Referring Physician Dr. Fajardo   Patient/Family Therapy Goal Statement (SLP) To discharge home today   Pertinent History of Current Problem Per notes: Dawson Jones is a 89 year old male with a history of paroxysmal atrial fibrillation on Eliquis, cardiomyopathy, Crohn's disease, spinal stenosis who presented to the ED with worsening shortness of breath and cough.  Acute hypoxic respiratory failure likely secondary to pneumonia versus pulmonary edema, more likely the latter.      Early 12/19 per notes:  Notified of sats dropping to 70s to low 80s with minimal activite even on 3 L NC. Now on 8 L oxymask to maintain 92% sats. Chart reviewed. RN mentioned patient coughs with drinking liquids so potentially could have aspiration pneumonitis? Looks like he had 3 negative covid tests. Last chest xray was 12/11 and showed bilateral patchy infiltrates.    Cough with thin liquids per nursing.   General Observations Alert, cooperative, decreased recall/awareness.  Pt only accepted limited amounts of solids with encouragement.  Pt stated he is not hungry and wants to eat at home.  Pt denies swallowing difficulty.   Oral Motor   Oral Musculature generally intact   Dentition (Oral Motor)   Dentition (Oral Motor) adequate dentition   Cough/Swallow/Gag Reflex (Oral Motor)   Comment, Cough/Swallow/Gag Reflex (Oral Motor) Minimal cough on mucus   Vocal Quality/Secretion Management (Oral Motor)   Comment, Vocal Quality/Secretion Management (Oral Motor) Mild hoarse voice   General Swallowing Observations   Current Diet/Method of Nutritional Intake (General Swallowing Observations, NIS) regular diet;thin liquids   Respiratory Support (General Swallowing Observations) oxygen mask  (6L - sats to 82% when mask removed for 5-10 minutes)   Clinical Swallow Eval: Thin Liquid Texture Trial   Mode of Presentation, Thin Liquids cup   Volume of  Liquid or Food Presented sips x 9   Oral Phase of Swallow Premature pharyngeal entry   Pharyngeal Phase of Swallow reduction in laryngeal movement;repeated swallows  (delay)   Diagnostic Statement cough x 1 on first sip, wet gurgly voice x 1 sip; slight chin tuck with min wet voice x 1   Clinical Swallow Evaluation: Solid Food Texture Trial   Mode of Presentation, Solid self-fed   Volume of Solid Food Presented very small bites x 5   Oral Phase, Solid   (decreased awareness, did not swallow until thin liquids provided)   Pharyngeal Phase, Solid reduction in laryngeal movement;repeated swallows   Diagnostic Statement no signs of aspiration with small sip of thin liquids to clear oral cavity   Esophageal Phase of Swallow   Patient reports or presents with symptoms of esophageal dysphagia No   Swallowing Recommendations   Diet Consistency Recommendations regular diet;thin liquids  (pick soft, moist textures)   Supervision Level for Intake close supervision needed   Mode of Delivery Recommendations no straws;bolus size, small;slow rate of intake   Postural Recommendations   (slight chin tuck with thin liquids)   Swallowing Maneuver Recommendations alternate food and liquid intake;extra swallow;double dry swallow   Monitoring/Assistance Required (Eating/Swallowing) monitor for cough or change in vocal quality with intake   Recommended Feeding/Eating Techniques (Swallow Eval) maintain upright posture during/after eating for 30 minutes   Medication Administration Recommendations, Swallowing (SLP) pills with puree if cough observed with thin liquids   Instrumental Assessment Recommendations VFSS (videofluroscopic swallowing study)  (IP vs OP; Pt declined study on 12/19)   SLP Therapy Assessment/Plan   Criteria for Skilled Therapeutic Interventions Met (SLP Eval) yes   SLP Diagnosis Mild-moderate oral-pharyngeal dysphagia   Rehab Potential (SLP Eval) good, to achieve stated therapy goals   Therapy Frequency (SLP Eval) 5  times/wk   Predicted Duration of Therapy Intervention (SLP Eval) 1 week   Comment, Therapy Assessment/Plan (SLP) Mild-moderate oral-pharyngeal dysphagia was found at bedside.  Deficits/risk factors include suspected pneumonia, increased O2 needs 12/18-12/19, report of cough with thin liquids, decreased awareness, delayed swallows, decreased elevation, and need for multiple swallows. Cough x 1 observed with large sip of thin liquids.  Strategies with cues eliminated coughing.  Recommend careful monitoring of regular diet and thin liquid tolerance, no straw, and reminders to use safe swallow strategies (see diet order).  IP vs OP video swallow study recommended.  If pt has not discharged, SLP may complete study on 12/21 if pt/MD in agreement.  SLP to follow.   Therapy Plan Review/Discharge Plan (SLP)   Therapy Plan Review (SLP) evaluation/treatment results reviewed;care plan/treatment goals reviewed;risks/benefits reviewed;current/potential barriers reviewed;participants included;patient  (pt wants to discharge home today)   SLP Discharge Planning    SLP Discharge Recommendation (DC Rec) home with home care speech therapy   SLP Rationale for DC Rec SLP [facility] swallow Tx to maximize safety for a regular diet/determine OP video swallow study needs   SLP Brief overview of current status  Mild-moderate oral-pharyngeal dysphagia found at bedside.  Cough x 1 with large sip of thin liquids.  Recommend careful monitoring of regular diet and thin liquid tolerance, no straw, and reminders to use safe swallow strategies (see diet order).  IP vs OP video swallow study recommended.  If pt has not discharged, SLP may complete study on 12/21.    Total Evaluation Time   Total Evaluation Time (Minutes) 15

## 2020-12-19 NOTE — PROGRESS NOTES
Patient has been assessed for Home Oxygen needs. Oxygen readings:    *Pulse oximetry (SpO2) = 87% on room air at rest while awake.    *SpO2 improved to 94% on 3 liters/minute at rest.    *SpO2 = 82% on room air during activity/with exercise.    *SpO2 improved to 85% on 6 liters/minute during activity/with exercise.

## 2020-12-19 NOTE — PLAN OF CARE
[id number] A&O x 4, forgetful. Patient denies pain. VSS, on 8L oxymask. Pt's O2 saturation dropped to high 70s-low 80s with minimal activity on 3L NC. MD notified - cxray ordered today. Pt also coughing after drinking fluids - SLP consult ordered for today. Up with Ax1 w/ BSC. Tele: A fib CVR. Continue to Monitor.

## 2020-12-19 NOTE — PROGRESS NOTES
"Hospitalist Cross Cover  12/19/2020    Notified of sats dropping to 70s to low 80s with minimal activite even on 3 L NC. Now on 8 L oxymask to maintain 92% sats. Chart reviewed. RN mentioned patient coughs with drinking liquids so potentially could have aspiration pneumonitis? Looks like he had 3 negative covid tests. Last chest xray was 12/11 and showed bilateral patchy infiltrates. Has had significant attempts at diuresis with bump in creatinine with no change in oxygen requirement. Liasted as FULL CODE.     /74 (BP Location: Right arm)   Pulse 90   Temp 97.6  F (36.4  C) (Oral)   Resp 16   Ht 1.676 m (5' 6\")   Wt 58.3 kg (128 lb 9.6 oz)   SpO2 92%   BMI 20.76 kg/m      Plan: OK to use the amount of oxygen needed to maintain sat >91%.   2 vie chest xray later this morning (~ 8 AM).  Speech swallow eval.  Has already been in the hospital a week. Still seems like discharge this weekend unlikely unless he has marked improvement and stable oxygen need.   Discussed with bedside nurse.    Dolly Ramirez MD        "

## 2020-12-19 NOTE — PROGRESS NOTES
Oxygen Documentation:   I certify that this patient, Dawson Jones has been under my care (or a nurse practitioner or physican's assistant working with me). This is the face-to-face encounter for oxygen medical necessity.      Dawson Jones is now in a chronic stable state and continues to require supplemental oxygen. Patient has continued oxygen desaturation due to ILD J84.9  Pulmonary Hypertension I27.20.    Alternative treatment(s) tried or considered and deemed clinically infective for treatment of ILD J84.9  Pulmonary Hypertension I27.20 include nebulizers, steroids and pulmonary toileting.  If portability is ordered, is the patient mobile within the home? yes    **Patients who qualify for home O2 coverage under the CMS guidelines require ABG tests or O2 sat readings obtained closest to, but no earlier than 2 days prior to the discharge, as evidence of the need for home oxygen therapy. Testing must be performed while patient is in the chronic stable state. See notes for O2 sats.**

## 2020-12-19 NOTE — PROVIDER NOTIFICATION
MD Notification    Notified Person: MD    Notified Person Name: Dolly Ramirez     Notification Date/Time: 12/19/2020 01:49    Notification Interaction: Page     Purpose of Notification: Pt O2 sats dropping to high 70s-low 80s with minimal activity on 3LPM NC. Now requiring 8LPM oxymask to maintain 92%. CEFERINO Gibbons *[id number]    Orders Received: SLP consult & Cxray in morning.     Comments: Provider also informed about pt coughing after sips of water.

## 2020-12-19 NOTE — DISCHARGE SUMMARY
Discharge Summary  Hospitalist    Date of Admission:  12/11/2020  Date of Discharge:  12/19/2020  Discharging Provider: Christina Fajardo MD  Date of Service (when I saw the patient): 12/19/20    Discharge Diagnoses   COVID-19 negative x3  Acute hypoxic respiratory failure, unclear etiology, pulmonary edema due to CHF versus pneumonia versus?  Interstitial pneumonitis  Lactic acidosis, resolved  Leukocytosis likely due to dexamethasone, improving  Atrial fibrillation with RVR  HFpEF with acute exacerbation likely due to A. fib RVR  Pulmonary edema  Pulmonary hypertension  Hyponatremia, resolved  Acute kidney injury 12/17, improving    History of Present Illness   Please refer H & P for details.      Hospital Course   Dawson Jones is a 89 year old male with a history of paroxysmal atrial fibrillation on Eliquis, cardiomyopathy, Crohn's disease, spinal stenosis who presented to the ED with worsening shortness of breath and cough.     COVID-19 negative x3  Acute hypoxic respiratory failure, unclear etiology, pulmonary edema due to CHF versus pneumonia versus?  Interstitial pneumonitis  Lactic acidosis, resolved  Leukocytosis likely due to dexamethasone, improving  Symptomatic with SOB, cough, loss of taste and smell, weakness and occasional lightheadedness over x1 wk PTA. Tested negative for Covid 12/6, 12/11, 12/14. On admit, CXR with bilateral patchy infiltrates concerning for viral/atypical pneumonia versus fluid. Hypoxic to 79% on room air, desats with minimal activity. Leukocytosis 13. Elevated CRP at 146.  Elevated BNP 10k. Elevated lactate 2.7. IV Lasix, IV Zosyn, IV diltiazem and IV dexamethasone given in the ED.  - procalcitonin was low at 0.1. Started Zosyn at admission. UA appears infected. Blood cultures with NGTD and urine cultures with 10-50k colonies of E. coli.  Zosyn switched to ceftriaxone and azithromycin which was subsequently switched to levofloxacin for treatment of possible bacterial pneumonia.   However procalcitonin recheck had decreased further to 0.05 making ongoing significant bacterial infection unlikely.  Finished 1 week of antibiotics in the hospital.  Received IV Lasix 40mg BID for likely heart failure exacerbation as noted below which was switched to PO 12/16 and discontinued 12/17 due to DEBBI and over diuresis  Received IV dexamethasone 6 mg daily which was discontinued 12/15 as COVID-19 testing returned negative for a third time on 12/14/2020 and discontinued special precautions.  -Patient made very slow progress during the hospitalization.  Oxygen needs were fluctuating.  He required 3 L at rest but would desat with minimal activity requiring 6 to 8 L to get up to the high 80s and would take a long time to recover.  -On 12/19, CRP was again elevated greater than 100.  High resolution chest CT showed diffuse bilateral groundglass opacities concerning for interstitial pneumonitis/ILD/fibrotic changes with possible component of edema/infection/other inflammatory process.      -Patient was very adamant on discharging home.  Discussed at length with the patient that oxygen needs were still fluctuating and we would need to work-up further with pulmonology consult.  Patient however was insistent that he would not stay another night and wanted to go home.  Subsequently, discussed at length with patient's wife and son over the phone multiple times regarding discharge planning.  Discussed that patient is high risk for readmission given fluctuating oxygen needs, oxygen desaturation with minimal activity.  Family was very concerned regarding patient's mental status as he was so upset on discharging home and they were concerned that he will get more delirious and upset if we kept him in the hospital.  Plan is to discharge home with home PT, OT, RN, ST and home oxygen and arrange for close follow-up with PCP.  Have also sent referrals to pulmonology and palliative care for earliest available appointments.  At  discharge he is on 3 L nasal cannula oxygen at rest with 6 to 8 L with activity.  -Did consider steroids at discharge given elevated CRP and interstitial pneumonitis pattern on chest CT.  However decided to hold off on starting till he see his pulmonologist/PCP.  He is at high risk for delirium, steroid side effects at discharge home.  Hence held off on steroids.  -Also discussed goals of care with patient.  He does stated to me I do not understand and was fixated on discharging home.  Discussed at length with family and he is changed to DNR/DNI.  At this time family would like to continue with restorative cares but are leaning towards comfort focus cares should he decompensate further at home.  Arranging palliative care referral as mentioned above.  - Set up for PCP appt with  Dr. Mcadams 12/21 at 0900  - Cardiology appt being set up by Care coordinator for 1-2 weeks after discharge     Acute kidney injury 12/17, improving  Cr bump from 0.86-->1.05-->1.67-->1.51. Diuresis has been titrated. Transient bout of hypotension overnight and AM 12/17. Decreased Eliquis dosing to 2.5mg BID. Most med changes done x2 days ago. IV Lasix 40mg/d --> 40mg BID --> and changed to PO for 12/17 but has not yet received dose. ? Over diuresis in combination with ongoing diarrhea from Crohns. No diuretics PTA.   - Recheck BMP in 3-5 days after discharge  - Hold parameters on antihypertensives, held CCB/BB 12/17 due to low BPs and over diuresis/DEBBI, resumed on 12/18  -Creatinine 1.3 on day of discharge.        Atrial fibrillation with RVR  HFpEF with acute exacerbation likely due to A. fib RVR  Pulmonary edema  Pulmonary hypertension  Hyponatremia, resolved  Appeared volume overloaded on exam initially.  BNP elevated at 95960, increased to 08948 the next day.  Last echo on file was in 2015 that showed normal EF, dilated left atrium.  Received 40 mg IV Lasix in the ED and 20 mg IV diltiazem bolus as well.  TTE: borderline low EF at 50 to  55%, diastolic function not assessed due to A. fib.  Pulmonary hypertension noted.  Mildly decreased RV systolic function.  - Cardiology signed off 12/17, close follow up in cardiology clinic in 1-2 weeks after discharge  - Continued PTA metoprolol , increased dose to 75 mg twice daily, subsequently switched to Toprol- mg daily 12/15, hold parameters in place  - Continue PTA Eliquis twice daily, dose reduced from 5mg BID to 2.5mg BID   - Started on diltiazem 120 mg daily 12/15 per cardiology to optimize rate control.  - Started Lasix 40mg BID --> PO --> discontinued 12/17 due to DEBBI and overdiuresis. Would montior OFF Lasix on discharge and close f/u with Cardiology in 1-2 weeks  - Monitor BMP, intake and output            Wt Readings from Last 4 Encounters:   12/17/20 58.1 kg (128 lb 1.6 oz)   12/11/20 63.5 kg (140 lb)   09/11/20 65.8 kg (145 lb)   02/03/20 66.2 kg (146 lb)      -BNP down to 3584 on day of discharge.     Confusion, suspect hospital delirium, improving  ?  Underlying mild dementia  Patient appeared more confused on 12/14/2020.  Nurses report that he is quite forgetful.  Needs frequent reorientation.  Keeps getting up from chair/bed in his room and setting off alarms.  - Continue to monitor  - Minimize sedating medication/narcotics  - Maintain sleep/wake cycle, minimize tethers as able  - Mentation appears much better on 12/16 and on, suspect will get better when he returns home  -Alert and conversing appropriately on day of discharge.  Is fixated on going home and does not seem to understand the gravity of his condition.  Did defer to his wife for making decisions.     Hematuria, resolved  ESBL E. coli in urine  UA appears infected at admission.  He also developed some pink-tinged urine.  He is anticoagulated with Eliquis.  He denies any dysuria.  Urine cultures with 10-50,000 colonies of E. coli, ESBL. No fevers, procalcitonin is very low, does not endorse any urinary symptoms. Has had ESBL  E. coli in urine cultures multiple times in the past.  Suspect colonization.  This does not look like true UTI.  Hence will not treat.  Hematuria has cleared up. Hgb stable             Recent Labs   Lab 12/18/20  0621 12/17/20  0614 12/15/20  0538 12/14/20  0623 12/14/20  0154   HGB 13.4 13.2* 12.6* 12.7* 13.2*         Crohn's disease  - Stable, not on any medications at this time  - Discontinue bowel regimen     Spinal stenosis, chronic back pain  - Stable.  Continue as needed Tylenol.  May order PTA tramadol if having increased pain.     Severe malnutrition in the context of acute illness or injury: Nutrition following.        Patient is high risk for readmission.  Did discharge prematurely as he was adamant on returning home.  Patient and family declined TCU though this was recommended.    Christina Fajardo MD, MD      Pending Results   These results will be followed up by Hospitalist team.  Unresulted Labs Ordered in the Past 30 Days of this Admission     No orders found from 11/11/2020 to 12/12/2020.          Code Status   DNR / DNI       Primary Care Physician   Judson Mcadams    Follow-ups Needed After Discharge   Follow-up Appointments     Follow-up and recommended labs and tests       Follow up with primary care provider, Judson Mcadams, within 3-5 days   for hospital follow- up.  The following labs/tests are recommended: repeat   BMP and CBC.  Referral to Pulmonologist for ILD (initial visit) within a week or   earliest available appointment.  Referral to Palliative Clinic in 1 - 2 weeks ( initial visit) for ongoing   goals of care , possible transition to comfort cares.             Physical Exam   Temp: 97.7  F (36.5  C) Temp src: Oral BP: 107/74 Pulse: 93   Resp: 16 SpO2: 94 % O2 Device: Nasal cannula Oxygen Delivery: 3 LPM  Vitals:    12/17/20 0620 12/18/20 0144 12/19/20 0558   Weight: 58.1 kg (128 lb 1.6 oz) 58.3 kg (128 lb 9.6 oz) 57.2 kg (126 lb)     Vital Signs with Ranges  Temp:  [97.6  F (36.4   C)-97.7  F (36.5  C)] 97.7  F (36.5  C)  Pulse:  [88-95] 93  Resp:  [16-18] 16  BP: ()/(64-77) 107/74  SpO2:  [77 %-98 %] 94 %  I/O last 3 completed shifts:  In: 580 [P.O.:580]  Out: 300 [Urine:300]    Constitutional: Alert, somewhat cooperative, no apparent distress  Respiratory: Non labored breathing at rest, crackles bilaterally  Cardiovascular: IR IR, no murmurs, no edema  GI: Normal bowel sounds, soft, non-distended, non-tender  Skin: No obvious rash  Neuro: Alert, engages in appropriate conversation, fluent speech, moving all extremities, no facial asymmetry  Psych: Anxious and fixated on discharging home      Discharge Disposition   Discharged to home  Condition at discharge: Fair    Consultations This Hospital Stay   PHYSICAL THERAPY ADULT IP CONSULT  CARDIOLOGY IP CONSULT  CARE MANAGEMENT / SOCIAL WORK IP CONSULT  SWALLOW EVAL SPEECH PATH AT BEDSIDE IP CONSULT    Time Spent on this Encounter   I, Christina Fajardo MD, personally saw the patient today and spent greater than 60 minutes discharging this patient.  Greater than 50% time spent in patient and family counseling, chart review, care coordination and discharge planning.    Discharge Orders      Home care nursing referral      Home Care PT Referral for Hospital Discharge      Home Care OT Referral for Hospital Discharge      Activity    Your activity upon discharge: activity as tolerated     Incentive Spirometry    Continue to use incentive spirometer 4 times a day Until return to normal activity     MD face to face encounter    Documentation of Face to Face and Certification for Home Health Services    I certify that patient: Dawson Jones is under my care and that I, or a nurse practitioner or physician's assistant working with me, had a face-to-face encounter that meets the physician face-to-face encounter requirements with this patient on: 12/19/2020.    This encounter with the patient was in whole, or in part, for the following medical  condition, which is the primary reason for home health care: deconditioning after hospitalization, malnutrition, and need for oxygen.    I certify that, based on my findings, the following services are medically necessary home health services: Nursing, Occupational Therapy and Physical Therapy.    My clinical findings support the need for the above services because: Nurse is needed: To assess fluid status, hydration, and vital signs after changes in medications or other medical regimen., To provide assessment and oversight required in the home to assure adherence to the medical plan due to: deconditioning after hospitalization. Occupational Therapy Services are needed to assess and treat cognitive ability and address ADL safety due to impairment in cognition and deconditioning. and Physical Therapy Services are needed to assess and treat the following functional impairments: deconditioning.  Speech Therapy also needed for dysphagia.    Further, I certify that my clinical findings support that this patient is homebound (i.e. absences from home require considerable and taxing effort and are for medical reasons or Mu-ism services or infrequently or of short duration when for other reasons) because: Leaving home is medically contraindicated for the following reason(s): Dyspnea on exertion that makes it so they cannot leave their home for needed services without clinical deterioration...    Based on the above findings. I certify that this patient is confined to the home and needs intermittent skilled nursing care, physical therapy and/or speech therapy.  The patient is under my care, and I have initiated the establishment of the plan of care.  This patient will be followed by a physician who will periodically review the plan of care.  Physician/Provider to provide follow up care: Judson Mcadams    Attending hospital physician (the Medicare certified [place] provider): Dr. Christina Fajardo  Physician Signature: See  electronic signature associated with these discharge orders.  Date: 12/19/2020     Reason for your hospital stay    Admitted with pneumonia vs fluid overload, treated with antibiotics and diuretics as well as oxygen. Likely has interstitial lung disease.  We tested you for COVID19 three times and they did not show you had COVID19. Your kidneys became dry when we were treating the heart which caused us to keep you an extra two days and adjust your fluid pill to ensure they recovered.     Follow-up and recommended labs and tests     Follow up with primary care provider, Jduson Mcadams, within 3-5 days for hospital follow- up.  The following labs/tests are recommended: repeat BMP and CBC.  Referral to Pulmonologist for ILD (initial visit) within a week or earliest available appointment.  Referral to Palliative Clinic in 1 - 2 weeks ( initial visit) for ongoing goals of care , possible transition to comfort cares.     Oxygen Adult/Peds    Oxygen Documentation:   I certify that this patient, Dawson Jones has been under my care (or a nurse practitioner or physican's assistant working with me). This is the face-to-face encounter for oxygen medical necessity.      Dawson Jones is now in a chronic stable state and continues to require supplemental oxygen. Patient has continued oxygen desaturation due to ILD J84.9  Pulmonary Hypertension I27.20.    Alternative treatment(s) tried or considered and deemed clinically infective for treatment of ILD J84.9  Pulmonary Hypertension I27.20 include nebulizers, steroids and pulmonary toileting.  If portability is ordered, is the patient mobile within the home? yes    **Patients who qualify for home O2 coverage under the CMS guidelines require ABG tests or O2 sat readings obtained closest to, but no earlier than 2 days prior to the discharge, as evidence of the need for home oxygen therapy. Testing must be performed while patient is in the chronic stable state. See notes for O2  sats.**     Diet    Follow this diet upon discharge: Orders Placed This Encounter      Snacks/Supplements Adult: Boost Plus; With Meals      Combination Diet Regular Diet Adult     Discharge Medications   Current Discharge Medication List      START taking these medications    Details   diltiazem ER COATED BEADS (CARDIZEM CD/CARTIA XT) 120 MG 24 hr capsule Take 1 capsule (120 mg) by mouth daily  Qty: 30 capsule, Refills: 3    Associated Diagnoses: Atrial fibrillation with rapid ventricular response (H)      metoprolol succinate ER (TOPROL-XL) 100 MG 24 hr tablet Take 1 tablet (100 mg) by mouth daily  Qty: 30 tablet, Refills: 3    Associated Diagnoses: Atrial fibrillation with rapid ventricular response (H)         CONTINUE these medications which have CHANGED    Details   apixaban ANTICOAGULANT (ELIQUIS) 2.5 MG tablet Take 1 tablet (2.5 mg) by mouth 2 times daily Can take half tab of 5 mg tabs till they run out .  Qty: 60 tablet, Refills: 0    Associated Diagnoses: Atrial fibrillation with rapid ventricular response (H)         CONTINUE these medications which have NOT CHANGED    Details   acetaminophen (TYLENOL 8 HOUR) 650 MG CR tablet Take 650-1,300 mg by mouth 3 times daily as needed for mild pain or fever      cyanocobalamin (CYANOCOBALAMIN) 1000 MCG/ML injection INJECT 1 ML IN THE MUSCLE EVERY 30 DAYS.  Qty: 3 mL, Refills: 11    Associated Diagnoses: Vitamin B12 deficiency (non anemic)      diphenoxylate-atropine (LOMOTIL) 2.5-0.025 MG tablet Take 1 tablet by mouth twice daily if needed  Qty: 180 tablet, Refills: 3    Associated Diagnoses: Crohn's disease of small and large intestines with complication (H)      ipratropium (ATROVENT) 0.06 % nasal spray Spray 2 sprays into both nostrils 4 times daily  Qty: 15 mL, Refills: 11    Associated Diagnoses: Runny nose      opium tincture 10 MG/ML (1%) liquid Take 0.2 mLs (2 mg) by mouth every 6 hours as needed for diarrhea (4 drops)  Qty: 118 mL, Refills: 0     "Associated Diagnoses: Crohn's disease of small and large intestines with complication (H)      BD INTEGRA SYRINGE 25G X 1\" 3 ML MISC USE WITH B-12 INJECTIONS  Qty: 36 each, Refills: 11    Associated Diagnoses: Vitamin B12 deficiency (non anemic)         STOP taking these medications       metoprolol tartrate (LOPRESSOR) 50 MG tablet Comments:   Reason for Stopping:             Allergies   Allergies   Allergen Reactions     No Known Allergies      Data   Most Recent 3 CBC's:  Recent Labs   Lab Test 12/18/20  0621 12/17/20  0614 12/15/20  0538   WBC 13.7* 15.2* 15.4*   HGB 13.4 13.2* 12.6*    99 101*    283 326      Most Recent 3 BMP's:  Recent Labs   Lab Test 12/19/20  0658 12/18/20  0621 12/17/20  0614    141 142   POTASSIUM 3.7 3.8 3.7   CHLORIDE 105 105 105   CO2 30 31 31   BUN 55* 61* 59*   CR 1.30* 1.51* 1.67*   ANIONGAP 5 5 6   CHANG 8.8 8.7 8.5   GLC 92 100* 111*     Most Recent 2 LFT's:  Recent Labs   Lab Test 12/11/20  1842 10/25/18 10/18/18  0906   AST 18 18 18   ALT 14  --  20   ALKPHOS 125  --  74   BILITOTAL 1.1  --  0.4     Most Recent INR's and Anticoagulation Dosing History:  Anticoagulation Dose History     Recent Dosing and Labs Latest Ref Rng & Units 11/11/2010 12/21/2010    INR 0.86 - 1.14 2.60(H) 0.98        Most Recent 3 Troponin's:  Recent Labs   Lab Test 12/12/20  0552 12/11/20  1842   TROPI <0.015 <0.015     Most Recent Cholesterol Panel:  Recent Labs   Lab Test 03/27/17  1136   CHOL 171   LDL 84   HDL 72   TRIG 74     Most Recent 6 Bacteria Isolates From Any Culture (See EPIC Reports for Culture Details):  Recent Labs   Lab Test 12/11/20 2001 12/11/20 1917 12/11/20  1842 10/30/18  1330 10/18/18  0906 10/17/18  1210   CULT 10,000 to 50,000 colonies/mL  Escherichia coli ESBL  ESBL (extended beta lactamase) producing organisms require contact precautions.  * No growth No growth <1000 colonies/mL  mixed urogenital jose  Susceptibility testing not routinely done   No growth " No growth     Most Recent TSH, T4 and A1c Labs:  Recent Labs   Lab Test 09/24/18  1232 03/27/17  1136   TSH  --  1.06   A1C 4.8  --        Results for orders placed or performed during the hospital encounter of 12/11/20   XR Chest Port 1 View    Narrative    EXAM: CHEST SINGLE VIEW PORTABLE  LOCATION: [facility]  DATE/TIME: 12/14/2020 1:40 AM    INDICATION: Worsening hypoxia.  COMPARISON: 12/11/2020.      Impression    IMPRESSION:   1. Patchy opacities scattered within both lungs, most prominent in the lateral aspect of the upper right lung. These are nonspecific, but most likely infectious or inflammatory in etiology. The opacities in the upper right lung may have mildly increased   since the comparison study. There has been no other convincing interval change since the recent comparison study.  2. Possible cardiomegaly again noted.   CT Chest Hi-Resolution wo Contrast    Narrative    CT CHEST HI-RESOLUTION WO CONTRAST 12/19/2020 12:22 PM    CLINICAL HISTORY: Shortness of breath; chf. Persistent patchy  opacities, no improvement  TECHNIQUE: High resolution images were obtained through the chest  during inspiration with select expiratory views. Prone imaging was  performed. Multiplanar reformats were obtained. Dose reduction  techniques were used.    CONTRAST: None.    COMPARISON: Chest radiograph 12/11/2020, 10/15/2018    FINDINGS:   LUNGS AND PLEURA: Subcentimeter nodular hypodensity on the anterior  aspect of the lower jagruti directed towards the proximal right  mainstem bronchus without occlusion measuring 0.9 cm series 5/135.    Upper and lower lung moderate to severe subpleural reticulation  without honeycombing or bronchiectasis throughout both lungs. Patchy  increased groundglass component throughout both lungs, most prominent  in the lung bases. No suspicious focal consolidative opacity.    Trace subpleural edema in the superior aspect of the right major  fissure. No pleural effusion or  pneumothorax.    MEDIASTINUM/AXILLAE: Dilatation of the ascending thoracic aorta  measuring 4.3 cm with mild to moderate calcified atheromatous plaque.  Coronary artery and aortic valvular leaflet calcifications are noted.    No axillary, mediastinal or obvious hilar lymphadenopathy, though  evaluation is limited in the absence of intravenous contrast.    UPPER ABDOMEN: Cholelithiasis. Gastric fundal diverticulum without  acute diverticulitis.    MUSCULOSKELETAL: No destructive osseous lesions.      Impression    IMPRESSION:   1.  Diffuse bilateral subpleural reticulation and an groundglass  without bronchiectasis or honeycombing. Findings are compatible with  at least a component of fibrotic changes and can be seen with  interstitial pneumonitis such as NSIP or UIP (though not in a typical  pattern).  2.  Superimposed acute infectious/inflammatory pneumonitis or  CHF/volume overload cannot be excluded.  3.  No suspicious focal consolidation.  4.  Trace subpleural edema without significant pleural effusion.  5.  Subcentimeter nodular density from the anterior trachea, may  represent nodular mucous, though follow-up or malignancy cannot be  excluded.    DAHLIA SOTO MD   Echocardiogram Complete    Narrative    [id number]  [id number]  [id number]  [id number]^ALVARO^DIMITRIOS^SAMSON           [facility]  Echocardiography Laboratory  [address] [place] [address]        Name: KING HILTON  MRN: [id number]  : 1930  Study Date: 2020 10:43 AM  Age: 89 yrs  Gender: Male  Patient Location: [facility]  Reason For Study: CHF  Ordering Physician: DIMITRIOS JOHN  Referring Physician: Judson Mcadams  Performed By: Lorna Cline     BSA: 1.7 m2  Height: 66 in  Weight: 140 lb  HR: 94  BP: 132/86 mmHg  _____________________________________________________________________________  __        Procedure  Complete Portable Echo  Adult.  _____________________________________________________________________________  __        Interpretation Summary     Left ventricular systolic function is low normal.  The visual ejection fraction is estimated at 50-55%.  The right ventricle is mild to moderately dilated.  Mildly decreased right ventricular systolic function  Right ventricular systolic pressure is elevated, consistent with moderate  pulmonary hypertension.  IVC diameter >2.1 cm collapsing <50% with sniff suggests a high RA pressure  estimated at 15 mmHg or greater.  The study was technically limited.  _____________________________________________________________________________  __        Left Ventricle  The left ventricle is normal in size. Diastolic function not assessed due to  atrial fibrillation. Left ventricular systolic function is low normal. The  visual ejection fraction is estimated at 50-55%. Septal motion is consistent  with conduction abnormality.     Right Ventricle  The right ventricle is mild to moderately dilated. Mildly decreased right  ventricular systolic function.     Atria  There is severe biatrial enlargement.     Mitral Valve  The mitral valve leaflets are mildly thickened. Mild mitral valve prolapse,  posterior leaflet. There is mild (1+) mitral regurgitation.        Tricuspid Valve  There is moderate (2+) tricuspid regurgitation. The right ventricular systolic  pressure is approximated at 39.9 mmHg plus the right atrial pressure. IVC  diameter >2.1 cm collapsing <50% with sniff suggests a high RA pressure  estimated at 15 mmHg or greater. Right ventricular systolic pressure is  elevated, consistent with moderate pulmonary hypertension.     Aortic Valve  The aortic valve is trileaflet. There is trace to mild aortic regurgitation.  Mild valvular aortic stenosis. The mean AoV pressure gradient is 8.5 mmHg.  Gradients may be underestimated due to low flow state, stroke volume 34ml/m2.     Pulmonic Valve  The pulmonic  valve is not well visualized.     Vessels  Borderline aortic root dilatation. The ascending aorta is Mildly dilated.  (4.4cm).     Pericardium  Trivial pericardial effusion.        Rhythm  The rhythm was atrial fibrillation.  _____________________________________________________________________________  __  MMode/2D Measurements & Calculations  asc Aorta Diam: 4.4 cm     LVOT diam: 2.1 cm  LVOT area: 3.5 cm2        Doppler Measurements & Calculations  MV E max koffi: 82.2 cm/sec  MV dec time: 0.20 sec  Ao V2 max: 201.5 cm/sec  Ao max P.0 mmHg  Ao V2 mean: 133.9 cm/sec  Ao mean P.5 mmHg  Ao V2 VTI: 38.4 cm  TOMAS(I,D): 1.5 cm2  TOMAS(V,D): 1.6 cm2  AI P1/2t: 230.6 msec  LV V1 max PG: 3.6 mmHg  LV V1 max: 94.3 cm/sec  LV V1 VTI: 16.8 cm  SV(LVOT): 58.8 ml  SI(LVOT): 34.2 ml/m2  TR max koffi: 315.2 cm/sec  TR max P.9 mmHg  AV Koffi Ratio (DI): 0.47  TOMAS Index (cm2/m2): 0.89              _____________________________________________________________________________  __        Report approved by: Jayla Mustafa 2020 12:28 PM

## 2020-12-19 NOTE — PLAN OF CARE
"Alert, disoriented to situation, forgetful. Pt anxious about being in hospital and wants to discharge home. VSS on 3 L NC, per home O2 test patient appears to desat with ambulation. Plan to retest patient for home O2 tomorrow, pending discharge. Tele: A fib, CVR-RVR, HR , scheduled metoprolol and dilt given. Pt did not eat all day, refusing to eat stating \"I'll eat when I get home\". Encouraged PO intake. Incentive spirometer encouraged, patient tolerated fairly. Denies CP and SOB. PETERS. Up w/ 1 and walker/GB. Plan for possible discharge tomorrow with home O2 pending O2 sats and kidney function. PO diuretics remain on hold per MD. Continue IV ABX.  "

## 2020-12-20 NOTE — PLAN OF CARE
Physical Therapy Discharge Summary    Reason for therapy discharge:    Discharged to home with home therapy.    Progress towards therapy goal(s). See goals on Care Plan in [facility] electronic health record for goal details.  Goals partially met.  Barriers to achieving goals:   discharge from facility.    Therapy recommendation(s):    Continued therapy is recommended.  Rationale/Recommendations:  home PT to maximize functional independence and safety in home environment.

## 2020-12-20 NOTE — PLAN OF CARE
Alert, disoriented to situation and forgetful, [place]. VSS on 3 L NC with increased need in O2 when ambulating. Plan to send patient home with home O2. Per MD patient adequate for discharge home with home health care, palliative care referral and PT/OT referral. CT of chest done today, plan to follow up with pulmonology outpatient. D/t patient's forgetfulness, discharge instructions reviewed over the phone with patient son and AVS printed and sent home with patient. All new medications have been sent home with patient from discharge pharmacy. Incentive spirometer sent home with patient, patient successfully demonstrates use however needs to be encouraged to use device. Pt son aware of this per AVS. IV removed and all belongs have been collected and sent home with patient. WC ride given off unit, patient has been brought home with family with home O2 device. Education materials on home O2 use have been included in patient's discharge instructions. Further home O2 equipment to be dropped off at patient's house and further instructions to be reviewed with patient and family once patient returns home by home O2 staff.

## 2020-12-21 NOTE — TELEPHONE ENCOUNTER
Chief Complaint: Atrial Fibrillation With Rapid Ventricular Response (H), Physical Deconditioning,  SAT 19-DEC-2020  0 / 1    [phone] ([place])

## 2020-12-21 NOTE — TELEPHONE ENCOUNTER
Patient was evaluated by cardiology while inpatient for HF and afib. Called patient's wife Shannon to discuss any post hospital d/c questions she may have, review medication changes, and confirm f/u appts. Patient's wife denied any questions regarding new medications or changes to PTA medications. Patient's wife denied that patient had any SOB, chest pain, or light headedness. RN confirmed with patient's wife that patient  has an apt scheduled on 1/4/21 with KRISTINE Deanna CORREIA. Patient's wife advised to call clinic with any cardiac related questions or concerns prior to this kristine't. Patient's wife verbalized understanding and agreed with plan.           Assessment & Plan     Dawson Jones is a 89 year old male with past medical history of paroxsymal atrial fibrillation, CM, Crohn's disease, spinal stenosis. This patient was admitted on 12/11/2020 with symptoms of SOB and cough        1.  Acute respiratory failure/heart failure with preserved ejection fraction  EF 50-55%; RV moderately reduced/moderate pulmonary hypertension/ moderate TR  Patient was admitted with shortness of breath, hypoxia and patchy opacities on chest x-ray suspicious for pneumonia.  CRP was elevated.  However Covid test repeatedly has come back negative.  He did receive some empiric steroids for potential COVID-19 infection.  -suspect he also has an element of congestive heart failure with preserved ejection fraction and RV dysfunction.    -Responded very well with the diuretics  -weight down 12 pounds  -hyponatremia improved.    -oral lasix 40mg daily held this am-would not give today  -creatinine up to 1.6 from 1.02; BUN 59  -RN has called IM, may need fluid bolus         2.  Persistent atrial fibrillation now with suboptimal rate control    He has had chronic atrial fibrillation but his ventricular rate was elevated on admission.    -Rate control may be suboptimal because of ongoing infection.     Continue apixaban 2.5mg bid,  long-acting  metoprolol and long-acting diltiazem  Given that his weight is now below 60 kg and age 89,  he needs apixaban at a lower dose which he is on    BP 86 this am, likely dry     3. Runs of NSVT last evening and this am; asymptomatic  Potassium low normal  Check magnesium--2.0   nuke 2010 fixed apical to basal inferior/inferolateral defect likely attenuation  No ischemia     4.  Possible ESBL/E. coli UTI, on antibiotics.  Is also suspicion of bacterial pneumonia.  Per hospitalist; planning to contact pleat a 7-day course of antibiotics     MD Roma Santos, MSN, APRN, CNP  [facility]

## 2020-12-21 NOTE — PROGRESS NOTES
"Dawson Jones is a 89 year old male who is being evaluated via a billable telephone visit.      The patient has been notified of following:     \"This telephone visit will be conducted via a call between you and your physician/provider. We have found that certain health care needs can be provided without the need for a physical exam.  This service lets us provide the care you need with a short phone conversation.  If a prescription is necessary we can send it directly to your pharmacy.  If lab work is needed we can place an order for that and you can then stop by our lab to have the test done at a later time.    Telephone visits are billed at different rates depending on your insurance coverage. During this emergency period, for some insurers they may be billed the same as an in-person visit.  Please reach out to your insurance provider with any questions.    If during the course of the call the physician/provider feels a telephone visit is not appropriate, you will not be charged for this service.\"    Patient has given verbal consent for Telephone visit?  Yes    What phone number would you like to be contacted at? [phone]    How would you like to obtain your AVS? Mail a copy    Subjective     Dawson Jones is a 89 year old male who presents via phone visit today for the following health issues:    HPI       Hospital Follow-up Visit:    Hospital/Nursing Home/IP Rehab Facility: [facility]  Date of Admission: 12/11/2020  Date of Discharge: 12/19/2020  Reason(s) for Admission:     COVID-19 negative x3  Acute hypoxic respiratory failure, unclear etiology, pulmonary edema due to CHF versus pneumonia versus?  Interstitial pneumonitis  Lactic acidosis, resolved  Leukocytosis likely due to dexamethasone, improving  Atrial fibrillation with RVR  HFpEF with acute exacerbation likely due to A. fib RVR  Pulmonary edema  Pulmonary hypertension  Hyponatremia, resolved  Acute kidney injury 12/17, " improving      Was your hospitalization related to COVID-19? No   Problems taking medications regularly:  None  Medication changes since discharge: Yes - updated in med list  Problems adhering to non-medication therapy:  Has not had therapy but states is very weak    Summary of hospitalization:  [place] hospital discharge summary reviewed  Diagnostic Tests/Treatments reviewed.  Follow up needed: pulmonary medicine, hospice consult  Other Healthcare Providers Involved in Patient s Care:         Homecare  Update since discharge: fluctuating course. Post Discharge Medication Reconciliation: discharge medications reconciled and changed, per note/orders.  Plan of care communicated with patient and family          Hospitalized for dyspnea/hypoxia  CT shows fibrosis  COVID negative  Discharged without complete evaluation per patient's preference to leave hospital apparently  Son and wife say he is weak and requiring supplemental oxygen  Questions about continuing evaluation for potentially reversible causes as outpatient vs. Moving toward palliative approach to care           Review of Systems            Objective          Vitals:  No vitals were obtained today due to virtual visit.            Assessment/Plan:    Assessment & Plan     Acute and chronic respiratory failure with hypoxia (H)  Unclear etiology  Declining course   Advanced age with multiple comorbid problems  Long discussion  Wife interested in hospice care in home to help keep him out of hospital   Son interested in pulmonary consult, potentially video or tele visit to review CT findings   I communicated that I am, unfortunately, doubtful that there will be an easily reversible cause for his decline  I think it will be a very difficult evaluation in the outpatient setting  They are unwilling to consider returning to the hospital  Pulmonary referral placed  Referral for hospice consult placed  Follow up with me in 2 weeks to discuss further, sooner if needed    - PULMONARY MEDICINE REFERRAL  - HOSPICE REFERRAL            Return in about 2 weeks (around 1/4/2021) for virtual fu appt .      Judson Mcadams MD  [facility]    Phone call duration:  31 minutes

## 2020-12-23 NOTE — LETTER
[facility] CARE COORDINATION  [address]  [facility] [address]      December 23, 2020      Dawson Jones  [address]  [facility] [address]      Dear Dawson,    I am a clinic community health worker who works with Judson Mcadams MD at [facility]. I wanted to thank you for spending the time to talk with me.  Below is a description of clinic care coordination and how I can further assist you.      The clinic care coordination team is made up of a registered nurse,  and community health worker who understand the health care system. The goal of clinic care coordination is to help you manage your health and improve access to the health care system in the most efficient manner. The team can assist you in meeting your health care goals by providing education, coordinating services, strengthening the communication among your providers and supporting you with any resource needs.    Please feel free to contact me at [phone] with any questions or concerns. We are focused on providing you with the highest-quality healthcare experience possible and that all starts with you.     Sincerely,     KARELY Luciano  Clinic Care Coordination  [facility]: Gracie Rhoades, Rowan McCone, Women's  Phone: [phone]

## 2020-12-23 NOTE — TELEPHONE ENCOUNTER
[facility] now requests orders and shares plan of care/discharge summaries for some patients through [employer].  Please REPLY TO THIS MESSAGE OR ROUTE BACK TO THE AUTHOR in order to give authorization for orders when needed.  This is considered a verbal order, you will still receive a faxed copy of orders for signature.  Thank you for your assistance in improving collaboration for our patients.    ORDER  Nursing 2x/week for 2 weeks; 1x/week for 2 weeks; 3 PRN  BMP and CBC to be drawn at next visit  SN to adminster B12 injection Qmonth.    PT/OT to eval and treat   SW to assist with LTC Planning, ACP and community resources    HHA 1x/week for 2 weeks; 2x/week for 2 weeks    Sara Milner RN [facility]  [phone]  ck [email or web address]

## 2020-12-23 NOTE — PROGRESS NOTES
Clinic Care Coordination Contact  Community Health Worker Initial Outreach  Spoke with Patient's wife, Pauly    KARELY Initial Information Gathering:  Referral Source: IP Report    [facility] 12/11/20-12/19/20  IP for Pneumonia  Medication Changes- Yes      Patient accepts CC: No, Enrolled in home care. Patient will be sent Care Coordination introduction letter for future reference. Patient's wife, Pauly is interested in the program, but not at this time. Patient's wife requested information to be mailed instead of sending via [employer].    Plan: Care Coordinator will send care coordination introduction letter with care coordinator contact information and explanation of care coordination services via mail. Care Coordinator will do no further outreaches at this time.    KARELY Luciano  Clinic Care Coordination  [facility]: Gracie Rhoades  Phone: [phone]

## 2020-12-24 NOTE — LETTER
December 28, 2020      Dawson MINOR Jones  [address] [facility] [address]        Dear ,           The following letter pertains to your most recent diagnostic tests:     These results show improving kidney function and stable other labs.  The white blood cell count may be high from dexamethasone received in the hospital.         Sincerely,     Dr. Mcadams         Resulted Orders   Basic metabolic panel   Result Value Ref Range    Sodium 140 133 - 144 mmol/L    Potassium 3.8 3.4 - 5.3 mmol/L    Chloride 105 94 - 109 mmol/L    Carbon Dioxide 29 20 - 32 mmol/L    Anion Gap 6 3 - 14 mmol/L    Glucose 120 (H) 70 - 99 mg/dL    Urea Nitrogen 62 (H) 7 - 30 mg/dL    Creatinine 1.42 (H) 0.66 - 1.25 mg/dL    GFR Estimate 43 (L) >60 mL/min/[1.73_m2]      Comment:      Non  GFR Calc  Starting 12/18/2018, serum creatinine based estimated GFR (eGFR) will be   calculated using the Chronic Kidney Disease Epidemiology Collaboration   (CKD-EPI) equation.      GFR Estimate If Black 50 (L) >60 mL/min/[1.73_m2]      Comment:       GFR Calc  Starting 12/18/2018, serum creatinine based estimated GFR (eGFR) will be   calculated using the Chronic Kidney Disease Epidemiology Collaboration   (CKD-EPI) equation.      Calcium 8.3 (L) 8.5 - 10.1 mg/dL   CBC with platelets differential   Result Value Ref Range    WBC 17.5 (H) 4.0 - 11.0 10e9/L    RBC Count 4.10 (L) 4.4 - 5.9 10e12/L    Hemoglobin 13.4 13.3 - 17.7 g/dL    Hematocrit 40.7 40.0 - 53.0 %    MCV 99 78 - 100 fl    MCH 32.7 26.5 - 33.0 pg    MCHC 32.9 31.5 - 36.5 g/dL    RDW 12.0 10.0 - 15.0 %    Platelet Count 302 150 - 450 10e9/L    Diff Method Automated Method     % Neutrophils 80.0 %    % Lymphocytes 10.9 %    % Monocytes 5.0 %    % Eosinophils 3.3 %    % Basophils 0.2 %    % Immature Granulocytes 0.6 %    Nucleated RBCs 0 0 /100    Absolute Neutrophil 14.0 (H) 1.6 - 8.3 10e9/L    Absolute Lymphocytes 1.9 0.8 - 5.3 10e9/L     Absolute Monocytes 0.9 0.0 - 1.3 10e9/L    Absolute Eosinophils 0.6 0.0 - 0.7 10e9/L    Absolute Basophils 0.0 0.0 - 0.2 10e9/L    Abs Immature Granulocytes 0.1 0 - 0.4 10e9/L    Absolute Nucleated RBC 0.0        (These results are viewable via MY CHART.   If you are having trouble accessing your account call  [phone])

## 2020-12-26 NOTE — PROGRESS NOTES
[facility] now requests orders and shares plan of care/discharge summaries for some patients through [employer].  Please REPLY TO THIS MESSAGE OR ROUTE BACK TO THE AUTHOR in order to give authorization for orders when needed.  This is considered a verbal order, you will still receive a faxed copy of orders for signature.  Thank you for your assistance in improving collaboration for our patients.    DISCHARGE SUMMARY    SN arrived at patient's home at scheduled visit time, SN was greeted at the door by a family member who told SN that patient had passed away. SN offered support, family acknowledged at states that everything is taken care of.     SN called patient's home later and spoke with wife Pauly. She states that patient did pass on, she could not remember exactly the time, but states that the patient had just gotten done brushing his teeth, patient had walked back to bed, then collapsed. Family did call 911. Police came. The body was collected by the [place] [email or web address]. Wife is grateful for homecare service, [facility] service was very helpful. SN offered any additional resource or support for family, wife states she is doing fine for now, she is still in a bit of shock but states family is with her and can assist her. SN notified weekend supervisor, sent email to CM and other care team members.

## 2020-12-27 NOTE — RESULT ENCOUNTER NOTE
The following letter pertains to your most recent diagnostic tests:    These results show improving kidney function and stable other labs.  The white blood cell count may be high from dexamethasone received in the hospital.        Sincerely,    Dr. Mcadams

## 2020-12-29 ENCOUNTER — TELEPHONE (OUTPATIENT)
Dept: FAMILY MEDICINE | Facility: CLINIC | Age: 85
End: 2020-12-29

## 2020-12-29 NOTE — TELEPHONE ENCOUNTER
Reason for Call:  Other death certificate    Detailed comments: patient passed away over the weekend. Is wondering when he was last seen and if Dr Mcadams could sign the death certificate.    Phone Number Patient can be reached at: Other phone number:  Medical examiners office [phone]    Best Time: any      Can we leave a detailed message on this number? YES    Call taken on 12/29/2020 at 1:14 PM by Móinca Lewis

## 2020-12-29 NOTE — TELEPHONE ENCOUNTER
I called the ME office and spoke to Cami. She will be emailing the death certificate for Dr. Mcadams to complete. I will give to Dr. Mcadams when we receive it.

## 2021-02-17 DIAGNOSIS — Z53.9 DIAGNOSIS NOT YET DEFINED: Primary | ICD-10-CM

## 2022-02-17 PROBLEM — F11.90 CHRONIC, CONTINUOUS USE OF OPIOIDS: Status: ACTIVE | Noted: 2018-01-04

## 2022-06-09 NOTE — PROGRESS NOTES
The following medication was given:     MEDICATION: Vitamin B12  1000 mcg  ROUTE: IM  SITE: Deltoid - Left  DOSE: 1 mL  LOT #: [id number]   :  [employer]  EXPIRATION DATE:  07/2018  NDC#: 7478-2682-69    Patient tolerated well. JOYCE Segura CMA February 7, 2017 9:48 AM       
14-Dec-2021

## 2022-08-10 NOTE — NURSING NOTE
Left message to return call.   Tue Jan 2, 2018  9:22 AM  The following medication was given:     MEDICATION: Vitamin B12  1000mcg  ROUTE: IM  SITE: Deltoid - Left  DOSE: 1 ML  LOT #: [id number]  :  [employer]  EXPIRATION DATE:  05/2019  NDC#: 0250-9340-23      Prior to injection verified patient identity using patient's name and date of birth.    Sussy Shore CMA

## (undated) DEVICE — SYR 01ML 27GA 0.5" NDL TBC 309623

## (undated) DEVICE — GLOVE PROTEXIS BLUE W/NEU-THERA 6.5  2D73EB65

## (undated) DEVICE — SPONGE KITTNER 31001010

## (undated) DEVICE — SU VICRYL 3-0 PS-1 18" UND J683

## (undated) DEVICE — SOL NACL 0.9% IRRIG 1000ML BOTTLE 2F7124

## (undated) DEVICE — DRAPE SHEET REV FOLD 3/4 9349

## (undated) DEVICE — TUBING IV SOLUTION SET SECONDARY 34" 2C7451

## (undated) DEVICE — DRSG ADAPTIC 3X3" 6112

## (undated) DEVICE — ESU ELEC BLADE 4" COATED

## (undated) DEVICE — DRAIN ROUND W/RESERV KIT JACKSON PRATT 10FR 400ML SU130-402D

## (undated) DEVICE — SOL NACL 0.9% IRRIG 3000ML BAG 2B7477

## (undated) DEVICE — SUCTION MANIFOLD NEPTUNE SGL

## (undated) DEVICE — SOL GLYCINE 1.5% 3000ML BAG 2B7317

## (undated) DEVICE — SU VICRYL 2-0 CT-1 27" J339H

## (undated) DEVICE — PACK SPINE SM CUSTOM SNE15SSFSK

## (undated) DEVICE — PAD CHUX UNDERPAD 23X24" 7136

## (undated) DEVICE — GLOVE PROTEXIS W/NEU-THERA 7.5  2D73TE75

## (undated) DEVICE — DRSG GAUZE 4X4" 3033

## (undated) DEVICE — NDL 30GA 0.5" 305106

## (undated) DEVICE — SOL WATER IRRIG 1000ML BOTTLE 2F7114

## (undated) DEVICE — GLOVE PROTEXIS BLUE W/NEU-THERA 7.5  2D73EB75

## (undated) DEVICE — TUBING SUCTION 6"X3/16" N56A

## (undated) DEVICE — PACK TUR CUSTOM SBA15RUFSE

## (undated) DEVICE — PREP CHLORAPREP 26ML TINTED ORANGE  260815

## (undated) DEVICE — LINEN TOWEL PACK X5 5464

## (undated) DEVICE — SOL BENZOIN 0.5OZ

## (undated) DEVICE — SOL NACL 0.9% INJ 1000ML BAG 07983-09

## (undated) DEVICE — CUSHION INSERT LG PRONE VIEW JACKSON TABLE

## (undated) DEVICE — ESU CORD MONOPOLAR HIGH FREQUENCY 26006M-D/10

## (undated) DEVICE — ESU GROUND PAD UNIVERSAL W/O CORD

## (undated) DEVICE — DECANTER VIAL 2006S

## (undated) DEVICE — TUBING SUCTION SOFT 20'X3/16" 0036570

## (undated) DEVICE — SU ETHILON 2-0 FS 18" 664H

## (undated) DEVICE — GLOVE PROTEXIS POWDER FREE 8.0 ORTHOPEDIC 2D73ET80

## (undated) DEVICE — DRAPE GYN/UROLOGY FLUID POUCH TUR 29455

## (undated) DEVICE — SPONGE SURGIFOAM 50

## (undated) DEVICE — GLOVE PROTEXIS POWDER FREE 6.5 ORTHOPEDIC 2D73ET65

## (undated) DEVICE — CATH TRAY FOLEY COUDE SURESTEP 16FR W/URNE MTR STLK A304716A

## (undated) DEVICE — SU VICRYL 1 MO-4 18" J702D

## (undated) DEVICE — MIDAS REX DISSECTING TOOL  14MH30

## (undated) RX ORDER — PROPOFOL 10 MG/ML
INJECTION, EMULSION INTRAVENOUS
Status: DISPENSED
Start: 2018-10-01

## (undated) RX ORDER — LIDOCAINE HYDROCHLORIDE 20 MG/ML
INJECTION, SOLUTION EPIDURAL; INFILTRATION; INTRACAUDAL; PERINEURAL
Status: DISPENSED
Start: 2019-01-24

## (undated) RX ORDER — FENTANYL CITRATE 50 UG/ML
INJECTION, SOLUTION INTRAMUSCULAR; INTRAVENOUS
Status: DISPENSED
Start: 2018-10-01

## (undated) RX ORDER — LIDOCAINE HYDROCHLORIDE 10 MG/ML
INJECTION, SOLUTION EPIDURAL; INFILTRATION; INTRACAUDAL; PERINEURAL
Status: DISPENSED
Start: 2018-10-01

## (undated) RX ORDER — BUPIVACAINE HYDROCHLORIDE AND EPINEPHRINE 5; 5 MG/ML; UG/ML
INJECTION, SOLUTION EPIDURAL; INTRACAUDAL; PERINEURAL
Status: DISPENSED
Start: 2018-10-01

## (undated) RX ORDER — PROPOFOL 10 MG/ML
INJECTION, EMULSION INTRAVENOUS
Status: DISPENSED
Start: 2019-01-24

## (undated) RX ORDER — CEFAZOLIN SODIUM 1 G/3ML
INJECTION, POWDER, FOR SOLUTION INTRAMUSCULAR; INTRAVENOUS
Status: DISPENSED
Start: 2018-10-01

## (undated) RX ORDER — LIDOCAINE HYDROCHLORIDE 20 MG/ML
INJECTION, SOLUTION EPIDURAL; INFILTRATION; INTRACAUDAL; PERINEURAL
Status: DISPENSED
Start: 2018-10-01

## (undated) RX ORDER — ONDANSETRON 2 MG/ML
INJECTION INTRAMUSCULAR; INTRAVENOUS
Status: DISPENSED
Start: 2018-10-01

## (undated) RX ORDER — NEOSTIGMINE METHYLSULFATE 1 MG/ML
VIAL (ML) INJECTION
Status: DISPENSED
Start: 2018-10-01

## (undated) RX ORDER — BETAMETHASONE SODIUM PHOSPHATE AND BETAMETHASONE ACETATE 3; 3 MG/ML; MG/ML
INJECTION, SUSPENSION INTRA-ARTICULAR; INTRALESIONAL; INTRAMUSCULAR; SOFT TISSUE
Status: DISPENSED
Start: 2018-10-01

## (undated) RX ORDER — ACETAMINOPHEN 500 MG
TABLET ORAL
Status: DISPENSED
Start: 2018-10-01

## (undated) RX ORDER — GLYCOPYRROLATE 0.2 MG/ML
INJECTION, SOLUTION INTRAMUSCULAR; INTRAVENOUS
Status: DISPENSED
Start: 2018-10-01

## (undated) RX ORDER — ONDANSETRON 2 MG/ML
INJECTION INTRAMUSCULAR; INTRAVENOUS
Status: DISPENSED
Start: 2019-01-24

## (undated) RX ORDER — FENTANYL CITRATE 50 UG/ML
INJECTION, SOLUTION INTRAMUSCULAR; INTRAVENOUS
Status: DISPENSED
Start: 2019-01-24

## (undated) RX ORDER — FUROSEMIDE 10 MG/ML
INJECTION INTRAMUSCULAR; INTRAVENOUS
Status: DISPENSED
Start: 2019-01-24

## (undated) RX ORDER — ACETAMINOPHEN 325 MG/1
TABLET ORAL
Status: DISPENSED
Start: 2019-01-24